# Patient Record
Sex: FEMALE | Race: WHITE | NOT HISPANIC OR LATINO
[De-identification: names, ages, dates, MRNs, and addresses within clinical notes are randomized per-mention and may not be internally consistent; named-entity substitution may affect disease eponyms.]

---

## 2017-01-06 ENCOUNTER — APPOINTMENT (OUTPATIENT)
Dept: NEPHROLOGY | Facility: CLINIC | Age: 62
End: 2017-01-06

## 2017-01-06 VITALS
HEART RATE: 90 BPM | WEIGHT: 155 LBS | BODY MASS INDEX: 24.62 KG/M2 | DIASTOLIC BLOOD PRESSURE: 70 MMHG | OXYGEN SATURATION: 99 % | TEMPERATURE: 97.7 F | HEIGHT: 66.5 IN | SYSTOLIC BLOOD PRESSURE: 150 MMHG | RESPIRATION RATE: 16 BRPM

## 2017-01-10 ENCOUNTER — RESULT REVIEW (OUTPATIENT)
Age: 62
End: 2017-01-10

## 2017-01-10 LAB
ANION GAP SERPL CALC-SCNC: 15 MMOL/L
BASOPHILS # BLD AUTO: 0.03 K/UL
BASOPHILS NFR BLD AUTO: 0.6 %
BUN SERPL-MCNC: 62 MG/DL
CALCIUM SERPL-MCNC: 8.9 MG/DL
CHLORIDE SERPL-SCNC: 107 MMOL/L
CO2 SERPL-SCNC: 21 MMOL/L
CREAT SERPL-MCNC: 3.23 MG/DL
EOSINOPHIL # BLD AUTO: 0.16 K/UL
EOSINOPHIL NFR BLD AUTO: 3 %
GLUCOSE SERPL-MCNC: 103 MG/DL
HCT VFR BLD CALC: 32.3 %
HGB BLD-MCNC: 10.7 G/DL
IMM GRANULOCYTES NFR BLD AUTO: 0.2 %
LYMPHOCYTES # BLD AUTO: 1.01 K/UL
LYMPHOCYTES NFR BLD AUTO: 18.7 %
MAN DIFF?: NORMAL
MCHC RBC-ENTMCNC: 30.7 PG
MCHC RBC-ENTMCNC: 33.1 GM/DL
MCV RBC AUTO: 92.6 FL
MONOCYTES # BLD AUTO: 0.5 K/UL
MONOCYTES NFR BLD AUTO: 9.3 %
NEUTROPHILS # BLD AUTO: 3.68 K/UL
NEUTROPHILS NFR BLD AUTO: 68.2 %
PLATELET # BLD AUTO: 177 K/UL
POTASSIUM SERPL-SCNC: 4.4 MMOL/L
RBC # BLD: 3.49 M/UL
RBC # FLD: 13.2 %
SODIUM SERPL-SCNC: 143 MMOL/L
WBC # FLD AUTO: 5.39 K/UL

## 2017-02-03 ENCOUNTER — APPOINTMENT (OUTPATIENT)
Dept: NEPHROLOGY | Facility: CLINIC | Age: 62
End: 2017-02-03

## 2017-03-31 ENCOUNTER — APPOINTMENT (OUTPATIENT)
Dept: NEPHROLOGY | Facility: CLINIC | Age: 62
End: 2017-03-31

## 2017-03-31 VITALS
WEIGHT: 153 LBS | OXYGEN SATURATION: 98 % | TEMPERATURE: 97.9 F | BODY MASS INDEX: 24.3 KG/M2 | RESPIRATION RATE: 14 BRPM | HEART RATE: 97 BPM | SYSTOLIC BLOOD PRESSURE: 172 MMHG | DIASTOLIC BLOOD PRESSURE: 98 MMHG | HEIGHT: 66.5 IN

## 2017-03-31 VITALS — DIASTOLIC BLOOD PRESSURE: 90 MMHG | SYSTOLIC BLOOD PRESSURE: 154 MMHG

## 2017-04-03 LAB
ALBUMIN SERPL ELPH-MCNC: 4.4 G/DL
ANION GAP SERPL CALC-SCNC: 18 MMOL/L
BASOPHILS # BLD AUTO: 0.03 K/UL
BASOPHILS NFR BLD AUTO: 0.4 %
BUN SERPL-MCNC: 55 MG/DL
CALCIUM SERPL-MCNC: 9.5 MG/DL
CALCIUM SERPL-MCNC: 9.5 MG/DL
CHLORIDE SERPL-SCNC: 104 MMOL/L
CO2 SERPL-SCNC: 22 MMOL/L
CREAT SERPL-MCNC: 3.22 MG/DL
EOSINOPHIL # BLD AUTO: 0.26 K/UL
EOSINOPHIL NFR BLD AUTO: 3.8 %
FERRITIN SERPL-MCNC: 240.4 NG/ML
GLUCOSE SERPL-MCNC: 85 MG/DL
HCT VFR BLD CALC: 33.4 %
HGB BLD-MCNC: 11 G/DL
IMM GRANULOCYTES NFR BLD AUTO: 0.1 %
IRON SATN MFR SERPL: 17 %
IRON SERPL-MCNC: 46 UG/DL
LYMPHOCYTES # BLD AUTO: 1.38 K/UL
LYMPHOCYTES NFR BLD AUTO: 20.3 %
MAN DIFF?: NORMAL
MCHC RBC-ENTMCNC: 30.4 PG
MCHC RBC-ENTMCNC: 32.9 GM/DL
MCV RBC AUTO: 92.3 FL
MONOCYTES # BLD AUTO: 0.49 K/UL
MONOCYTES NFR BLD AUTO: 7.2 %
NEUTROPHILS # BLD AUTO: 4.62 K/UL
NEUTROPHILS NFR BLD AUTO: 68.2 %
PARATHYROID HORMONE INTACT: 269 PG/ML
PHOSPHATE SERPL-MCNC: 3.4 MG/DL
PLATELET # BLD AUTO: 180 K/UL
POTASSIUM SERPL-SCNC: 5 MMOL/L
RBC # BLD: 3.62 M/UL
RBC # FLD: 13.4 %
SODIUM SERPL-SCNC: 144 MMOL/L
TIBC SERPL-MCNC: 271 UG/DL
UIBC SERPL-MCNC: 225 UG/DL
WBC # FLD AUTO: 6.79 K/UL

## 2017-06-23 ENCOUNTER — APPOINTMENT (OUTPATIENT)
Dept: NEPHROLOGY | Facility: CLINIC | Age: 62
End: 2017-06-23

## 2017-06-23 VITALS
TEMPERATURE: 97.1 F | SYSTOLIC BLOOD PRESSURE: 130 MMHG | OXYGEN SATURATION: 99 % | HEART RATE: 99 BPM | WEIGHT: 154 LBS | RESPIRATION RATE: 16 BRPM | DIASTOLIC BLOOD PRESSURE: 89 MMHG | BODY MASS INDEX: 24.46 KG/M2 | HEIGHT: 66.5 IN

## 2017-06-26 ENCOUNTER — RESULT REVIEW (OUTPATIENT)
Age: 62
End: 2017-06-26

## 2017-06-26 LAB
ALBUMIN SERPL ELPH-MCNC: 3.9 G/DL
ANION GAP SERPL CALC-SCNC: 13 MMOL/L
BASOPHILS # BLD AUTO: 0.02 K/UL
BASOPHILS NFR BLD AUTO: 0.3 %
BUN SERPL-MCNC: 73 MG/DL
CALCIUM SERPL-MCNC: 9.2 MG/DL
CHLORIDE SERPL-SCNC: 105 MMOL/L
CO2 SERPL-SCNC: 24 MMOL/L
CREAT SERPL-MCNC: 3.45 MG/DL
EOSINOPHIL # BLD AUTO: 0.25 K/UL
EOSINOPHIL NFR BLD AUTO: 4 %
GLUCOSE SERPL-MCNC: 103 MG/DL
HCT VFR BLD CALC: 30.6 %
HGB BLD-MCNC: 10 G/DL
IMM GRANULOCYTES NFR BLD AUTO: 0.2 %
LYMPHOCYTES # BLD AUTO: 1.57 K/UL
LYMPHOCYTES NFR BLD AUTO: 24.8 %
MAN DIFF?: NORMAL
MCHC RBC-ENTMCNC: 30 PG
MCHC RBC-ENTMCNC: 32.7 GM/DL
MCV RBC AUTO: 91.9 FL
MONOCYTES # BLD AUTO: 0.44 K/UL
MONOCYTES NFR BLD AUTO: 7 %
NEUTROPHILS # BLD AUTO: 4.03 K/UL
NEUTROPHILS NFR BLD AUTO: 63.7 %
PHOSPHATE SERPL-MCNC: 3.3 MG/DL
PLATELET # BLD AUTO: 184 K/UL
POTASSIUM SERPL-SCNC: 4.6 MMOL/L
RBC # BLD: 3.33 M/UL
RBC # FLD: 13.3 %
SODIUM SERPL-SCNC: 142 MMOL/L
WBC # FLD AUTO: 6.32 K/UL

## 2017-09-01 ENCOUNTER — APPOINTMENT (OUTPATIENT)
Dept: NEPHROLOGY | Facility: CLINIC | Age: 62
End: 2017-09-01
Payer: COMMERCIAL

## 2017-09-01 VITALS
SYSTOLIC BLOOD PRESSURE: 142 MMHG | HEART RATE: 97 BPM | HEIGHT: 66 IN | DIASTOLIC BLOOD PRESSURE: 80 MMHG | WEIGHT: 150 LBS | BODY MASS INDEX: 24.11 KG/M2 | OXYGEN SATURATION: 97 % | TEMPERATURE: 97.5 F | RESPIRATION RATE: 14 BRPM

## 2017-09-01 PROCEDURE — 99214 OFFICE O/P EST MOD 30 MIN: CPT

## 2017-09-05 ENCOUNTER — RESULT REVIEW (OUTPATIENT)
Age: 62
End: 2017-09-05

## 2017-09-06 LAB
ALBUMIN SERPL ELPH-MCNC: 4.2 G/DL
ANION GAP SERPL CALC-SCNC: 19 MMOL/L
BASOPHILS # BLD AUTO: 0.04 K/UL
BASOPHILS NFR BLD AUTO: 0.7 %
BUN SERPL-MCNC: 76 MG/DL
CALCIUM SERPL-MCNC: 9.8 MG/DL
CHLORIDE SERPL-SCNC: 108 MMOL/L
CO2 SERPL-SCNC: 17 MMOL/L
CREAT SERPL-MCNC: 3.24 MG/DL
EOSINOPHIL # BLD AUTO: 0.2 K/UL
EOSINOPHIL NFR BLD AUTO: 3.3 %
GLUCOSE SERPL-MCNC: 37 MG/DL
HCT VFR BLD CALC: 32.5 %
HGB BLD-MCNC: 10.7 G/DL
IMM GRANULOCYTES NFR BLD AUTO: 0.2 %
LYMPHOCYTES # BLD AUTO: 1.57 K/UL
LYMPHOCYTES NFR BLD AUTO: 25.8 %
MAN DIFF?: NORMAL
MCHC RBC-ENTMCNC: 30.6 PG
MCHC RBC-ENTMCNC: 32.9 GM/DL
MCV RBC AUTO: 92.9 FL
MONOCYTES # BLD AUTO: 0.38 K/UL
MONOCYTES NFR BLD AUTO: 6.3 %
NEUTROPHILS # BLD AUTO: 3.88 K/UL
NEUTROPHILS NFR BLD AUTO: 63.7 %
PHOSPHATE SERPL-MCNC: 4.1 MG/DL
PLATELET # BLD AUTO: 172 K/UL
POTASSIUM SERPL-SCNC: 4.8 MMOL/L
RBC # BLD: 3.5 M/UL
RBC # FLD: 13.6 %
SODIUM SERPL-SCNC: 144 MMOL/L
WBC # FLD AUTO: 6.08 K/UL

## 2017-09-07 ENCOUNTER — TRANSCRIPTION ENCOUNTER (OUTPATIENT)
Age: 62
End: 2017-09-07

## 2017-10-27 ENCOUNTER — APPOINTMENT (OUTPATIENT)
Dept: NEPHROLOGY | Facility: CLINIC | Age: 62
End: 2017-10-27
Payer: COMMERCIAL

## 2017-10-27 VITALS
RESPIRATION RATE: 14 BRPM | OXYGEN SATURATION: 98 % | HEIGHT: 66 IN | SYSTOLIC BLOOD PRESSURE: 146 MMHG | HEART RATE: 90 BPM | WEIGHT: 149 LBS | TEMPERATURE: 97.9 F | BODY MASS INDEX: 23.95 KG/M2 | DIASTOLIC BLOOD PRESSURE: 80 MMHG

## 2017-10-27 PROCEDURE — 99214 OFFICE O/P EST MOD 30 MIN: CPT

## 2017-10-30 ENCOUNTER — RESULT REVIEW (OUTPATIENT)
Age: 62
End: 2017-10-30

## 2017-10-30 ENCOUNTER — RX RENEWAL (OUTPATIENT)
Age: 62
End: 2017-10-30

## 2017-11-02 LAB
ALBUMIN SERPL ELPH-MCNC: 4.6 G/DL
ANION GAP SERPL CALC-SCNC: 21 MMOL/L
BUN SERPL-MCNC: 65 MG/DL
CALCIUM SERPL-MCNC: 10 MG/DL
CHLORIDE SERPL-SCNC: 101 MMOL/L
CO2 SERPL-SCNC: 22 MMOL/L
CREAT SERPL-MCNC: 3.65 MG/DL
GLUCOSE SERPL-MCNC: 41 MG/DL
PHOSPHATE SERPL-MCNC: 3 MG/DL
POTASSIUM SERPL-SCNC: 4.4 MMOL/L
SODIUM SERPL-SCNC: 144 MMOL/L

## 2017-11-06 ENCOUNTER — APPOINTMENT (OUTPATIENT)
Dept: VASCULAR SURGERY | Facility: CLINIC | Age: 62
End: 2017-11-06
Payer: COMMERCIAL

## 2017-11-06 VITALS
HEIGHT: 66 IN | WEIGHT: 149 LBS | DIASTOLIC BLOOD PRESSURE: 75 MMHG | SYSTOLIC BLOOD PRESSURE: 161 MMHG | BODY MASS INDEX: 23.95 KG/M2 | HEART RATE: 73 BPM | TEMPERATURE: 98.3 F

## 2017-11-06 PROCEDURE — G0365: CPT

## 2017-11-06 PROCEDURE — 99202 OFFICE O/P NEW SF 15 MIN: CPT

## 2017-11-07 ENCOUNTER — TRANSCRIPTION ENCOUNTER (OUTPATIENT)
Age: 62
End: 2017-11-07

## 2017-11-08 ENCOUNTER — OUTPATIENT (OUTPATIENT)
Dept: OUTPATIENT SERVICES | Facility: HOSPITAL | Age: 62
LOS: 1 days | End: 2017-11-08
Payer: COMMERCIAL

## 2017-11-08 VITALS
TEMPERATURE: 98 F | DIASTOLIC BLOOD PRESSURE: 86 MMHG | HEART RATE: 84 BPM | SYSTOLIC BLOOD PRESSURE: 167 MMHG | HEIGHT: 66 IN | OXYGEN SATURATION: 100 % | RESPIRATION RATE: 20 BRPM | WEIGHT: 149.03 LBS

## 2017-11-08 DIAGNOSIS — Z90.89 ACQUIRED ABSENCE OF OTHER ORGANS: Chronic | ICD-10-CM

## 2017-11-08 DIAGNOSIS — N18.6 END STAGE RENAL DISEASE: ICD-10-CM

## 2017-11-08 DIAGNOSIS — Z01.818 ENCOUNTER FOR OTHER PREPROCEDURAL EXAMINATION: ICD-10-CM

## 2017-11-08 LAB
ALBUMIN SERPL ELPH-MCNC: 4.3 G/DL — SIGNIFICANT CHANGE UP (ref 3.3–5)
ALP SERPL-CCNC: 101 U/L — SIGNIFICANT CHANGE UP (ref 40–120)
ALT FLD-CCNC: 8 U/L — LOW (ref 10–45)
ANION GAP SERPL CALC-SCNC: 19 MMOL/L — HIGH (ref 5–17)
AST SERPL-CCNC: 16 U/L — SIGNIFICANT CHANGE UP (ref 10–40)
BILIRUB SERPL-MCNC: 0.5 MG/DL — SIGNIFICANT CHANGE UP (ref 0.2–1.2)
BUN SERPL-MCNC: 54 MG/DL — HIGH (ref 7–23)
CALCIUM SERPL-MCNC: 9.9 MG/DL — SIGNIFICANT CHANGE UP (ref 8.4–10.5)
CHLORIDE SERPL-SCNC: 104 MMOL/L — SIGNIFICANT CHANGE UP (ref 96–108)
CO2 SERPL-SCNC: 21 MMOL/L — LOW (ref 22–31)
CREAT SERPL-MCNC: 3.38 MG/DL — HIGH (ref 0.5–1.3)
GLUCOSE SERPL-MCNC: 107 MG/DL — HIGH (ref 70–99)
HCT VFR BLD CALC: 32.5 % — LOW (ref 34.5–45)
HGB BLD-MCNC: 10.9 G/DL — LOW (ref 11.5–15.5)
MCHC RBC-ENTMCNC: 31.1 PG — SIGNIFICANT CHANGE UP (ref 27–34)
MCHC RBC-ENTMCNC: 33.5 GM/DL — SIGNIFICANT CHANGE UP (ref 32–36)
MCV RBC AUTO: 92.6 FL — SIGNIFICANT CHANGE UP (ref 80–100)
PLATELET # BLD AUTO: 171 K/UL — SIGNIFICANT CHANGE UP (ref 150–400)
POTASSIUM SERPL-MCNC: 4.6 MMOL/L — SIGNIFICANT CHANGE UP (ref 3.5–5.3)
POTASSIUM SERPL-SCNC: 4.6 MMOL/L — SIGNIFICANT CHANGE UP (ref 3.5–5.3)
PROT SERPL-MCNC: 7.8 G/DL — SIGNIFICANT CHANGE UP (ref 6–8.3)
RBC # BLD: 3.51 M/UL — LOW (ref 3.8–5.2)
RBC # FLD: 13.1 % — SIGNIFICANT CHANGE UP (ref 10.3–14.5)
SODIUM SERPL-SCNC: 144 MMOL/L — SIGNIFICANT CHANGE UP (ref 135–145)
WBC # BLD: 7.08 K/UL — SIGNIFICANT CHANGE UP (ref 3.8–10.5)
WBC # FLD AUTO: 7.08 K/UL — SIGNIFICANT CHANGE UP (ref 3.8–10.5)

## 2017-11-08 PROCEDURE — 80053 COMPREHEN METABOLIC PANEL: CPT

## 2017-11-08 PROCEDURE — 85027 COMPLETE CBC AUTOMATED: CPT

## 2017-11-08 PROCEDURE — G0463: CPT

## 2017-11-08 RX ORDER — CEFAZOLIN SODIUM 1 G
2000 VIAL (EA) INJECTION ONCE
Qty: 0 | Refills: 0 | Status: DISCONTINUED | OUTPATIENT
Start: 2017-11-10 | End: 2017-11-25

## 2017-11-08 NOTE — H&P PST ADULT - HISTORY OF PRESENT ILLNESS
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

## 2017-11-08 NOTE — H&P PST ADULT - ATTENDING COMMENTS
Pt known  CKD  Impending HD  Needs perm access  Plan L UE AVF  COnd, options, procedure, risks/benegits/implications dw'ed pt and . Pt known  CKD  Impending HD  Needs perm access  Plan L UE AVF  COnd, options, procedure, risks/benegits/implications dw'ed pt and .  11/10/17; No changes  Will proceed w l AVF

## 2017-11-08 NOTE — H&P PST ADULT - NSANTHOSAYNRD_GEN_A_CORE
No. RENETTA screening performed.  STOP BANG Legend: 0-2 = LOW Risk; 3-4 = INTERMEDIATE Risk; 5-8 = HIGH Risk

## 2017-11-08 NOTE — H&P PST ADULT - PMH
Alport's syndrome  carrier  CKD (chronic kidney disease) stage 4, GFR 15-29 ml/min    Hard of hearing    HTN (hypertension) Alport's syndrome  carrier  Anemia    CKD (chronic kidney disease) stage 4, GFR 15-29 ml/min    Hard of hearing    HTN (hypertension)

## 2017-11-10 ENCOUNTER — OUTPATIENT (OUTPATIENT)
Dept: OUTPATIENT SERVICES | Facility: HOSPITAL | Age: 62
LOS: 1 days | End: 2017-11-10
Payer: COMMERCIAL

## 2017-11-10 VITALS
TEMPERATURE: 99 F | RESPIRATION RATE: 18 BRPM | SYSTOLIC BLOOD PRESSURE: 118 MMHG | WEIGHT: 149.03 LBS | DIASTOLIC BLOOD PRESSURE: 70 MMHG | HEIGHT: 66 IN | OXYGEN SATURATION: 100 % | HEART RATE: 74 BPM

## 2017-11-10 VITALS
TEMPERATURE: 99 F | OXYGEN SATURATION: 100 % | DIASTOLIC BLOOD PRESSURE: 68 MMHG | SYSTOLIC BLOOD PRESSURE: 112 MMHG | RESPIRATION RATE: 18 BRPM | HEART RATE: 55 BPM

## 2017-11-10 DIAGNOSIS — N18.6 END STAGE RENAL DISEASE: ICD-10-CM

## 2017-11-10 DIAGNOSIS — Z90.89 ACQUIRED ABSENCE OF OTHER ORGANS: Chronic | ICD-10-CM

## 2017-11-10 LAB
POTASSIUM SERPL-MCNC: 4.6 MMOL/L — SIGNIFICANT CHANGE UP (ref 3.5–5.3)
POTASSIUM SERPL-SCNC: 4.6 MMOL/L — SIGNIFICANT CHANGE UP (ref 3.5–5.3)

## 2017-11-10 PROCEDURE — C1889: CPT

## 2017-11-10 PROCEDURE — 36821 AV FUSION DIRECT ANY SITE: CPT

## 2017-11-10 PROCEDURE — 36819 AV FUSE UPPR ARM BASILIC: CPT | Mod: LT

## 2017-11-10 PROCEDURE — 36820 AV FUSION/FOREARM VEIN: CPT | Mod: GC

## 2017-11-10 PROCEDURE — 84132 ASSAY OF SERUM POTASSIUM: CPT

## 2017-11-10 RX ORDER — ACETAMINOPHEN WITH CODEINE 300MG-30MG
1 TABLET ORAL
Qty: 10 | Refills: 0
Start: 2017-11-10

## 2017-11-10 RX ORDER — LIDOCAINE HCL 20 MG/ML
0.2 VIAL (ML) INJECTION ONCE
Qty: 0 | Refills: 0 | Status: DISCONTINUED | OUTPATIENT
Start: 2017-11-10 | End: 2017-11-10

## 2017-11-10 RX ORDER — SODIUM CHLORIDE 9 MG/ML
3 INJECTION INTRAMUSCULAR; INTRAVENOUS; SUBCUTANEOUS EVERY 8 HOURS
Qty: 0 | Refills: 0 | Status: DISCONTINUED | OUTPATIENT
Start: 2017-11-10 | End: 2017-11-10

## 2017-11-10 NOTE — ASU DISCHARGE PLAN (ADULT/PEDIATRIC). - MEDICATION SUMMARY - MEDICATIONS TO TAKE
I will START or STAY ON the medications listed below when I get home from the hospital:    Tylenol with Codeine #3 oral tablet  -- 1 tab(s) by mouth every 4 hours MDD:6  -- Caution federal law prohibits the transfer of this drug to any person other  than the person for whom it was prescribed.  May cause drowsiness.  Alcohol may intensify this effect.  Use care when operating dangerous machinery.  This product contains acetaminophen.  Do not use  with any other product containing acetaminophen to prevent possible liver damage.  Using more of this medication than prescribed may cause serious breathing problems.    -- Indication: For post op pain    sodium bicarbonate 650 mg oral tablet  -- 2 tab(s) by mouth 2 times a day  -- Indication: For GERD    carvedilol 3.125 mg oral tablet  -- 1 tab(s) by mouth 2 times a day  -- Indication: For tachycardia    amLODIPine 10 mg oral tablet  -- 1 tab(s) by mouth once a day  -- Indication: For HTN    furosemide 20 mg oral tablet  -- 1 tab(s) by mouth once a day  -- Indication: For CKD    Iron 100 Plus oral tablet  -- 1 tab(s) by mouth once a day  -- Indication: For anemia

## 2017-11-10 NOTE — ASU DISCHARGE PLAN (ADULT/PEDIATRIC). - NOTIFY
Bleeding that does not stop/Numbness, tingling/Pain not relieved by Medications/Swelling that continues

## 2017-11-25 ENCOUNTER — TRANSCRIPTION ENCOUNTER (OUTPATIENT)
Age: 62
End: 2017-11-25

## 2017-12-04 ENCOUNTER — TRANSCRIPTION ENCOUNTER (OUTPATIENT)
Age: 62
End: 2017-12-04

## 2017-12-07 ENCOUNTER — APPOINTMENT (OUTPATIENT)
Dept: VASCULAR SURGERY | Facility: CLINIC | Age: 62
End: 2017-12-07
Payer: COMMERCIAL

## 2017-12-07 VITALS
DIASTOLIC BLOOD PRESSURE: 79 MMHG | HEART RATE: 87 BPM | SYSTOLIC BLOOD PRESSURE: 161 MMHG | WEIGHT: 146 LBS | BODY MASS INDEX: 23.46 KG/M2 | TEMPERATURE: 97.7 F | HEIGHT: 66 IN

## 2017-12-07 PROCEDURE — 93990 DOPPLER FLOW TESTING: CPT

## 2017-12-07 PROCEDURE — 99024 POSTOP FOLLOW-UP VISIT: CPT

## 2018-01-05 ENCOUNTER — APPOINTMENT (OUTPATIENT)
Dept: NEPHROLOGY | Facility: CLINIC | Age: 63
End: 2018-01-05
Payer: COMMERCIAL

## 2018-01-05 VITALS
SYSTOLIC BLOOD PRESSURE: 130 MMHG | WEIGHT: 150 LBS | RESPIRATION RATE: 18 BRPM | HEART RATE: 89 BPM | OXYGEN SATURATION: 99 % | DIASTOLIC BLOOD PRESSURE: 70 MMHG | BODY MASS INDEX: 24.11 KG/M2 | TEMPERATURE: 98.6 F | HEIGHT: 66 IN

## 2018-01-05 PROCEDURE — 99214 OFFICE O/P EST MOD 30 MIN: CPT | Mod: GC

## 2018-01-05 RX ORDER — AMOXICILLIN 250 MG/1
250 CAPSULE ORAL
Qty: 21 | Refills: 0 | Status: DISCONTINUED | COMMUNITY
Start: 2017-08-18

## 2018-01-05 RX ORDER — CARVEDILOL 3.12 MG/1
3.12 TABLET, FILM COATED ORAL
Qty: 60 | Refills: 0 | Status: DISCONTINUED | COMMUNITY
Start: 2016-11-14

## 2018-01-05 RX ORDER — ACETAMINOPHEN AND CODEINE 300; 30 MG/1; MG/1
300-30 TABLET ORAL
Qty: 10 | Refills: 0 | Status: DISCONTINUED | COMMUNITY
Start: 2017-11-10

## 2018-01-08 ENCOUNTER — RESULT REVIEW (OUTPATIENT)
Age: 63
End: 2018-01-08

## 2018-01-08 LAB
ALBUMIN SERPL ELPH-MCNC: 4.1 G/DL
ANION GAP SERPL CALC-SCNC: 18 MMOL/L
BASOPHILS # BLD AUTO: 0.02 K/UL
BASOPHILS NFR BLD AUTO: 0.3 %
BUN SERPL-MCNC: 67 MG/DL
CALCIUM SERPL-MCNC: 10.2 MG/DL
CALCIUM SERPL-MCNC: 9.9 MG/DL
CHLORIDE SERPL-SCNC: 104 MMOL/L
CO2 SERPL-SCNC: 21 MMOL/L
CREAT SERPL-MCNC: 3.86 MG/DL
EOSINOPHIL # BLD AUTO: 0.18 K/UL
EOSINOPHIL NFR BLD AUTO: 2.7 %
GLUCOSE SERPL-MCNC: 94 MG/DL
HCT VFR BLD CALC: 32.1 %
HGB BLD-MCNC: 10.6 G/DL
IMM GRANULOCYTES NFR BLD AUTO: 0.2 %
LYMPHOCYTES # BLD AUTO: 1.28 K/UL
LYMPHOCYTES NFR BLD AUTO: 19.4 %
MAN DIFF?: NORMAL
MCHC RBC-ENTMCNC: 31.7 PG
MCHC RBC-ENTMCNC: 33 GM/DL
MCV RBC AUTO: 96.1 FL
MONOCYTES # BLD AUTO: 0.48 K/UL
MONOCYTES NFR BLD AUTO: 7.3 %
NEUTROPHILS # BLD AUTO: 4.62 K/UL
NEUTROPHILS NFR BLD AUTO: 70.1 %
PARATHYROID HORMONE INTACT: 337 PG/ML
PHOSPHATE SERPL-MCNC: 4.3 MG/DL
PLATELET # BLD AUTO: 179 K/UL
POTASSIUM SERPL-SCNC: 5.4 MMOL/L
RBC # BLD: 3.34 M/UL
RBC # FLD: 13.1 %
SODIUM SERPL-SCNC: 143 MMOL/L
WBC # FLD AUTO: 6.59 K/UL

## 2018-01-12 ENCOUNTER — APPOINTMENT (OUTPATIENT)
Dept: NEPHROLOGY | Facility: CLINIC | Age: 63
End: 2018-01-12
Payer: COMMERCIAL

## 2018-01-12 PROCEDURE — 36415 COLL VENOUS BLD VENIPUNCTURE: CPT

## 2018-01-17 LAB
ALBUMIN SERPL ELPH-MCNC: 4.5 G/DL
ANION GAP SERPL CALC-SCNC: 19 MMOL/L
BUN SERPL-MCNC: 67 MG/DL
CALCIUM SERPL-MCNC: 9.8 MG/DL
CHLORIDE SERPL-SCNC: 103 MMOL/L
CO2 SERPL-SCNC: 23 MMOL/L
CREAT SERPL-MCNC: 3.98 MG/DL
GLUCOSE SERPL-MCNC: 97 MG/DL
PHOSPHATE SERPL-MCNC: 4.5 MG/DL
POTASSIUM SERPL-SCNC: 4.8 MMOL/L
SODIUM SERPL-SCNC: 145 MMOL/L

## 2018-02-01 ENCOUNTER — APPOINTMENT (OUTPATIENT)
Dept: VASCULAR SURGERY | Facility: CLINIC | Age: 63
End: 2018-02-01
Payer: COMMERCIAL

## 2018-02-01 VITALS
HEART RATE: 80 BPM | SYSTOLIC BLOOD PRESSURE: 141 MMHG | WEIGHT: 150 LBS | HEIGHT: 66 IN | TEMPERATURE: 98.1 F | BODY MASS INDEX: 24.11 KG/M2 | DIASTOLIC BLOOD PRESSURE: 74 MMHG

## 2018-02-01 PROCEDURE — 99024 POSTOP FOLLOW-UP VISIT: CPT

## 2018-02-01 PROCEDURE — 93971 EXTREMITY STUDY: CPT

## 2018-03-16 ENCOUNTER — APPOINTMENT (OUTPATIENT)
Dept: NEPHROLOGY | Facility: CLINIC | Age: 63
End: 2018-03-16
Payer: COMMERCIAL

## 2018-03-16 VITALS
HEIGHT: 66 IN | WEIGHT: 152 LBS | BODY MASS INDEX: 24.43 KG/M2 | HEART RATE: 93 BPM | DIASTOLIC BLOOD PRESSURE: 80 MMHG | SYSTOLIC BLOOD PRESSURE: 140 MMHG | TEMPERATURE: 97.5 F | RESPIRATION RATE: 16 BRPM

## 2018-03-16 PROCEDURE — 99214 OFFICE O/P EST MOD 30 MIN: CPT

## 2018-03-23 ENCOUNTER — RESULT REVIEW (OUTPATIENT)
Age: 63
End: 2018-03-23

## 2018-03-23 LAB
ALBUMIN SERPL ELPH-MCNC: 4.1 G/DL
ANION GAP SERPL CALC-SCNC: 17 MMOL/L
BUN SERPL-MCNC: 72 MG/DL
CALCIUM SERPL-MCNC: 9.4 MG/DL
CHLORIDE SERPL-SCNC: 100 MMOL/L
CO2 SERPL-SCNC: 24 MMOL/L
CREAT SERPL-MCNC: 3.53 MG/DL
GLUCOSE SERPL-MCNC: 117 MG/DL
PHOSPHATE SERPL-MCNC: 3.2 MG/DL
POTASSIUM SERPL-SCNC: 4.5 MMOL/L
SODIUM SERPL-SCNC: 141 MMOL/L

## 2018-05-10 ENCOUNTER — APPOINTMENT (OUTPATIENT)
Dept: VASCULAR SURGERY | Facility: CLINIC | Age: 63
End: 2018-05-10
Payer: COMMERCIAL

## 2018-05-10 VITALS
HEIGHT: 66 IN | SYSTOLIC BLOOD PRESSURE: 146 MMHG | HEART RATE: 79 BPM | WEIGHT: 152 LBS | DIASTOLIC BLOOD PRESSURE: 76 MMHG | BODY MASS INDEX: 24.43 KG/M2

## 2018-05-10 PROCEDURE — 99212 OFFICE O/P EST SF 10 MIN: CPT | Mod: 25

## 2018-05-10 PROCEDURE — 93990 DOPPLER FLOW TESTING: CPT

## 2018-05-11 ENCOUNTER — APPOINTMENT (OUTPATIENT)
Dept: NEPHROLOGY | Facility: CLINIC | Age: 63
End: 2018-05-11
Payer: COMMERCIAL

## 2018-05-11 VITALS
HEIGHT: 66 IN | SYSTOLIC BLOOD PRESSURE: 120 MMHG | HEART RATE: 78 BPM | WEIGHT: 150 LBS | TEMPERATURE: 98 F | OXYGEN SATURATION: 98 % | BODY MASS INDEX: 24.11 KG/M2 | DIASTOLIC BLOOD PRESSURE: 80 MMHG | RESPIRATION RATE: 16 BRPM

## 2018-05-11 PROCEDURE — 99214 OFFICE O/P EST MOD 30 MIN: CPT | Mod: GC

## 2018-05-14 LAB
ALBUMIN SERPL ELPH-MCNC: 4.1 G/DL
ANION GAP SERPL CALC-SCNC: 17 MMOL/L
BASOPHILS # BLD AUTO: 0.03 K/UL
BASOPHILS NFR BLD AUTO: 0.5 %
BUN SERPL-MCNC: 64 MG/DL
CALCIUM SERPL-MCNC: 9.5 MG/DL
CHLORIDE SERPL-SCNC: 100 MMOL/L
CO2 SERPL-SCNC: 24 MMOL/L
CREAT SERPL-MCNC: 3.87 MG/DL
EOSINOPHIL # BLD AUTO: 0.18 K/UL
EOSINOPHIL NFR BLD AUTO: 3.1 %
GLUCOSE SERPL-MCNC: 97 MG/DL
HCT VFR BLD CALC: 32.8 %
HGB BLD-MCNC: 10.8 G/DL
IMM GRANULOCYTES NFR BLD AUTO: 0.2 %
LYMPHOCYTES # BLD AUTO: 1.46 K/UL
LYMPHOCYTES NFR BLD AUTO: 24.7 %
MAN DIFF?: NORMAL
MCHC RBC-ENTMCNC: 31.5 PG
MCHC RBC-ENTMCNC: 32.9 GM/DL
MCV RBC AUTO: 95.6 FL
MONOCYTES # BLD AUTO: 0.5 K/UL
MONOCYTES NFR BLD AUTO: 8.5 %
NEUTROPHILS # BLD AUTO: 3.72 K/UL
NEUTROPHILS NFR BLD AUTO: 63 %
PHOSPHATE SERPL-MCNC: 4 MG/DL
PLATELET # BLD AUTO: 168 K/UL
POTASSIUM SERPL-SCNC: 4.8 MMOL/L
RBC # BLD: 3.43 M/UL
RBC # FLD: 13.2 %
SODIUM SERPL-SCNC: 141 MMOL/L
WBC # FLD AUTO: 5.9 K/UL

## 2018-07-11 ENCOUNTER — RX RENEWAL (OUTPATIENT)
Age: 63
End: 2018-07-11

## 2018-07-20 ENCOUNTER — APPOINTMENT (OUTPATIENT)
Dept: NEPHROLOGY | Facility: CLINIC | Age: 63
End: 2018-07-20
Payer: COMMERCIAL

## 2018-07-20 VITALS
OXYGEN SATURATION: 99 % | BODY MASS INDEX: 24.75 KG/M2 | HEIGHT: 66 IN | DIASTOLIC BLOOD PRESSURE: 70 MMHG | RESPIRATION RATE: 18 BRPM | HEART RATE: 71 BPM | SYSTOLIC BLOOD PRESSURE: 130 MMHG | WEIGHT: 154 LBS

## 2018-07-20 PROCEDURE — 99214 OFFICE O/P EST MOD 30 MIN: CPT | Mod: GC

## 2018-07-24 ENCOUNTER — RESULT REVIEW (OUTPATIENT)
Age: 63
End: 2018-07-24

## 2018-07-24 LAB
ALBUMIN SERPL ELPH-MCNC: 4.5 G/DL
ANION GAP SERPL CALC-SCNC: 17 MMOL/L
BUN SERPL-MCNC: 78 MG/DL
CALCIUM SERPL-MCNC: 9.6 MG/DL
CHLORIDE SERPL-SCNC: 105 MMOL/L
CO2 SERPL-SCNC: 22 MMOL/L
CREAT SERPL-MCNC: 4.51 MG/DL
GLUCOSE SERPL-MCNC: 110 MG/DL
PHOSPHATE SERPL-MCNC: 4.2 MG/DL
POTASSIUM SERPL-SCNC: 4.8 MMOL/L
SODIUM SERPL-SCNC: 144 MMOL/L

## 2018-08-03 PROBLEM — I10 ESSENTIAL (PRIMARY) HYPERTENSION: Chronic | Status: ACTIVE | Noted: 2017-11-08

## 2018-08-03 PROBLEM — D64.9 ANEMIA, UNSPECIFIED: Chronic | Status: ACTIVE | Noted: 2017-11-08

## 2018-08-09 ENCOUNTER — APPOINTMENT (OUTPATIENT)
Dept: VASCULAR SURGERY | Facility: CLINIC | Age: 63
End: 2018-08-09
Payer: COMMERCIAL

## 2018-08-09 VITALS
BODY MASS INDEX: 24.75 KG/M2 | TEMPERATURE: 98.4 F | DIASTOLIC BLOOD PRESSURE: 71 MMHG | SYSTOLIC BLOOD PRESSURE: 164 MMHG | WEIGHT: 154 LBS | HEIGHT: 66 IN

## 2018-08-09 PROCEDURE — 93990 DOPPLER FLOW TESTING: CPT

## 2018-08-09 PROCEDURE — 99212 OFFICE O/P EST SF 10 MIN: CPT

## 2018-09-28 ENCOUNTER — APPOINTMENT (OUTPATIENT)
Dept: NEPHROLOGY | Facility: CLINIC | Age: 63
End: 2018-09-28
Payer: COMMERCIAL

## 2018-09-28 VITALS
HEART RATE: 91 BPM | RESPIRATION RATE: 18 BRPM | HEIGHT: 66 IN | WEIGHT: 154 LBS | TEMPERATURE: 98.6 F | OXYGEN SATURATION: 100 % | SYSTOLIC BLOOD PRESSURE: 157 MMHG | DIASTOLIC BLOOD PRESSURE: 80 MMHG | BODY MASS INDEX: 24.75 KG/M2

## 2018-09-28 VITALS — DIASTOLIC BLOOD PRESSURE: 80 MMHG | SYSTOLIC BLOOD PRESSURE: 140 MMHG

## 2018-09-28 DIAGNOSIS — N18.4 CHRONIC KIDNEY DISEASE, STAGE 4 (SEVERE): ICD-10-CM

## 2018-09-28 PROCEDURE — 99214 OFFICE O/P EST MOD 30 MIN: CPT | Mod: GC

## 2018-10-04 ENCOUNTER — RESULT REVIEW (OUTPATIENT)
Age: 63
End: 2018-10-04

## 2018-10-04 LAB
ALBUMIN SERPL ELPH-MCNC: 4.5 G/DL
ANION GAP SERPL CALC-SCNC: 16 MMOL/L
BUN SERPL-MCNC: 65 MG/DL
CALCIUM SERPL-MCNC: 9.9 MG/DL
CHLORIDE SERPL-SCNC: 104 MMOL/L
CO2 SERPL-SCNC: 23 MMOL/L
CREAT SERPL-MCNC: 3.6 MG/DL
GLUCOSE SERPL-MCNC: 94 MG/DL
PHOSPHATE SERPL-MCNC: 3.8 MG/DL
POTASSIUM SERPL-SCNC: 5 MMOL/L
SODIUM SERPL-SCNC: 143 MMOL/L

## 2018-11-15 ENCOUNTER — APPOINTMENT (OUTPATIENT)
Dept: NEPHROLOGY | Facility: CLINIC | Age: 63
End: 2018-11-15
Payer: COMMERCIAL

## 2018-11-15 VITALS
DIASTOLIC BLOOD PRESSURE: 76 MMHG | WEIGHT: 155 LBS | HEART RATE: 81 BPM | OXYGEN SATURATION: 100 % | HEIGHT: 66 IN | SYSTOLIC BLOOD PRESSURE: 136 MMHG | RESPIRATION RATE: 18 BRPM | TEMPERATURE: 98.4 F | BODY MASS INDEX: 24.91 KG/M2

## 2018-11-15 PROCEDURE — 99214 OFFICE O/P EST MOD 30 MIN: CPT

## 2018-11-16 ENCOUNTER — RESULT REVIEW (OUTPATIENT)
Age: 63
End: 2018-11-16

## 2018-11-16 LAB
ALBUMIN SERPL ELPH-MCNC: 4.5 G/DL
ANION GAP SERPL CALC-SCNC: 16 MMOL/L
BASOPHILS # BLD AUTO: 0.03 K/UL
BASOPHILS NFR BLD AUTO: 0.5 %
BUN SERPL-MCNC: 66 MG/DL
CALCIUM SERPL-MCNC: 9.6 MG/DL
CALCIUM SERPL-MCNC: 9.9 MG/DL
CHLORIDE SERPL-SCNC: 105 MMOL/L
CO2 SERPL-SCNC: 23 MMOL/L
CREAT SERPL-MCNC: 3.91 MG/DL
EOSINOPHIL # BLD AUTO: 0.14 K/UL
EOSINOPHIL NFR BLD AUTO: 2.2 %
GLUCOSE SERPL-MCNC: 127 MG/DL
HCT VFR BLD CALC: 33.5 %
HGB BLD-MCNC: 11.3 G/DL
IMM GRANULOCYTES NFR BLD AUTO: 0.3 %
LYMPHOCYTES # BLD AUTO: 0.99 K/UL
LYMPHOCYTES NFR BLD AUTO: 15.6 %
MAN DIFF?: NORMAL
MCHC RBC-ENTMCNC: 31.8 PG
MCHC RBC-ENTMCNC: 33.7 GM/DL
MCV RBC AUTO: 94.4 FL
MONOCYTES # BLD AUTO: 0.43 K/UL
MONOCYTES NFR BLD AUTO: 6.8 %
NEUTROPHILS # BLD AUTO: 4.72 K/UL
NEUTROPHILS NFR BLD AUTO: 74.6 %
PARATHYROID HORMONE INTACT: 235 PG/ML
PHOSPHATE SERPL-MCNC: 4.3 MG/DL
PLATELET # BLD AUTO: 165 K/UL
POTASSIUM SERPL-SCNC: 4.9 MMOL/L
RBC # BLD: 3.55 M/UL
RBC # FLD: 13.2 %
SODIUM SERPL-SCNC: 144 MMOL/L
WBC # FLD AUTO: 6.33 K/UL

## 2018-11-18 ENCOUNTER — RX RENEWAL (OUTPATIENT)
Age: 63
End: 2018-11-18

## 2018-12-06 ENCOUNTER — APPOINTMENT (OUTPATIENT)
Dept: VASCULAR SURGERY | Facility: CLINIC | Age: 63
End: 2018-12-06
Payer: COMMERCIAL

## 2018-12-06 VITALS
DIASTOLIC BLOOD PRESSURE: 72 MMHG | SYSTOLIC BLOOD PRESSURE: 149 MMHG | HEIGHT: 66 IN | BODY MASS INDEX: 24.91 KG/M2 | TEMPERATURE: 98.2 F | HEART RATE: 63 BPM | WEIGHT: 155 LBS

## 2018-12-06 PROCEDURE — 93990 DOPPLER FLOW TESTING: CPT

## 2018-12-06 PROCEDURE — 99213 OFFICE O/P EST LOW 20 MIN: CPT

## 2019-01-04 ENCOUNTER — APPOINTMENT (OUTPATIENT)
Dept: NEPHROLOGY | Facility: CLINIC | Age: 64
End: 2019-01-04
Payer: COMMERCIAL

## 2019-01-04 VITALS
SYSTOLIC BLOOD PRESSURE: 156 MMHG | WEIGHT: 154 LBS | RESPIRATION RATE: 16 BRPM | TEMPERATURE: 97.3 F | HEIGHT: 66 IN | BODY MASS INDEX: 24.75 KG/M2 | HEART RATE: 82 BPM | OXYGEN SATURATION: 100 % | DIASTOLIC BLOOD PRESSURE: 82 MMHG

## 2019-01-04 VITALS — DIASTOLIC BLOOD PRESSURE: 80 MMHG | SYSTOLIC BLOOD PRESSURE: 130 MMHG

## 2019-01-04 PROCEDURE — 99214 OFFICE O/P EST MOD 30 MIN: CPT

## 2019-01-04 NOTE — HISTORY OF PRESENT ILLNESS
[FreeTextEntry1] : 63-year-old female with h/o HTN, CKD-5 (presumed Alport's gene carrier) presents for follow-up visit. Pt. was last seen in office on 11/15/18. Patient had left AVF creation surgery on 11/10/17 by Dr. Doan and was last seen in his office for follow-up visit on 12/6/18. Pt. currently feels well. She denies CP, SOB, urinary complaints, or LE swelling. Pt. admits compliance to her medications.

## 2019-01-04 NOTE — REVIEW OF SYSTEMS
[As Noted in HPI] : as noted in HPI [Negative] : Endocrine [Fever] : no fever [Feeling Poorly] : not feeling poorly [Eye Pain] : no eye pain [Earache] : no earache [Lower Ext Edema] : no lower extremity edema [Abdominal Pain] : no abdominal pain [Dysuria] : no dysuria [Limb Swelling] : no limb swelling [Dizziness] : no dizziness [Easy Bleeding] : no tendency for easy bleeding

## 2019-01-04 NOTE — PHYSICAL EXAM
[General Appearance - Alert] : alert [General Appearance - In No Acute Distress] : in no acute distress [General Appearance - Well Nourished] : well nourished [Sclera] : the sclera and conjunctiva were normal [PERRL With Normal Accommodation] : pupils were equal in size, round, and reactive to light [Outer Ear] : the ears and nose were normal in appearance [Neck Appearance] : the appearance of the neck was normal [Auscultation Breath Sounds / Voice Sounds] : lungs were clear to auscultation bilaterally [Heart Rate And Rhythm] : heart rate was normal and rhythm regular [Heart Sounds] : normal S1 and S2 [Murmurs] : no murmurs [Edema] : there was no peripheral edema [Bowel Sounds] : normal bowel sounds [Abdomen Soft] : soft [Abnormal Walk] : normal gait [___ (cm) Fistula] : [unfilled] (cm) fistula [Bruit] : a bruit was present [Thrill] : a thrill was present [] : no rash [No Focal Deficits] : no focal deficits [Oriented To Time, Place, And Person] : oriented to person, place, and time [Impaired Insight] : insight and judgment were intact [Affect] : the affect was normal

## 2019-01-04 NOTE — ASSESSMENT
[FreeTextEntry1] : 1. CKD Stage 5: Last Scr elevated at 3.91 on labs done on 11/15/18. Pt. with advanced CKD. Patient underwent left AVF creation surgery on 11/10/17. LUE AVF can be used for HD if needed as per Dr. Doan's note dated 8/9/18. Check renal panel today. Last intact PTH level was 235 on 11/15/18. Avoid NSAIDs, RCA, and nephrotoxins.\par \par 2. HTN: Repeat BP in acceptable range during office visit today. Continue with current antihypertensives. Low salt diet advised. Monitor BP.\par \par 3. Hyperkalemia: Last serum potassium WNL (4.9) on labs done on 11/15/18. Advised to continue with low potassium diet. Check serum potassium today.\par \par 4. Metabolic acidosis: Last serum CO2 WNL at 23 on 11/15/18. Continue with oral sodium bicarbonate. Check serum CO2 level today.\par \par 5. Anemia: in setting of advanced CKD. Last hemoglobin in acceptable range (11.3) on 11/15/18. Check CBC today. \par \par 6. LE edema: in setting of advanced CKD and amlodipine use. Pt. with no LE edema on examination today. Low salt diet advised. Continue with lasix. Monitor body weights   \par \par F/U pending review of lab results.

## 2019-01-08 ENCOUNTER — RESULT REVIEW (OUTPATIENT)
Age: 64
End: 2019-01-08

## 2019-01-08 LAB
ALBUMIN SERPL ELPH-MCNC: 4.6 G/DL
ANION GAP SERPL CALC-SCNC: 18 MMOL/L
BUN SERPL-MCNC: 71 MG/DL
CALCIUM SERPL-MCNC: 9.6 MG/DL
CHLORIDE SERPL-SCNC: 103 MMOL/L
CO2 SERPL-SCNC: 21 MMOL/L
CREAT SERPL-MCNC: 3.66 MG/DL
GLUCOSE SERPL-MCNC: 95 MG/DL
PHOSPHATE SERPL-MCNC: 4.2 MG/DL
POTASSIUM SERPL-SCNC: 4.9 MMOL/L
SODIUM SERPL-SCNC: 142 MMOL/L

## 2019-03-07 ENCOUNTER — TRANSCRIPTION ENCOUNTER (OUTPATIENT)
Age: 64
End: 2019-03-07

## 2019-03-15 ENCOUNTER — APPOINTMENT (OUTPATIENT)
Dept: NEPHROLOGY | Facility: CLINIC | Age: 64
End: 2019-03-15
Payer: COMMERCIAL

## 2019-03-15 VITALS
BODY MASS INDEX: 25.23 KG/M2 | DIASTOLIC BLOOD PRESSURE: 75 MMHG | HEIGHT: 66 IN | OXYGEN SATURATION: 100 % | RESPIRATION RATE: 16 BRPM | TEMPERATURE: 98 F | HEART RATE: 90 BPM | SYSTOLIC BLOOD PRESSURE: 135 MMHG | WEIGHT: 157 LBS

## 2019-03-15 PROCEDURE — 99214 OFFICE O/P EST MOD 30 MIN: CPT

## 2019-03-15 NOTE — HISTORY OF PRESENT ILLNESS
[FreeTextEntry1] : 63-year-old female with h/o HTN, CKD-5 (presumed Alport's gene carrier) presents for follow-up visit. Pt. was last seen in office on 1/4/19. Patient had left AVF creation surgery on 11/10/17 by Dr. Doan and was last seen in his office for follow-up visit on 12/6/18. \par \par Pt. currently feels well. She denies CP, SOB, urinary complaints, or LE swelling. Pt. admits compliance to her medications.

## 2019-03-15 NOTE — ASSESSMENT
[FreeTextEntry1] : 1. CKD Stage 5: Last Scr elevated/stable at 3.66 on labs done on 1/4/19. Pt. with advanced CKD. Patient underwent left AVF creation surgery on 11/10/17. LUE AVF can be used for HD if needed as per Dr. Doan's note dated 8/9/18. Check renal panel today. Last intact PTH level was 235 on 11/15/18. Avoid NSAIDs, RCA, and nephrotoxins.\par \par 2. HTN: BP in acceptable range during office visit today. Continue with current antihypertensives. Low salt diet advised. Monitor BP.\par \par 3. Hyperkalemia: Last serum potassium WNL (4.9) on labs done on 1/4/19. Advised to continue with low potassium diet. Check serum potassium today.\par \par 4. Metabolic acidosis: Last serum CO2 low at 21 on 1/4/19. Continue with oral sodium bicarbonate. Check serum CO2 level today.\par \par 5. Anemia: in setting of advanced CKD. Last hemoglobin in acceptable range (11.3) on 11/15/18. Check CBC today. \par \par 6. LE edema: in setting of advanced CKD and amlodipine use. Pt. with mild bilateral LE edema on examination today. Low salt diet advised. Continue with lasix. Monitor body weights   \par \par F/U pending review of lab results.

## 2019-03-15 NOTE — REVIEW OF SYSTEMS
[Lower Ext Edema] : lower extremity edema [As Noted in HPI] : as noted in HPI [Limb Swelling] : limb swelling [Negative] : Endocrine [Fever] : no fever [Feeling Poorly] : not feeling poorly [Eye Pain] : no eye pain [Earache] : no earache [Abdominal Pain] : no abdominal pain [Dysuria] : no dysuria [Dizziness] : no dizziness [Easy Bleeding] : no tendency for easy bleeding [FreeTextEntry9] : intermittent bilateral LE swelling (usually at end of the day)

## 2019-03-15 NOTE — PHYSICAL EXAM
[General Appearance - Alert] : alert [General Appearance - In No Acute Distress] : in no acute distress [General Appearance - Well Nourished] : well nourished [Sclera] : the sclera and conjunctiva were normal [PERRL With Normal Accommodation] : pupils were equal in size, round, and reactive to light [Outer Ear] : the ears and nose were normal in appearance [Neck Appearance] : the appearance of the neck was normal [Auscultation Breath Sounds / Voice Sounds] : lungs were clear to auscultation bilaterally [Heart Rate And Rhythm] : heart rate was normal and rhythm regular [Heart Sounds] : normal S1 and S2 [Murmurs] : no murmurs [Bowel Sounds] : normal bowel sounds [Abdomen Soft] : soft [Abnormal Walk] : normal gait [___ (cm) Fistula] : [unfilled] (cm) fistula [Bruit] : a bruit was present [Thrill] : a thrill was present [] : no rash [No Focal Deficits] : no focal deficits [Oriented To Time, Place, And Person] : oriented to person, place, and time [Impaired Insight] : insight and judgment were intact [Affect] : the affect was normal [FreeTextEntry1] : Bilateral LE: mild pitting edema present

## 2019-04-12 LAB
ALBUMIN SERPL ELPH-MCNC: 4.5 G/DL
ANION GAP SERPL CALC-SCNC: 16 MMOL/L
BUN SERPL-MCNC: 70 MG/DL
CALCIUM SERPL-MCNC: 9.5 MG/DL
CHLORIDE SERPL-SCNC: 105 MMOL/L
CO2 SERPL-SCNC: 22 MMOL/L
CREAT SERPL-MCNC: 3.73 MG/DL
GLUCOSE SERPL-MCNC: 93 MG/DL
PHOSPHATE SERPL-MCNC: 4.6 MG/DL
POTASSIUM SERPL-SCNC: 4.8 MMOL/L
SODIUM SERPL-SCNC: 143 MMOL/L

## 2019-05-16 ENCOUNTER — APPOINTMENT (OUTPATIENT)
Dept: NEPHROLOGY | Facility: CLINIC | Age: 64
End: 2019-05-16
Payer: COMMERCIAL

## 2019-05-16 VITALS
TEMPERATURE: 97.5 F | SYSTOLIC BLOOD PRESSURE: 145 MMHG | DIASTOLIC BLOOD PRESSURE: 80 MMHG | BODY MASS INDEX: 25.66 KG/M2 | OXYGEN SATURATION: 96 % | RESPIRATION RATE: 16 BRPM | WEIGHT: 159 LBS | HEART RATE: 86 BPM

## 2019-05-16 PROCEDURE — 99214 OFFICE O/P EST MOD 30 MIN: CPT

## 2019-05-16 NOTE — PHYSICAL EXAM
[General Appearance - In No Acute Distress] : in no acute distress [General Appearance - Alert] : alert [General Appearance - Well Nourished] : well nourished [PERRL With Normal Accommodation] : pupils were equal in size, round, and reactive to light [Sclera] : the sclera and conjunctiva were normal [Neck Appearance] : the appearance of the neck was normal [Outer Ear] : the ears and nose were normal in appearance [Jugular Venous Distention Increased] : there was no jugular-venous distention [Heart Sounds] : normal S1 and S2 [Auscultation Breath Sounds / Voice Sounds] : lungs were clear to auscultation bilaterally [Heart Rate And Rhythm] : heart rate was normal and rhythm regular [Bowel Sounds] : normal bowel sounds [Murmurs] : no murmurs [Edema] : there was no peripheral edema [Abnormal Walk] : normal gait [Abdomen Soft] : soft [___ (cm) Fistula] : [unfilled] (cm) fistula [Bruit] : a bruit was present [] : no rash [Thrill] : a thrill was present [No Focal Deficits] : no focal deficits [Oriented To Time, Place, And Person] : oriented to person, place, and time [Impaired Insight] : insight and judgment were intact [Affect] : the affect was normal

## 2019-05-16 NOTE — ASSESSMENT
[FreeTextEntry1] : 1. CKD Stage 5: Last Scr elevated/stable at 3.73 on labs done on 3/15/19. Pt. with advanced CKD. Patient underwent left AVF creation surgery on 11/10/17. LUE AVF can be used for HD if needed as per Dr. Doan's note dated 8/9/18. Check renal panel today. Last intact PTH level was 235 on 11/15/18. Avoid NSAIDs, RCA, and nephrotoxins.\par \par 2. HTN: BP in acceptable range during office visit today. Continue with current antihypertensives. Low salt diet advised. Monitor BP.\par \par 3. Hyperkalemia: Last serum potassium WNL (4.8) on labs done on 3/15/19. Advised to continue with low potassium diet. Check serum potassium today.\par \par 4. Metabolic acidosis: Last serum CO2 low at 22 on 3/15/19. Continue with oral sodium bicarbonate. Check serum CO2 level today.\par \par 5. Anemia: in setting of advanced CKD. Last hemoglobin in acceptable range (11.3) on 11/15/18. Check CBC today. \par \par 6. LE edema: in setting of advanced CKD and amlodipine use. No LE edema on examination today. Low salt diet advised. Continue with lasix. Monitor body weights   \par \par F/U pending review of lab results.

## 2019-05-16 NOTE — HISTORY OF PRESENT ILLNESS
[FreeTextEntry1] : 63-year-old female with advanced CKD-5 (presumed Alport's gene carrier) and HTN presents for follow-up visit. Pt. was last seen in office on 3/15/19. Patient had left AVF creation surgery on 11/10/17 by Dr. Doan and was last seen in his office for follow-up visit on 12/6/18. \par \par Pt. currently feels well. She denies CP, SOB, urinary complaints, or LE swelling. Pt. admits compliance to her medications.

## 2019-05-16 NOTE — REVIEW OF SYSTEMS
[As Noted in HPI] : as noted in HPI [Negative] : Psychiatric [Fever] : no fever [Feeling Poorly] : not feeling poorly [Eye Pain] : no eye pain [Earache] : no earache [Lower Ext Edema] : no lower extremity edema [Abdominal Pain] : no abdominal pain [Dysuria] : no dysuria [Limb Swelling] : no limb swelling [Dizziness] : no dizziness [Easy Bleeding] : no tendency for easy bleeding

## 2019-05-17 ENCOUNTER — RESULT REVIEW (OUTPATIENT)
Age: 64
End: 2019-05-17

## 2019-05-17 LAB
ALBUMIN SERPL ELPH-MCNC: 4.6 G/DL
ANION GAP SERPL CALC-SCNC: 15 MMOL/L
BASOPHILS # BLD AUTO: 0.05 K/UL
BASOPHILS NFR BLD AUTO: 0.8 %
BUN SERPL-MCNC: 72 MG/DL
CALCIUM SERPL-MCNC: 10 MG/DL
CALCIUM SERPL-MCNC: 9.8 MG/DL
CHLORIDE SERPL-SCNC: 106 MMOL/L
CO2 SERPL-SCNC: 23 MMOL/L
CREAT SERPL-MCNC: 3.77 MG/DL
EOSINOPHIL # BLD AUTO: 0.18 K/UL
EOSINOPHIL NFR BLD AUTO: 2.8 %
GLUCOSE SERPL-MCNC: 118 MG/DL
HCT VFR BLD CALC: 36 %
HGB BLD-MCNC: 11.6 G/DL
IMM GRANULOCYTES NFR BLD AUTO: 0.2 %
LYMPHOCYTES # BLD AUTO: 1 K/UL
LYMPHOCYTES NFR BLD AUTO: 15.4 %
MAN DIFF?: NORMAL
MCHC RBC-ENTMCNC: 31.3 PG
MCHC RBC-ENTMCNC: 32.2 GM/DL
MCV RBC AUTO: 97 FL
MONOCYTES # BLD AUTO: 0.38 K/UL
MONOCYTES NFR BLD AUTO: 5.9 %
NEUTROPHILS # BLD AUTO: 4.87 K/UL
NEUTROPHILS NFR BLD AUTO: 74.9 %
PARATHYROID HORMONE INTACT: 211 PG/ML
PHOSPHATE SERPL-MCNC: 4.1 MG/DL
PLATELET # BLD AUTO: 182 K/UL
POTASSIUM SERPL-SCNC: 5.1 MMOL/L
RBC # BLD: 3.71 M/UL
RBC # FLD: 12.5 %
SODIUM SERPL-SCNC: 144 MMOL/L
WBC # FLD AUTO: 6.49 K/UL

## 2019-06-07 ENCOUNTER — APPOINTMENT (OUTPATIENT)
Dept: VASCULAR SURGERY | Facility: CLINIC | Age: 64
End: 2019-06-07
Payer: COMMERCIAL

## 2019-06-07 VITALS
BODY MASS INDEX: 25.55 KG/M2 | TEMPERATURE: 98.5 F | HEIGHT: 66 IN | WEIGHT: 159 LBS | HEART RATE: 81 BPM | DIASTOLIC BLOOD PRESSURE: 73 MMHG | SYSTOLIC BLOOD PRESSURE: 148 MMHG

## 2019-06-07 PROCEDURE — 99212 OFFICE O/P EST SF 10 MIN: CPT

## 2019-06-07 PROCEDURE — 93990 DOPPLER FLOW TESTING: CPT

## 2019-07-19 ENCOUNTER — APPOINTMENT (OUTPATIENT)
Dept: NEPHROLOGY | Facility: CLINIC | Age: 64
End: 2019-07-19
Payer: COMMERCIAL

## 2019-07-19 VITALS
DIASTOLIC BLOOD PRESSURE: 77 MMHG | TEMPERATURE: 97.5 F | HEART RATE: 93 BPM | BODY MASS INDEX: 25.71 KG/M2 | HEIGHT: 66 IN | RESPIRATION RATE: 16 BRPM | SYSTOLIC BLOOD PRESSURE: 144 MMHG | WEIGHT: 160 LBS

## 2019-07-19 PROCEDURE — 99214 OFFICE O/P EST MOD 30 MIN: CPT

## 2019-07-19 NOTE — ASSESSMENT
[FreeTextEntry1] : 1. CKD Stage 5: Last Scr elevated/stable at 3.77 on labs done on 5/16/19. Pt. with advanced CKD. Patient underwent left AVF creation surgery on 11/10/17. LUE AVF can be used for HD if needed as per Dr. Doan's note dated 6/7/19. Check renal panel today. Last intact PTH level stable at 211 on 5/16/19. Avoid NSAIDs, RCA, and nephrotoxins.\par \par 2. HTN: BP in acceptable range during office visit today. Continue with current antihypertensives. Low salt diet advised. Monitor BP.\par \par 3. Hyperkalemia: Last serum potassium WNL (5.1) on labs done on 5/16/29. Advised to continue with low potassium diet. Check serum potassium today.\par \par 4. Metabolic acidosis: Last serum CO2 WNL at 23 on 5/16/19. Continue with oral sodium bicarbonate. Check serum CO2 level today.\par \par 5. Anemia: in setting of advanced CKD. Last hemoglobin in acceptable range (11.6) on 5/16/19. Check CBC today. \par \par 6. LE edema: in setting of advanced CKD and amlodipine use. No LE edema on examination today. Low salt diet advised. Continue with lasix. Monitor body weights   \par \par F/U pending review of lab results.

## 2019-07-19 NOTE — HISTORY OF PRESENT ILLNESS
[FreeTextEntry1] : 63-year-old female with advanced CKD-5 (presumed Alport's gene carrier) and HTN presents for follow-up visit. Pt. was last seen in office on 5/16/19. Patient had left AVF creation surgery on 11/10/17 by Dr. Doan and was last seen in his office for follow-up visit on 6/7/19. \par \par Pt. currently feels well. She denies CP, SOB, urinary complaints, or LE swelling. Pt. admits compliance to her medications.

## 2019-07-19 NOTE — PHYSICAL EXAM
[General Appearance - Alert] : alert [General Appearance - In No Acute Distress] : in no acute distress [General Appearance - Well Nourished] : well nourished [Sclera] : the sclera and conjunctiva were normal [PERRL With Normal Accommodation] : pupils were equal in size, round, and reactive to light [Outer Ear] : the ears and nose were normal in appearance [Neck Appearance] : the appearance of the neck was normal [Jugular Venous Distention Increased] : there was no jugular-venous distention [Auscultation Breath Sounds / Voice Sounds] : lungs were clear to auscultation bilaterally [Heart Rate And Rhythm] : heart rate was normal and rhythm regular [Heart Sounds] : normal S1 and S2 [Murmurs] : no murmurs [Edema] : there was no peripheral edema [Bowel Sounds] : normal bowel sounds [Abdomen Soft] : soft [Abnormal Walk] : normal gait [___ (cm) Fistula] : [unfilled] (cm) fistula [Bruit] : a bruit was present [Thrill] : a thrill was present [] : no rash [No Focal Deficits] : no focal deficits [Oriented To Time, Place, And Person] : oriented to person, place, and time [Impaired Insight] : insight and judgment were intact [Affect] : the affect was normal

## 2019-07-23 LAB
ALBUMIN SERPL ELPH-MCNC: 4.3 G/DL
ANION GAP SERPL CALC-SCNC: 15 MMOL/L
BUN SERPL-MCNC: 72 MG/DL
CALCIUM SERPL-MCNC: 9.4 MG/DL
CHLORIDE SERPL-SCNC: 106 MMOL/L
CO2 SERPL-SCNC: 23 MMOL/L
CREAT SERPL-MCNC: 4.16 MG/DL
GLUCOSE SERPL-MCNC: 109 MG/DL
PHOSPHATE SERPL-MCNC: 4 MG/DL
POTASSIUM SERPL-SCNC: 4.5 MMOL/L
SODIUM SERPL-SCNC: 144 MMOL/L

## 2019-10-11 ENCOUNTER — APPOINTMENT (OUTPATIENT)
Dept: NEPHROLOGY | Facility: CLINIC | Age: 64
End: 2019-10-11
Payer: COMMERCIAL

## 2019-10-11 VITALS
RESPIRATION RATE: 16 BRPM | DIASTOLIC BLOOD PRESSURE: 78 MMHG | BODY MASS INDEX: 25.55 KG/M2 | HEIGHT: 66 IN | WEIGHT: 159 LBS | SYSTOLIC BLOOD PRESSURE: 138 MMHG | HEART RATE: 94 BPM | TEMPERATURE: 97.8 F

## 2019-10-11 PROCEDURE — 99214 OFFICE O/P EST MOD 30 MIN: CPT

## 2019-10-11 NOTE — ASSESSMENT
[FreeTextEntry1] : 1. CKD Stage 5: Last Scr increased to 4.16 on labs done on 7/19/19. Pt. with advanced CKD. Patient underwent left AVF creation surgery on 11/10/17. LUE AVF can be used for HD if needed as per Dr. Doan's note dated 6/7/19. Check renal panel today. Last intact PTH level stable at 211 on 5/16/19. Avoid NSAIDs, RCA, and nephrotoxins.\par \par 2. HTN: BP in acceptable range during office visit today. Continue with current antihypertensives. Low salt diet advised. Monitor BP.\par \par 3. Hyperkalemia: Last serum potassium WNL (4.5) on labs done on 7/9/19. Advised to continue with low potassium diet. Check serum potassium today.\par \par 4. Metabolic acidosis: Last serum CO2 WNL at 23 on 7/19/19. Continue with oral sodium bicarbonate. Check serum CO2 level today.\par \par 5. Anemia: in setting of advanced CKD. Last hemoglobin in acceptable range (11.6) on 5/16/19. Check CBC today. \par \par 6. LE edema: in setting of advanced CKD and amlodipine use. Pt. with mild bilateral LE edema on examination today. Low salt diet advised. Continue with lasix. Monitor body weights   \par \par F/U pending review of lab results.

## 2019-10-11 NOTE — PHYSICAL EXAM
[General Appearance - Alert] : alert [General Appearance - In No Acute Distress] : in no acute distress [General Appearance - Well Nourished] : well nourished [Sclera] : the sclera and conjunctiva were normal [PERRL With Normal Accommodation] : pupils were equal in size, round, and reactive to light [Outer Ear] : the ears and nose were normal in appearance [Neck Appearance] : the appearance of the neck was normal [Jugular Venous Distention Increased] : there was no jugular-venous distention [Respiration, Rhythm And Depth] : normal respiratory rhythm and effort [Auscultation Breath Sounds / Voice Sounds] : lungs were clear to auscultation bilaterally [Heart Rate And Rhythm] : heart rate was normal and rhythm regular [Heart Sounds] : normal S1 and S2 [Murmurs] : no murmurs [Bowel Sounds] : normal bowel sounds [Abdomen Soft] : soft [No CVA Tenderness] : no ~M costovertebral angle tenderness [Abnormal Walk] : normal gait [___ (cm) Fistula] : [unfilled] (cm) fistula [Bruit] : a bruit was present [Thrill] : a thrill was present [] : no rash [Oriented To Time, Place, And Person] : oriented to person, place, and time [Impaired Insight] : insight and judgment were intact [Affect] : the affect was normal [FreeTextEntry1] : bilateral LE: mild pitting edema

## 2019-10-11 NOTE — HISTORY OF PRESENT ILLNESS
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 Pt. was last seen in office on 7/19/19. Patient had left AVF creation surgery on 11/10/17 by Dr. Doan and was last seen in his office for follow-up visit on 6/7/19. \par \par Pt. currently feels well. She denies CP, SOB, HA or urinary complaints. Pt. admits compliance to her medications.

## 2019-10-11 NOTE — REVIEW OF SYSTEMS
[As Noted in HPI] : as noted in HPI [Lower Ext Edema] : lower extremity edema [Limb Swelling] : limb swelling [Negative] : Endocrine [Fever] : no fever [Feeling Poorly] : not feeling poorly [Eye Pain] : no eye pain [Earache] : no earache [Shortness Of Breath] : no shortness of breath [Abdominal Pain] : no abdominal pain [Dysuria] : no dysuria [Dizziness] : no dizziness [Easy Bleeding] : no tendency for easy bleeding [FreeTextEntry5] : mild bilateral LE edema

## 2019-10-13 LAB
ALBUMIN SERPL ELPH-MCNC: 4.8 G/DL
ANION GAP SERPL CALC-SCNC: 18 MMOL/L
BASOPHILS # BLD AUTO: 0.04 K/UL
BASOPHILS NFR BLD AUTO: 0.6 %
BUN SERPL-MCNC: 71 MG/DL
CALCIUM SERPL-MCNC: 9.5 MG/DL
CHLORIDE SERPL-SCNC: 106 MMOL/L
CO2 SERPL-SCNC: 18 MMOL/L
CREAT SERPL-MCNC: 3.86 MG/DL
EOSINOPHIL # BLD AUTO: 0.24 K/UL
EOSINOPHIL NFR BLD AUTO: 3.4 %
GLUCOSE SERPL-MCNC: 96 MG/DL
HCT VFR BLD CALC: 35.5 %
HGB BLD-MCNC: 11.3 G/DL
IMM GRANULOCYTES NFR BLD AUTO: 0.3 %
LYMPHOCYTES # BLD AUTO: 1.35 K/UL
LYMPHOCYTES NFR BLD AUTO: 19.1 %
MAN DIFF?: NORMAL
MCHC RBC-ENTMCNC: 31.2 PG
MCHC RBC-ENTMCNC: 31.8 GM/DL
MCV RBC AUTO: 98.1 FL
MONOCYTES # BLD AUTO: 0.51 K/UL
MONOCYTES NFR BLD AUTO: 7.2 %
NEUTROPHILS # BLD AUTO: 4.92 K/UL
NEUTROPHILS NFR BLD AUTO: 69.4 %
PHOSPHATE SERPL-MCNC: 4.2 MG/DL
PLATELET # BLD AUTO: 194 K/UL
POTASSIUM SERPL-SCNC: 4.8 MMOL/L
RBC # BLD: 3.62 M/UL
RBC # FLD: 12.4 %
SODIUM SERPL-SCNC: 142 MMOL/L
WBC # FLD AUTO: 7.08 K/UL

## 2019-10-14 ENCOUNTER — RESULT REVIEW (OUTPATIENT)
Age: 64
End: 2019-10-14

## 2019-12-13 ENCOUNTER — APPOINTMENT (OUTPATIENT)
Dept: VASCULAR SURGERY | Facility: CLINIC | Age: 64
End: 2019-12-13
Payer: COMMERCIAL

## 2019-12-13 PROCEDURE — 99212 OFFICE O/P EST SF 10 MIN: CPT

## 2019-12-13 PROCEDURE — 93990 DOPPLER FLOW TESTING: CPT

## 2019-12-20 ENCOUNTER — APPOINTMENT (OUTPATIENT)
Dept: NEPHROLOGY | Facility: CLINIC | Age: 64
End: 2019-12-20
Payer: COMMERCIAL

## 2019-12-20 VITALS
RESPIRATION RATE: 17 BRPM | WEIGHT: 155.25 LBS | HEART RATE: 73 BPM | OXYGEN SATURATION: 92 % | BODY MASS INDEX: 25.06 KG/M2 | TEMPERATURE: 97.8 F | SYSTOLIC BLOOD PRESSURE: 176 MMHG | DIASTOLIC BLOOD PRESSURE: 78 MMHG

## 2019-12-20 VITALS — DIASTOLIC BLOOD PRESSURE: 68 MMHG | SYSTOLIC BLOOD PRESSURE: 142 MMHG

## 2019-12-20 PROCEDURE — 99214 OFFICE O/P EST MOD 30 MIN: CPT

## 2019-12-20 NOTE — HISTORY OF PRESENT ILLNESS
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 Pt. was last seen in office on 10/11/19. Patient had left AVF creation surgery on 11/10/17 by Dr. Doan and was last seen in his office for follow-up visit on 12/13/19. \par \par Pt. currently feels well. She denies CP, SOB, HA or urinary complaints. Pt. admits compliance to her medications. Pt. had labs done recently in PMD's office on 12/6/19.

## 2019-12-20 NOTE — REVIEW OF SYSTEMS
[As Noted in HPI] : as noted in HPI [Lower Ext Edema] : lower extremity edema [Limb Swelling] : limb swelling [Negative] : Endocrine [Fever] : no fever [Feeling Poorly] : not feeling poorly [Eye Pain] : no eye pain [Earache] : no earache [Abdominal Pain] : no abdominal pain [Shortness Of Breath] : no shortness of breath [Dysuria] : no dysuria [Dizziness] : no dizziness [Easy Bleeding] : no tendency for easy bleeding [FreeTextEntry5] : mild bilateral LE edema

## 2019-12-20 NOTE — ASSESSMENT
[FreeTextEntry1] : 1. CKD Stage 5: Last Scr elevated but stable at 3.71 on labs done on 12/6/19. Pt. with advanced CKD. Patient underwent left AVF creation surgery on 11/10/17. LUE AVF can be used for HD if needed as per Dr. Doan's note dated 6/7/19. Monitor renal panel. Last intact PTH level stable at 211 on 5/16/19. Avoid NSAIDs, RCA, and nephrotoxins.\par \par 2. HTN: Repeat BP reading improved to acceptable range during office visit today. Continue with current antihypertensives. Low salt diet advised. Monitor BP.\par \par 3. Hyperkalemia: Last serum potassium WNL (4.5) on labs done on 12/6/19. Advised to continue with low potassium diet. Monitor serum potassium.\par \par 4. Metabolic acidosis: Last serum CO2 low at 18 on 12/6/19. Increase oral sodium bicarbonate to TID. Monitor serum CO2. \par \par 5. Anemia: in setting of advanced CKD. Last hemoglobin in acceptable range (10.1) on 12/6/19. Monitor hemoglobin. \par \par 6. LE edema: in setting of advanced CKD and amlodipine use. No LE edema on examination today. Low salt diet advised. Continue with lasix. Monitor body weights   \par \par Follow-up in 2-3 months.

## 2019-12-20 NOTE — PHYSICAL EXAM
[General Appearance - Alert] : alert [General Appearance - Well Nourished] : well nourished [Sclera] : the sclera and conjunctiva were normal [General Appearance - In No Acute Distress] : in no acute distress [Outer Ear] : the ears and nose were normal in appearance [PERRL With Normal Accommodation] : pupils were equal in size, round, and reactive to light [Neck Appearance] : the appearance of the neck was normal [Respiration, Rhythm And Depth] : normal respiratory rhythm and effort [Jugular Venous Distention Increased] : there was no jugular-venous distention [Auscultation Breath Sounds / Voice Sounds] : lungs were clear to auscultation bilaterally [Heart Sounds] : normal S1 and S2 [Heart Rate And Rhythm] : heart rate was normal and rhythm regular [Murmurs] : no murmurs [Edema] : there was no peripheral edema [Abdomen Soft] : soft [Bowel Sounds] : normal bowel sounds [No CVA Tenderness] : no ~M costovertebral angle tenderness [___ (cm) Fistula] : [unfilled] (cm) fistula [Abnormal Walk] : normal gait [Bruit] : a bruit was present [Thrill] : a thrill was present [] : no rash [Affect] : the affect was normal [Impaired Insight] : insight and judgment were intact [Oriented To Time, Place, And Person] : oriented to person, place, and time

## 2020-03-13 ENCOUNTER — APPOINTMENT (OUTPATIENT)
Dept: NEPHROLOGY | Facility: CLINIC | Age: 65
End: 2020-03-13
Payer: COMMERCIAL

## 2020-03-13 VITALS
HEIGHT: 66 IN | DIASTOLIC BLOOD PRESSURE: 68 MMHG | HEART RATE: 110 BPM | WEIGHT: 158 LBS | BODY MASS INDEX: 25.39 KG/M2 | SYSTOLIC BLOOD PRESSURE: 154 MMHG | TEMPERATURE: 97.7 F | OXYGEN SATURATION: 96 % | RESPIRATION RATE: 15 BRPM

## 2020-03-13 VITALS — DIASTOLIC BLOOD PRESSURE: 64 MMHG | SYSTOLIC BLOOD PRESSURE: 134 MMHG

## 2020-03-13 PROCEDURE — 99214 OFFICE O/P EST MOD 30 MIN: CPT

## 2020-03-13 NOTE — HISTORY OF PRESENT ILLNESS
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 Pt. was last seen in office on 12/20/19. Patient had left AVF creation surgery on 11/10/17 by Dr. Doan and was last seen in his office for follow-up visit on 12/13/19. \par \par Pt. currently feels well. She denies CP, SOB, HA or urinary complaints. Pt. admits compliance to her medications.

## 2020-03-13 NOTE — ASSESSMENT
[FreeTextEntry1] : 1. CKD Stage 5: Last Scr elevated but stable at 3.71 on labs done on 12/6/19. Pt. with advanced CKD. Patient underwent left AVF creation surgery on 11/10/17. LUE AVF can be used for HD if needed as per Dr. Doan's note dated 6/7/19. Last intact PTH level stable at 211 on 5/16/19. Check renal panel and intact PTH level today. Avoid NSAIDs, RCA, and nephrotoxins.\par \par 2. HTN: Repeat BP reading improved to acceptable range during office visit today. Continue with current BP meds. Low salt diet advised. Monitor BP.\par \par 3. Hyperkalemia: Last serum potassium WNL (4.5) on labs done on 12/6/19. Advised to continue with low potassium diet. Check serum potassium today.\par \par 4. Metabolic acidosis: Last serum CO2 low at 18 on 12/6/19. Increase oral sodium bicarbonate to TID. Check serum CO2 today. \par \par 5. Anemia: in setting of advanced CKD. Last hemoglobin in acceptable range (10.1) on 12/6/19. Monitor hemoglobin. \par \par 6. LE edema: in setting of advanced CKD and amlodipine use. Pt. with trace bilateral LE edema on examination today. Low salt diet advised. Continue with oral lasix. Monitor body weights   \par \par Follow-up pending review of labs results.

## 2020-03-13 NOTE — PHYSICAL EXAM
[General Appearance - Alert] : alert [General Appearance - In No Acute Distress] : in no acute distress [General Appearance - Well Nourished] : well nourished [Sclera] : the sclera and conjunctiva were normal [PERRL With Normal Accommodation] : pupils were equal in size, round, and reactive to light [Outer Ear] : the ears and nose were normal in appearance [Neck Appearance] : the appearance of the neck was normal [Jugular Venous Distention Increased] : there was no jugular-venous distention [Respiration, Rhythm And Depth] : normal respiratory rhythm and effort [Auscultation Breath Sounds / Voice Sounds] : lungs were clear to auscultation bilaterally [Heart Rate And Rhythm] : heart rate was normal and rhythm regular [Heart Sounds] : normal S1 and S2 [Murmurs] : no murmurs [Bowel Sounds] : normal bowel sounds [Abdomen Soft] : soft [No CVA Tenderness] : no ~M costovertebral angle tenderness [Abnormal Walk] : normal gait [___ (cm) Fistula] : [unfilled] (cm) fistula [Bruit] : a bruit was present [Thrill] : a thrill was present [] : no rash [Oriented To Time, Place, And Person] : oriented to person, place, and time [Impaired Insight] : insight and judgment were intact [Affect] : the affect was normal [FreeTextEntry1] : Bilateral LE: trace edema present

## 2020-03-16 LAB
ALBUMIN SERPL ELPH-MCNC: 4.5 G/DL
ANION GAP SERPL CALC-SCNC: 17 MMOL/L
BUN SERPL-MCNC: 69 MG/DL
CALCIUM SERPL-MCNC: 9.3 MG/DL
CALCIUM SERPL-MCNC: 9.4 MG/DL
CHLORIDE SERPL-SCNC: 105 MMOL/L
CO2 SERPL-SCNC: 22 MMOL/L
CREAT SERPL-MCNC: 4.01 MG/DL
GLUCOSE SERPL-MCNC: 78 MG/DL
PARATHYROID HORMONE INTACT: 569 PG/ML
PHOSPHATE SERPL-MCNC: 5 MG/DL
POTASSIUM SERPL-SCNC: 5.1 MMOL/L
SODIUM SERPL-SCNC: 144 MMOL/L

## 2020-03-31 ENCOUNTER — RX RENEWAL (OUTPATIENT)
Age: 65
End: 2020-03-31

## 2020-05-06 ENCOUNTER — RX RENEWAL (OUTPATIENT)
Age: 65
End: 2020-05-06

## 2020-05-28 ENCOUNTER — APPOINTMENT (OUTPATIENT)
Dept: VASCULAR SURGERY | Facility: CLINIC | Age: 65
End: 2020-05-28
Payer: COMMERCIAL

## 2020-05-28 PROCEDURE — 93990 DOPPLER FLOW TESTING: CPT

## 2020-06-05 ENCOUNTER — APPOINTMENT (OUTPATIENT)
Dept: VASCULAR SURGERY | Facility: CLINIC | Age: 65
End: 2020-06-05

## 2020-06-05 ENCOUNTER — APPOINTMENT (OUTPATIENT)
Dept: VASCULAR SURGERY | Facility: CLINIC | Age: 65
End: 2020-06-05
Payer: COMMERCIAL

## 2020-06-05 PROCEDURE — 99201 OFFICE OUTPATIENT NEW 10 MINUTES: CPT | Mod: 95

## 2020-06-19 ENCOUNTER — APPOINTMENT (OUTPATIENT)
Dept: NEPHROLOGY | Facility: CLINIC | Age: 65
End: 2020-06-19
Payer: COMMERCIAL

## 2020-06-19 VITALS
RESPIRATION RATE: 18 BRPM | HEIGHT: 66 IN | TEMPERATURE: 99.4 F | HEART RATE: 79 BPM | OXYGEN SATURATION: 99 % | WEIGHT: 155 LBS | SYSTOLIC BLOOD PRESSURE: 152 MMHG | BODY MASS INDEX: 24.91 KG/M2 | DIASTOLIC BLOOD PRESSURE: 73 MMHG

## 2020-06-19 VITALS — DIASTOLIC BLOOD PRESSURE: 67 MMHG | SYSTOLIC BLOOD PRESSURE: 140 MMHG

## 2020-06-19 PROCEDURE — 99214 OFFICE O/P EST MOD 30 MIN: CPT

## 2020-06-19 RX ORDER — FUROSEMIDE 20 MG/1
20 TABLET ORAL
Qty: 90 | Refills: 3 | Status: DISCONTINUED | COMMUNITY
Start: 2017-09-01 | End: 2020-06-19

## 2020-06-19 NOTE — HISTORY OF PRESENT ILLNESS
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 Pt. was last seen in office on 3/13/20. Patient had left AVF creation surgery on 11/10/17 by Dr. Doan and was last seen in his office for follow-up visit on 12/13/19. \par \par Pt. currently feels well but gives history of intermittent bilateral LE/feet swelling. She denies CP, SOB, HA or urinary complaints. Pt. admits compliance to her medications.

## 2020-06-19 NOTE — PHYSICAL EXAM
[General Appearance - Alert] : alert [General Appearance - In No Acute Distress] : in no acute distress [General Appearance - Well Nourished] : well nourished [Outer Ear] : the ears and nose were normal in appearance [PERRL With Normal Accommodation] : pupils were equal in size, round, and reactive to light [Sclera] : the sclera and conjunctiva were normal [Respiration, Rhythm And Depth] : normal respiratory rhythm and effort [Neck Appearance] : the appearance of the neck was normal [Jugular Venous Distention Increased] : there was no jugular-venous distention [Heart Rate And Rhythm] : heart rate was normal and rhythm regular [Auscultation Breath Sounds / Voice Sounds] : lungs were clear to auscultation bilaterally [Heart Sounds] : normal S1 and S2 [Bowel Sounds] : normal bowel sounds [Murmurs] : no murmurs [No CVA Tenderness] : no ~M costovertebral angle tenderness [Abdomen Soft] : soft [Abnormal Walk] : normal gait [___ (cm) Fistula] : [unfilled] (cm) fistula [] : no rash [Bruit] : a bruit was present [Thrill] : a thrill was present [Affect] : the affect was normal [Impaired Insight] : insight and judgment were intact [Oriented To Time, Place, And Person] : oriented to person, place, and time [FreeTextEntry1] : Bilateral LE: mild edema present

## 2020-06-19 NOTE — REVIEW OF SYSTEMS
[Lower Ext Edema] : lower extremity edema [As Noted in HPI] : as noted in HPI [Limb Swelling] : limb swelling [Negative] : Endocrine [Eye Pain] : no eye pain [Fever] : no fever [Feeling Poorly] : not feeling poorly [Shortness Of Breath] : no shortness of breath [Earache] : no earache [Dysuria] : no dysuria [Dizziness] : no dizziness [Abdominal Pain] : no abdominal pain [Easy Bleeding] : no tendency for easy bleeding [FreeTextEntry5] : mild bilateral LE edema

## 2020-06-19 NOTE — ASSESSMENT
[FreeTextEntry1] : 1. CKD Stage 5: Last Scr increased to 4.01 in March 2020. Pt. with advanced/progressive CKD. Patient underwent left AVF creation surgery on 11/10/17. LUE AVF can be used for HD if needed as per Dr. Doan's note dated 6/7/19. Check renal panel today. Avoid NSAIDs, RCA, and nephrotoxins.\par \par 2. HTN: Repeat BP reading improved during office visit today. Add torsemide 10 mg once daily and discontinue furosemide. Low salt diet advised. Monitor BP.\par \par 3. Hyperkalemia: Last serum potassium WNL (5.1) on labs done in March 2020. Advised to continue with low potassium diet. Check serum potassium today.\par \par 4. Metabolic acidosis: Last serum CO2 WNL at 22. Pt. on oral sodium bicarbonate therapy. Check serum CO2 today. \par \par 5. Anemia: in setting of advanced CKD. Last hemoglobin in acceptable range (10.1) on 12/6/19. Monitor hemoglobin. \par \par 6. LE edema: in setting of advanced CKD and amlodipine use. Pt. with mild bilateral LE edema on examination today. Add torsemide 10 mg once daily and discontinue furosemide. Low salt diet advised. Monitor body weights   \par \par Follow-up pending review of labs results.

## 2020-06-21 ENCOUNTER — RX RENEWAL (OUTPATIENT)
Age: 65
End: 2020-06-21

## 2020-06-21 LAB
ALBUMIN SERPL ELPH-MCNC: 4.8 G/DL
ANION GAP SERPL CALC-SCNC: 19 MMOL/L
BASOPHILS # BLD AUTO: 0.04 K/UL
BASOPHILS NFR BLD AUTO: 0.7 %
BUN SERPL-MCNC: 75 MG/DL
CALCIUM SERPL-MCNC: 9.2 MG/DL
CHLORIDE SERPL-SCNC: 107 MMOL/L
CO2 SERPL-SCNC: 20 MMOL/L
CREAT SERPL-MCNC: 4.4 MG/DL
EOSINOPHIL # BLD AUTO: 0.18 K/UL
EOSINOPHIL NFR BLD AUTO: 3.2 %
GLUCOSE SERPL-MCNC: 113 MG/DL
HCT VFR BLD CALC: 32.9 %
HGB BLD-MCNC: 10.6 G/DL
IMM GRANULOCYTES NFR BLD AUTO: 0.2 %
LYMPHOCYTES # BLD AUTO: 1.02 K/UL
LYMPHOCYTES NFR BLD AUTO: 18.1 %
MAN DIFF?: NORMAL
MCHC RBC-ENTMCNC: 31.3 PG
MCHC RBC-ENTMCNC: 32.2 GM/DL
MCV RBC AUTO: 97.1 FL
MONOCYTES # BLD AUTO: 0.4 K/UL
MONOCYTES NFR BLD AUTO: 7.1 %
NEUTROPHILS # BLD AUTO: 4 K/UL
NEUTROPHILS NFR BLD AUTO: 70.7 %
PHOSPHATE SERPL-MCNC: 4.3 MG/DL
PLATELET # BLD AUTO: 161 K/UL
POTASSIUM SERPL-SCNC: 5.2 MMOL/L
RBC # BLD: 3.39 M/UL
RBC # FLD: 12.5 %
SODIUM SERPL-SCNC: 146 MMOL/L
WBC # FLD AUTO: 5.65 K/UL

## 2020-08-31 ENCOUNTER — RX RENEWAL (OUTPATIENT)
Age: 65
End: 2020-08-31

## 2020-09-11 ENCOUNTER — APPOINTMENT (OUTPATIENT)
Dept: NEPHROLOGY | Facility: CLINIC | Age: 65
End: 2020-09-11
Payer: COMMERCIAL

## 2020-09-11 VITALS
TEMPERATURE: 99.3 F | SYSTOLIC BLOOD PRESSURE: 160 MMHG | DIASTOLIC BLOOD PRESSURE: 77 MMHG | RESPIRATION RATE: 16 BRPM | HEIGHT: 66 IN | HEART RATE: 81 BPM | WEIGHT: 153 LBS | BODY MASS INDEX: 24.59 KG/M2

## 2020-09-11 VITALS — SYSTOLIC BLOOD PRESSURE: 134 MMHG | DIASTOLIC BLOOD PRESSURE: 76 MMHG

## 2020-09-11 PROCEDURE — 99214 OFFICE O/P EST MOD 30 MIN: CPT

## 2020-09-11 NOTE — HISTORY OF PRESENT ILLNESS
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 Pt. was last seen in office on 6/19/20. Pt. had left AVF creation surgery on 11/10/17 by Dr. Doan. \par \par Pt. currently feels well but gives history of intermittent bilateral LE/feet swelling. She denies CP, SOB, HA or urinary complaints. Pt. admits compliance to her medications.

## 2020-09-11 NOTE — REVIEW OF SYSTEMS
[As Noted in HPI] : as noted in HPI [Lower Ext Edema] : lower extremity edema [Limb Swelling] : limb swelling [Negative] : Endocrine [Fever] : no fever [Feeling Poorly] : not feeling poorly [Earache] : no earache [Shortness Of Breath] : no shortness of breath [Eye Pain] : no eye pain [Abdominal Pain] : no abdominal pain [Dysuria] : no dysuria [Easy Bleeding] : no tendency for easy bleeding [Dizziness] : no dizziness [FreeTextEntry5] : mild bilateral LE edema

## 2020-09-11 NOTE — PHYSICAL EXAM
[General Appearance - In No Acute Distress] : in no acute distress [General Appearance - Alert] : alert [Sclera] : the sclera and conjunctiva were normal [General Appearance - Well Nourished] : well nourished [PERRL With Normal Accommodation] : pupils were equal in size, round, and reactive to light [Outer Ear] : the ears and nose were normal in appearance [Neck Appearance] : the appearance of the neck was normal [Jugular Venous Distention Increased] : there was no jugular-venous distention [Auscultation Breath Sounds / Voice Sounds] : lungs were clear to auscultation bilaterally [Heart Rate And Rhythm] : heart rate was normal and rhythm regular [Respiration, Rhythm And Depth] : normal respiratory rhythm and effort [Murmurs] : no murmurs [Heart Sounds] : normal S1 and S2 [Abdomen Soft] : soft [Bowel Sounds] : normal bowel sounds [___ (cm) Fistula] : [unfilled] (cm) fistula [Abnormal Walk] : normal gait [No CVA Tenderness] : no ~M costovertebral angle tenderness [Bruit] : a bruit was present [Thrill] : a thrill was present [] : no rash [Oriented To Time, Place, And Person] : oriented to person, place, and time [Impaired Insight] : insight and judgment were intact [Affect] : the affect was normal [FreeTextEntry1] : Bilateral LE: mild edema present

## 2020-09-11 NOTE — ASSESSMENT
[FreeTextEntry1] : 1. CKD Stage 5: Last Scr increased to 4.4 in June 2020. Pt. with advanced/progressive CKD. Patient underwent left AVF creation surgery on 11/10/17. LUE AVF can be used for HD if needed as per Dr. Doan's note in June 2020. Check renal panel today. Avoid NSAIDs, RCA, and nephrotoxins.\par \par 2. HTN: BP in acceptable range during office visit today. Low salt diet advised. Monitor BP.\par \par 3. Hyperkalemia: Last serum potassium WNL (5.2) on labs done in June 2020. Advised to continue with low potassium diet. Check serum potassium today.\par \par 4. Metabolic acidosis: Last serum CO2 low at 20. Pt. on oral sodium bicarbonate therapy. Check serum CO2 today. \par \par 5. Anemia: in setting of advanced CKD. Last hemoglobin in acceptable range (10.6). Check hemoglobin today. \par \par 6. LE edema: in setting of advanced CKD and amlodipine use. Pt. with mild bilateral LE edema on examination today. Pt. on torsemide 10 mg once daily. Low salt diet advised. Monitor body weights   \par \par Follow-up pending review of labs results.

## 2020-09-12 ENCOUNTER — EMERGENCY (EMERGENCY)
Facility: HOSPITAL | Age: 65
LOS: 1 days | Discharge: HOME | End: 2020-09-12
Attending: EMERGENCY MEDICINE
Payer: COMMERCIAL

## 2020-09-12 VITALS
TEMPERATURE: 98 F | HEIGHT: 66 IN | SYSTOLIC BLOOD PRESSURE: 135 MMHG | OXYGEN SATURATION: 100 % | RESPIRATION RATE: 16 BRPM | WEIGHT: 154.1 LBS | DIASTOLIC BLOOD PRESSURE: 47 MMHG | HEART RATE: 74 BPM

## 2020-09-12 VITALS
DIASTOLIC BLOOD PRESSURE: 63 MMHG | TEMPERATURE: 98 F | RESPIRATION RATE: 18 BRPM | OXYGEN SATURATION: 100 % | HEART RATE: 75 BPM | SYSTOLIC BLOOD PRESSURE: 120 MMHG

## 2020-09-12 DIAGNOSIS — I77.0 ARTERIOVENOUS FISTULA, ACQUIRED: Chronic | ICD-10-CM

## 2020-09-12 DIAGNOSIS — Z90.89 ACQUIRED ABSENCE OF OTHER ORGANS: Chronic | ICD-10-CM

## 2020-09-12 LAB
ALBUMIN SERPL ELPH-MCNC: 4.3 G/DL — SIGNIFICANT CHANGE UP (ref 3.3–5)
ALP SERPL-CCNC: 102 U/L — SIGNIFICANT CHANGE UP (ref 40–120)
ALT FLD-CCNC: 10 U/L — SIGNIFICANT CHANGE UP (ref 10–45)
ANION GAP SERPL CALC-SCNC: 14 MMOL/L — SIGNIFICANT CHANGE UP (ref 5–17)
AST SERPL-CCNC: 12 U/L — SIGNIFICANT CHANGE UP (ref 10–40)
BASE EXCESS BLDV CALC-SCNC: -1.7 MMOL/L — SIGNIFICANT CHANGE UP (ref -2–2)
BASOPHILS # BLD AUTO: 0.04 K/UL — SIGNIFICANT CHANGE UP (ref 0–0.2)
BASOPHILS NFR BLD AUTO: 0.7 % — SIGNIFICANT CHANGE UP (ref 0–2)
BILIRUB SERPL-MCNC: 0.4 MG/DL — SIGNIFICANT CHANGE UP (ref 0.2–1.2)
BUN SERPL-MCNC: 84 MG/DL — HIGH (ref 7–23)
CA-I SERPL-SCNC: 1.28 MMOL/L — SIGNIFICANT CHANGE UP (ref 1.12–1.3)
CALCIUM SERPL-MCNC: 10 MG/DL — SIGNIFICANT CHANGE UP (ref 8.4–10.5)
CHLORIDE BLDV-SCNC: 110 MMOL/L — HIGH (ref 96–108)
CHLORIDE SERPL-SCNC: 106 MMOL/L — SIGNIFICANT CHANGE UP (ref 96–108)
CO2 BLDV-SCNC: 25 MMOL/L — SIGNIFICANT CHANGE UP (ref 22–30)
CO2 SERPL-SCNC: 22 MMOL/L — SIGNIFICANT CHANGE UP (ref 22–31)
CREAT SERPL-MCNC: 4.59 MG/DL — HIGH (ref 0.5–1.3)
EOSINOPHIL # BLD AUTO: 0.18 K/UL — SIGNIFICANT CHANGE UP (ref 0–0.5)
EOSINOPHIL NFR BLD AUTO: 3.1 % — SIGNIFICANT CHANGE UP (ref 0–6)
GAS PNL BLDV: 145 MMOL/L — SIGNIFICANT CHANGE UP (ref 135–145)
GAS PNL BLDV: SIGNIFICANT CHANGE UP
GAS PNL BLDV: SIGNIFICANT CHANGE UP
GLUCOSE BLDV-MCNC: 122 MG/DL — HIGH (ref 70–99)
GLUCOSE SERPL-MCNC: 132 MG/DL — HIGH (ref 70–99)
HCO3 BLDV-SCNC: 24 MMOL/L — SIGNIFICANT CHANGE UP (ref 21–29)
HCT VFR BLD CALC: 30.8 % — LOW (ref 34.5–45)
HCT VFR BLDA CALC: 32 % — LOW (ref 39–50)
HGB BLD CALC-MCNC: 10.4 G/DL — LOW (ref 11.5–15.5)
HGB BLD-MCNC: 10 G/DL — LOW (ref 11.5–15.5)
IMM GRANULOCYTES NFR BLD AUTO: 0.5 % — SIGNIFICANT CHANGE UP (ref 0–1.5)
LACTATE BLDV-MCNC: 1.3 MMOL/L — SIGNIFICANT CHANGE UP (ref 0.7–2)
LYMPHOCYTES # BLD AUTO: 0.83 K/UL — LOW (ref 1–3.3)
LYMPHOCYTES # BLD AUTO: 14.4 % — SIGNIFICANT CHANGE UP (ref 13–44)
MCHC RBC-ENTMCNC: 31.3 PG — SIGNIFICANT CHANGE UP (ref 27–34)
MCHC RBC-ENTMCNC: 32.5 GM/DL — SIGNIFICANT CHANGE UP (ref 32–36)
MCV RBC AUTO: 96.3 FL — SIGNIFICANT CHANGE UP (ref 80–100)
MONOCYTES # BLD AUTO: 0.37 K/UL — SIGNIFICANT CHANGE UP (ref 0–0.9)
MONOCYTES NFR BLD AUTO: 6.4 % — SIGNIFICANT CHANGE UP (ref 2–14)
NEUTROPHILS # BLD AUTO: 4.32 K/UL — SIGNIFICANT CHANGE UP (ref 1.8–7.4)
NEUTROPHILS NFR BLD AUTO: 74.9 % — SIGNIFICANT CHANGE UP (ref 43–77)
NRBC # BLD: 0 /100 WBCS — SIGNIFICANT CHANGE UP (ref 0–0)
PCO2 BLDV: 46 MMHG — SIGNIFICANT CHANGE UP (ref 35–50)
PH BLDV: 7.33 — LOW (ref 7.35–7.45)
PLATELET # BLD AUTO: 155 K/UL — SIGNIFICANT CHANGE UP (ref 150–400)
PO2 BLDV: 29 MMHG — SIGNIFICANT CHANGE UP (ref 25–45)
POTASSIUM BLDV-SCNC: 5.1 MMOL/L — SIGNIFICANT CHANGE UP (ref 3.5–5.3)
POTASSIUM SERPL-MCNC: 5.1 MMOL/L — SIGNIFICANT CHANGE UP (ref 3.5–5.3)
POTASSIUM SERPL-SCNC: 5.1 MMOL/L — SIGNIFICANT CHANGE UP (ref 3.5–5.3)
PROT SERPL-MCNC: 7.1 G/DL — SIGNIFICANT CHANGE UP (ref 6–8.3)
RBC # BLD: 3.2 M/UL — LOW (ref 3.8–5.2)
RBC # FLD: 12.1 % — SIGNIFICANT CHANGE UP (ref 10.3–14.5)
SAO2 % BLDV: 53 % — LOW (ref 67–88)
SODIUM SERPL-SCNC: 142 MMOL/L — SIGNIFICANT CHANGE UP (ref 135–145)
WBC # BLD: 5.77 K/UL — SIGNIFICANT CHANGE UP (ref 3.8–10.5)
WBC # FLD AUTO: 5.77 K/UL — SIGNIFICANT CHANGE UP (ref 3.8–10.5)

## 2020-09-12 PROCEDURE — 93010 ELECTROCARDIOGRAM REPORT: CPT | Mod: 59

## 2020-09-12 PROCEDURE — 85025 COMPLETE CBC W/AUTO DIFF WBC: CPT

## 2020-09-12 PROCEDURE — 99285 EMERGENCY DEPT VISIT HI MDM: CPT

## 2020-09-12 PROCEDURE — 82803 BLOOD GASES ANY COMBINATION: CPT

## 2020-09-12 PROCEDURE — 85018 HEMOGLOBIN: CPT

## 2020-09-12 PROCEDURE — 82947 ASSAY GLUCOSE BLOOD QUANT: CPT

## 2020-09-12 PROCEDURE — 85014 HEMATOCRIT: CPT

## 2020-09-12 PROCEDURE — 82435 ASSAY OF BLOOD CHLORIDE: CPT

## 2020-09-12 PROCEDURE — 84295 ASSAY OF SERUM SODIUM: CPT

## 2020-09-12 PROCEDURE — 84132 ASSAY OF SERUM POTASSIUM: CPT

## 2020-09-12 PROCEDURE — 99284 EMERGENCY DEPT VISIT MOD MDM: CPT

## 2020-09-12 PROCEDURE — 93005 ELECTROCARDIOGRAM TRACING: CPT

## 2020-09-12 PROCEDURE — 83605 ASSAY OF LACTIC ACID: CPT

## 2020-09-12 PROCEDURE — 80053 COMPREHEN METABOLIC PANEL: CPT

## 2020-09-12 PROCEDURE — 82330 ASSAY OF CALCIUM: CPT

## 2020-09-12 RX ORDER — SODIUM BICARBONATE 1 MEQ/ML
2 SYRINGE (ML) INTRAVENOUS
Qty: 0 | Refills: 0 | DISCHARGE

## 2020-09-12 RX ORDER — CARVEDILOL PHOSPHATE 80 MG/1
1 CAPSULE, EXTENDED RELEASE ORAL
Qty: 0 | Refills: 0 | DISCHARGE

## 2020-09-12 NOTE — ED ADULT NURSE NOTE - OBJECTIVE STATEMENT
64 yo f pmhx of alport syndrome, anemia, CKD stage 4, HTN, left arm fistula, (pink band) presents to the ed with c/o abnormal potassium value. PT reports yesterday she went to her nephrologist got blood work done and was told to come to the ED because of an elevated potassium level. Pt states she gets her usual blood work and was found to have an elevated potassium level. PT is A&Ox4, lung sound clear, pt placed on cardiac monitoring, abd soft and non distended, ambulatory, reports able to make urine. pt denies chest pain, numbness and tingling of the extremities, back pain, headache, nausea and vomiting.

## 2020-09-12 NOTE — ED PROVIDER NOTE - ATTENDING CONTRIBUTION TO CARE
64 y/o f with pmhx Alport's syndrome, CKD, HTN, presents for abnml labs. was called at around 3 am and  heard the message at around 10 am that her potassium was high from yesterday's routine labs done as outpt. patient denies any complaints. sees Dr. Horn at [facility] for Renal. no chest pain no shortness of breath.   Gen.  no acute distress  HEENT:  perrl eomi  Lungs:  b/l bs  CVS: S1S2   Abd;  soft non tender  Ext: + left upper ext fistula  Neuro: aaox3 no focal deficits  MSK: strength 5/5 upper and lower ext b/l.

## 2020-09-12 NOTE — ED PROVIDER NOTE - OBJECTIVE STATEMENT
66 yo F w/ hx of Alport's syndrome, CKD5 (s/p LUE fistula Nov 2017, not started on dialysis), presenting with outpatient lab result of elevated potassium 6.3. Pt reports she had her 3-month routine visit with Dr. Horn yesterday and today was informed that the blood work from yesterday showed K 6.3 and was told to come to ED. Pt reports feeling well without complaints. Denies f/c/n/v, CP, palpitations, SOB, abdominal pain, diarrhea, constipation, dysuria, hematuria, hematochezia.

## 2020-09-12 NOTE — ED PROVIDER NOTE - PROVIDER TOKENS
PROVIDER:[TOKEN:[[id number]],FOLLOWUP:[1-3 Days],ESTABLISHEDPATIENT:[T]],PROVIDER:[TOKEN:[[id number]],FOLLOWUP:[4-6 Days],ESTABLISHEDPATIENT:[T]]

## 2020-09-12 NOTE — ED PROVIDER NOTE - NS ED ROS FT
REVIEW OF SYSTEMS:    CONSTITUTIONAL: No weakness, fevers or chills  EYES/ENT: No visual changes  NECK: No pain or stiffness  RESPIRATORY: No cough or shortness of breath  CARDIOVASCULAR: No chest pain or palpitations  GASTROINTESTINAL: No abdominal or epigastric pain. No nausea, vomiting, or hematemesis; No diarrhea or constipation. No melena or hematochezia.  GENITOURINARY: No dysuria or hematuria  NEUROLOGICAL: No numbness or weakness  SKIN: No rashes or lesions REVIEW OF SYSTEMS:    CONSTITUTIONAL: No weakness, fevers or chills  EYES/ENT: No visual changes. +baseline hearing difficulties  NECK: No pain or stiffness  RESPIRATORY: No cough or shortness of breath  CARDIOVASCULAR: No chest pain or palpitations  GASTROINTESTINAL: No abdominal or epigastric pain. No nausea, vomiting, or hematemesis; No diarrhea or constipation. No melena or hematochezia.  GENITOURINARY: No dysuria or hematuria  NEUROLOGICAL: No numbness or weakness  SKIN: No rashes or lesions

## 2020-09-12 NOTE — ED PROVIDER NOTE - PROGRESS NOTE DETAILS
Resident note (Jaskaran Mallory, PGY-2): VBG and CMP potassium 5.1. Plan to dispo home with outpatient PCP/Nephrology follow-up. Left message with Dr. Jet Horn to call back in order to inform him of lab result.

## 2020-09-12 NOTE — ED PROVIDER NOTE - CLINICAL SUMMARY MEDICAL DECISION MAKING FREE TEXT BOX
64 yo F w/ hx of Alport's syndrome, CKD5 (s/p LUE fistula Nov 2017, not started on dialysis), presenting with outpatient lab result of elevated potassium 6.3 after visit with Nephrologist yesterday. VSS, exam unremarkable. EKG obtained, normal sinus. Will recheck labs to r/o hyperkalemia. 66 yo F w/ hx of Alport's syndrome, CKD5 (s/p LUE fistula Nov 2017, not started on dialysis), presenting with outpatient lab result of elevated potassium 6.3 after visit with Nephrologist yesterday. VSS, exam unremarkable. EKG obtained, normal sinus. Will recheck labs to r/o hyperkalemia.  ATTG: : abnml labs concern for elevated K+, check ekg, labs, re eval for dispo.

## 2020-09-12 NOTE — ED ADULT TRIAGE NOTE - NS ED TRIAGE AVPU SCALE
Hyperbilirubinemia requiring phototherapy Alert-The patient is alert, awake and responds to voice. The patient is oriented to time, place, and person. The triage nurse is able to obtain subjective information.

## 2020-09-12 NOTE — ED PROVIDER NOTE - PHYSICAL EXAMINATION
GENERAL: NAD, well-developed, comfortably laying in bed  HEENT: Sclera clear  CHEST/LUNG: Clear to auscultation bilaterally; No wheezes or rales  HEART: Regular rate and rhythm; No murmurs, rubs, or gallops  ABDOMEN: Soft, Nontender, Nondistended; Bowel sounds present  EXTREMITIES:  2+ Peripheral Pulses, No clubbing, cyanosis, or edema  PSYCH: AAOx3  NEUROLOGY: non-focal  SKIN: No rashes or lesions GENERAL: NAD, well-developed, comfortably laying in bed  HEENT: Sclera clear  CHEST/LUNG: Clear to auscultation bilaterally; No wheezes or rales  HEART: Regular rate and rhythm; No murmurs, rubs, or gallops  ABDOMEN: Soft, Nontender, Nondistended; Bowel sounds present  EXTREMITIES:  LUE fistula (forearm); 2+ Peripheral Pulses, No clubbing, cyanosis, or edema  PSYCH: AAOx3  NEUROLOGY: non-focal  SKIN: No rashes or lesions

## 2020-09-12 NOTE — ED PROVIDER NOTE - NSFOLLOWUPINSTRUCTIONS_ED_ALL_ED_FT
YOU WERE SEEN IN THE ED FOR: elevated potassium level    Your lab work was checked and your potassium level was a 5.1 in the ED, which is in the normal range.     PLEASE FOLLOW UP WITH YOUR PRIVATE PHYSICIAN (PCP Dr. Jos Zaragoza or Nephrologist Dr. Jet Horn) WITHIN THE NEXT 72 HOURS. BRING COPIES OF YOUR RESULTS.  -If you do not have a PMD, please call [phone] to find one convenient for you or call our clinic at [phone].    RETURN TO THE EMERGENCY DEPARTMENT IF YOU EXPERIENCE ANY NEW/CONCERNING/WORSENING SYMPTOMS SUCH AS BUT NOT LIMITED TO:  weakness, confusion, CP, palpitations, "funny" heart beats, inability to urinate

## 2020-09-12 NOTE — ED PROVIDER NOTE - PMH
Alport's syndrome  carrier  Anemia    CKD (chronic kidney disease) stage 4, GFR 15-29 ml/min    Hard of hearing    HTN (hypertension)

## 2020-09-12 NOTE — ED ADULT TRIAGE NOTE - CHIEF COMPLAINT QUOTE
Patient sent by nephrologist for potassium 6.3 performed outpatient. Denies CP, SOB, palpitations.  PMHx ESRD, fistula L arm.

## 2020-09-12 NOTE — ED PROVIDER NOTE - CARE PROVIDER_API CALL
Jet Horn  NEPHROLOGY  [address], [address] [place] [address]  Phone: [phone]  Fax: [phone]  Established Patient  Follow Up Time: 1-3 Days    Jos Zaragoza  INTERNAL MEDICINE  [address], [address]  [place] [address]  Phone: [phone]  Fax: [phone]  Established Patient  Follow Up Time: 4-6 Days

## 2020-09-12 NOTE — ED PROVIDER NOTE - PATIENT PORTAL LINK FT
You can access the FollowMyHealth Patient Portal offered by [facility] by registering at the following website: [email or web address]. By joining [facility]’s FollowMyHealth portal, you will also be able to view your health information using other applications (apps) compatible with our system.

## 2020-09-14 LAB
ALBUMIN SERPL ELPH-MCNC: 4.8 G/DL
ANION GAP SERPL CALC-SCNC: 19 MMOL/L
BASOPHILS # BLD AUTO: 0.05 K/UL
BASOPHILS NFR BLD AUTO: 0.7 %
BUN SERPL-MCNC: 77 MG/DL
CALCIUM SERPL-MCNC: 10.1 MG/DL
CHLORIDE SERPL-SCNC: 105 MMOL/L
CO2 SERPL-SCNC: 21 MMOL/L
CREAT SERPL-MCNC: 4.82 MG/DL
EOSINOPHIL # BLD AUTO: 0.24 K/UL
EOSINOPHIL NFR BLD AUTO: 3.4 %
GLUCOSE SERPL-MCNC: 80 MG/DL
HCT VFR BLD CALC: 32.3 %
HGB BLD-MCNC: 10.3 G/DL
IMM GRANULOCYTES NFR BLD AUTO: 0.4 %
LYMPHOCYTES # BLD AUTO: 1.53 K/UL
LYMPHOCYTES NFR BLD AUTO: 21.5 %
MAN DIFF?: NORMAL
MCHC RBC-ENTMCNC: 31.5 PG
MCHC RBC-ENTMCNC: 31.9 GM/DL
MCV RBC AUTO: 98.8 FL
MONOCYTES # BLD AUTO: 0.62 K/UL
MONOCYTES NFR BLD AUTO: 8.7 %
NEUTROPHILS # BLD AUTO: 4.64 K/UL
NEUTROPHILS NFR BLD AUTO: 65.3 %
PHOSPHATE SERPL-MCNC: 4.2 MG/DL
PLATELET # BLD AUTO: 174 K/UL
POTASSIUM SERPL-SCNC: 6.3 MMOL/L
RBC # BLD: 3.27 M/UL
RBC # FLD: 12.2 %
SODIUM SERPL-SCNC: 145 MMOL/L
WBC # FLD AUTO: 7.11 K/UL

## 2020-10-29 ENCOUNTER — APPOINTMENT (OUTPATIENT)
Dept: NEPHROLOGY | Facility: CLINIC | Age: 65
End: 2020-10-29
Payer: COMMERCIAL

## 2020-10-29 ENCOUNTER — APPOINTMENT (OUTPATIENT)
Dept: TRANSPLANT | Facility: CLINIC | Age: 65
End: 2020-10-29
Payer: COMMERCIAL

## 2020-10-29 ENCOUNTER — LABORATORY RESULT (OUTPATIENT)
Age: 65
End: 2020-10-29

## 2020-10-29 ENCOUNTER — NON-APPOINTMENT (OUTPATIENT)
Age: 65
End: 2020-10-29

## 2020-10-29 VITALS
TEMPERATURE: 97.2 F | DIASTOLIC BLOOD PRESSURE: 65 MMHG | SYSTOLIC BLOOD PRESSURE: 134 MMHG | HEIGHT: 66 IN | BODY MASS INDEX: 24.91 KG/M2 | RESPIRATION RATE: 76 BRPM | WEIGHT: 155 LBS | HEART RATE: 78 BPM | OXYGEN SATURATION: 100 %

## 2020-10-29 PROCEDURE — 99204 OFFICE O/P NEW MOD 45 MIN: CPT

## 2020-10-29 PROCEDURE — 99072 ADDL SUPL MATRL&STAF TM PHE: CPT

## 2020-10-29 PROCEDURE — 99205 OFFICE O/P NEW HI 60 MIN: CPT

## 2020-10-29 NOTE — PHYSICAL EXAM

## 2020-10-29 NOTE — HISTORY OF PRESENT ILLNESS
[Hypertension] : Hypertension [Other: ___] : [unfilled] [Previous Kidney Transplant] : no previous kidney transplant [Cardiac History] : no cardiac history [Blood Transfusion] : no prior blood transfusion [Claudication/Angina] : no claudication/angina [Hx of DVT/Thrombosis/Miscarriage] : history  of dvt/thrombosis/miscarriage [Diabetes] : no diabetes [TextBox_20] : 01/05 [TextBox_30] : unlimited, stairs no problem [de-identified] : 65 F for kidney transplant candidacy evaluation\par Diagnosed with CKD since 15-20y\par advanced CKD-5 due to presumed Alport's gene carrier\par no biopsy of kidney\par urine output normal, nocturia x1, no hematuria\par \par PMHx\par HT\par hearing loss (as part of Alport's sydrome) since 2018\par denies cardiac, respiratory, neurological, depression, psychiatric issues\par no DVT/PE\par denies covid symptoms or infeciton\par denies infection\par \par Miscarriage x1\par No blood transfusions\par \par Surgical Hx\par left AVF creation (2017) - Dr Doan\par tonsillectomy\par \par Mammogram 08/20 - wnl\par pap smear - due for one\par colonoscopy - due for one\par \par Meds:\par sodium bicarb\par amlodipine\par caredilol\par torsemide\par \par FHx\par son - kidney failure and kidney transplant (Dec 2019), Alport's, Crohn's\par \par SHx\par lives with  and son (36), other son (30) lives apart\par works [employer] as \par non smoker\par no alcohol\par no drugs\par \par Living donor - none identified

## 2020-10-29 NOTE — ASSESSMENT
[Excellent candidate] : an excellent candidate. We should proceed with our protocol for evaluation for kidney transplantation. [FreeTextEntry1] : great functional status, still working full time and good exercise tolerance

## 2020-10-29 NOTE — PLAN
[FreeTextEntry1] : 1. CKD:  Ms. NGUYEN GARCIA  Is a good candidate for kidney transplantation and would benefit from transplantation . we can send genetic testing for etiology of CKD \par 2.Cardiac risk: will get further evaluation; echo, stress test; Reviewed cardiovascular risk reduction strategies\par 3. Cancer screening:  [place], Pap and colonoscopy \par 4. ID: Serology for acute and chronic viral infections. Screening for latent TB\par 5. Imaging: Renal/abdominal /chest /Iliac imaging\par 6.Hypertension- well controlled \par \par I have personally discussed the risks and benefits of transplantation and patient attended transplant education class where the following was disclosed:\par  \par Reviewed factors affecting survival and morbidity while on dialysis, the transplant wait list and reviewed aimee-operative and long-term risk factors affecting outcome in kidney transplantation. \par  \par One year SRTR outcomes for national and [facility] were discussed in regards to patient survival and graft survival after transplantation. \par  \par Details of transplant surgery, including complications were discussed.\par Immunosuppression and complications including infection including life threatening sepsis and opportunistic infections, malignancy and new onset diabetes were discussed. \par  \par Benefits of live donor transplantation as well as variability in wait times across regions and multiple listing were discussed. \par KDPI >85% and PHS high risk criteria donors were discussed. \par HCV kidney transplantation was discussed.\par  \par Will proceed with completing/ updating work up and listing for transplant/ live donor transplant once work up is reviewed and found to be acceptable by multidisciplinary listing committee.\par

## 2020-10-29 NOTE — REASON FOR VISIT
[Initial] : an initial visit for [End-Stage Renal Disease] : end-stage renal disease [Kidney Transplant Evaluation] : kidney transplant evaluation [FreeTextEntry2] : Jet Horn

## 2020-10-29 NOTE — HISTORY OF PRESENT ILLNESS
[TextBox_42] : NGUYEN GARCIA is a 65 year old female who presents for kidney transplant evaluation. \par Cause of CKD : has known CKD ~ 15 years. Has a AVF in 2017, not on dialysis yet. Son has Alports and she has had genetic testing that revealed that she was a carrier. \par Nephrologist: Dr Jet Horn \par  \par Other PMH :\par Cardiac - HTN X 10 years . No MI , CHF, CAD\par Pulmonary-  no h/o COPD, Asthma\par Infections-h/o  TB, HIV, Hepatitis , covid \par Heme- h/o malignancy or bleeding disorders.No DVT/PE\par Endo- no h/o diabetes, no Thyroid disease\par Neuro- no no h/o CVA\par Psych- suicidal ideation, depression or anxiety. \par Sensitization events- no  previous blood transfusions or previous transplant\par Ob/gyn- 2 children ( 36, 30 ) Normal vaginal delivery \par No infections or hospitalizations in the last 6 months \par Surgical h/o : AVF , tonsillectomy \par Functional status: can walk 10 blocks and climb stairs with no difficulty \par Social history: works at [employer]. works in financial aid . works full time. lives with  and son . non smoker , no alcohol or illicit drug use.\par Family history:  Son was diagnosed with kidney disease due to Alports syndrome and underwent a kidney transplant in 2019 at [facility]. \par .\par \par \par

## 2020-10-29 NOTE — PLAN
[We should proceed with our protocol for kidney transplantation evaluation.] : We should proceed with our protocol for kidney transplantation evaluation. [TextBox_6] : cardiac evaluation\par cxr\par us vessels\par pap smear\par colonoscopy\par blood tests including serology and covid testing

## 2020-11-02 LAB
ABO + RH PNL BLD: NORMAL
ABO + RH PNL BLD: NORMAL
ALBUMIN SERPL ELPH-MCNC: 4.5 G/DL
ALP BLD-CCNC: 118 U/L
ALT SERPL-CCNC: 8 U/L
ANION GAP SERPL CALC-SCNC: 18 MMOL/L
APPEARANCE: CLEAR
AST SERPL-CCNC: 11 U/L
BACTERIA: NEGATIVE
BASOPHILS # BLD AUTO: 0.05 K/UL
BASOPHILS NFR BLD AUTO: 0.8 %
BILIRUB SERPL-MCNC: 0.4 MG/DL
BILIRUBIN URINE: NEGATIVE
BLOOD URINE: NORMAL
BUN SERPL-MCNC: 94 MG/DL
CALCIUM SERPL-MCNC: 9.2 MG/DL
CHLORIDE SERPL-SCNC: 104 MMOL/L
CHOLEST SERPL-MCNC: 186 MG/DL
CMV IGG SERPL QL: <0.2 U/ML
CMV IGG SERPL-IMP: NEGATIVE
CO2 SERPL-SCNC: 22 MMOL/L
COLOR: COLORLESS
CREAT SERPL-MCNC: 4.65 MG/DL
CREAT SPEC-SCNC: 66 MG/DL
CREAT/PROT UR: 0.8 RATIO
EBV DNA SERPL NAA+PROBE-ACNC: NOT DETECTED IU/ML
EBV EA AB SER IA-ACNC: <5 U/ML
EBV EA AB TITR SER IF: NEGATIVE
EBV EA IGG SER QL IA: 13.6 U/ML
EBV EA IGG SER-ACNC: NEGATIVE
EBV EA IGM SER IA-ACNC: NEGATIVE
EBV PATRN SPEC IB-IMP: NORMAL
EBV VCA IGG SER IA-ACNC: 182 U/ML
EBV VCA IGM SER QL IA: <10 U/ML
EOSINOPHIL # BLD AUTO: 0.25 K/UL
EOSINOPHIL NFR BLD AUTO: 3.8 %
EPSTEIN-BARR VIRUS CAPSID ANTIGEN IGG: POSITIVE
ESTIMATED AVERAGE GLUCOSE: 97 MG/DL
GLUCOSE QUALITATIVE U: NEGATIVE
GLUCOSE SERPL-MCNC: 98 MG/DL
HAV IGM SER QL: NONREACTIVE
HBA1C MFR BLD HPLC: 5 %
HBV CORE IGG+IGM SER QL: NONREACTIVE
HBV SURFACE AB SER QL: NONREACTIVE
HBV SURFACE AB SERPL IA-ACNC: <3 MIU/ML
HBV SURFACE AG SER QL: NONREACTIVE
HCT VFR BLD CALC: 33.3 %
HCV AB SER QL: NONREACTIVE
HCV S/CO RATIO: 0.07 S/CO
HDLC SERPL-MCNC: 37 MG/DL
HEPATITIS A IGG ANTIBODY: NONREACTIVE
HGB BLD-MCNC: 10.8 G/DL
HIV1+2 AB SPEC QL IA.RAPID: NONREACTIVE
HSV 1+2 IGG SER IA-IMP: NEGATIVE
HSV 1+2 IGG SER IA-IMP: POSITIVE
HSV1 IGG SER QL: 56.4 INDEX
HSV2 IGG SER QL: 0.1 INDEX
HYALINE CASTS: 0 /LPF
IMM GRANULOCYTES NFR BLD AUTO: 0.3 %
KETONES URINE: NEGATIVE
LDLC SERPL CALC-MCNC: 125 MG/DL
LEUKOCYTE ESTERASE URINE: ABNORMAL
LYMPHOCYTES # BLD AUTO: 1.17 K/UL
LYMPHOCYTES NFR BLD AUTO: 17.6 %
M TB IFN-G BLD-IMP: NEGATIVE
MAGNESIUM SERPL-MCNC: 2.2 MG/DL
MAN DIFF?: NORMAL
MCHC RBC-ENTMCNC: 31.2 PG
MCHC RBC-ENTMCNC: 32.4 GM/DL
MCV RBC AUTO: 96.2 FL
MICROSCOPIC-UA: NORMAL
MONOCYTES # BLD AUTO: 0.5 K/UL
MONOCYTES NFR BLD AUTO: 7.5 %
NEUTROPHILS # BLD AUTO: 4.67 K/UL
NEUTROPHILS NFR BLD AUTO: 70 %
NITRITE URINE: NEGATIVE
NONHDLC SERPL-MCNC: 150 MG/DL
PH URINE: 6.5
PHOSPHATE SERPL-MCNC: 5 MG/DL
PLATELET # BLD AUTO: 177 K/UL
POTASSIUM SERPL-SCNC: 4.8 MMOL/L
PROT SERPL-MCNC: 7.2 G/DL
PROT UR-MCNC: 55 MG/DL
PROTEIN URINE: ABNORMAL
QUANTIFERON TB PLUS MITOGEN MINUS NIL: 6.97 IU/ML
QUANTIFERON TB PLUS NIL: 0.01 IU/ML
QUANTIFERON TB PLUS TB1 MINUS NIL: 0 IU/ML
QUANTIFERON TB PLUS TB2 MINUS NIL: 0 IU/ML
RBC # BLD: 3.46 M/UL
RBC # FLD: 12.3 %
RED BLOOD CELLS URINE: 2 /HPF
RUBV IGG FLD-ACNC: 23 INDEX
RUBV IGG SER-IMP: POSITIVE
SARS-COV-2 IGG SERPL IA-ACNC: 0.12 INDEX
SARS-COV-2 IGG SERPL QL IA: NEGATIVE
SODIUM SERPL-SCNC: 144 MMOL/L
SPECIFIC GRAVITY URINE: 1.01
SQUAMOUS EPITHELIAL CELLS: 2 /HPF
T GONDII AB SER-IMP: NEGATIVE
T GONDII IGG SER QL: <3 IU/ML
T PALLIDUM AB SER QL IA: NEGATIVE
TRIGL SERPL-MCNC: 125 MG/DL
UROBILINOGEN URINE: NORMAL
VZV AB TITR SER: POSITIVE
VZV IGG SER IF-ACNC: 365.6 INDEX
WBC # FLD AUTO: 6.66 K/UL
WHITE BLOOD CELLS URINE: 27 /HPF

## 2020-11-03 DIAGNOSIS — Z12.11 ENCOUNTER FOR SCREENING FOR MALIGNANT NEOPLASM OF COLON: ICD-10-CM

## 2020-11-08 ENCOUNTER — RX RENEWAL (OUTPATIENT)
Age: 65
End: 2020-11-08

## 2020-11-16 ENCOUNTER — APPOINTMENT (OUTPATIENT)
Dept: CARDIOLOGY | Facility: CLINIC | Age: 65
End: 2020-11-16
Payer: COMMERCIAL

## 2020-11-16 ENCOUNTER — NON-APPOINTMENT (OUTPATIENT)
Age: 65
End: 2020-11-16

## 2020-11-16 VITALS
HEART RATE: 80 BPM | SYSTOLIC BLOOD PRESSURE: 150 MMHG | OXYGEN SATURATION: 100 % | WEIGHT: 151 LBS | TEMPERATURE: 97 F | BODY MASS INDEX: 24.37 KG/M2 | DIASTOLIC BLOOD PRESSURE: 58 MMHG

## 2020-11-16 VITALS — DIASTOLIC BLOOD PRESSURE: 58 MMHG | SYSTOLIC BLOOD PRESSURE: 138 MMHG

## 2020-11-16 PROCEDURE — 99205 OFFICE O/P NEW HI 60 MIN: CPT

## 2020-11-16 PROCEDURE — 93000 ELECTROCARDIOGRAM COMPLETE: CPT | Mod: NC

## 2020-11-16 PROCEDURE — 99072 ADDL SUPL MATRL&STAF TM PHE: CPT

## 2020-11-16 PROCEDURE — 93306 TTE W/DOPPLER COMPLETE: CPT

## 2020-11-16 NOTE — CARDIOLOGY SUMMARY
[___] : [unfilled] [___] : [unfilled] [LVEF ___%] : LVEF [unfilled]% [None] : no pulmonary hypertension [Normal] : normal LA size

## 2020-11-29 NOTE — PHYSICAL EXAM
[General Appearance - Well Developed] : well developed [Normal Appearance] : normal appearance [Well Groomed] : well groomed [General Appearance - Well Nourished] : well nourished [No Deformities] : no deformities [General Appearance - In No Acute Distress] : no acute distress [Conjunctiva] : the conjunctiva were normal in both eyes [PERRL] : pupils were equal in size, round, and reactive to light [EOM Intact] : extraocular movements were intact [Normal Oral Mucosa] : normal oral mucosa [No Oral Pallor] : no oral pallor [No Oral Cyanosis] : no oral cyanosis [Normal Oropharynx] : normal oropharynx [Normal Jugular Venous A Waves Present] : normal jugular venous A waves present [Normal Jugular Venous V Waves Present] : normal jugular venous V waves present [No Jugular Venous Coombs A Waves] : no jugular venous coombs A waves [5th Left ICS - MCL] : palpated at the 5th LICS in the midclavicular line [Normal] : normal [No Precordial Heave] : no precordial heave was noted [Normal Rate] : normal [Rhythm Regular] : regular [Normal S1] : normal S1 [Normal S2] : normal S2 [No Gallop] : no gallop heard [No Murmur] : no murmurs heard [2+] : right 2+ [] : no respiratory distress [Respiration, Rhythm And Depth] : normal respiratory rhythm and effort [Exaggerated Use Of Accessory Muscles For Inspiration] : no accessory muscle use [Auscultation Breath Sounds / Voice Sounds] : lungs were clear to auscultation bilaterally [Bowel Sounds] : normal bowel sounds [Abdomen Soft] : soft [Abdomen Tenderness] : non-tender [Abnormal Walk] : normal gait [Gait - Sufficient For Exercise Testing] : the gait was sufficient for exercise testing [Nail Clubbing] : no clubbing of the fingernails [Cyanosis, Localized] : no localized cyanosis [Skin Color & Pigmentation] : normal skin color and pigmentation [No Venous Stasis] : no venous stasis [No Xanthoma] : no  xanthoma was observed [Oriented To Time, Place, And Person] : oriented to person, place, and time [Impaired Insight] : insight and judgment were intact [Affect] : the affect was normal [Mood] : the mood was normal [No Anxiety] : not feeling anxious [___ +] : bilateral [unfilled]U+ pretibial pitting edema [Yellow Sclera (Icteric)] : no scleral icterus was seen [Right Carotid Bruit] : no bruit heard over the right carotid [Left Carotid Bruit] : no bruit heard over the left carotid [Rt] : no varicose veins of the right leg [Lt] : no varicose veins of the left leg

## 2020-11-29 NOTE — HISTORY OF PRESENT ILLNESS
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 \par Patient is a preemptive candidate for transplant.\par She has a fistula placed in her left radial in anticipation of eventual HD, but it was placed several years ago and she has not needed it.\par \par PMD: Jos Zaragoza MD [phone]\par Nephrologist: Jet Horn MD [phone]

## 2020-11-29 NOTE — DISCUSSION/SUMMARY
[FreeTextEntry1] : 65 year-old woman with history as above who presents for cardiac evaluation prior to possible renal transplant.\par Echocardiogram to evaluate for structural heart disease.\par Nuclear stress to evaluate for ischemic heart disease.

## 2020-12-03 ENCOUNTER — APPOINTMENT (OUTPATIENT)
Dept: CARDIOLOGY | Facility: CLINIC | Age: 65
End: 2020-12-03
Payer: COMMERCIAL

## 2020-12-03 PROCEDURE — 93015 CV STRESS TEST SUPVJ I&R: CPT

## 2020-12-03 PROCEDURE — 78452 HT MUSCLE IMAGE SPECT MULT: CPT

## 2020-12-03 PROCEDURE — 99072 ADDL SUPL MATRL&STAF TM PHE: CPT

## 2020-12-03 PROCEDURE — A9500: CPT

## 2020-12-04 ENCOUNTER — APPOINTMENT (OUTPATIENT)
Dept: VASCULAR SURGERY | Facility: CLINIC | Age: 65
End: 2020-12-04
Payer: COMMERCIAL

## 2020-12-04 VITALS
BODY MASS INDEX: 24.91 KG/M2 | DIASTOLIC BLOOD PRESSURE: 67 MMHG | HEIGHT: 66 IN | TEMPERATURE: 97.7 F | HEART RATE: 82 BPM | SYSTOLIC BLOOD PRESSURE: 121 MMHG | WEIGHT: 155 LBS

## 2020-12-04 PROCEDURE — 99212 OFFICE O/P EST SF 10 MIN: CPT

## 2020-12-04 PROCEDURE — 93990 DOPPLER FLOW TESTING: CPT

## 2020-12-04 PROCEDURE — 99072 ADDL SUPL MATRL&STAF TM PHE: CPT

## 2020-12-10 ENCOUNTER — APPOINTMENT (OUTPATIENT)
Dept: NEPHROLOGY | Facility: CLINIC | Age: 65
End: 2020-12-10
Payer: COMMERCIAL

## 2020-12-10 VITALS
SYSTOLIC BLOOD PRESSURE: 119 MMHG | TEMPERATURE: 98.6 F | WEIGHT: 150 LBS | BODY MASS INDEX: 24.11 KG/M2 | HEIGHT: 66 IN | DIASTOLIC BLOOD PRESSURE: 63 MMHG | HEART RATE: 83 BPM | RESPIRATION RATE: 16 BRPM

## 2020-12-10 PROCEDURE — 99214 OFFICE O/P EST MOD 30 MIN: CPT

## 2020-12-10 PROCEDURE — 99072 ADDL SUPL MATRL&STAF TM PHE: CPT

## 2020-12-10 NOTE — REVIEW OF SYSTEMS
[Limb Swelling] : limb swelling [Negative] : Endocrine [Fever] : no fever [Feeling Poorly] : not feeling poorly [Eye Pain] : no eye pain [Earache] : no earache [Lower Ext Edema] : no lower extremity edema [Shortness Of Breath] : no shortness of breath [Abdominal Pain] : no abdominal pain [Dysuria] : no dysuria [Dizziness] : no dizziness [Easy Bleeding] : no tendency for easy bleeding

## 2020-12-10 NOTE — PHYSICAL EXAM
[General Appearance - Alert] : alert [General Appearance - In No Acute Distress] : in no acute distress [General Appearance - Well Nourished] : well nourished [Sclera] : the sclera and conjunctiva were normal [PERRL With Normal Accommodation] : pupils were equal in size, round, and reactive to light [Outer Ear] : the ears and nose were normal in appearance [Neck Appearance] : the appearance of the neck was normal [Jugular Venous Distention Increased] : there was no jugular-venous distention [Respiration, Rhythm And Depth] : normal respiratory rhythm and effort [Auscultation Breath Sounds / Voice Sounds] : lungs were clear to auscultation bilaterally [Heart Rate And Rhythm] : heart rate was normal and rhythm regular [Heart Sounds] : normal S1 and S2 [Murmurs] : no murmurs [Edema] : there was no peripheral edema [Bowel Sounds] : normal bowel sounds [Abdomen Soft] : soft [No CVA Tenderness] : no ~M costovertebral angle tenderness [Abnormal Walk] : normal gait [___ (cm) Fistula] : [unfilled] (cm) fistula [Bruit] : a bruit was present [Thrill] : a thrill was present [] : no rash [Oriented To Time, Place, And Person] : oriented to person, place, and time [Impaired Insight] : insight and judgment were intact [Affect] : the affect was normal

## 2020-12-10 NOTE — ASSESSMENT
[FreeTextEntry1] : 1. CKD Stage 5: Pt. with advanced/progressive CKD. Last Scr elevated/stable at 4.65 on 10/29/20. Patient underwent left AVF creation surgery on 11/10/17. LUE AVF can be used for HD if needed as per Dr. Doan's note in June 2020. Check renal panel today. Avoid NSAIDs, RCA, and nephrotoxins.\par \par 2. HTN: BP in acceptable range during office visit today. Low salt diet advised. Monitor BP.\par \par 3. Hyperkalemia: Last serum potassium WNL (4.8) on labs done on 10/29/20. Advised to continue with low potassium diet. Check serum potassium today.\par \par 4. Metabolic acidosis: Last serum CO2 WNL at 22. Pt. on oral sodium bicarbonate therapy. Check serum CO2 today. \par \par 5. Anemia: in setting of advanced CKD. Last hemoglobin in acceptable range (10.8). Monitor hemoglobin today. \par \par 6. LE edema: Pt. with no bilateral LE edema on examination today. Pt. on torsemide 10 mg once daily. Low salt diet advised. Monitor body weights   \par \par Follow-up pending review of labs results.

## 2020-12-10 NOTE — HISTORY OF PRESENT ILLNESS
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 Pt. was last seen in office on 9/11/20. Pt. had left AVF creation surgery on 11/10/17 by Dr. Doan. \par \par Pt. currently feels well and denies CP, SOB, HA or urinary complaints. Pt. admits compliance to her medications. Pt. underwent pre-kidney transplant evaluation at [facility] on 10/29/20.

## 2020-12-10 NOTE — QUALITY MEASURES
[Yes] : patient is an appropriate candidate for kidney transplantation evaluation [Patient has started or completed the] : patient has started or completed the process of evaluation for renal transplant

## 2020-12-18 LAB
ALBUMIN SERPL ELPH-MCNC: 4 G/DL
ANION GAP SERPL CALC-SCNC: 13 MMOL/L
BUN SERPL-MCNC: 74 MG/DL
CALCIUM SERPL-MCNC: 9 MG/DL
CHLORIDE SERPL-SCNC: 108 MMOL/L
CO2 SERPL-SCNC: 24 MMOL/L
CREAT SERPL-MCNC: 4.57 MG/DL
GLUCOSE SERPL-MCNC: 125 MG/DL
PHOSPHATE SERPL-MCNC: 4.2 MG/DL
POTASSIUM SERPL-SCNC: 4.6 MMOL/L
SODIUM SERPL-SCNC: 145 MMOL/L

## 2020-12-28 ENCOUNTER — APPOINTMENT (OUTPATIENT)
Dept: ULTRASOUND IMAGING | Facility: CLINIC | Age: 65
End: 2020-12-28
Payer: COMMERCIAL

## 2020-12-28 ENCOUNTER — APPOINTMENT (OUTPATIENT)
Dept: RADIOLOGY | Facility: CLINIC | Age: 65
End: 2020-12-28
Payer: COMMERCIAL

## 2020-12-28 ENCOUNTER — OUTPATIENT (OUTPATIENT)
Dept: OUTPATIENT SERVICES | Facility: HOSPITAL | Age: 65
LOS: 1 days | End: 2020-12-28
Payer: COMMERCIAL

## 2020-12-28 DIAGNOSIS — Z90.89 ACQUIRED ABSENCE OF OTHER ORGANS: Chronic | ICD-10-CM

## 2020-12-28 DIAGNOSIS — I77.0 ARTERIOVENOUS FISTULA, ACQUIRED: Chronic | ICD-10-CM

## 2020-12-28 DIAGNOSIS — Z00.8 ENCOUNTER FOR OTHER GENERAL EXAMINATION: ICD-10-CM

## 2020-12-28 PROCEDURE — 71046 X-RAY EXAM CHEST 2 VIEWS: CPT

## 2020-12-28 PROCEDURE — 76700 US EXAM ABDOM COMPLETE: CPT | Mod: 26,59

## 2020-12-28 PROCEDURE — 93976 VASCULAR STUDY: CPT | Mod: 26

## 2020-12-28 PROCEDURE — 71046 X-RAY EXAM CHEST 2 VIEWS: CPT | Mod: 26

## 2020-12-28 PROCEDURE — 76700 US EXAM ABDOM COMPLETE: CPT

## 2020-12-28 PROCEDURE — 93975 VASCULAR STUDY: CPT

## 2020-12-29 ENCOUNTER — APPOINTMENT (OUTPATIENT)
Dept: OBGYN | Facility: CLINIC | Age: 65
End: 2020-12-29
Payer: COMMERCIAL

## 2020-12-29 VITALS
SYSTOLIC BLOOD PRESSURE: 122 MMHG | HEIGHT: 66 IN | TEMPERATURE: 97.3 F | DIASTOLIC BLOOD PRESSURE: 70 MMHG | WEIGHT: 147 LBS | BODY MASS INDEX: 23.63 KG/M2

## 2020-12-29 PROCEDURE — 99387 INIT PM E/M NEW PAT 65+ YRS: CPT

## 2020-12-29 PROCEDURE — 99072 ADDL SUPL MATRL&STAF TM PHE: CPT

## 2020-12-29 NOTE — HISTORY OF PRESENT ILLNESS
[FreeTextEntry1] : pre transplant clearance  [Patient reported mammogram was normal] : Patient reported mammogram was normal [Patient reported PAP Smear was normal] : Patient reported PAP Smear was normal [Mammogramdate] : 2020 [PapSmeardate] : 2015 [TextBox_43] : scheduled

## 2021-01-04 LAB — CYTOLOGY CVX/VAG DOC THIN PREP: NORMAL

## 2021-01-07 ENCOUNTER — OUTPATIENT (OUTPATIENT)
Dept: OUTPATIENT SERVICES | Facility: HOSPITAL | Age: 66
LOS: 1 days | End: 2021-01-07
Payer: COMMERCIAL

## 2021-01-07 VITALS
HEIGHT: 65 IN | RESPIRATION RATE: 16 BRPM | TEMPERATURE: 98 F | SYSTOLIC BLOOD PRESSURE: 105 MMHG | HEART RATE: 83 BPM | DIASTOLIC BLOOD PRESSURE: 68 MMHG | WEIGHT: 145.95 LBS | OXYGEN SATURATION: 98 %

## 2021-01-07 DIAGNOSIS — I77.0 ARTERIOVENOUS FISTULA, ACQUIRED: Chronic | ICD-10-CM

## 2021-01-07 DIAGNOSIS — Z12.11 ENCOUNTER FOR SCREENING FOR MALIGNANT NEOPLASM OF COLON: ICD-10-CM

## 2021-01-07 DIAGNOSIS — Z01.818 ENCOUNTER FOR OTHER PREPROCEDURAL EXAMINATION: ICD-10-CM

## 2021-01-07 DIAGNOSIS — N18.6 END STAGE RENAL DISEASE: ICD-10-CM

## 2021-01-07 DIAGNOSIS — Z90.89 ACQUIRED ABSENCE OF OTHER ORGANS: Chronic | ICD-10-CM

## 2021-01-07 LAB
ANION GAP SERPL CALC-SCNC: 16 MMOL/L — SIGNIFICANT CHANGE UP (ref 5–17)
BUN SERPL-MCNC: 93 MG/DL — HIGH (ref 7–23)
CALCIUM SERPL-MCNC: 8.2 MG/DL — LOW (ref 8.4–10.5)
CHLORIDE SERPL-SCNC: 105 MMOL/L — SIGNIFICANT CHANGE UP (ref 96–108)
CO2 SERPL-SCNC: 21 MMOL/L — LOW (ref 22–31)
CREAT SERPL-MCNC: 4.89 MG/DL — HIGH (ref 0.5–1.3)
GLUCOSE SERPL-MCNC: 123 MG/DL — HIGH (ref 70–99)
HCT VFR BLD CALC: 27.3 % — LOW (ref 34.5–45)
HGB BLD-MCNC: 8.7 G/DL — LOW (ref 11.5–15.5)
MCHC RBC-ENTMCNC: 29.9 PG — SIGNIFICANT CHANGE UP (ref 27–34)
MCHC RBC-ENTMCNC: 31.9 GM/DL — LOW (ref 32–36)
MCV RBC AUTO: 93.8 FL — SIGNIFICANT CHANGE UP (ref 80–100)
NRBC # BLD: 0 /100 WBCS — SIGNIFICANT CHANGE UP (ref 0–0)
PLATELET # BLD AUTO: 115 K/UL — LOW (ref 150–400)
POTASSIUM SERPL-MCNC: 4.4 MMOL/L — SIGNIFICANT CHANGE UP (ref 3.5–5.3)
POTASSIUM SERPL-SCNC: 4.4 MMOL/L — SIGNIFICANT CHANGE UP (ref 3.5–5.3)
RBC # BLD: 2.91 M/UL — LOW (ref 3.8–5.2)
RBC # FLD: 13.2 % — SIGNIFICANT CHANGE UP (ref 10.3–14.5)
SODIUM SERPL-SCNC: 142 MMOL/L — SIGNIFICANT CHANGE UP (ref 135–145)
WBC # BLD: 2.84 K/UL — LOW (ref 3.8–10.5)
WBC # FLD AUTO: 2.84 K/UL — LOW (ref 3.8–10.5)

## 2021-01-07 PROCEDURE — G0463: CPT

## 2021-01-07 PROCEDURE — 85027 COMPLETE CBC AUTOMATED: CPT

## 2021-01-07 PROCEDURE — 80048 BASIC METABOLIC PNL TOTAL CA: CPT

## 2021-01-07 NOTE — H&P PST ADULT - NSICDXPROBLEM_GEN_ALL_CORE_FT
PROBLEM DIAGNOSES  Problem: ESRD with anemia  Assessment and Plan: colonoscopy under anesthesia  PST labs send  preprocedure instructions discussed   K the day of sx  continue home meds  recent EKG/echo/stress report, cardiac note on file       PROBLEM DIAGNOSES  Problem: Screening for colon cancer  Assessment and Plan: colonoscopy under anesthesia  PST labs send  preprocedure instructions discussed   K the day of sx  continue home meds  recent EKG/echo/stress report, cardiac note on file

## 2021-01-07 NOTE — H&P PST ADULT - HISTORY OF PRESENT ILLNESS
66 yo female  alport syndrome, HTN, ESRD s/p left AV fistula not on HD ( on pretransplant) planned for screnning colonoscopy under anesthesia on 1/13/2021.    *** Covid test 1/10/2021 64 yo female with PMH of  alport syndrome, HTN, [place] with bilateral hearing aids, ESRD s/p left AV fistula 2017 not on HD ( on pretransplant list) planned for screening colonoscopy under anesthesia on 1/13/2021.    *** Covid test 1/10/2021

## 2021-01-07 NOTE — H&P PST ADULT - NSICDXPASTMEDICALHX_GEN_ALL_CORE_FT
PAST MEDICAL HISTORY:  Alport's syndrome     Anemia     ESRD with anemia     Hard of hearing     HTN (hypertension)      PAST MEDICAL HISTORY:  Alport's syndrome     Anemia     ESRD with anemia left AV fistula, not in use    Hard of hearing uses hearing aids    HTN (hypertension)

## 2021-01-07 NOTE — H&P PST ADULT - OTHER CARE PROVIDERS
Dr. Leroy Huston Cardio, Dr. Jet Horn nephro Dr. Leroy Huston Cardio [phone], Dr. Jet Horn nephro [phone]

## 2021-01-09 DIAGNOSIS — Z01.818 ENCOUNTER FOR OTHER PREPROCEDURAL EXAMINATION: ICD-10-CM

## 2021-01-10 ENCOUNTER — APPOINTMENT (OUTPATIENT)
Dept: DISASTER EMERGENCY | Facility: CLINIC | Age: 66
End: 2021-01-10

## 2021-01-12 PROBLEM — N18.6 END STAGE RENAL DISEASE: Chronic | Status: ACTIVE | Noted: 2021-01-07

## 2021-01-12 PROBLEM — Q87.81 ALPORT SYNDROME: Chronic | Status: ACTIVE | Noted: 2017-11-08

## 2021-01-12 PROBLEM — H91.90 UNSPECIFIED HEARING LOSS, UNSPECIFIED EAR: Chronic | Status: ACTIVE | Noted: 2017-11-08

## 2021-01-12 LAB — SARS-COV-2 N GENE NPH QL NAA+PROBE: DETECTED

## 2021-02-03 ENCOUNTER — OUTPATIENT (OUTPATIENT)
Dept: OUTPATIENT SERVICES | Facility: HOSPITAL | Age: 66
LOS: 1 days | End: 2021-02-03
Payer: COMMERCIAL

## 2021-02-03 VITALS
HEIGHT: 66 IN | RESPIRATION RATE: 18 BRPM | DIASTOLIC BLOOD PRESSURE: 70 MMHG | WEIGHT: 149.91 LBS | OXYGEN SATURATION: 100 % | TEMPERATURE: 98 F | HEART RATE: 81 BPM | SYSTOLIC BLOOD PRESSURE: 149 MMHG

## 2021-02-03 DIAGNOSIS — I77.0 ARTERIOVENOUS FISTULA, ACQUIRED: Chronic | ICD-10-CM

## 2021-02-03 DIAGNOSIS — Z01.818 ENCOUNTER FOR OTHER PREPROCEDURAL EXAMINATION: ICD-10-CM

## 2021-02-03 DIAGNOSIS — Z90.89 ACQUIRED ABSENCE OF OTHER ORGANS: Chronic | ICD-10-CM

## 2021-02-03 LAB
ANION GAP SERPL CALC-SCNC: 16 MMOL/L — SIGNIFICANT CHANGE UP (ref 5–17)
BUN SERPL-MCNC: 79 MG/DL — HIGH (ref 7–23)
CALCIUM SERPL-MCNC: 9.5 MG/DL — SIGNIFICANT CHANGE UP (ref 8.4–10.5)
CHLORIDE SERPL-SCNC: 107 MMOL/L — SIGNIFICANT CHANGE UP (ref 96–108)
CO2 SERPL-SCNC: 21 MMOL/L — LOW (ref 22–31)
CREAT SERPL-MCNC: 4.29 MG/DL — HIGH (ref 0.5–1.3)
GLUCOSE SERPL-MCNC: 125 MG/DL — HIGH (ref 70–99)
HCT VFR BLD CALC: 27.9 % — LOW (ref 34.5–45)
HGB BLD-MCNC: 9.1 G/DL — LOW (ref 11.5–15.5)
MCHC RBC-ENTMCNC: 30.6 PG — SIGNIFICANT CHANGE UP (ref 27–34)
MCHC RBC-ENTMCNC: 32.6 GM/DL — SIGNIFICANT CHANGE UP (ref 32–36)
MCV RBC AUTO: 93.9 FL — SIGNIFICANT CHANGE UP (ref 80–100)
NRBC # BLD: 0 /100 WBCS — SIGNIFICANT CHANGE UP (ref 0–0)
PLATELET # BLD AUTO: 170 K/UL — SIGNIFICANT CHANGE UP (ref 150–400)
POTASSIUM SERPL-MCNC: 4.7 MMOL/L — SIGNIFICANT CHANGE UP (ref 3.5–5.3)
POTASSIUM SERPL-SCNC: 4.7 MMOL/L — SIGNIFICANT CHANGE UP (ref 3.5–5.3)
RBC # BLD: 2.97 M/UL — LOW (ref 3.8–5.2)
RBC # FLD: 13.7 % — SIGNIFICANT CHANGE UP (ref 10.3–14.5)
SODIUM SERPL-SCNC: 144 MMOL/L — SIGNIFICANT CHANGE UP (ref 135–145)
WBC # BLD: 6.76 K/UL — SIGNIFICANT CHANGE UP (ref 3.8–10.5)
WBC # FLD AUTO: 6.76 K/UL — SIGNIFICANT CHANGE UP (ref 3.8–10.5)

## 2021-02-03 PROCEDURE — 80048 BASIC METABOLIC PNL TOTAL CA: CPT

## 2021-02-03 PROCEDURE — 85027 COMPLETE CBC AUTOMATED: CPT

## 2021-02-03 RX ORDER — FUROSEMIDE 40 MG
1 TABLET ORAL
Qty: 0 | Refills: 0 | DISCHARGE

## 2021-02-03 RX ORDER — BENZOYL PEROXIDE MICRONIZED 5.8 %
1 TOWELETTE (EA) TOPICAL
Qty: 0 | Refills: 0 | DISCHARGE

## 2021-02-03 NOTE — H&P PST ADULT - NEGATIVE GASTROINTESTINAL SYMPTOMS
no nausea/no vomiting/no diarrhea/no constipation/no change in bowel habits/no abdominal pain/no melena

## 2021-02-03 NOTE — H&P PST ADULT - NSICDXPASTMEDICALHX_GEN_ALL_CORE_FT
PAST MEDICAL HISTORY:  Alport's syndrome     Anemia     ESRD with anemia left AV fistula, not in use    Hard of hearing uses hearing aids    HTN (hypertension)

## 2021-02-03 NOTE — H&P PST ADULT - HISTORY OF PRESENT ILLNESS
66 yo female with PMH of alport syndrome, HTN, [place] with bilateral hearing aids, ESRD s/p left AV fistula 2017 not on HD (on pretransplant list) planned for screening colonoscopy under anesthesia on 2/10/2021.  Patient was previously scheduled for colonoscopy in January but was found to be Covid + on 1/10/2021; patient was asymptomatic and denies symptoms currently.    *** Covid test 2/7/2021 ***

## 2021-02-03 NOTE — H&P PST ADULT - NSICDXPROBLEM_GEN_ALL_CORE_FT
PROBLEM DIAGNOSES  Problem: Encounter for other preprocedural examination  Assessment and Plan: Colonoscopy on 2/10/2021 with Dr. Terence Day.  Covid test on 2/7/21 at [facility].  Pre-op questions given. Questions answered.

## 2021-02-07 ENCOUNTER — APPOINTMENT (OUTPATIENT)
Dept: DISASTER EMERGENCY | Facility: CLINIC | Age: 66
End: 2021-02-07

## 2021-02-07 LAB — SARS-COV-2 N GENE NPH QL NAA+PROBE: NOT DETECTED

## 2021-02-10 ENCOUNTER — NON-APPOINTMENT (OUTPATIENT)
Age: 66
End: 2021-02-10

## 2021-02-10 ENCOUNTER — RESULT REVIEW (OUTPATIENT)
Age: 66
End: 2021-02-10

## 2021-02-10 ENCOUNTER — APPOINTMENT (OUTPATIENT)
Dept: HEPATOLOGY | Facility: HOSPITAL | Age: 66
End: 2021-02-10

## 2021-02-10 ENCOUNTER — OUTPATIENT (OUTPATIENT)
Dept: OUTPATIENT SERVICES | Facility: HOSPITAL | Age: 66
LOS: 1 days | End: 2021-02-10
Payer: COMMERCIAL

## 2021-02-10 VITALS
SYSTOLIC BLOOD PRESSURE: 118 MMHG | WEIGHT: 149.91 LBS | DIASTOLIC BLOOD PRESSURE: 58 MMHG | OXYGEN SATURATION: 100 % | RESPIRATION RATE: 15 BRPM | HEART RATE: 76 BPM | TEMPERATURE: 97 F | HEIGHT: 65.5 IN

## 2021-02-10 VITALS
OXYGEN SATURATION: 100 % | SYSTOLIC BLOOD PRESSURE: 107 MMHG | RESPIRATION RATE: 19 BRPM | DIASTOLIC BLOOD PRESSURE: 44 MMHG | HEART RATE: 74 BPM

## 2021-02-10 DIAGNOSIS — Z01.818 ENCOUNTER FOR OTHER PREPROCEDURAL EXAMINATION: ICD-10-CM

## 2021-02-10 DIAGNOSIS — Z90.89 ACQUIRED ABSENCE OF OTHER ORGANS: Chronic | ICD-10-CM

## 2021-02-10 DIAGNOSIS — I77.0 ARTERIOVENOUS FISTULA, ACQUIRED: Chronic | ICD-10-CM

## 2021-02-10 PROCEDURE — 88305 TISSUE EXAM BY PATHOLOGIST: CPT | Mod: 26

## 2021-02-10 PROCEDURE — 88305 TISSUE EXAM BY PATHOLOGIST: CPT

## 2021-02-10 PROCEDURE — 45380 COLONOSCOPY AND BIOPSY: CPT

## 2021-02-10 PROCEDURE — 45380 COLONOSCOPY AND BIOPSY: CPT | Mod: PT

## 2021-02-10 RX ORDER — SODIUM CHLORIDE 9 MG/ML
500 INJECTION INTRAMUSCULAR; INTRAVENOUS; SUBCUTANEOUS
Refills: 0 | Status: DISCONTINUED | OUTPATIENT
Start: 2021-02-10 | End: 2021-02-24

## 2021-02-10 NOTE — ASU PATIENT PROFILE, ADULT - PMH
Alport's syndrome    Anemia    ESRD with anemia  left AV fistula, not in use  Hard of hearing  uses hearing aids  HTN (hypertension)

## 2021-02-10 NOTE — H&P PST ADULT - PT NEEDS ASSIST
Daily Note     Today's date: 2/10/2021  Patient name: Karan Quintana  : 1964  MRN: [id number]  Referring provider: Waleska Haas, *  Dx:   Encounter Diagnosis     ICD-10-CM    1  Strain of muscle, fascia and tendon of lower back, initial encounter  S39 012A    2  Low back pain without sciatica, unspecified back pain laterality, unspecified chronicity  M54 5                   Subjective: Pt notes that she did not have muscle spasms in her back this morning for the first time in awhile  She notes that she has been compliant with her HEP  Objective: See treatment diary below      Assessment: Tolerated treatment well  Pt was able to perform all exercise today with no difficulty  She had increased tightness in her neck today, had some discomfort during open book stretch  Pt needed moderate cueing for proper form and intensity  No TTT during manual work, continue to progress patient as see fit  Patient demonstrated fatigue post treatment and would benefit from continued PT  Plan: Continue per plan of care             Precautions: N/A     Daily Treatment Diary:       Initial Evaluation Date: 21  Compliance 1/26  2/3 2/10                 Visit Number 1 2  3                 Re-Jody  IE -  -              MC   Foto Captured Y -  -                        1/26  2/3  2/10                 Manual                      L/S STM   arb  arb 10'                                                              Ther-Ex                      Bike- warm up -  L1 9'  L1 9'                 LTR HEP  10x10"  10x10"                 SKC HEP  10x 5" ea  10x 5" ea                 Open books HEP  5x10" 5x 10" ea                 Cat/cow HEP  -  -                 child's pose  HEP  -  -                 Hamstring stretch HEP  3x30"  3x30"                 IT band stretch HEP    -                 Piriformis stretch HEP    3x30"                 clamshells  Green 2x10 Green 2x10           Butterfly stretch   10x10"          Hip ext    At bar 15x ea          squats   At bar 15x           Bridge    15x  -                 Neuro Re-Ed                      PPT with TA contraction  20x 5"  20x 5"                 TA with march   10x                                                  Ther-Act                                                               Modalities                       prn  10' post  10' post no

## 2021-02-10 NOTE — ASU PREOP CHECKLIST - ISOLATION PRECAUTIONS
Luz is here today for   Chief Complaint   Patient presents with   • Office Visit   • Physical   .        Medication Refills needed today?  NO,   if you receive a prescription today would you like it to be sent to [facility]? YES      Patient would like communication of their results via:      Cell Phone:   Telephone Information:   Mobile [phone]     Okay to leave a message containing results? Yes          Health Maintenance Summary     DTaP/Tdap/Td Vaccine (1 - Tdap)  Overdue since 6/25/2007    Cervical Cancer Screening HPV CO-Testing (Every 5 Years)  Overdue since 6/25/2018    Influenza Vaccine (1)  Overdue since 9/1/2020    Depression Screening (Yearly)  Next due on 9/16/2021    Hepatitis B Vaccine   Aged Out    Meningococcal Vaccine   Aged Out    HPV Vaccine   Aged Out    Pneumococcal Vaccine 0-64   Aged Out          Patient is due for the topics as listed above and wishes to proceed with them.            none

## 2021-02-10 NOTE — PRE PROCEDURE NOTE - PRE PROCEDURE EVALUATION
Attending Physician: Terence Day                        Procedure: Colonoscopy    Indication for Procedure: Screening  ________________________________________________________  PAST MEDICAL & SURGICAL HISTORY:  ESRD with anemia  left AV fistula, not in use    Anemia    Hard of hearing  uses hearing aids    HTN (hypertension)    Alport&#x27;s syndrome    A-V fistula  Nov 2017    S/P tonsillectomy      ALLERGIES:  No Known Allergies    HOME MEDICATIONS:  amLODIPine 10 mg oral tablet: 1 tab(s) orally once a day  carvedilol 6.25 mg oral tablet: 1 tab(s) orally 2 times a day  iron: once a day  sodium bicarbonate 650 mg oral tablet: 2 tab(s) orally 3 times a day  torsemide 10 mg oral tablet: 1 tab(s) orally once a day    AICD/PPM: [ ] yes   [ ] no    PERTINENT LAB DATA:                      PHYSICAL EXAMINATION:    Height (cm): 166.4  Weight (kg): 68  BMI (kg/m2): 24.6  BSA (m2): 1.76T(C): 36.3  HR: 76  BP: 118/58  RR: 15  SpO2: 100%    Constitutional: NAD  HEENT: PERRLA, EOMI,    Neck:  No JVD  Respiratory: CTAB/L  Cardiovascular: S1 and S2  Gastrointestinal: BS+, soft, NT/ND  Extremities: No peripheral edema  Neurological: A/O x 3, no focal deficits  Psychiatric: Normal mood, normal affect  Skin: No rashes    ASA Class: I [ ]  II [ ]  III [ x]  IV [ ]    COMMENTS:    The patient is a suitable candidate for the planned procedure unless box checked [ ]  No, explain:

## 2021-02-10 NOTE — ASU PREOP CHECKLIST - BLOOD AVAILABLE
Patient states the pharmacy doesn't have a prescription for her insulin.  She would like one sent over.  Thank You. Ok to leave a message.   n/a

## 2021-02-11 ENCOUNTER — NON-APPOINTMENT (OUTPATIENT)
Age: 66
End: 2021-02-11

## 2021-02-11 LAB — SURGICAL PATHOLOGY STUDY: SIGNIFICANT CHANGE UP

## 2021-02-12 ENCOUNTER — APPOINTMENT (OUTPATIENT)
Dept: NEPHROLOGY | Facility: CLINIC | Age: 66
End: 2021-02-12
Payer: COMMERCIAL

## 2021-02-12 VITALS
TEMPERATURE: 96.5 F | OXYGEN SATURATION: 99 % | BODY MASS INDEX: 24.75 KG/M2 | DIASTOLIC BLOOD PRESSURE: 72 MMHG | HEIGHT: 66 IN | HEART RATE: 88 BPM | WEIGHT: 154 LBS | SYSTOLIC BLOOD PRESSURE: 144 MMHG

## 2021-02-12 PROCEDURE — 99072 ADDL SUPL MATRL&STAF TM PHE: CPT

## 2021-02-12 PROCEDURE — 99214 OFFICE O/P EST MOD 30 MIN: CPT | Mod: GC

## 2021-02-12 RX ORDER — PEG-3350, SODIUM SULFATE, SODIUM CHLORIDE, POTASSIUM CHLORIDE, SODIUM ASCORBATE AND ASCORBIC ACID 7.5-2.691G
100 KIT ORAL
Qty: 2 | Refills: 0 | Status: DISCONTINUED | COMMUNITY
Start: 2020-11-03 | End: 2021-02-12

## 2021-02-12 NOTE — HISTORY OF PRESENT ILLNESS
[FreeTextEntry1] : 65-year-old female with advanced CKD-5 (presumed Alport's gene carrier), left AVF creation surgery (on 11/10/17 by Dr. Doan), and HTN presents for follow-up visit after ~2 months. Pt. was last seen in office on 12/10/20. \par \par Pt. currently feels well and denies CP, SOB, HA or urinary complaints. Pt. admits compliance to her medications. Pt. says she completed her pre-kidney transplant evaluation at [facility] Kidney Transplant program.

## 2021-02-12 NOTE — PHYSICAL EXAM
[General Appearance - Alert] : alert [General Appearance - In No Acute Distress] : in no acute distress [General Appearance - Well Nourished] : well nourished [Sclera] : the sclera and conjunctiva were normal [Outer Ear] : the ears and nose were normal in appearance [PERRL With Normal Accommodation] : pupils were equal in size, round, and reactive to light [Neck Appearance] : the appearance of the neck was normal [Jugular Venous Distention Increased] : there was no jugular-venous distention [Respiration, Rhythm And Depth] : normal respiratory rhythm and effort [Auscultation Breath Sounds / Voice Sounds] : lungs were clear to auscultation bilaterally [Heart Rate And Rhythm] : heart rate was normal and rhythm regular [Heart Sounds] : normal S1 and S2 [Murmurs] : no murmurs [Edema] : there was no peripheral edema [Bowel Sounds] : normal bowel sounds [Abdomen Soft] : soft [No CVA Tenderness] : no ~M costovertebral angle tenderness [Abnormal Walk] : normal gait [___ (cm) Fistula] : [unfilled] (cm) fistula [Bruit] : a bruit was present [Thrill] : a thrill was present [] : no rash [Oriented To Time, Place, And Person] : oriented to person, place, and time [Impaired Insight] : insight and judgment were intact [Affect] : the affect was normal

## 2021-02-12 NOTE — REVIEW OF SYSTEMS
[Negative] : Endocrine [Fever] : no fever [Feeling Poorly] : not feeling poorly [Eye Pain] : no eye pain [Earache] : no earache [Lower Ext Edema] : no lower extremity edema [Shortness Of Breath] : no shortness of breath [Abdominal Pain] : no abdominal pain [Dysuria] : no dysuria [Limb Swelling] : no limb swelling [Dizziness] : no dizziness [Easy Bleeding] : no tendency for easy bleeding

## 2021-02-12 NOTE — ASSESSMENT
[FreeTextEntry1] : 1. CKD Stage 5: Pt. with advanced/progressive CKD. Last Scr elevated/stable at 4.29 on 2/3/21. Patient underwent left AVF creation surgery on 11/10/17. LUE AVF can be used for HD if needed as per Dr. Doan's note in June 2020. Pt. underwent kidney transplant evaluation at [facility]. Monitor kidney function. Avoid NSAIDs, RCA, and nephrotoxins.\par \par 2. HTN: BP in acceptable range during office visit today. Low salt diet advised. Monitor BP.\par \par 3. Hyperkalemia: Last serum potassium WNL (4.7) on labs done on 2/3/21. Advised to continue with low potassium diet. Monitor serum potassium.\par \par 4. Metabolic acidosis: Last serum CO2 WNL at 21. Pt. on oral sodium bicarbonate therapy. Monitor serum CO2. \par \par 5. Anemia: in setting of advanced CKD. Last hemoglobin below target range (9.1). Monitor hemoglobin. \par \par 6. LE edema: Pt. with no bilateral LE edema on examination today. Pt. on torsemide 10 mg once daily. Low salt diet advised. Monitor body weights   \par \par Follow-up in 2 months.

## 2021-02-16 ENCOUNTER — NON-APPOINTMENT (OUTPATIENT)
Age: 66
End: 2021-02-16

## 2021-03-16 ENCOUNTER — APPOINTMENT (OUTPATIENT)
Dept: TRANSPLANT | Facility: CLINIC | Age: 66
End: 2021-03-16

## 2021-04-13 ENCOUNTER — APPOINTMENT (OUTPATIENT)
Dept: TRANSPLANT | Facility: CLINIC | Age: 66
End: 2021-04-13

## 2021-04-14 ENCOUNTER — APPOINTMENT (OUTPATIENT)
Dept: TRANSPLANT | Facility: CLINIC | Age: 66
End: 2021-04-14
Payer: COMMERCIAL

## 2021-04-14 PROCEDURE — 99001T: CUSTOM

## 2021-04-23 ENCOUNTER — APPOINTMENT (OUTPATIENT)
Dept: NEPHROLOGY | Facility: CLINIC | Age: 66
End: 2021-04-23
Payer: COMMERCIAL

## 2021-04-23 VITALS
TEMPERATURE: 97.2 F | WEIGHT: 156 LBS | SYSTOLIC BLOOD PRESSURE: 156 MMHG | RESPIRATION RATE: 16 BRPM | DIASTOLIC BLOOD PRESSURE: 76 MMHG | HEIGHT: 66 IN | HEART RATE: 89 BPM | BODY MASS INDEX: 25.07 KG/M2

## 2021-04-23 VITALS — SYSTOLIC BLOOD PRESSURE: 148 MMHG | DIASTOLIC BLOOD PRESSURE: 84 MMHG

## 2021-04-23 PROCEDURE — 99214 OFFICE O/P EST MOD 30 MIN: CPT

## 2021-04-23 PROCEDURE — 99072 ADDL SUPL MATRL&STAF TM PHE: CPT

## 2021-04-23 NOTE — ASSESSMENT
[FreeTextEntry1] : 1. CKD Stage 5: Pt. with advanced/progressive CKD. Last Scr elevated/stable at 4.29 on 2/3/21. Patient underwent left AVF creation surgery on 11/10/17. LUE AVF can be used for HD if needed as per Dr. Doan's note in June 2020. Pt. underwent kidney transplant evaluation at [facility]. Check renal panel today. Monitor kidney function. Avoid NSAIDs, RCA, and nephrotoxins.\par \par 2. HTN: Repeat BP reading improved during office visit today. Low salt diet advised. Monitor BP.\par \par 3. Hyperkalemia: Last serum potassium WNL (4.7) on labs done on 2/3/21. Advised to continue with low potassium diet. Check serum potassium today.\par \par 4. Metabolic acidosis: Last serum CO2 WNL at 21. Pt. on oral sodium bicarbonate therapy. Check serum CO2 today. \par \par 5. Anemia: in setting of advanced CKD. Last hemoglobin below target range (9.1). Monitor hemoglobin. \par \par 6. LE edema: Pt. with no bilateral LE edema on examination today. Pt. on torsemide 10 mg once daily. Low salt diet advised. Monitor body weights   \par \par Follow-up in 2 months.

## 2021-04-23 NOTE — HISTORY OF PRESENT ILLNESS
[FreeTextEntry1] : 65-year-old female with advanced CKD-5 (presumed Alport's gene carrier), left AVF creation surgery (on 11/10/17 by Dr. Doan), and HTN presents for follow-up visit after ~2.5 months. Pt. was last seen in office on 2/12/21. \par \par Pt. currently feels well and denies CP, SOB, HA or urinary complaints. Pt. admits compliance to her medications. Pt. currently listed actively at [facility] Kidney Transplant program.

## 2021-04-23 NOTE — REVIEW OF SYSTEMS
[Negative] : Endocrine [Fever] : no fever [Feeling Poorly] : not feeling poorly [Earache] : no earache [Eye Pain] : no eye pain [Lower Ext Edema] : no lower extremity edema [Shortness Of Breath] : no shortness of breath [Abdominal Pain] : no abdominal pain [Dysuria] : no dysuria [Limb Swelling] : no limb swelling [Dizziness] : no dizziness [Easy Bleeding] : no tendency for easy bleeding

## 2021-04-25 LAB
ALBUMIN SERPL ELPH-MCNC: 4.5 G/DL
ANION GAP SERPL CALC-SCNC: 16 MMOL/L
BUN SERPL-MCNC: 84 MG/DL
CALCIUM SERPL-MCNC: 9.3 MG/DL
CHLORIDE SERPL-SCNC: 108 MMOL/L
CO2 SERPL-SCNC: 21 MMOL/L
CREAT SERPL-MCNC: 4.67 MG/DL
GLUCOSE SERPL-MCNC: 87 MG/DL
PHOSPHATE SERPL-MCNC: 5 MG/DL
POTASSIUM SERPL-SCNC: 5.1 MMOL/L
SODIUM SERPL-SCNC: 145 MMOL/L

## 2021-04-27 ENCOUNTER — NON-APPOINTMENT (OUTPATIENT)
Age: 66
End: 2021-04-27

## 2021-05-13 ENCOUNTER — APPOINTMENT (OUTPATIENT)
Dept: TRANSPLANT | Facility: CLINIC | Age: 66
End: 2021-05-13

## 2021-05-14 ENCOUNTER — APPOINTMENT (OUTPATIENT)
Dept: TRANSPLANT | Facility: CLINIC | Age: 66
End: 2021-05-14
Payer: COMMERCIAL

## 2021-05-14 PROCEDURE — 99001T: CUSTOM

## 2021-06-11 ENCOUNTER — APPOINTMENT (OUTPATIENT)
Dept: VASCULAR SURGERY | Facility: CLINIC | Age: 66
End: 2021-06-11
Payer: COMMERCIAL

## 2021-06-11 LAB — ROGOSIN: NORMAL

## 2021-06-11 PROCEDURE — 99072 ADDL SUPL MATRL&STAF TM PHE: CPT

## 2021-06-11 PROCEDURE — 93990 DOPPLER FLOW TESTING: CPT

## 2021-06-15 ENCOUNTER — APPOINTMENT (OUTPATIENT)
Dept: VASCULAR SURGERY | Facility: CLINIC | Age: 66
End: 2021-06-15
Payer: COMMERCIAL

## 2021-06-15 PROCEDURE — 99442: CPT

## 2021-06-18 ENCOUNTER — APPOINTMENT (OUTPATIENT)
Dept: TRANSPLANT | Facility: CLINIC | Age: 66
End: 2021-06-18

## 2021-06-21 ENCOUNTER — APPOINTMENT (OUTPATIENT)
Dept: TRANSPLANT | Facility: CLINIC | Age: 66
End: 2021-06-21
Payer: COMMERCIAL

## 2021-06-21 PROCEDURE — 86832 HLA CLASS I HIGH DEFIN QUAL: CPT

## 2021-06-21 PROCEDURE — 99001T: CUSTOM

## 2021-06-21 PROCEDURE — 86833 HLA CLASS II HIGH DEFIN QUAL: CPT

## 2021-06-23 NOTE — PRE-ANESTHESIA EVALUATION ADULT - NSATTENDATTESTRD_GEN_ALL_CORE
The patient has been re-examined and I agree with the above assessment or I updated with my findings. Psoriasis

## 2021-06-24 ENCOUNTER — NON-APPOINTMENT (OUTPATIENT)
Age: 66
End: 2021-06-24

## 2021-06-24 LAB
ALBUMIN SERPL ELPH-MCNC: 4.4 G/DL
ANION GAP SERPL CALC-SCNC: 16 MMOL/L
BASOPHILS # BLD AUTO: 0.04 K/UL
BASOPHILS NFR BLD AUTO: 0.7 %
BUN SERPL-MCNC: 100 MG/DL
CALCIUM SERPL-MCNC: 9.3 MG/DL
CHLORIDE SERPL-SCNC: 108 MMOL/L
CO2 SERPL-SCNC: 21 MMOL/L
CREAT SERPL-MCNC: 5.09 MG/DL
EOSINOPHIL # BLD AUTO: 0.21 K/UL
EOSINOPHIL NFR BLD AUTO: 3.6 %
GLUCOSE SERPL-MCNC: 123 MG/DL
HCT VFR BLD CALC: 31 %
HGB BLD-MCNC: 9.6 G/DL
IMM GRANULOCYTES NFR BLD AUTO: 0.2 %
LYMPHOCYTES # BLD AUTO: 1.23 K/UL
LYMPHOCYTES NFR BLD AUTO: 21 %
MAN DIFF?: NORMAL
MCHC RBC-ENTMCNC: 30.2 PG
MCHC RBC-ENTMCNC: 31 GM/DL
MCV RBC AUTO: 97.5 FL
MONOCYTES # BLD AUTO: 0.53 K/UL
MONOCYTES NFR BLD AUTO: 9 %
NEUTROPHILS # BLD AUTO: 3.84 K/UL
NEUTROPHILS NFR BLD AUTO: 65.5 %
PHOSPHATE SERPL-MCNC: 5 MG/DL
PLATELET # BLD AUTO: 171 K/UL
POTASSIUM SERPL-SCNC: 5.4 MMOL/L
RBC # BLD: 3.18 M/UL
RBC # FLD: 12.7 %
SODIUM SERPL-SCNC: 145 MMOL/L
WBC # FLD AUTO: 5.86 K/UL

## 2021-06-30 LAB
ALBUMIN SERPL ELPH-MCNC: 4.2 G/DL
ANION GAP SERPL CALC-SCNC: 14 MMOL/L
BUN SERPL-MCNC: 81 MG/DL
CALCIUM SERPL-MCNC: 9.4 MG/DL
CHLORIDE SERPL-SCNC: 107 MMOL/L
CO2 SERPL-SCNC: 23 MMOL/L
CREAT SERPL-MCNC: 4.84 MG/DL
GLUCOSE SERPL-MCNC: 154 MG/DL
PHOSPHATE SERPL-MCNC: 4.4 MG/DL
POTASSIUM SERPL-SCNC: 5.6 MMOL/L
SODIUM SERPL-SCNC: 144 MMOL/L

## 2021-07-09 ENCOUNTER — APPOINTMENT (OUTPATIENT)
Dept: TRANSPLANT | Facility: CLINIC | Age: 66
End: 2021-07-09

## 2021-07-12 ENCOUNTER — APPOINTMENT (OUTPATIENT)
Dept: TRANSPLANT | Facility: CLINIC | Age: 66
End: 2021-07-12
Payer: COMMERCIAL

## 2021-07-12 PROCEDURE — 99001T: CUSTOM

## 2021-07-16 ENCOUNTER — APPOINTMENT (OUTPATIENT)
Dept: NEPHROLOGY | Facility: CLINIC | Age: 66
End: 2021-07-16
Payer: COMMERCIAL

## 2021-07-16 VITALS
SYSTOLIC BLOOD PRESSURE: 143 MMHG | HEIGHT: 66 IN | DIASTOLIC BLOOD PRESSURE: 74 MMHG | BODY MASS INDEX: 24.75 KG/M2 | TEMPERATURE: 97.2 F | HEART RATE: 89 BPM | WEIGHT: 154 LBS | RESPIRATION RATE: 16 BRPM | OXYGEN SATURATION: 98 %

## 2021-07-16 VITALS — SYSTOLIC BLOOD PRESSURE: 138 MMHG | DIASTOLIC BLOOD PRESSURE: 82 MMHG

## 2021-07-16 PROCEDURE — 99214 OFFICE O/P EST MOD 30 MIN: CPT

## 2021-07-16 PROCEDURE — 99072 ADDL SUPL MATRL&STAF TM PHE: CPT

## 2021-07-16 NOTE — HISTORY OF PRESENT ILLNESS
[FreeTextEntry1] : 65-year-old female with advanced CKD-5 (presumed Alport's gene carrier), LUE AVF creation surgery (on 11/10/17 by Dr. Doan), and HTN presents for follow-up visit after ~2.5-3 months. Pt. was last seen in office on 4/23/21. \par \par Pt. currently feels well and denies CP, SOB, HA or urinary complaints. Pt. admits compliance to her medications. Pt. currently listed actively at [facility] Kidney Transplant program.

## 2021-07-16 NOTE — PHYSICAL EXAM
[General Appearance - Alert] : alert [General Appearance - In No Acute Distress] : in no acute distress [General Appearance - Well Nourished] : well nourished [Sclera] : the sclera and conjunctiva were normal [PERRL With Normal Accommodation] : pupils were equal in size, round, and reactive to light [Outer Ear] : the ears and nose were normal in appearance [Neck Appearance] : the appearance of the neck was normal [Jugular Venous Distention Increased] : there was no jugular-venous distention [Respiration, Rhythm And Depth] : normal respiratory rhythm and effort [Auscultation Breath Sounds / Voice Sounds] : lungs were clear to auscultation bilaterally [Heart Rate And Rhythm] : heart rate was normal and rhythm regular [Heart Sounds] : normal S1 and S2 [Murmurs] : no murmurs [Bowel Sounds] : normal bowel sounds [Abdomen Soft] : soft [No CVA Tenderness] : no ~M costovertebral angle tenderness [Abnormal Walk] : normal gait [___ (cm) Fistula] : [unfilled] (cm) fistula [Bruit] : a bruit was present [Thrill] : a thrill was present [] : no rash [Oriented To Time, Place, And Person] : oriented to person, place, and time [Impaired Insight] : insight and judgment were intact [Affect] : the affect was normal [FreeTextEntry1] : Mild B/L LE pitting edema (L>R)

## 2021-07-16 NOTE — REVIEW OF SYSTEMS
[Negative] : Endocrine [Lower Ext Edema] : lower extremity edema [Limb Swelling] : limb swelling [Fever] : no fever [Eye Pain] : no eye pain [Earache] : no earache [Shortness Of Breath] : no shortness of breath [Abdominal Pain] : no abdominal pain [Dysuria] : no dysuria [Dizziness] : no dizziness [Easy Bleeding] : no tendency for easy bleeding [FreeTextEntry2] : generalized weakness intermittently

## 2021-07-16 NOTE — ASSESSMENT
[FreeTextEntry1] : 1. CKD Stage 5: Pt. with advanced/progressive CKD. Last Scr elevated/stable at 4.84 in June 2021. Patient underwent left AVF creation surgery on 11/10/17. LUE AVF can be used for HD if needed as per Dr. Doan's note in June 2020. Pt. underwent kidney transplant evaluation at [facility]. Check renal panel today. Monitor kidney function. Avoid NSAIDs, RCA, and nephrotoxins.\par \par 2. HTN: Repeat BP reading improved during office visit today. Low salt diet advised. Monitor BP.\par \par 3. Hyperkalemia: Last serum potassium elevated at 5.6 on labs done in June 2021. Low potassium diet advised. Check serum potassium today.\par \par 4. Metabolic acidosis: Last serum CO2 WNL at 23. Pt. on oral sodium bicarbonate therapy. Check serum CO2 today. \par \par 5. Anemia: in setting of advanced CKD. Last hemoglobin below target range (9.6). Monitor hemoglobin. \par \par 6. LE edema: Pt. with mild bilateral LE edema on examination today. Pt. on torsemide 10 mg once daily. Low salt diet advised. Monitor body weights   \par \par Follow-up in 2 months.

## 2021-07-19 ENCOUNTER — NON-APPOINTMENT (OUTPATIENT)
Age: 66
End: 2021-07-19

## 2021-07-21 LAB
ALBUMIN SERPL ELPH-MCNC: 4.2 G/DL
ANION GAP SERPL CALC-SCNC: 16 MMOL/L
BASOPHILS # BLD AUTO: 0.03 K/UL
BASOPHILS NFR BLD AUTO: 0.5 %
BUN SERPL-MCNC: 96 MG/DL
CALCIUM SERPL-MCNC: 8.7 MG/DL
CALCIUM SERPL-MCNC: 8.7 MG/DL
CHLORIDE SERPL-SCNC: 108 MMOL/L
CO2 SERPL-SCNC: 19 MMOL/L
CREAT SERPL-MCNC: 5.1 MG/DL
EOSINOPHIL # BLD AUTO: 0.26 K/UL
EOSINOPHIL NFR BLD AUTO: 4.3 %
GLUCOSE SERPL-MCNC: 110 MG/DL
HCT VFR BLD CALC: 29.6 %
HGB BLD-MCNC: 9.7 G/DL
IMM GRANULOCYTES NFR BLD AUTO: 0.2 %
LYMPHOCYTES # BLD AUTO: 1.09 K/UL
LYMPHOCYTES NFR BLD AUTO: 18 %
MAN DIFF?: NORMAL
MCHC RBC-ENTMCNC: 31.3 PG
MCHC RBC-ENTMCNC: 32.8 GM/DL
MCV RBC AUTO: 95.5 FL
MONOCYTES # BLD AUTO: 0.54 K/UL
MONOCYTES NFR BLD AUTO: 8.9 %
NEUTROPHILS # BLD AUTO: 4.11 K/UL
NEUTROPHILS NFR BLD AUTO: 68.1 %
PARATHYROID HORMONE INTACT: 587 PG/ML
PHOSPHATE SERPL-MCNC: 4.5 MG/DL
PLATELET # BLD AUTO: 175 K/UL
POTASSIUM SERPL-SCNC: 5.1 MMOL/L
RBC # BLD: 3.1 M/UL
RBC # FLD: 12.7 %
SODIUM SERPL-SCNC: 143 MMOL/L
WBC # FLD AUTO: 6.04 K/UL

## 2021-08-05 ENCOUNTER — APPOINTMENT (OUTPATIENT)
Dept: TRANSPLANT | Facility: CLINIC | Age: 66
End: 2021-08-05
Payer: COMMERCIAL

## 2021-08-05 PROCEDURE — 99001T: CUSTOM

## 2021-08-06 ENCOUNTER — APPOINTMENT (OUTPATIENT)
Dept: TRANSPLANT | Facility: CLINIC | Age: 66
End: 2021-08-06

## 2021-09-09 ENCOUNTER — APPOINTMENT (OUTPATIENT)
Dept: TRANSPLANT | Facility: CLINIC | Age: 66
End: 2021-09-09
Payer: COMMERCIAL

## 2021-09-09 PROCEDURE — 99001T: CUSTOM

## 2021-09-10 ENCOUNTER — APPOINTMENT (OUTPATIENT)
Dept: TRANSPLANT | Facility: CLINIC | Age: 66
End: 2021-09-10

## 2021-09-24 ENCOUNTER — APPOINTMENT (OUTPATIENT)
Dept: NEPHROLOGY | Facility: CLINIC | Age: 66
End: 2021-09-24
Payer: COMMERCIAL

## 2021-09-24 VITALS
TEMPERATURE: 97.8 F | HEIGHT: 66 IN | RESPIRATION RATE: 16 BRPM | OXYGEN SATURATION: 99 % | DIASTOLIC BLOOD PRESSURE: 73 MMHG | WEIGHT: 156.13 LBS | SYSTOLIC BLOOD PRESSURE: 183 MMHG | BODY MASS INDEX: 25.09 KG/M2 | HEART RATE: 89 BPM

## 2021-09-24 VITALS — SYSTOLIC BLOOD PRESSURE: 142 MMHG | DIASTOLIC BLOOD PRESSURE: 68 MMHG

## 2021-09-24 PROCEDURE — 99214 OFFICE O/P EST MOD 30 MIN: CPT

## 2021-09-24 NOTE — ASSESSMENT
[FreeTextEntry1] : 1. CKD Stage 5: Pt. with advanced/progressive CKD. Last Scr elevated/stable at 5.1 on 7/16/21. Pt. underwent left AVF creation surgery on 11/10/17. LUE AVF can be used for HD if needed as per Dr. Doan's note in June 2020. Pt. currently listed actively at [facility] Kidney Transplant program. Check renal panel today. Monitor kidney function. Avoid NSAIDs, RCA, and nephrotoxins.\par \par 2. HTN: Repeat BP reading improved during office visit today. Low salt diet advised. Monitor BP.\par \par 3. Hyperkalemia: Last serum potassium WNL at 5.1 on 7/16/21. Low potassium diet advised. Check serum potassium today.\par \par 4. Metabolic acidosis: Last serum CO2 low at 19. Pt. on oral sodium bicarbonate therapy. Check serum CO2 today. \par \par 5. Anemia: in setting of advanced CKD. Last hemoglobin low but stable at 9.7. Monitor hemoglobin. \par \par 6. LE edema: Pt. with mild bilateral LE edema on examination today. Pt. on torsemide 10 mg once daily. Low salt diet advised. Monitor body weights   \par \par Follow-up in 2 months.

## 2021-09-24 NOTE — HISTORY OF PRESENT ILLNESS
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 Doan), and HTN presents for follow-up visit. Pt. was last seen in office on 7/16/21. \par \par Pt. currently feels well but gives history of intermittent generalized weakness. Pt. denies fever, nausea, vomiting, CP, SOB, HA or urinary complaints. Pt. admits compliance to her medications. Pt. currently listed actively at [facility] Kidney Transplant program.

## 2021-09-24 NOTE — REVIEW OF SYSTEMS
[Lower Ext Edema] : lower extremity edema [Limb Swelling] : limb swelling [Negative] : Endocrine [Fever] : no fever [Eye Pain] : no eye pain [Earache] : no earache [Shortness Of Breath] : no shortness of breath [Abdominal Pain] : no abdominal pain [Dysuria] : no dysuria [Dizziness] : no dizziness [Easy Bleeding] : no tendency for easy bleeding [FreeTextEntry2] : intermittent generalized weakness

## 2021-09-24 NOTE — PHYSICAL EXAM
[General Appearance - Alert] : alert [General Appearance - In No Acute Distress] : in no acute distress [General Appearance - Well Nourished] : well nourished [Sclera] : the sclera and conjunctiva were normal [PERRL With Normal Accommodation] : pupils were equal in size, round, and reactive to light [Outer Ear] : the ears and nose were normal in appearance [Neck Appearance] : the appearance of the neck was normal [Jugular Venous Distention Increased] : there was no jugular-venous distention [Respiration, Rhythm And Depth] : normal respiratory rhythm and effort [Auscultation Breath Sounds / Voice Sounds] : lungs were clear to auscultation bilaterally [Heart Rate And Rhythm] : heart rate was normal and rhythm regular [Heart Sounds] : normal S1 and S2 [Murmurs] : no murmurs [Bowel Sounds] : normal bowel sounds [Abdomen Soft] : soft [No CVA Tenderness] : no ~M costovertebral angle tenderness [Abnormal Walk] : normal gait [___ (cm) Fistula] : [unfilled] (cm) fistula [Bruit] : a bruit was present [Thrill] : a thrill was present [] : no rash [Oriented To Time, Place, And Person] : oriented to person, place, and time [Impaired Insight] : insight and judgment were intact [Affect] : the affect was normal [FreeTextEntry1] : Mild B/L LE pitting edema

## 2021-09-27 ENCOUNTER — NON-APPOINTMENT (OUTPATIENT)
Age: 66
End: 2021-09-27

## 2021-09-27 LAB
ALBUMIN SERPL ELPH-MCNC: 4.4 G/DL
ANION GAP SERPL CALC-SCNC: 18 MMOL/L
BASOPHILS # BLD AUTO: 0.05 K/UL
BASOPHILS NFR BLD AUTO: 0.8 %
BUN SERPL-MCNC: 89 MG/DL
CALCIUM SERPL-MCNC: 8.7 MG/DL
CHLORIDE SERPL-SCNC: 108 MMOL/L
CO2 SERPL-SCNC: 18 MMOL/L
CREAT SERPL-MCNC: 4.79 MG/DL
EOSINOPHIL # BLD AUTO: 0.25 K/UL
EOSINOPHIL NFR BLD AUTO: 4 %
GLUCOSE SERPL-MCNC: 123 MG/DL
HCT VFR BLD CALC: 31.4 %
HGB BLD-MCNC: 10.2 G/DL
IMM GRANULOCYTES NFR BLD AUTO: 0.3 %
LYMPHOCYTES # BLD AUTO: 1.11 K/UL
LYMPHOCYTES NFR BLD AUTO: 17.8 %
MAN DIFF?: NORMAL
MCHC RBC-ENTMCNC: 31.2 PG
MCHC RBC-ENTMCNC: 32.5 GM/DL
MCV RBC AUTO: 96 FL
MONOCYTES # BLD AUTO: 0.61 K/UL
MONOCYTES NFR BLD AUTO: 9.8 %
NEUTROPHILS # BLD AUTO: 4.2 K/UL
NEUTROPHILS NFR BLD AUTO: 67.3 %
PHOSPHATE SERPL-MCNC: 5.4 MG/DL
PLATELET # BLD AUTO: 167 K/UL
POTASSIUM SERPL-SCNC: 4.8 MMOL/L
RBC # BLD: 3.27 M/UL
RBC # FLD: 12.5 %
SODIUM SERPL-SCNC: 145 MMOL/L
WBC # FLD AUTO: 6.24 K/UL

## 2021-10-06 ENCOUNTER — APPOINTMENT (OUTPATIENT)
Dept: TRANSPLANT | Facility: CLINIC | Age: 66
End: 2021-10-06

## 2021-10-07 ENCOUNTER — APPOINTMENT (OUTPATIENT)
Dept: TRANSPLANT | Facility: CLINIC | Age: 66
End: 2021-10-07
Payer: COMMERCIAL

## 2021-10-07 PROCEDURE — 99001T: CUSTOM

## 2021-10-12 ENCOUNTER — APPOINTMENT (OUTPATIENT)
Dept: TRANSPLANT | Facility: CLINIC | Age: 66
End: 2021-10-12

## 2021-11-01 ENCOUNTER — APPOINTMENT (OUTPATIENT)
Dept: NEPHROLOGY | Facility: CLINIC | Age: 66
End: 2021-11-01
Payer: COMMERCIAL

## 2021-11-01 VITALS
DIASTOLIC BLOOD PRESSURE: 75 MMHG | WEIGHT: 155 LBS | HEIGHT: 64 IN | SYSTOLIC BLOOD PRESSURE: 121 MMHG | HEART RATE: 86 BPM | BODY MASS INDEX: 26.46 KG/M2 | OXYGEN SATURATION: 100 % | TEMPERATURE: 97.3 F

## 2021-11-01 PROCEDURE — 99215 OFFICE O/P EST HI 40 MIN: CPT

## 2021-11-01 PROCEDURE — 99072 ADDL SUPL MATRL&STAF TM PHE: CPT

## 2021-11-01 NOTE — PLAN
[FreeTextEntry1] : 1. CKD:  Ms. NGUYEN GARCIA  remains  a good candidate for kidney transplantation and would benefit from transplantation .\par 2.Cardiac risk: cardiac testing reviewed form Dec 2020 . repeat cardiac testing next year. \par 3. Cancer screening:  Olinda done this year, wnl. Needs pap smear .  olonoscopy result?\par 6.Hypertension- well controlled \par \par I have personally discussed the risks and benefits of transplantation and patient attended transplant education class where the following was disclosed:\par  \par Reviewed factors affecting survival and morbidity while on dialysis, the transplant wait list and reviewed aimee-operative and long-term risk factors affecting outcome in kidney transplantation. \par  \par One year SRTR outcomes for national and [facility] were discussed in regards to patient survival and graft survival after transplantation. \par  \par Details of transplant surgery, including complications were discussed.\par Immunosuppression and complications including infection including life threatening sepsis and opportunistic infections, malignancy and new onset diabetes were discussed. \par  \par Benefits of live donor transplantation as well as variability in wait times across regions and multiple listing were discussed. \par KDPI >85% and PHS high risk criteria donors were discussed. \par HCV kidney transplantation was discussed.\par  \par Will proceed with completing/ updating work up and listing for transplant/ live donor transplant once work up is reviewed and found to be acceptable by multidisciplinary listing committee.\par

## 2021-11-01 NOTE — HISTORY OF PRESENT ILLNESS
[TextBox_[id number]] : NGUYEN GARCIA is a 66 year old female who presents for kidney transplant evaluation. \par Cause of CKD : has known CKD ~ 15 years. Has a AVF in 2017, not on dialysis yet. Son has Alports and she has had genetic testing that revealed that she was a carrier. \par Nephrologist: Dr Jet Horn \par remains preemptive \par  \par Other PMH :\par Cardiac - HTN X 10 years. No MI , CHF, CAD\par Pulmonary- no h/o COPD, Asthma\par Infections-h/o TB, HIV, Hepatitis , covid \par Heme- h/o malignancy or bleeding disorders.No DVT/PE\par Endo- no h/o diabetes, no Thyroid disease\par Neuro- no no h/o CVA\par Psych- suicidal ideation, depression or anxiety. \par Sensitization events- no previous blood transfusions or previous transplant\par Ob/gyn- 2 children ( 36, 30 ) Normal vaginal delivery \par No infections or hospitalizations in the last 1 year \par Surgical h/o : AVF , tonsillectomy \par Functional status: can walk 10 blocks and climb stairs with no difficulty \par Social history: works at [employer]. works in financial aid. works full time. lives with  and son. non smoker , no alcohol or illicit drug use.\par Family history: Son was diagnosed with kidney disease due to Alports syndrome and underwent a kidney transplant in 2019 at [facility]. \par .\par \par \par

## 2021-11-02 ENCOUNTER — APPOINTMENT (OUTPATIENT)
Dept: TRANSPLANT | Facility: CLINIC | Age: 66
End: 2021-11-02
Payer: COMMERCIAL

## 2021-11-02 ENCOUNTER — NON-APPOINTMENT (OUTPATIENT)
Age: 66
End: 2021-11-02

## 2021-11-02 LAB
ABO + RH PNL BLD: NORMAL
ALBUMIN SERPL ELPH-MCNC: 4.4 G/DL
ALP BLD-CCNC: 144 U/L
ALT SERPL-CCNC: 9 U/L
ANION GAP SERPL CALC-SCNC: 15 MMOL/L
APPEARANCE: ABNORMAL
AST SERPL-CCNC: 10 U/L
BACTERIA: ABNORMAL
BASOPHILS # BLD AUTO: 0.04 K/UL
BASOPHILS NFR BLD AUTO: 0.6 %
BILIRUB SERPL-MCNC: 0.4 MG/DL
BILIRUBIN URINE: NEGATIVE
BLOOD URINE: NORMAL
BUN SERPL-MCNC: 85 MG/DL
C PEPTIDE SERPL-MCNC: 10.1 NG/ML
CALCIUM SERPL-MCNC: 9.4 MG/DL
CHLORIDE SERPL-SCNC: 108 MMOL/L
CHOLEST SERPL-MCNC: 179 MG/DL
CMV IGG SERPL QL: <0.2 U/ML
CMV IGG SERPL-IMP: NEGATIVE
CO2 SERPL-SCNC: 21 MMOL/L
COLOR: NORMAL
COVID-19 NUCLEOCAPSID  GAM ANTIBODY INTERPRETATION: POSITIVE
COVID-19 SPIKE DOMAIN ANTIBODY INTERPRETATION: POSITIVE
CREAT SERPL-MCNC: 5.02 MG/DL
CREAT SPEC-SCNC: 69 MG/DL
CREAT/PROT UR: 1 RATIO
EBV DNA SERPL NAA+PROBE-ACNC: NOT DETECTED IU/ML
EBV EA AB SER IA-ACNC: <5 U/ML
EBV EA AB TITR SER IF: POSITIVE
EBV EA IGG SER QL IA: 245 U/ML
EBV EA IGG SER-ACNC: NEGATIVE
EBV EA IGM SER IA-ACNC: NEGATIVE
EBV PATRN SPEC IB-IMP: NORMAL
EBV VCA IGG SER IA-ACNC: 168 U/ML
EBV VCA IGM SER QL IA: <10 U/ML
EOSINOPHIL # BLD AUTO: 0.29 K/UL
EOSINOPHIL NFR BLD AUTO: 4.7 %
EPSTEIN-BARR VIRUS CAPSID ANTIGEN IGG: POSITIVE
ESTIMATED AVERAGE GLUCOSE: 103 MG/DL
GLUCOSE QUALITATIVE U: NEGATIVE
GLUCOSE SERPL-MCNC: 132 MG/DL
HAV IGM SER QL: NONREACTIVE
HBA1C MFR BLD HPLC: 5.2 %
HBV CORE IGG+IGM SER QL: NONREACTIVE
HBV SURFACE AB SER QL: NONREACTIVE
HBV SURFACE AB SERPL IA-ACNC: <3 MIU/ML
HBV SURFACE AG SER QL: NONREACTIVE
HCT VFR BLD CALC: 31.3 %
HCV AB SER QL: NONREACTIVE
HCV S/CO RATIO: 0.11 S/CO
HDLC SERPL-MCNC: 36 MG/DL
HEPATITIS A IGG ANTIBODY: NONREACTIVE
HGB BLD-MCNC: 10.1 G/DL
HIV1+2 AB SPEC QL IA.RAPID: NONREACTIVE
HSV 1+2 IGG SER IA-IMP: NEGATIVE
HSV 1+2 IGG SER IA-IMP: POSITIVE
HSV1 IGG SER QL: >62.2 INDEX
HSV2 IGG SER QL: 0.1 INDEX
HYALINE CASTS: 0 /LPF
IMM GRANULOCYTES NFR BLD AUTO: 0.3 %
KETONES URINE: NEGATIVE
LDLC SERPL CALC-MCNC: 118 MG/DL
LEUKOCYTE ESTERASE URINE: ABNORMAL
LYMPHOCYTES # BLD AUTO: 1.09 K/UL
LYMPHOCYTES NFR BLD AUTO: 17.7 %
MAGNESIUM SERPL-MCNC: 2.1 MG/DL
MAN DIFF?: NORMAL
MCHC RBC-ENTMCNC: 31.1 PG
MCHC RBC-ENTMCNC: 32.3 GM/DL
MCV RBC AUTO: 96.3 FL
MICROSCOPIC-UA: NORMAL
MONOCYTES # BLD AUTO: 0.53 K/UL
MONOCYTES NFR BLD AUTO: 8.6 %
NEUTROPHILS # BLD AUTO: 4.19 K/UL
NEUTROPHILS NFR BLD AUTO: 68.1 %
NITRITE URINE: NEGATIVE
NONHDLC SERPL-MCNC: 143 MG/DL
PH URINE: 6.5
PHOSPHATE SERPL-MCNC: 4.9 MG/DL
PLATELET # BLD AUTO: 165 K/UL
POTASSIUM SERPL-SCNC: 5.4 MMOL/L
PROT SERPL-MCNC: 7 G/DL
PROT UR-MCNC: 68 MG/DL
PROTEIN URINE: ABNORMAL
RBC # BLD: 3.25 M/UL
RBC # FLD: 12.4 %
RED BLOOD CELLS URINE: 9 /HPF
RUBV IGG FLD-ACNC: 22.8 INDEX
RUBV IGG SER-IMP: POSITIVE
SARS-COV-2 AB SERPL IA-ACNC: >250 U/ML
SARS-COV-2 AB SERPL QL IA: 27.8 INDEX
SARS-COV-2 N GENE NPH QL NAA+PROBE: NOT DETECTED
SODIUM SERPL-SCNC: 145 MMOL/L
SPECIFIC GRAVITY URINE: 1.02
SQUAMOUS EPITHELIAL CELLS: 20 /HPF
T GONDII AB SER-IMP: NEGATIVE
T GONDII IGG SER QL: <3 IU/ML
T PALLIDUM AB SER QL IA: NEGATIVE
TRIGL SERPL-MCNC: 123 MG/DL
UROBILINOGEN URINE: NORMAL
VZV AB TITR SER: POSITIVE
VZV IGG SER IF-ACNC: 293 INDEX
WBC # FLD AUTO: 6.16 K/UL
WHITE BLOOD CELLS URINE: 22 /HPF

## 2021-11-02 PROCEDURE — 86833 HLA CLASS II HIGH DEFIN QUAL: CPT

## 2021-11-02 PROCEDURE — 99001T: CUSTOM

## 2021-11-02 PROCEDURE — 86832 HLA CLASS I HIGH DEFIN QUAL: CPT

## 2021-11-03 LAB
M TB IFN-G BLD-IMP: NEGATIVE
QUANTIFERON TB PLUS MITOGEN MINUS NIL: 10 IU/ML
QUANTIFERON TB PLUS NIL: 0.01 IU/ML
QUANTIFERON TB PLUS TB1 MINUS NIL: 0 IU/ML
QUANTIFERON TB PLUS TB2 MINUS NIL: 0 IU/ML

## 2021-11-19 ENCOUNTER — APPOINTMENT (OUTPATIENT)
Dept: NEPHROLOGY | Facility: CLINIC | Age: 66
End: 2021-11-19
Payer: COMMERCIAL

## 2021-11-19 VITALS
OXYGEN SATURATION: 99 % | BODY MASS INDEX: 26.8 KG/M2 | WEIGHT: 157 LBS | SYSTOLIC BLOOD PRESSURE: 153 MMHG | RESPIRATION RATE: 16 BRPM | HEIGHT: 64 IN | DIASTOLIC BLOOD PRESSURE: 76 MMHG | TEMPERATURE: 97.1 F | HEART RATE: 86 BPM

## 2021-11-19 VITALS — SYSTOLIC BLOOD PRESSURE: 122 MMHG | DIASTOLIC BLOOD PRESSURE: 78 MMHG

## 2021-11-19 PROCEDURE — 99214 OFFICE O/P EST MOD 30 MIN: CPT

## 2021-11-19 NOTE — PHYSICAL EXAM
[General Appearance - Alert] : alert [General Appearance - In No Acute Distress] : in no acute distress [General Appearance - Well Nourished] : well nourished [Sclera] : the sclera and conjunctiva were normal [PERRL With Normal Accommodation] : pupils were equal in size, round, and reactive to light [Outer Ear] : the ears and nose were normal in appearance [Neck Appearance] : the appearance of the neck was normal [Jugular Venous Distention Increased] : there was no jugular-venous distention [Respiration, Rhythm And Depth] : normal respiratory rhythm and effort [Auscultation Breath Sounds / Voice Sounds] : lungs were clear to auscultation bilaterally [Heart Rate And Rhythm] : heart rate was normal and rhythm regular [Heart Sounds] : normal S1 and S2 [Murmurs] : no murmurs [Bowel Sounds] : normal bowel sounds [Abdomen Soft] : soft [No CVA Tenderness] : no ~M costovertebral angle tenderness [Abnormal Walk] : normal gait [___ (cm) Fistula] : [unfilled] (cm) fistula [Bruit] : a bruit was present [Thrill] : a thrill was present [] : no rash [Oriented To Time, Place, And Person] : oriented to person, place, and time [Impaired Insight] : insight and judgment were intact [Affect] : the affect was normal [FreeTextEntry1] : B/L LE: mild pitting edema present on exam today.

## 2021-11-19 NOTE — HISTORY OF PRESENT ILLNESS
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 Doan), and HTN presents for follow-up visit. Pt. was last seen in office on 9/24/21. \par \par Pt. currently feels well but gives history of generalized weakness "at the end of the day". Pt. denies fever, nausea, vomiting, CP, SOB, HA or urinary complaints. Pt. admits compliance to her medications. Pt. currently listed actively at [facility] Kidney Transplant program. Pt. was recently seen by transplant nephrologist Dr. Elenita Tobin on 11/1/21.

## 2021-11-19 NOTE — ASSESSMENT
[FreeTextEntry1] : 1. CKD Stage 5: Pt. with advanced/progressive CKD. Last Scr elevated/stable at 5.02 on 11/1/21. Pt. underwent left AVF creation surgery on 11/10/17. LUE AVF can be used for HD if needed as per Dr. Doan's note in June 2020. Pt. currently listed actively at [facility] Kidney Transplant program. Check renal panel today. Monitor kidney function. Avoid NSAIDs, RCA, and nephrotoxins.\par \par 2. HTN: Repeat BP reading improved during office visit today. Low salt diet advised. Monitor BP.\par \par 3. Hyperkalemia: Serum potassium elevated at 5.4 on 11/1/21. Low potassium diet advised. Check serum potassium today.\par \par 4. Metabolic acidosis: Serum CO2 low at 21 on 11/1/21. Pt. on oral sodium bicarbonate therapy. Check serum CO2 today. \par \par 5. Anemia: in setting of advanced CKD. Recent hemoglobin low but stable at 10.1 (11/1/21). Monitor hemoglobin. \par \par 6. LE edema: Pt. with mild bilateral LE edema on examination today. Pt. on torsemide 10 mg once daily. Low salt diet advised. Monitor body weights   \par \par Follow-up in 2 months.

## 2021-11-19 NOTE — REVIEW OF SYSTEMS
[As Noted in HPI] : as noted in HPI [Lower Ext Edema] : lower extremity edema [Limb Swelling] : limb swelling [Negative] : Endocrine [Fever] : no fever [Eye Pain] : no eye pain [Earache] : no earache [Shortness Of Breath] : no shortness of breath [Abdominal Pain] : no abdominal pain [Dysuria] : no dysuria [Dizziness] : no dizziness [Easy Bleeding] : no tendency for easy bleeding

## 2021-11-22 ENCOUNTER — NON-APPOINTMENT (OUTPATIENT)
Age: 66
End: 2021-11-22

## 2021-12-13 ENCOUNTER — APPOINTMENT (OUTPATIENT)
Dept: TRANSPLANT | Facility: CLINIC | Age: 66
End: 2021-12-13

## 2021-12-13 ENCOUNTER — APPOINTMENT (OUTPATIENT)
Dept: VASCULAR SURGERY | Facility: CLINIC | Age: 66
End: 2021-12-13
Payer: COMMERCIAL

## 2021-12-13 PROCEDURE — 93990 DOPPLER FLOW TESTING: CPT

## 2021-12-14 ENCOUNTER — APPOINTMENT (OUTPATIENT)
Dept: VASCULAR SURGERY | Facility: CLINIC | Age: 66
End: 2021-12-14
Payer: COMMERCIAL

## 2021-12-14 ENCOUNTER — APPOINTMENT (OUTPATIENT)
Dept: TRANSPLANT | Facility: CLINIC | Age: 66
End: 2021-12-14
Payer: COMMERCIAL

## 2021-12-14 PROCEDURE — 99001T: CUSTOM

## 2021-12-14 PROCEDURE — 99441: CPT

## 2021-12-28 ENCOUNTER — NON-APPOINTMENT (OUTPATIENT)
Age: 66
End: 2021-12-28

## 2022-01-25 ENCOUNTER — APPOINTMENT (OUTPATIENT)
Dept: CARDIOLOGY | Facility: CLINIC | Age: 67
End: 2022-01-25
Payer: COMMERCIAL

## 2022-01-25 ENCOUNTER — NON-APPOINTMENT (OUTPATIENT)
Age: 67
End: 2022-01-25

## 2022-01-25 ENCOUNTER — OUTPATIENT (OUTPATIENT)
Dept: OUTPATIENT SERVICES | Facility: HOSPITAL | Age: 67
LOS: 1 days | End: 2022-01-25
Payer: COMMERCIAL

## 2022-01-25 ENCOUNTER — APPOINTMENT (OUTPATIENT)
Dept: TRANSPLANT | Facility: CLINIC | Age: 67
End: 2022-01-25

## 2022-01-25 ENCOUNTER — APPOINTMENT (OUTPATIENT)
Dept: ULTRASOUND IMAGING | Facility: CLINIC | Age: 67
End: 2022-01-25
Payer: COMMERCIAL

## 2022-01-25 ENCOUNTER — APPOINTMENT (OUTPATIENT)
Dept: RADIOLOGY | Facility: CLINIC | Age: 67
End: 2022-01-25
Payer: COMMERCIAL

## 2022-01-25 VITALS
WEIGHT: 154 LBS | OXYGEN SATURATION: 99 % | DIASTOLIC BLOOD PRESSURE: 70 MMHG | HEART RATE: 85 BPM | BODY MASS INDEX: 26.29 KG/M2 | SYSTOLIC BLOOD PRESSURE: 154 MMHG | HEIGHT: 64 IN

## 2022-01-25 DIAGNOSIS — I77.0 ARTERIOVENOUS FISTULA, ACQUIRED: Chronic | ICD-10-CM

## 2022-01-25 DIAGNOSIS — Z90.89 ACQUIRED ABSENCE OF OTHER ORGANS: Chronic | ICD-10-CM

## 2022-01-25 DIAGNOSIS — Z01.818 ENCOUNTER FOR OTHER PREPROCEDURAL EXAMINATION: ICD-10-CM

## 2022-01-25 PROCEDURE — 93000 ELECTROCARDIOGRAM COMPLETE: CPT | Mod: NC

## 2022-01-25 PROCEDURE — 76700 US EXAM ABDOM COMPLETE: CPT | Mod: 26,59

## 2022-01-25 PROCEDURE — 71046 X-RAY EXAM CHEST 2 VIEWS: CPT | Mod: 26

## 2022-01-25 PROCEDURE — 93975 VASCULAR STUDY: CPT | Mod: 26

## 2022-01-25 PROCEDURE — 76700 US EXAM ABDOM COMPLETE: CPT

## 2022-01-25 PROCEDURE — 99215 OFFICE O/P EST HI 40 MIN: CPT

## 2022-01-25 PROCEDURE — 99072 ADDL SUPL MATRL&STAF TM PHE: CPT

## 2022-01-25 PROCEDURE — 93975 VASCULAR STUDY: CPT

## 2022-01-25 PROCEDURE — 71046 X-RAY EXAM CHEST 2 VIEWS: CPT

## 2022-01-26 ENCOUNTER — APPOINTMENT (OUTPATIENT)
Dept: TRANSPLANT | Facility: CLINIC | Age: 67
End: 2022-01-26
Payer: COMMERCIAL

## 2022-01-26 PROCEDURE — 99001T: CUSTOM

## 2022-01-28 ENCOUNTER — APPOINTMENT (OUTPATIENT)
Dept: NEPHROLOGY | Facility: CLINIC | Age: 67
End: 2022-01-28
Payer: COMMERCIAL

## 2022-01-28 VITALS
RESPIRATION RATE: 16 BRPM | HEART RATE: 90 BPM | DIASTOLIC BLOOD PRESSURE: 74 MMHG | TEMPERATURE: 97.5 F | BODY MASS INDEX: 26.38 KG/M2 | WEIGHT: 154.5 LBS | OXYGEN SATURATION: 99 % | SYSTOLIC BLOOD PRESSURE: 160 MMHG | HEIGHT: 64 IN

## 2022-01-28 VITALS — SYSTOLIC BLOOD PRESSURE: 132 MMHG | DIASTOLIC BLOOD PRESSURE: 78 MMHG

## 2022-01-28 PROCEDURE — 99214 OFFICE O/P EST MOD 30 MIN: CPT

## 2022-01-28 NOTE — ASSESSMENT
[FreeTextEntry1] : 1. CKD Stage 5: Pt. with advanced/progressive CKD. Last Scr elevated at 5.37 on 11/19/21. Pt. underwent left AVF creation surgery on 11/10/17. LUE AVF can be used for HD if needed as per Dr. Doan's note in June 2020. Pt. currently listed actively at [facility] Kidney Transplant program. Check renal panel and intact PTH today. Monitor kidney function. Avoid NSAIDs, RCA, and nephrotoxins.\par \par 2. HTN: Repeat BP reading improved during office visit today. Low salt diet advised. Monitor BP on current BP meds.\par \par 3. Hyperkalemia: Serum potassium WNL at 5.1 on 11/19/21. Low potassium diet advised. Check serum potassium today.\par \par 4. Metabolic acidosis: Serum CO2 low at 19 on 11/19/21. Pt. on oral sodium bicarbonate therapy. Check serum CO2 today. \par \par 5. Anemia: in setting of advanced CKD. Hemoglobin low but stable at 10.1 (11/1/21). Check CBC today.  \par \par 6. LE edema: Pt. with mild bilateral LE edema on examination today. Pt. on torsemide 10 mg once daily. Low salt diet advised. Monitor body weights   \par \par Follow-up in 2 months.

## 2022-01-28 NOTE — HISTORY OF PRESENT ILLNESS
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 Doan), and HTN presents for follow-up visit. Pt. was last seen in office on 11/19/21. \par \par Pt. currently feels well. No fever, nausea, vomiting, CP, SOB, HA or urinary complaints. Pt. admits compliance to her medications. Pt. currently listed actively at [facility] Kidney Transplant program. Pt. was seen by transplant nephrologist Dr. Elenita Tobin on 11/1/21.

## 2022-01-28 NOTE — REVIEW OF SYSTEMS
[As Noted in HPI] : as noted in HPI [Feeling Tired] : feeling tired [Lower Ext Edema] : lower extremity edema [Limb Swelling] : limb swelling [Negative] : Endocrine [Fever] : no fever [Eye Pain] : no eye pain [Earache] : no earache [Shortness Of Breath] : no shortness of breath [Abdominal Pain] : no abdominal pain [Dysuria] : no dysuria [Dizziness] : no dizziness [Easy Bleeding] : no tendency for easy bleeding [FreeTextEntry2] : history of intermittent tiredness

## 2022-01-31 ENCOUNTER — NON-APPOINTMENT (OUTPATIENT)
Age: 67
End: 2022-01-31

## 2022-02-02 NOTE — ED ADULT NURSE NOTE - MODE OF DISCHARGE
[FreeTextEntry1] : Annual Physical [de-identified] : RAVINDER KAY is a 71 yo woman with hypercholesterolemia, hypothyroidism, depression here for a physical.  She has been well overall.  Medical problem stable on current meds.\par \par Gyn utd with dr bree young.  Mammogram utd 1/22 at [facility]. Bone density due next year. Cologuard utd 2021 due 2024. Pt will consider derm.  Pt will consider tdap.\par \par The patient is  with 2 children and 3 grandsons.  She is a [employer].  She would have no difficulty walking 4 to 6 blocks or 2 flights of stairs.   Ambulatory

## 2022-02-05 NOTE — PHYSICAL EXAM
[General Appearance - Well Developed] : well developed [Normal Appearance] : normal appearance [Well Groomed] : well groomed [General Appearance - Well Nourished] : well nourished [No Deformities] : no deformities [General Appearance - In No Acute Distress] : no acute distress [Conjunctiva] : the conjunctiva were normal in both eyes [PERRL] : pupils were equal in size, round, and reactive to light [EOM Intact] : extraocular movements were intact [Normal Oral Mucosa] : normal oral mucosa [No Oral Pallor] : no oral pallor [No Oral Cyanosis] : no oral cyanosis [Normal Oropharynx] : normal oropharynx [Normal Jugular Venous A Waves Present] : normal jugular venous A waves present [Normal Jugular Venous V Waves Present] : normal jugular venous V waves present [No Jugular Venous Coombs A Waves] : no jugular venous coombs A waves [5th Left ICS - MCL] : palpated at the 5th LICS in the midclavicular line [Normal] : normal [No Precordial Heave] : no precordial heave was noted [Normal Rate] : normal [Rhythm Regular] : regular [Normal S1] : normal S1 [Normal S2] : normal S2 [No Gallop] : no gallop heard [No Murmur] : no murmurs heard [2+] : right 2+ [___ +] : bilateral [unfilled]U+ pretibial pitting edema [] : no respiratory distress [Respiration, Rhythm And Depth] : normal respiratory rhythm and effort [Exaggerated Use Of Accessory Muscles For Inspiration] : no accessory muscle use [Auscultation Breath Sounds / Voice Sounds] : lungs were clear to auscultation bilaterally [Bowel Sounds] : normal bowel sounds [Abdomen Soft] : soft [Abdomen Tenderness] : non-tender [Abnormal Walk] : normal gait [Gait - Sufficient For Exercise Testing] : the gait was sufficient for exercise testing [Nail Clubbing] : no clubbing of the fingernails [Cyanosis, Localized] : no localized cyanosis [Skin Color & Pigmentation] : normal skin color and pigmentation [No Venous Stasis] : no venous stasis [No Xanthoma] : no  xanthoma was observed [Oriented To Time, Place, And Person] : oriented to person, place, and time [Impaired Insight] : insight and judgment were intact [Affect] : the affect was normal [Mood] : the mood was normal [No Anxiety] : not feeling anxious [Yellow Sclera (Icteric)] : no scleral icterus was seen [Right Carotid Bruit] : no bruit heard over the right carotid [Left Carotid Bruit] : no bruit heard over the left carotid [Rt] : no varicose veins of the right leg [Lt] : no varicose veins of the left leg

## 2022-02-05 NOTE — DISCUSSION/SUMMARY
[FreeTextEntry1] : 66 year-old woman with history as above who presents for cardiac evaluation prior to possible renal transplant.\par \par Repeat Echocardiogram to evaluate for structural heart disease.\par Repeat nuclear stress to evaluate for ischemic heart disease. \par \par

## 2022-02-05 NOTE — CARDIOLOGY SUMMARY
[de-identified] : 11/16/2020, normal, sinus 72 bpm [de-identified] : 11/16/2020, normal LV function, no pulmonary hypertension, normal LA size LVEF 65-70%

## 2022-02-05 NOTE — REASON FOR VISIT
[Other: ____] : [unfilled] [FreeTextEntry1] : January 2022 - Patient returns today for follow-up as part of her transplant evaluation. She remains a preemptive candidate for transplant, but she now has a left radial AV fistula placed.\par Her ischemic evaluation was negative in late 2020.\par She has no cardiovascular complaints.\par She has received two doses of Moderna's Covid-19 vaccine and a Moderna booster.

## 2022-02-22 ENCOUNTER — APPOINTMENT (OUTPATIENT)
Dept: TRANSPLANT | Facility: CLINIC | Age: 67
End: 2022-02-22

## 2022-02-23 ENCOUNTER — APPOINTMENT (OUTPATIENT)
Dept: TRANSPLANT | Facility: CLINIC | Age: 67
End: 2022-02-23
Payer: COMMERCIAL

## 2022-02-23 PROCEDURE — 99001T: CUSTOM

## 2022-03-03 RX ORDER — DOXERCALCIFEROL 1 UG/1
1 CAPSULE ORAL DAILY
Qty: 60 | Refills: 2 | Status: DISCONTINUED | COMMUNITY
Start: 2022-01-31 | End: 2022-03-03

## 2022-03-04 ENCOUNTER — APPOINTMENT (OUTPATIENT)
Dept: TRANSPLANT | Facility: CLINIC | Age: 67
End: 2022-03-04

## 2022-03-11 ENCOUNTER — APPOINTMENT (OUTPATIENT)
Dept: NEPHROLOGY | Facility: CLINIC | Age: 67
End: 2022-03-11
Payer: COMMERCIAL

## 2022-03-11 VITALS
BODY MASS INDEX: 26.63 KG/M2 | DIASTOLIC BLOOD PRESSURE: 73 MMHG | HEIGHT: 64 IN | SYSTOLIC BLOOD PRESSURE: 147 MMHG | HEART RATE: 87 BPM | TEMPERATURE: 97 F | WEIGHT: 156 LBS | RESPIRATION RATE: 15 BRPM | OXYGEN SATURATION: 98 %

## 2022-03-11 VITALS — SYSTOLIC BLOOD PRESSURE: 120 MMHG | DIASTOLIC BLOOD PRESSURE: 70 MMHG

## 2022-03-11 VITALS — BODY MASS INDEX: 26.61 KG/M2 | WEIGHT: 155 LBS

## 2022-03-11 PROCEDURE — 99214 OFFICE O/P EST MOD 30 MIN: CPT | Mod: GC

## 2022-03-11 NOTE — ASSESSMENT
[FreeTextEntry1] : 1. CKD Stage 5: Pt. with advanced/progressive CKD. Last Scr elevated at 5.33 on 1/28/22. Pt. underwent left AVF creation surgery on 11/10/17. LUE AVF can be used for HD if needed as per Dr. Doan's note in June 2020. Pt. currently listed actively at [facility] Kidney Transplant program. Check renal panel today. Monitor kidney function. Avoid NSAIDs, RCA, and nephrotoxins.\par \par 2. HTN: Repeat BP reading improved during office visit today. Low salt diet advised. Monitor BP on current BP meds.\par \par 3. Hyperkalemia: Serum potassium WNL at 4.9 on 1/28/22. Low potassium diet advised. Check serum potassium today.\par \par 4. Metabolic acidosis: Serum CO2 is 23 on 1/28/22. Pt. on oral sodium bicarbonate therapy. Check serum CO2 today. \par \par 5. Anemia: in setting of advanced CKD. Hemoglobin low but stable at 10.1 on 1/28/22. Monitor CBC. \par \par 6. LE edema: Pt. with mild bilateral LE edema on examination today. Pt. on torsemide 10 mg once daily. Low salt diet advised. Monitor body weights   \par \par 7. Hyperparathyroidism: Pt. with elevated intact PTH on 701 on 1/28/22. Pt. was initiated on Hectorol 1 mcg PO daily on 1/31/22, will transition to oral Calcitriol 0.25 mg once daily secondary to cost issues. Monitor intact PTH. \par \par RTC: Depending on lab findings.

## 2022-03-11 NOTE — REVIEW OF SYSTEMS
[Lower Ext Edema] : lower extremity edema [Fever] : no fever [Chills] : no chills [Eyesight Problems] : no eyesight problems [Sore Throat] : no sore throat [Shortness Of Breath] : no shortness of breath [Vomiting] : no vomiting [Diarrhea] : no diarrhea [Dysuria] : no dysuria [Arthralgias] : no arthralgias [Dizziness] : no dizziness [Sleep Disturbances] : no sleep disturbances [Feelings Of Weakness] : no feelings of weakness [Easy Bleeding] : no tendency for easy bleeding [Easy Bruising] : no tendency for easy bruising [FreeTextEntry5] : chronic LE edema

## 2022-03-11 NOTE — END OF VISIT
[FreeTextEntry3] : I was physically present for the key portions of the evaluation and management (E/M) service provided. I agree with the above history, ROS, physical exam, assessment, and plan which I have reviewed and edited where appropriate. I have also reviewed the assessment and management of CKD, HTN, hyperkalemia, metabolic acidosis, anemia and LE edema with the patient during clinic visit today. \par \par Pt. with advanced CKD stage 5 and HTN. Repeat BP reading in acceptable range during clinic visit today. Check labs (as outlined above). Monitor BP and labs. Avoid nephrotoxins.

## 2022-03-11 NOTE — HISTORY OF PRESENT ILLNESS
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 Doan), and HTN presents for follow-up visit. Pt. was last seen in office on 1/28/22.\par \par Pt. currently feels well. No fever, nausea, vomiting, CP, SOB, HA or urinary complaints. Pt. admits compliance to her medications. Pt. currently listed actively at [facility] Kidney Transplant program. Pt. was seen by transplant nephrologist Dr. Elenita Tobin on 11/1/21.

## 2022-03-14 NOTE — H&P PST ADULT - PRO ANTICIPATED DISCH DISP
Outpatient Medications Marked as Taking for the 3/14/22 encounter (Refill) with Aidan Martínez MD   Medication Sig Dispense Refill   • nystatin (MYCOSTATIN) 485272 UNIT/GM cream APPLY EXTERNALLY TO RASH THREE TIMES DAILY FOR 2 WEEKS 60 g 5        Ok to leave detailed Message: Yes  Informed caller of refill policy- 48-72 hours: Yes  No call back needed unless nurse has questions.     [facility]: [facility]   home

## 2022-03-15 ENCOUNTER — NON-APPOINTMENT (OUTPATIENT)
Age: 67
End: 2022-03-15

## 2022-03-15 LAB
ALBUMIN SERPL ELPH-MCNC: 4.3 G/DL
ANION GAP SERPL CALC-SCNC: 19 MMOL/L
BUN SERPL-MCNC: 85 MG/DL
CALCIUM SERPL-MCNC: 8.8 MG/DL
CHLORIDE SERPL-SCNC: 104 MMOL/L
CO2 SERPL-SCNC: 19 MMOL/L
CREAT SERPL-MCNC: 5.73 MG/DL
EGFR: 8 ML/MIN/1.73M2
GLUCOSE SERPL-MCNC: 91 MG/DL
PHOSPHATE SERPL-MCNC: 5 MG/DL
POTASSIUM SERPL-SCNC: 4.9 MMOL/L
SODIUM SERPL-SCNC: 141 MMOL/L

## 2022-03-29 ENCOUNTER — APPOINTMENT (OUTPATIENT)
Dept: CARDIOLOGY | Facility: CLINIC | Age: 67
End: 2022-03-29
Payer: COMMERCIAL

## 2022-03-29 PROCEDURE — 99072 ADDL SUPL MATRL&STAF TM PHE: CPT

## 2022-03-29 PROCEDURE — 78452 HT MUSCLE IMAGE SPECT MULT: CPT

## 2022-03-29 PROCEDURE — 93306 TTE W/DOPPLER COMPLETE: CPT

## 2022-03-29 PROCEDURE — A9500: CPT

## 2022-03-29 PROCEDURE — 93015 CV STRESS TEST SUPVJ I&R: CPT

## 2022-04-05 ENCOUNTER — APPOINTMENT (OUTPATIENT)
Dept: TRANSPLANT | Facility: CLINIC | Age: 67
End: 2022-04-05

## 2022-05-17 ENCOUNTER — APPOINTMENT (OUTPATIENT)
Dept: TRANSPLANT | Facility: CLINIC | Age: 67
End: 2022-05-17

## 2022-05-20 ENCOUNTER — APPOINTMENT (OUTPATIENT)
Dept: NEPHROLOGY | Facility: CLINIC | Age: 67
End: 2022-05-20
Payer: MEDICARE

## 2022-05-20 VITALS — SYSTOLIC BLOOD PRESSURE: 138 MMHG | DIASTOLIC BLOOD PRESSURE: 82 MMHG

## 2022-05-20 VITALS
WEIGHT: 152 LBS | BODY MASS INDEX: 25.95 KG/M2 | SYSTOLIC BLOOD PRESSURE: 149 MMHG | RESPIRATION RATE: 16 BRPM | DIASTOLIC BLOOD PRESSURE: 71 MMHG | TEMPERATURE: 97.3 F | OXYGEN SATURATION: 100 % | HEIGHT: 64 IN | HEART RATE: 84 BPM

## 2022-05-20 LAB
ALBUMIN SERPL ELPH-MCNC: 4.4 G/DL
ALBUMIN SERPL ELPH-MCNC: 4.4 G/DL
ANION GAP SERPL CALC-SCNC: 16 MMOL/L
ANION GAP SERPL CALC-SCNC: 21 MMOL/L
BASOPHILS # BLD AUTO: 0.03 K/UL
BASOPHILS NFR BLD AUTO: 0.5 %
BUN SERPL-MCNC: 88 MG/DL
BUN SERPL-MCNC: 93 MG/DL
CALCIUM SERPL-MCNC: 8.7 MG/DL
CALCIUM SERPL-MCNC: 8.9 MG/DL
CALCIUM SERPL-MCNC: 9 MG/DL
CHLORIDE SERPL-SCNC: 104 MMOL/L
CHLORIDE SERPL-SCNC: 106 MMOL/L
CO2 SERPL-SCNC: 19 MMOL/L
CO2 SERPL-SCNC: 23 MMOL/L
CREAT SERPL-MCNC: 5.33 MG/DL
CREAT SERPL-MCNC: 5.37 MG/DL
EOSINOPHIL # BLD AUTO: 0.27 K/UL
EOSINOPHIL NFR BLD AUTO: 4.1 %
GLUCOSE SERPL-MCNC: 117 MG/DL
GLUCOSE SERPL-MCNC: 80 MG/DL
HCT VFR BLD CALC: 31 %
HGB BLD-MCNC: 10.1 G/DL
IMM GRANULOCYTES NFR BLD AUTO: 0.5 %
LYMPHOCYTES # BLD AUTO: 1.28 K/UL
LYMPHOCYTES NFR BLD AUTO: 19.5 %
MAN DIFF?: NORMAL
MCHC RBC-ENTMCNC: 31.8 PG
MCHC RBC-ENTMCNC: 32.6 GM/DL
MCV RBC AUTO: 97.5 FL
MONOCYTES # BLD AUTO: 0.62 K/UL
MONOCYTES NFR BLD AUTO: 9.4 %
NEUTROPHILS # BLD AUTO: 4.35 K/UL
NEUTROPHILS NFR BLD AUTO: 66 %
PARATHYROID HORMONE INTACT: 701 PG/ML
PHOSPHATE SERPL-MCNC: 4.2 MG/DL
PHOSPHATE SERPL-MCNC: 4.6 MG/DL
PLATELET # BLD AUTO: 201 K/UL
POTASSIUM SERPL-SCNC: 4.9 MMOL/L
POTASSIUM SERPL-SCNC: 5.1 MMOL/L
RBC # BLD: 3.18 M/UL
RBC # FLD: 12.3 %
SODIUM SERPL-SCNC: 143 MMOL/L
SODIUM SERPL-SCNC: 145 MMOL/L
WBC # FLD AUTO: 6.58 K/UL

## 2022-05-20 PROCEDURE — 99214 OFFICE O/P EST MOD 30 MIN: CPT

## 2022-05-20 NOTE — REVIEW OF SYSTEMS
[As Noted in HPI] : as noted in HPI [Lower Ext Edema] : lower extremity edema [Fever] : no fever [Eye Pain] : no eye pain [Sore Throat] : no sore throat [Shortness Of Breath] : no shortness of breath [Vomiting] : no vomiting [Diarrhea] : no diarrhea [Dysuria] : no dysuria [Arthralgias] : no arthralgias [Dizziness] : no dizziness [Sleep Disturbances] : no sleep disturbances [Feelings Of Weakness] : no feelings of weakness [Easy Bleeding] : no tendency for easy bleeding [Easy Bruising] : no tendency for easy bruising [FreeTextEntry5] : chronic B/L LE edema

## 2022-05-20 NOTE — PHYSICAL EXAM
[General Appearance - Alert] : alert [General Appearance - In No Acute Distress] : in no acute distress [General Appearance - Well Nourished] : well nourished [Sclera] : the sclera and conjunctiva were normal [PERRL With Normal Accommodation] : pupils were equal in size, round, and reactive to light [Outer Ear] : the ears and nose were normal in appearance [Neck Appearance] : the appearance of the neck was normal [Jugular Venous Distention Increased] : there was no jugular-venous distention [Respiration, Rhythm And Depth] : normal respiratory rhythm and effort [Auscultation Breath Sounds / Voice Sounds] : lungs were clear to auscultation bilaterally [Heart Rate And Rhythm] : heart rate was normal and rhythm regular [Heart Sounds] : normal S1 and S2 [Murmurs] : no murmurs [Bowel Sounds] : normal bowel sounds [Abdomen Soft] : soft [No CVA Tenderness] : no ~M costovertebral angle tenderness [Abnormal Walk] : normal gait [___ (cm) Fistula] : [unfilled] (cm) fistula [Bruit] : a bruit was present [Thrill] : a thrill was present [] : no rash [Oriented To Time, Place, And Person] : oriented to person, place, and time [Impaired Insight] : insight and judgment were intact [Affect] : the affect was normal [FreeTextEntry1] : B/L LE: mild pitting edema

## 2022-05-20 NOTE — ASSESSMENT
[FreeTextEntry1] : 1. CKD Stage 5: Pt. with advanced/progressive CKD. Last Scr elevated at 5.73 on 3/11/22. Pt. underwent left AVF creation surgery on 11/10/17. LUE AVF can be used for HD if needed as per Dr. Doan's note in June 2020. Pt. currently listed actively at [facility] Kidney Transplant program. Check renal panel today. Monitor kidney function. Avoid NSAIDs, RCA, and nephrotoxins.\par \par 2. HTN: Repeat BP reading improved during office visit today. Low salt diet advised. Monitor BP on current BP meds.\par \par 3. Hyperkalemia: Serum potassium WNL at 4.9 on 3/11/22. Low potassium diet advised. Check serum potassium today.\par \par 4. Metabolic acidosis: Serum CO2 was low at 19 on 3/11/22. Pt. on oral sodium bicarbonate therapy. Check serum CO2 today. \par \par 5. Anemia: in setting of advanced CKD. Hemoglobin low but stable at 10.1 on 1/28/22. Monitor CBC. \par \par 6. LE edema: Pt. with mild bilateral LE edema on examination today. Pt. on torsemide 10 mg once daily. Low salt diet advised. Monitor body weights   \par \par 7. Hyperparathyroidism: Pt. with elevated intact PTH on 701 on 1/28/22. Pt. currently on oral Calcitriol 0.25 mg once daily. Check intact PTH today. \par \par Follow-up pending review of lab results.

## 2022-05-20 NOTE — HISTORY OF PRESENT ILLNESS
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 Doan), and HTN presents for follow-up visit. Pt. was last seen in office on 3/11/22.\par \par Pt. currently feels well but gives history of intermittent tiredness/fatigue. Pt. says she recently/retired from her job. No fever, nausea, vomiting, CP, SOB, HA or urinary complaints. Pt. admits compliance to her medications. Pt. currently listed actively at [facility] Kidney Transplant program.

## 2022-05-22 ENCOUNTER — NON-APPOINTMENT (OUTPATIENT)
Age: 67
End: 2022-05-22

## 2022-05-26 ENCOUNTER — NON-APPOINTMENT (OUTPATIENT)
Age: 67
End: 2022-05-26

## 2022-05-26 LAB
ALBUMIN SERPL ELPH-MCNC: 4.5 G/DL
ANION GAP SERPL CALC-SCNC: 17 MMOL/L
BASOPHILS # BLD AUTO: 0.04 K/UL
BASOPHILS NFR BLD AUTO: 0.6 %
BUN SERPL-MCNC: 82 MG/DL
CALCIUM SERPL-MCNC: 9.3 MG/DL
CALCIUM SERPL-MCNC: 9.7 MG/DL
CHLORIDE SERPL-SCNC: 107 MMOL/L
CO2 SERPL-SCNC: 19 MMOL/L
CREAT SERPL-MCNC: 5.6 MG/DL
EGFR: 8 ML/MIN/1.73M2
EOSINOPHIL # BLD AUTO: 0.33 K/UL
EOSINOPHIL NFR BLD AUTO: 5.2 %
GLUCOSE SERPL-MCNC: 95 MG/DL
HCT VFR BLD CALC: 31.4 %
HGB BLD-MCNC: 10.2 G/DL
IMM GRANULOCYTES NFR BLD AUTO: 0.3 %
LYMPHOCYTES # BLD AUTO: 1.22 K/UL
LYMPHOCYTES NFR BLD AUTO: 19.2 %
MAN DIFF?: NORMAL
MCHC RBC-ENTMCNC: 31.2 PG
MCHC RBC-ENTMCNC: 32.5 GM/DL
MCV RBC AUTO: 96 FL
MONOCYTES # BLD AUTO: 0.58 K/UL
MONOCYTES NFR BLD AUTO: 9.1 %
NEUTROPHILS # BLD AUTO: 4.18 K/UL
NEUTROPHILS NFR BLD AUTO: 65.6 %
PARATHYROID HORMONE INTACT: 558 PG/ML
PHOSPHATE SERPL-MCNC: 4.5 MG/DL
PLATELET # BLD AUTO: 187 K/UL
POTASSIUM SERPL-SCNC: 5.8 MMOL/L
RBC # BLD: 3.27 M/UL
RBC # FLD: 12.8 %
SODIUM SERPL-SCNC: 143 MMOL/L
WBC # FLD AUTO: 6.37 K/UL

## 2022-06-07 LAB
ALBUMIN SERPL ELPH-MCNC: 3.9 G/DL
ANION GAP SERPL CALC-SCNC: 17 MMOL/L
BUN SERPL-MCNC: 94 MG/DL
CALCIUM SERPL-MCNC: 8.2 MG/DL
CHLORIDE SERPL-SCNC: 107 MMOL/L
CO2 SERPL-SCNC: 20 MMOL/L
CREAT SERPL-MCNC: 5.54 MG/DL
EGFR: 8 ML/MIN/1.73M2
GLUCOSE SERPL-MCNC: 189 MG/DL
PHOSPHATE SERPL-MCNC: 4.8 MG/DL
POTASSIUM SERPL-SCNC: 4.9 MMOL/L
SODIUM SERPL-SCNC: 144 MMOL/L

## 2022-07-19 ENCOUNTER — APPOINTMENT (OUTPATIENT)
Dept: TRANSPLANT | Facility: CLINIC | Age: 67
End: 2022-07-19

## 2022-07-29 ENCOUNTER — APPOINTMENT (OUTPATIENT)
Dept: NEPHROLOGY | Facility: CLINIC | Age: 67
End: 2022-07-29

## 2022-07-29 VITALS
WEIGHT: 152 LBS | SYSTOLIC BLOOD PRESSURE: 137 MMHG | HEIGHT: 64 IN | BODY MASS INDEX: 25.95 KG/M2 | HEART RATE: 83 BPM | RESPIRATION RATE: 16 BRPM | TEMPERATURE: 97 F | DIASTOLIC BLOOD PRESSURE: 68 MMHG

## 2022-07-29 LAB
BASOPHILS # BLD AUTO: 0.04 K/UL
BASOPHILS NFR BLD AUTO: 0.5 %
EOSINOPHIL # BLD AUTO: 0.38 K/UL
EOSINOPHIL NFR BLD AUTO: 5.2 %
HCT VFR BLD CALC: 31.2 %
HGB BLD-MCNC: 10 G/DL
IMM GRANULOCYTES NFR BLD AUTO: 0.7 %
LYMPHOCYTES # BLD AUTO: 1.21 K/UL
LYMPHOCYTES NFR BLD AUTO: 16.5 %
MAN DIFF?: NORMAL
MCHC RBC-ENTMCNC: 31.4 PG
MCHC RBC-ENTMCNC: 32.1 GM/DL
MCV RBC AUTO: 98.1 FL
MONOCYTES # BLD AUTO: 0.56 K/UL
MONOCYTES NFR BLD AUTO: 7.6 %
NEUTROPHILS # BLD AUTO: 5.1 K/UL
NEUTROPHILS NFR BLD AUTO: 69.5 %
PLATELET # BLD AUTO: 174 K/UL
RBC # BLD: 3.18 M/UL
RBC # FLD: 12.3 %
WBC # FLD AUTO: 7.34 K/UL

## 2022-07-29 PROCEDURE — 99214 OFFICE O/P EST MOD 30 MIN: CPT

## 2022-07-29 NOTE — ASSESSMENT
[FreeTextEntry1] : 1. CKD Stage 5: Pt. with advanced/progressive CKD. Last Scr elevated/stable at 5.54 on 5/25/22. Pt. underwent left AVF creation surgery on 11/10/17. LUE AVF can be used for HD if needed as per Dr. Doan's note in June 2020. Pt. currently listed actively at [facility] Kidney Transplant program. Check renal panel today. Monitor kidney function. Avoid NSAIDs, RCA, and nephrotoxins.\par \par 2. HTN: BP in acceptable range during office visit today. Low salt diet advised. Monitor BP on current BP meds.\par \par 3. Hyperkalemia: Serum potassium WNL at 4.9 on 5/25/22. Low potassium diet advised. Check serum potassium today.\par \par 4. Metabolic acidosis: Serum CO2 was low at 20 on 5/25/22. Pt. on oral sodium bicarbonate therapy. Check serum CO2 today. \par \par 5. Anemia: in setting of advanced CKD. Hemoglobin low but stable at 10.2 on 5/20/22. Monitor CBC. \par \par 6. LE edema: No LE edema on examination today. Pt. on torsemide 10 mg once daily. Low salt diet advised. Monitor body weights.   \par \par 7. Hyperparathyroidism: Intact PTH elevated at 558 on 5/20/22. Pt. currently on oral Calcitriol 0.25 mg once daily. Check intact PTH today. \par \par Follow-up pending review of lab results.

## 2022-07-29 NOTE — REVIEW OF SYSTEMS
[As Noted in HPI] : as noted in HPI [Feeling Tired] : feeling tired [Fever] : no fever [Eye Pain] : no eye pain [Sore Throat] : no sore throat [Lower Ext Edema] : no lower extremity edema [Shortness Of Breath] : no shortness of breath [Vomiting] : no vomiting [Diarrhea] : no diarrhea [Dysuria] : no dysuria [Arthralgias] : no arthralgias [Limb Swelling] : no limb swelling [Dizziness] : no dizziness [Sleep Disturbances] : no sleep disturbances [Feelings Of Weakness] : no feelings of weakness [Easy Bleeding] : no tendency for easy bleeding [Easy Bruising] : no tendency for easy bruising

## 2022-07-29 NOTE — HISTORY OF PRESENT ILLNESS
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 Doan), and HTN presents for follow-up visit. Pt. was last seen in office on 5/20/22.\par \par Pt. currently feels well but gives history of intermittent tiredness/fatigue. No fever, CP, SOB, nausea, vomiting, HA or urinary complaints. Pt. admits compliance to her medications.

## 2022-08-19 ENCOUNTER — APPOINTMENT (OUTPATIENT)
Dept: TRANSPLANT | Facility: CLINIC | Age: 67
End: 2022-08-19

## 2022-08-19 LAB
ALBUMIN SERPL ELPH-MCNC: 3.9 G/DL
ANION GAP SERPL CALC-SCNC: 16 MMOL/L
BUN SERPL-MCNC: 94 MG/DL
CALCIUM SERPL-MCNC: 9.3 MG/DL
CHLORIDE SERPL-SCNC: 105 MMOL/L
CO2 SERPL-SCNC: 21 MMOL/L
CREAT SERPL-MCNC: 5.27 MG/DL
EGFR: 8 ML/MIN/1.73M2
GLUCOSE SERPL-MCNC: 159 MG/DL
PHOSPHATE SERPL-MCNC: 4.4 MG/DL
POTASSIUM SERPL-SCNC: 5.2 MMOL/L
SODIUM SERPL-SCNC: 142 MMOL/L

## 2022-09-14 NOTE — H&P PST ADULT - DOES PATIENT HAVE ADVANCE DIRECTIVE
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 Plan: OS: Resolved - patient may discontinue Maxitrol.
No

## 2022-09-26 ENCOUNTER — APPOINTMENT (OUTPATIENT)
Dept: TRANSPLANT | Facility: CLINIC | Age: 67
End: 2022-09-26

## 2022-09-29 ENCOUNTER — RX RENEWAL (OUTPATIENT)
Age: 67
End: 2022-09-29

## 2022-09-29 ENCOUNTER — APPOINTMENT (OUTPATIENT)
Dept: NEPHROLOGY | Facility: CLINIC | Age: 67
End: 2022-09-29

## 2022-09-29 VITALS
RESPIRATION RATE: 16 BRPM | HEART RATE: 85 BPM | TEMPERATURE: 98 F | BODY MASS INDEX: 26.8 KG/M2 | WEIGHT: 157 LBS | SYSTOLIC BLOOD PRESSURE: 139 MMHG | DIASTOLIC BLOOD PRESSURE: 72 MMHG | HEIGHT: 64 IN | OXYGEN SATURATION: 99 %

## 2022-09-29 PROCEDURE — 99214 OFFICE O/P EST MOD 30 MIN: CPT

## 2022-09-29 NOTE — PHYSICAL EXAM
[General Appearance - Alert] : alert [General Appearance - In No Acute Distress] : in no acute distress [General Appearance - Well Nourished] : well nourished [Sclera] : the sclera and conjunctiva were normal [PERRL With Normal Accommodation] : pupils were equal in size, round, and reactive to light [Outer Ear] : the ears and nose were normal in appearance [Neck Appearance] : the appearance of the neck was normal [Jugular Venous Distention Increased] : there was no jugular-venous distention [Respiration, Rhythm And Depth] : normal respiratory rhythm and effort [Auscultation Breath Sounds / Voice Sounds] : lungs were clear to auscultation bilaterally [Heart Rate And Rhythm] : heart rate was normal and rhythm regular [Heart Sounds] : normal S1 and S2 [Murmurs] : no murmurs [Heart Sounds Pericardial Friction Rub] : no pericardial rub [Edema] : there was no peripheral edema [Bowel Sounds] : normal bowel sounds [Abdomen Soft] : soft [No CVA Tenderness] : no ~M costovertebral angle tenderness [Abnormal Walk] : normal gait [___ (cm) Fistula] : [unfilled] (cm) fistula [Bruit] : a bruit was present [Thrill] : a thrill was present [] : no rash [Oriented To Time, Place, And Person] : oriented to person, place, and time [Impaired Insight] : insight and judgment were intact [Affect] : the affect was normal

## 2022-09-29 NOTE — REVIEW OF SYSTEMS
[Feeling Tired] : feeling tired [As Noted in HPI] : as noted in HPI [Fever] : no fever [Eye Pain] : no eye pain [Sore Throat] : no sore throat [Lower Ext Edema] : no lower extremity edema [Shortness Of Breath] : no shortness of breath [Vomiting] : no vomiting [Diarrhea] : no diarrhea [Dysuria] : no dysuria [Arthralgias] : no arthralgias [Limb Swelling] : no limb swelling [Itching] : no itching [Dizziness] : no dizziness [Sleep Disturbances] : no sleep disturbances [Easy Bleeding] : no tendency for easy bleeding

## 2022-09-29 NOTE — ASSESSMENT
[FreeTextEntry1] : 1. CKD Stage 5: Pt. with advanced/progressive CKD. Last Scr elevated/stable at 5.27 on 7/29/22. Pt. underwent left AVF creation surgery on 11/10/17. LUE AVF can be used for HD if needed as per Dr. Doan's note in June 2020. Pt. currently listed actively at [facility] Kidney Transplant program. Check renal panel today. Monitor kidney function. Avoid NSAIDs, RCA, and nephrotoxins.\par \par 2. HTN: BP in acceptable range during office visit today. Low salt diet advised. Monitor BP on current BP meds.\par \par 3. Hyperkalemia: Serum potassium WNL at 5.2 on 7/29/22. Low potassium diet advised. Check serum potassium today.\par \par 4. Metabolic acidosis: Serum CO2 was low at 21 on 7/29/22. Pt. on oral sodium bicarbonate therapy. Check serum CO2 today. \par \par 5. Anemia: in setting of advanced CKD. Hemoglobin low but stable at 10.0 on 7/29/22. Monitor CBC. \par \par 6. LE edema: No LE edema on examination today. Pt. on torsemide 10 mg once daily. Low salt diet advised. Monitor body weights.   \par \par 7. Hyperparathyroidism: Intact PTH elevated at 558 on 5/20/22. Pt. currently on oral Calcitriol 0.25 mg once daily. Check intact PTH today. \par \par Follow-up pending review of lab results.

## 2022-09-29 NOTE — HISTORY OF PRESENT ILLNESS
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 Doan), and HTN presents for follow-up visit. Pt. was last seen in office on 7/29/22.\par \par Pt. currently feels well but gives history of intermittent tiredness/fatigue. No fever, CP, SOB, nausea, vomiting, HA or urinary complaints. Pt. admits compliance to her medications.

## 2022-09-30 ENCOUNTER — NON-APPOINTMENT (OUTPATIENT)
Age: 67
End: 2022-09-30

## 2022-09-30 LAB
ALBUMIN SERPL ELPH-MCNC: 4.4 G/DL
ANION GAP SERPL CALC-SCNC: 17 MMOL/L
BUN SERPL-MCNC: 96 MG/DL
CALCIUM SERPL-MCNC: 8.9 MG/DL
CALCIUM SERPL-MCNC: 9.4 MG/DL
CHLORIDE SERPL-SCNC: 105 MMOL/L
CO2 SERPL-SCNC: 20 MMOL/L
CREAT SERPL-MCNC: 5.38 MG/DL
EGFR: 8 ML/MIN/1.73M2
GLUCOSE SERPL-MCNC: 129 MG/DL
PARATHYROID HORMONE INTACT: 462 PG/ML
PHOSPHATE SERPL-MCNC: 5 MG/DL
POTASSIUM SERPL-SCNC: 5.2 MMOL/L
SODIUM SERPL-SCNC: 142 MMOL/L

## 2022-10-28 ENCOUNTER — APPOINTMENT (OUTPATIENT)
Dept: TRANSPLANT | Facility: CLINIC | Age: 67
End: 2022-10-28

## 2022-11-01 ENCOUNTER — LABORATORY RESULT (OUTPATIENT)
Age: 67
End: 2022-11-01

## 2022-11-01 ENCOUNTER — APPOINTMENT (OUTPATIENT)
Dept: NEPHROLOGY | Facility: CLINIC | Age: 67
End: 2022-11-01

## 2022-11-01 VITALS
SYSTOLIC BLOOD PRESSURE: 122 MMHG | TEMPERATURE: 97.6 F | WEIGHT: 155 LBS | OXYGEN SATURATION: 96 % | HEIGHT: 65 IN | DIASTOLIC BLOOD PRESSURE: 72 MMHG | BODY MASS INDEX: 25.83 KG/M2 | HEART RATE: 81 BPM | RESPIRATION RATE: 17 BRPM

## 2022-11-01 PROCEDURE — 99072 ADDL SUPL MATRL&STAF TM PHE: CPT

## 2022-11-01 PROCEDURE — 99215 OFFICE O/P EST HI 40 MIN: CPT

## 2022-11-01 NOTE — HISTORY OF PRESENT ILLNESS
[TextBox_42] : NGUYEN GARCIA is a 66 year old female who presents for kidney transplant evaluation. \par Cause of CKD : has known CKD ~ 15 years. Has a AVF in 2017, not on dialysis yet. Son has Alports and she has had genetic testing that revealed that she was a carrier. \par Nephrologist: Dr Jet Horn \par remains preemptive , but will be starting dialysis soon\par  \par Other PMH :\par Cardiac - HTN X 10 years. No MI , CHF, CAD\par Pulmonary- no h/o COPD, Asthma\par Infections-h/o TB, HIV, Hepatitis , covid \par Heme- h/o malignancy or bleeding disorders.No DVT/PE\par Endo- no h/o diabetes, no Thyroid disease\par Neuro- no no h/o CVA\par Psych- suicidal ideation, depression or anxiety. \par Sensitization events- no previous blood transfusions or previous transplant\par Ob/gyn- 2 children ( 36, 30 ) Normal vaginal delivery \par No infections or hospitalizations in the last 1 year \par Surgical h/o : AVF , tonsillectomy \par Functional status: can walk 10 blocks and climb stairs with no difficulty \par Social history: works at [employer]. works in financial aid. works full time. lives with  and son. non smoker , no alcohol or illicit drug use.\par Family history: Son was diagnosed with kidney disease due to Alports syndrome and underwent a kidney transplant in 2019 at [facility]. \par .\par Last SEen 21\par Listed \par Dialysis pre-emptive when last seen\par ABO A\par PRA 0% will repeat today. \par \par Most recent testing\par Cardiac-\par EC2020, normal, sinus 72 bpm \par Echo: , normal LV, LA, RA and RV no pulmonary hypertension, normal LA size LVEF 60-65% \par Stress 3/29/22 90% MPHR 4 METS no evidence of inducible ischemia. EF 77%\par \par Radiology \par Chest Xray 22 no acute disease. \par Abd US and doppler 22 multiple simple appearing renal cysts and diffuse increased renal echogenicity. vasculature patent. \par \par Cancer Screening\par Mammo 21 BIRADS 2 no evidence of malignancy. \par \par \par

## 2022-11-01 NOTE — PLAN
[FreeTextEntry1] : 1. Advanced  CKD:  Ms. NGUYEN GARCIA  remains  a good candidate for kidney transplantation and would benefit from transplantation .\par 2.Cardiac risk: cardiac testing done this year, reviewed and is wnl. \par 3. Cancer screening:  Olinda done this year, wnl. Needs pap smear . Colonoscopy result?\par \par \par I have personally discussed the risks and benefits of transplantation and patient attended transplant education class where the following was disclosed:\par  \par Reviewed factors affecting survival and morbidity while on dialysis, the transplant wait list and reviewed aimee-operative and long-term risk factors affecting outcome in kidney transplantation. \par  \par One year SRTR outcomes for national and [facility] were discussed in regards to patient survival and graft survival after transplantation. \par  \par Details of transplant surgery, including complications were discussed.\par Immunosuppression and complications including infection including life threatening sepsis and opportunistic infections, malignancy and new onset diabetes were discussed. \par  \par Benefits of live donor transplantation as well as variability in wait times across regions and multiple listing were discussed. \par KDPI >85% and PHS high risk criteria donors were discussed. \par HCV kidney transplantation was discussed.\par  \par Will proceed with completing/ updating work up and listing for transplant/ live donor transplant once work up is reviewed and found to be acceptable by multidisciplinary listing committee.\par

## 2022-11-02 LAB
ABO + RH PNL BLD: NORMAL
ALBUMIN SERPL ELPH-MCNC: 4.2 G/DL
ALP BLD-CCNC: 84 U/L
ALT SERPL-CCNC: 9 U/L
ANION GAP SERPL CALC-SCNC: 17 MMOL/L
APPEARANCE: ABNORMAL
AST SERPL-CCNC: 10 U/L
BASOPHILS # BLD AUTO: 0.05 K/UL
BASOPHILS NFR BLD AUTO: 0.7 %
BILIRUB SERPL-MCNC: 0.3 MG/DL
BILIRUBIN URINE: NEGATIVE
BLOOD URINE: NEGATIVE
BUN SERPL-MCNC: 91 MG/DL
C PEPTIDE SERPL-MCNC: 12.5 NG/ML
CALCIUM SERPL-MCNC: 9.1 MG/DL
CHLORIDE SERPL-SCNC: 107 MMOL/L
CHOLEST SERPL-MCNC: 176 MG/DL
CK SERPL-CCNC: 24 U/L
CMV IGG SERPL QL: <0.2 U/ML
CMV IGG SERPL-IMP: NEGATIVE
CO2 SERPL-SCNC: 18 MMOL/L
COLOR: NORMAL
COVID-19 SPIKE DOMAIN ANTIBODY INTERPRETATION: POSITIVE
CREAT SERPL-MCNC: 5.25 MG/DL
CREAT SPEC-SCNC: 130 MG/DL
CREAT/PROT UR: 0.6 RATIO
CRP SERPL-MCNC: <3 MG/L
EBV EA AB SER IA-ACNC: <5 U/ML
EBV EA AB TITR SER IF: POSITIVE
EBV EA IGG SER QL IA: 58.7 U/ML
EBV EA IGG SER-ACNC: NEGATIVE
EBV EA IGM SER IA-ACNC: NEGATIVE
EBV PATRN SPEC IB-IMP: NORMAL
EBV VCA IGG SER IA-ACNC: 165 U/ML
EBV VCA IGM SER QL IA: 22.9 U/ML
EGFR: 8 ML/MIN/1.73M2
EOSINOPHIL # BLD AUTO: 0.37 K/UL
EOSINOPHIL NFR BLD AUTO: 5.5 %
EPSTEIN-BARR VIRUS CAPSID ANTIGEN IGG: POSITIVE
ERYTHROCYTE [SEDIMENTATION RATE] IN BLOOD BY WESTERGREN METHOD: 35 MM/HR
ESTIMATED AVERAGE GLUCOSE: 103 MG/DL
GLUCOSE QUALITATIVE U: NEGATIVE
GLUCOSE SERPL-MCNC: 171 MG/DL
HAV IGM SER QL: NONREACTIVE
HBA1C MFR BLD HPLC: 5.2 %
HBV CORE IGG+IGM SER QL: NONREACTIVE
HBV SURFACE AB SER QL: NONREACTIVE
HBV SURFACE AB SERPL IA-ACNC: <3 MIU/ML
HBV SURFACE AG SER QL: NONREACTIVE
HCT VFR BLD CALC: 29.8 %
HCV AB SER QL: NONREACTIVE
HCV S/CO RATIO: 0.14 S/CO
HDLC SERPL-MCNC: 30 MG/DL
HEPATITIS A IGG ANTIBODY: NONREACTIVE
HGB BLD-MCNC: 9.9 G/DL
HIV1+2 AB SPEC QL IA.RAPID: NONREACTIVE
HSV 1+2 IGG SER IA-IMP: NEGATIVE
HSV 1+2 IGG SER IA-IMP: POSITIVE
HSV 1+2 IGG SER IA-IMP: POSITIVE
HSV1 IGG SER QL: 61.5 INDEX
HSV1 IGG SER QL: 61.5 INDEX
HSV2 IGG SER QL: 0.1 INDEX
IMM GRANULOCYTES NFR BLD AUTO: 0.4 %
KETONES URINE: NEGATIVE
LDLC SERPL CALC-MCNC: 110 MG/DL
LEUKOCYTE ESTERASE URINE: ABNORMAL
LYMPHOCYTES # BLD AUTO: 1.38 K/UL
LYMPHOCYTES NFR BLD AUTO: 20.5 %
MAGNESIUM SERPL-MCNC: 1.9 MG/DL
MAN DIFF?: NORMAL
MCHC RBC-ENTMCNC: 31.9 PG
MCHC RBC-ENTMCNC: 33.2 GM/DL
MCV RBC AUTO: 96.1 FL
MONOCYTES # BLD AUTO: 0.45 K/UL
MONOCYTES NFR BLD AUTO: 6.7 %
NEUTROPHILS # BLD AUTO: 4.44 K/UL
NEUTROPHILS NFR BLD AUTO: 66.2 %
NITRITE URINE: NEGATIVE
NONHDLC SERPL-MCNC: 147 MG/DL
PH URINE: 6
PHOSPHATE SERPL-MCNC: 4.8 MG/DL
PLATELET # BLD AUTO: 178 K/UL
POTASSIUM SERPL-SCNC: 4.8 MMOL/L
PROT SERPL-MCNC: 6.6 G/DL
PROT UR-MCNC: 81 MG/DL
PROTEIN URINE: ABNORMAL
RBC # BLD: 3.1 M/UL
RBC # FLD: 12.3 %
RUBV IGG FLD-ACNC: 18.9 INDEX
RUBV IGG SER-IMP: POSITIVE
SARS-COV-2 AB SERPL IA-ACNC: >250 U/ML
SARS-COV-2 N GENE NPH QL NAA+PROBE: NOT DETECTED
SODIUM SERPL-SCNC: 142 MMOL/L
SPECIFIC GRAVITY URINE: 1.01
T GONDII AB SER-IMP: NEGATIVE
T GONDII IGG SER QL: <3 IU/ML
T PALLIDUM AB SER QL IA: NEGATIVE
T3 SERPL-MCNC: 106 NG/DL
T4 FREE SERPL-MCNC: 1.2 NG/DL
TRIGL SERPL-MCNC: 181 MG/DL
TSH SERPL-ACNC: 2.07 UIU/ML
URATE SERPL-MCNC: 8.3 MG/DL
UROBILINOGEN URINE: NORMAL
VZV AB TITR SER: POSITIVE
VZV IGG SER IF-ACNC: 306.9 INDEX
WBC # FLD AUTO: 6.72 K/UL

## 2022-11-04 ENCOUNTER — APPOINTMENT (OUTPATIENT)
Dept: VASCULAR SURGERY | Facility: CLINIC | Age: 67
End: 2022-11-04

## 2022-11-04 VITALS
DIASTOLIC BLOOD PRESSURE: 63 MMHG | SYSTOLIC BLOOD PRESSURE: 122 MMHG | TEMPERATURE: 98.9 F | HEIGHT: 65 IN | HEART RATE: 80 BPM

## 2022-11-04 LAB
HSV1 IGM SER QL: NEGATIVE
HSV2 AB FLD-ACNC: NEGATIVE
M TB IFN-G BLD-IMP: NEGATIVE
QUANTIFERON TB PLUS MITOGEN MINUS NIL: 9.17 IU/ML
QUANTIFERON TB PLUS NIL: 0.04 IU/ML
QUANTIFERON TB PLUS TB1 MINUS NIL: 0 IU/ML
QUANTIFERON TB PLUS TB2 MINUS NIL: -0.01 IU/ML

## 2022-11-04 PROCEDURE — 93880 EXTRACRANIAL BILAT STUDY: CPT

## 2022-11-04 PROCEDURE — 99213 OFFICE O/P EST LOW 20 MIN: CPT

## 2022-11-18 ENCOUNTER — APPOINTMENT (OUTPATIENT)
Dept: NEPHROLOGY | Facility: CLINIC | Age: 67
End: 2022-11-18

## 2022-11-18 VITALS
WEIGHT: 156 LBS | SYSTOLIC BLOOD PRESSURE: 155 MMHG | TEMPERATURE: 97.7 F | HEART RATE: 85 BPM | RESPIRATION RATE: 16 BRPM | BODY MASS INDEX: 25.07 KG/M2 | HEIGHT: 66 IN | OXYGEN SATURATION: 99 % | DIASTOLIC BLOOD PRESSURE: 80 MMHG

## 2022-11-18 DIAGNOSIS — N25.81 SECONDARY HYPERPARATHYROIDISM OF RENAL ORIGIN: ICD-10-CM

## 2022-11-18 PROCEDURE — 99214 OFFICE O/P EST MOD 30 MIN: CPT

## 2022-11-18 NOTE — ASSESSMENT
[FreeTextEntry1] : 1. CKD Stage 5: Pt. with advanced/progressive CKD. Recent Scr elevated/stable at 5.25 on 11/1/22. Pt. underwent left AVF creation surgery on 11/10/17. LUE AVF can be used for HD if needed as per Dr. Doan's note in June 2020. Pt. currently listed actively at [facility] Kidney Transplant program. Monitor kidney function. Avoid NSAIDs, RCA, and nephrotoxins.\par \par 2. HTN: BP elevated during office visit today. Low salt diet advised. Monitor BP on current BP meds.\par \par 3. Hyperkalemia: Serum potassium WNL at 4.8 on 11/1/22. Low potassium diet advised. Monitor serum potassium. \par \par 4. Metabolic acidosis: Serum CO2 was low at 18 on 11/1/22. Pt. on oral sodium bicarbonate therapy. Monitor serum CO2. \par \par 5. Anemia: in setting of advanced CKD. Hemoglobin low but stable at 9.9 on 11/1/22. Monitor CBC. \par \par 6. LE edema: No LE edema on examination today. Pt. on oral Torsemide 10 mg once daily. Low salt diet advised. Monitor body weights.   \par \par 7. Hyperparathyroidism: Intact PTH elevated at 558 on 5/20/22. Pt. currently on oral Calcitriol 0.25 mg once daily. Monitor intact PTH. \par \par Follow-up in 2 months.

## 2022-11-18 NOTE — HISTORY OF PRESENT ILLNESS
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 Doan), and HTN presents for follow-up visit. Pt. was last seen in office on 7/29/22.\par \par Pt. currently feels well but gives history of intermittent tiredness/fatigue. No fever, CP, SOB, nausea, vomiting, HA or urinary complaints. Pt. admits compliance to her medications. Pt. seen by her transplant nephrologist Dr. Elenita Tobin earlier this month on 11/1/22. Pt. saw her vascular surgeon Dr. Doan on 11/4/22.

## 2022-12-07 ENCOUNTER — NON-APPOINTMENT (OUTPATIENT)
Age: 67
End: 2022-12-07

## 2022-12-16 ENCOUNTER — APPOINTMENT (OUTPATIENT)
Dept: TRANSPLANT | Facility: CLINIC | Age: 67
End: 2022-12-16

## 2022-12-27 ENCOUNTER — RX RENEWAL (OUTPATIENT)
Age: 67
End: 2022-12-27

## 2023-01-05 ENCOUNTER — APPOINTMENT (OUTPATIENT)
Dept: CARDIOLOGY | Facility: CLINIC | Age: 68
End: 2023-01-05
Payer: COMMERCIAL

## 2023-01-05 ENCOUNTER — NON-APPOINTMENT (OUTPATIENT)
Age: 68
End: 2023-01-05

## 2023-01-05 VITALS
WEIGHT: 156 LBS | HEIGHT: 66 IN | SYSTOLIC BLOOD PRESSURE: 149 MMHG | OXYGEN SATURATION: 100 % | BODY MASS INDEX: 25.07 KG/M2 | HEART RATE: 86 BPM | DIASTOLIC BLOOD PRESSURE: 76 MMHG | RESPIRATION RATE: 17 BRPM

## 2023-01-05 PROCEDURE — 99072 ADDL SUPL MATRL&STAF TM PHE: CPT

## 2023-01-05 PROCEDURE — 93000 ELECTROCARDIOGRAM COMPLETE: CPT

## 2023-01-05 PROCEDURE — 99215 OFFICE O/P EST HI 40 MIN: CPT

## 2023-01-05 NOTE — REASON FOR VISIT
[Other: ____] : [unfilled] [FreeTextEntry1] : January 2023 - Patient returns today for follow-up in her usual state of health. She remains a preemptive candidate for transplant. She has retired from work and reports feeling bored at home.\par She has no cardiovascular complaints. Carotid duplex performed by Jerome Doan MD.\par She has not had Covid-19 since her asymptomatic infection in December 2020 (detected prior to her screening colonoscopy). She has not received the bivalent booster.\par

## 2023-01-05 NOTE — HISTORY OF PRESENT ILLNESS
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 \par Patient is a preemptive candidate for transplant.\par She has a fistula placed in her left radial in anticipation of eventual HD, but it was placed several years ago and she has not needed it.\par \par On November 16, 2020, patient presented for echocardiogram. Echo showed normal LVEF (65-70%), normal LA size, normal pulmonary pressures.\par \par On December 3, 2020, she presented for nuclear stress test. She walked on the treadmill, achieved target HR (88% MPHR), but had to stop walking due to leg pain early in stage 2. Myocardial perfusion imaging was negative for ischemia. \par \par January 2022 - Patient returns today for follow-up as part of her transplant evaluation. She remains a preemptive candidate for transplant, but she now has a left radial AV fistula placed.\par Her ischemic evaluation was negative in late 2020.\par She has no cardiovascular complaints.\par She has received two doses of Moderna's Covid-19 vaccine and a Moderna booster.\par \par PMD: Jos Zaragoza MD [phone]\par Nephrologist: Jet Horn MD [phone]

## 2023-01-05 NOTE — CARDIOLOGY SUMMARY
[de-identified] : 11/16/2020, normal, sinus 72 bpm [de-identified] : 11/16/2020, normal LV function, no pulmonary hypertension, normal LA size LVEF 65-70%

## 2023-01-05 NOTE — DISCUSSION/SUMMARY
[EKG obtained to assist in diagnosis and management of assessed problem(s)] : EKG obtained to assist in diagnosis and management of assessed problem(s) [FreeTextEntry1] : 67 year-old woman with history as above who presents for cardiac evaluation prior to possible renal transplant.\par \par Repeat Echocardiogram to evaluate for structural heart disease.\par Repeat nuclear stress to evaluate for ischemic heart disease.

## 2023-01-09 ENCOUNTER — OUTPATIENT (OUTPATIENT)
Dept: OUTPATIENT SERVICES | Facility: HOSPITAL | Age: 68
LOS: 1 days | End: 2023-01-09
Payer: COMMERCIAL

## 2023-01-09 ENCOUNTER — APPOINTMENT (OUTPATIENT)
Dept: CT IMAGING | Facility: CLINIC | Age: 68
End: 2023-01-09
Payer: COMMERCIAL

## 2023-01-09 DIAGNOSIS — I77.0 ARTERIOVENOUS FISTULA, ACQUIRED: Chronic | ICD-10-CM

## 2023-01-09 DIAGNOSIS — Z90.89 ACQUIRED ABSENCE OF OTHER ORGANS: Chronic | ICD-10-CM

## 2023-01-09 DIAGNOSIS — Z00.8 ENCOUNTER FOR OTHER GENERAL EXAMINATION: ICD-10-CM

## 2023-01-09 DIAGNOSIS — Z01.818 ENCOUNTER FOR OTHER PREPROCEDURAL EXAMINATION: ICD-10-CM

## 2023-01-09 PROCEDURE — 74176 CT ABD & PELVIS W/O CONTRAST: CPT | Mod: 26

## 2023-01-09 PROCEDURE — 74176 CT ABD & PELVIS W/O CONTRAST: CPT

## 2023-01-11 ENCOUNTER — APPOINTMENT (OUTPATIENT)
Dept: CARDIOLOGY | Facility: CLINIC | Age: 68
End: 2023-01-11

## 2023-01-13 ENCOUNTER — APPOINTMENT (OUTPATIENT)
Dept: TRANSPLANT | Facility: CLINIC | Age: 68
End: 2023-01-13

## 2023-01-17 ENCOUNTER — NON-APPOINTMENT (OUTPATIENT)
Age: 68
End: 2023-01-17

## 2023-01-19 ENCOUNTER — APPOINTMENT (OUTPATIENT)
Dept: NEPHROLOGY | Facility: CLINIC | Age: 68
End: 2023-01-19
Payer: MEDICARE

## 2023-01-19 VITALS
WEIGHT: 152.13 LBS | HEART RATE: 97 BPM | BODY MASS INDEX: 24.45 KG/M2 | RESPIRATION RATE: 16 BRPM | DIASTOLIC BLOOD PRESSURE: 87 MMHG | SYSTOLIC BLOOD PRESSURE: 126 MMHG | HEIGHT: 66 IN | OXYGEN SATURATION: 99 % | TEMPERATURE: 97.5 F

## 2023-01-19 PROCEDURE — 99214 OFFICE O/P EST MOD 30 MIN: CPT

## 2023-01-19 NOTE — ASSESSMENT
[FreeTextEntry1] : 1. CKD Stage 5: Pt. with advanced/progressive CKD. Last Scr elevated/stable at 5.25 on 11/1/22. Pt. underwent LUE AVF creation surgery on 11/10/17. LUE AVF can be used for HD if needed as per Dr. Doan's note in June 2020. Pt. currently listed actively at [facility] Kidney Transplant program. Check renal panel today. Monitor kidney function. Avoid NSAIDs, RCA, and nephrotoxins.\par \par 2. HTN: BP in acceptable range during office visit today. Low salt diet advised. Monitor BP on current BP meds.\par \par 3. Hyperkalemia: Serum potassium WNL at 4.8 on 11/1/22. Low potassium diet advised. Check serum potassium today. \par \par 4. Metabolic acidosis: Serum CO2 was low at 18 on 11/1/22. Pt. on oral sodium bicarbonate therapy. Check serum CO2 today. \par \par 5. Anemia: in setting of advanced CKD. Hemoglobin low but stable at 9.9 on 11/1/22. Check CBC today. \par \par 6. LE edema: No LE edema on examination today. Pt. on oral Torsemide 10 mg once daily. Low salt diet advised. Monitor body weights.   \par \par 7. Hyperparathyroidism: Intact PTH decreased to 462 on 9/29/22. Pt. currently on oral Calcitriol 0.25 mg once daily. Check intact PTH today. \par \par Follow-up in 2 months.

## 2023-01-19 NOTE — HISTORY OF PRESENT ILLNESS
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 Doan), and HTN presents for follow-up visit. Pt. was last seen in office on 7/29/22.\par \par Pt. currently feels well but gives history of intermittent tiredness/fatigue. No fever, CP, SOB, nausea, vomiting, HA or urinary complaints. Pt. admits compliance to her medications. Pt. saw her cardiologist Dr. Huston earlier this month on 1/5/23. Pt. saw her vascular surgeon Dr. Doan on 11/4/22.

## 2023-01-20 ENCOUNTER — NON-APPOINTMENT (OUTPATIENT)
Age: 68
End: 2023-01-20

## 2023-01-20 ENCOUNTER — APPOINTMENT (OUTPATIENT)
Dept: OBGYN | Facility: CLINIC | Age: 68
End: 2023-01-20
Payer: MEDICARE

## 2023-01-20 VITALS
DIASTOLIC BLOOD PRESSURE: 81 MMHG | SYSTOLIC BLOOD PRESSURE: 151 MMHG | WEIGHT: 155 LBS | HEIGHT: 66 IN | BODY MASS INDEX: 24.91 KG/M2

## 2023-01-20 LAB
ALBUMIN SERPL ELPH-MCNC: 4.3 G/DL
ANION GAP SERPL CALC-SCNC: 17 MMOL/L
BUN SERPL-MCNC: 93 MG/DL
CALCIUM SERPL-MCNC: 9.2 MG/DL
CHLORIDE SERPL-SCNC: 107 MMOL/L
CO2 SERPL-SCNC: 17 MMOL/L
CREAT SERPL-MCNC: 5.44 MG/DL
EGFR: 8 ML/MIN/1.73M2
GLUCOSE SERPL-MCNC: 126 MG/DL
PHOSPHATE SERPL-MCNC: 5 MG/DL
POTASSIUM SERPL-SCNC: 4.8 MMOL/L
SODIUM SERPL-SCNC: 142 MMOL/L

## 2023-01-20 PROCEDURE — G0101: CPT

## 2023-01-20 NOTE — HISTORY OF PRESENT ILLNESS
[FreeTextEntry1] : Patient on renal transplant list \par  [Patient reported mammogram was normal] : Patient reported mammogram was normal [Patient reported PAP Smear was normal] : Patient reported PAP Smear was normal [Patient reported colonoscopy was normal] : Patient reported colonoscopy was normal [Mammogramdate] : 2022  [PapSmeardate] : 2020 [TextBox_43] : Up to date

## 2023-01-25 LAB — CYTOLOGY CVX/VAG DOC THIN PREP: NORMAL

## 2023-02-14 DIAGNOSIS — Z01.818 ENCOUNTER FOR OTHER PREPROCEDURAL EXAMINATION: ICD-10-CM

## 2023-02-15 ENCOUNTER — APPOINTMENT (OUTPATIENT)
Dept: TRANSPLANT | Facility: CLINIC | Age: 68
End: 2023-02-15

## 2023-03-07 ENCOUNTER — APPOINTMENT (OUTPATIENT)
Dept: VASCULAR SURGERY | Facility: CLINIC | Age: 68
End: 2023-03-07
Payer: MEDICARE

## 2023-03-07 PROCEDURE — 93880 EXTRACRANIAL BILAT STUDY: CPT

## 2023-03-14 ENCOUNTER — APPOINTMENT (OUTPATIENT)
Dept: VASCULAR SURGERY | Facility: CLINIC | Age: 68
End: 2023-03-14
Payer: MEDICARE

## 2023-03-14 PROCEDURE — 99442: CPT | Mod: 95,PD

## 2023-03-16 ENCOUNTER — APPOINTMENT (OUTPATIENT)
Dept: NEPHROLOGY | Facility: CLINIC | Age: 68
End: 2023-03-16
Payer: MEDICARE

## 2023-03-16 VITALS
HEIGHT: 66 IN | TEMPERATURE: 97 F | BODY MASS INDEX: 24.27 KG/M2 | DIASTOLIC BLOOD PRESSURE: 69 MMHG | WEIGHT: 151 LBS | OXYGEN SATURATION: 98 % | SYSTOLIC BLOOD PRESSURE: 118 MMHG | RESPIRATION RATE: 15 BRPM | HEART RATE: 99 BPM

## 2023-03-16 DIAGNOSIS — N18.5 CHRONIC KIDNEY DISEASE, STAGE 5: ICD-10-CM

## 2023-03-16 DIAGNOSIS — E87.2 ACIDOSIS: ICD-10-CM

## 2023-03-16 DIAGNOSIS — E87.5 HYPERKALEMIA: ICD-10-CM

## 2023-03-16 PROCEDURE — 99214 OFFICE O/P EST MOD 30 MIN: CPT

## 2023-03-16 NOTE — ASSESSMENT
[FreeTextEntry1] : 1. CKD Stage 5: Pt. with advanced/progressive CKD. Last Scr elevated/stable at 5.44 on 1/19/23. Pt. underwent LUE AVF creation surgery on 11/10/17. LUE AVF can be used for HD if needed as per Dr. Doan's note in June 2020. Pt. currently listed actively at [facility] Kidney Transplant program. Check renal panel today. Monitor kidney function. Avoid NSAIDs, RCA, and nephrotoxins.\par \par 2. HTN: BP in acceptable range during office visit today. Low salt diet advised. Monitor BP on current BP meds.\par \par 3. Hyperkalemia: Serum potassium WNL at 4.8 on 1/19/23. Low potassium diet advised. Check serum potassium today. \par \par 4. Metabolic acidosis: Serum CO2 was low at 17 on 1/19/23. Pt. on oral sodium bicarbonate therapy. Check serum CO2 today. \par \par 5. Anemia: in setting of advanced CKD. Hemoglobin low but stable at 9.9 on 11/1/22. Check CBC today. \par \par 6. LE edema: No LE edema on examination today. Pt. on oral Torsemide 10 mg once daily. Low salt diet advised. Monitor body weights.   \par \par 7. Hyperparathyroidism: Intact PTH decreased to 462 on 9/29/22. Pt. currently on oral Calcitriol 0.25 mg once daily. Check intact PTH today. \par \par Follow-up pending review of lab results.

## 2023-03-16 NOTE — HISTORY OF PRESENT ILLNESS
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 Doan), and HTN presents for follow-up visit. Pt. was last seen in office on 1/19/23.\par \par Pt. gives recent history of abdominal pain and diarrhea. Pt. currently feels better but gives history of intermittent tiredness/fatigue and decreased appetite. No fever, CP, SOB, nausea, vomiting, diarrhea, HA or urinary complaints during clinic visit today. Pt. admits compliance to her medications.

## 2023-03-16 NOTE — REVIEW OF SYSTEMS
[Feeling Tired] : feeling tired [As Noted in HPI] : as noted in HPI [Fever] : no fever [Eye Pain] : no eye pain [Sore Throat] : no sore throat [Lower Ext Edema] : no lower extremity edema [Shortness Of Breath] : no shortness of breath [Dysuria] : no dysuria [Arthralgias] : no arthralgias [Limb Swelling] : no limb swelling [Itching] : no itching [Dizziness] : no dizziness [Sleep Disturbances] : no sleep disturbances [Easy Bleeding] : no tendency for easy bleeding [FreeTextEntry2] : decreased appetite

## 2023-03-17 ENCOUNTER — INPATIENT (INPATIENT)
Facility: HOSPITAL | Age: 68
LOS: 6 days | Discharge: HOME | DRG: 683 | End: 2023-03-24
Attending: INTERNAL MEDICINE | Admitting: INTERNAL MEDICINE
Payer: MEDICARE

## 2023-03-17 VITALS
SYSTOLIC BLOOD PRESSURE: 146 MMHG | OXYGEN SATURATION: 100 % | WEIGHT: 149.03 LBS | RESPIRATION RATE: 20 BRPM | TEMPERATURE: 98 F | DIASTOLIC BLOOD PRESSURE: 67 MMHG | HEART RATE: 78 BPM | HEIGHT: 64 IN

## 2023-03-17 DIAGNOSIS — Z90.89 ACQUIRED ABSENCE OF OTHER ORGANS: Chronic | ICD-10-CM

## 2023-03-17 DIAGNOSIS — N17.9 ACUTE KIDNEY FAILURE, UNSPECIFIED: ICD-10-CM

## 2023-03-17 DIAGNOSIS — N18.5 CHRONIC KIDNEY DISEASE, STAGE 5: ICD-10-CM

## 2023-03-17 DIAGNOSIS — N18.6 END STAGE RENAL DISEASE: ICD-10-CM

## 2023-03-17 DIAGNOSIS — D64.9 ANEMIA, UNSPECIFIED: ICD-10-CM

## 2023-03-17 DIAGNOSIS — I10 ESSENTIAL (PRIMARY) HYPERTENSION: ICD-10-CM

## 2023-03-17 DIAGNOSIS — I77.0 ARTERIOVENOUS FISTULA, ACQUIRED: Chronic | ICD-10-CM

## 2023-03-17 DIAGNOSIS — Z29.9 ENCOUNTER FOR PROPHYLACTIC MEASURES, UNSPECIFIED: ICD-10-CM

## 2023-03-17 LAB
ALBUMIN SERPL ELPH-MCNC: 3.5 G/DL
ALBUMIN SERPL ELPH-MCNC: 3.7 G/DL — SIGNIFICANT CHANGE UP (ref 3.3–5)
ALP SERPL-CCNC: 70 U/L — SIGNIFICANT CHANGE UP (ref 40–120)
ALT FLD-CCNC: 13 U/L — SIGNIFICANT CHANGE UP (ref 10–45)
ANION GAP SERPL CALC-SCNC: 18 MMOL/L — HIGH (ref 5–17)
ANION GAP SERPL CALC-SCNC: 20 MMOL/L
APPEARANCE UR: ABNORMAL
APTT BLD: 29.4 SEC — SIGNIFICANT CHANGE UP (ref 27.5–35.5)
AST SERPL-CCNC: 10 U/L — SIGNIFICANT CHANGE UP (ref 10–40)
BACTERIA # UR AUTO: ABNORMAL
BASOPHILS # BLD AUTO: 0.04 K/UL
BASOPHILS # BLD AUTO: 0.05 K/UL — SIGNIFICANT CHANGE UP (ref 0–0.2)
BASOPHILS NFR BLD AUTO: 0.6 %
BASOPHILS NFR BLD AUTO: 0.7 % — SIGNIFICANT CHANGE UP (ref 0–2)
BILIRUB SERPL-MCNC: 0.2 MG/DL — SIGNIFICANT CHANGE UP (ref 0.2–1.2)
BILIRUB UR-MCNC: NEGATIVE — SIGNIFICANT CHANGE UP
BLD GP AB SCN SERPL QL: NEGATIVE — SIGNIFICANT CHANGE UP
BUN SERPL-MCNC: 107 MG/DL — HIGH (ref 7–23)
BUN SERPL-MCNC: 119 MG/DL
CALCIUM SERPL-MCNC: 8.6 MG/DL
CALCIUM SERPL-MCNC: 8.6 MG/DL
CALCIUM SERPL-MCNC: 9.1 MG/DL — SIGNIFICANT CHANGE UP (ref 8.4–10.5)
CHLORIDE SERPL-SCNC: 105 MMOL/L
CHLORIDE SERPL-SCNC: 106 MMOL/L — SIGNIFICANT CHANGE UP (ref 96–108)
CO2 SERPL-SCNC: 17 MMOL/L
CO2 SERPL-SCNC: 19 MMOL/L — LOW (ref 22–31)
COLOR SPEC: SIGNIFICANT CHANGE UP
CREAT SERPL-MCNC: 6.2 MG/DL — HIGH (ref 0.5–1.3)
CREAT SERPL-MCNC: 6.59 MG/DL
DIFF PNL FLD: ABNORMAL
EGFR: 6 ML/MIN/1.73M2
EGFR: 7 ML/MIN/1.73M2 — LOW
EOSINOPHIL # BLD AUTO: 0.24 K/UL
EOSINOPHIL # BLD AUTO: 0.24 K/UL — SIGNIFICANT CHANGE UP (ref 0–0.5)
EOSINOPHIL NFR BLD AUTO: 3.3 %
EOSINOPHIL NFR BLD AUTO: 3.4 % — SIGNIFICANT CHANGE UP (ref 0–6)
EPI CELLS # UR: 1 /HPF — SIGNIFICANT CHANGE UP
FLUAV AG NPH QL: SIGNIFICANT CHANGE UP
FLUBV AG NPH QL: SIGNIFICANT CHANGE UP
GLUCOSE SERPL-MCNC: 123 MG/DL — HIGH (ref 70–99)
GLUCOSE SERPL-MCNC: 170 MG/DL
GLUCOSE UR QL: NEGATIVE — SIGNIFICANT CHANGE UP
HBV SURFACE AG SER-ACNC: SIGNIFICANT CHANGE UP
HCT VFR BLD CALC: 24.8 % — LOW (ref 34.5–45)
HCT VFR BLD CALC: 25.3 %
HGB BLD-MCNC: 7.9 G/DL
HGB BLD-MCNC: 8 G/DL — LOW (ref 11.5–15.5)
HYALINE CASTS # UR AUTO: 1 /LPF — SIGNIFICANT CHANGE UP (ref 0–2)
IMM GRANULOCYTES NFR BLD AUTO: 0.6 % — SIGNIFICANT CHANGE UP (ref 0–0.9)
IMM GRANULOCYTES NFR BLD AUTO: 1.1 %
INR BLD: 1.46 RATIO — HIGH (ref 0.88–1.16)
KETONES UR-MCNC: NEGATIVE — SIGNIFICANT CHANGE UP
LEUKOCYTE ESTERASE UR-ACNC: ABNORMAL
LYMPHOCYTES # BLD AUTO: 0.78 K/UL
LYMPHOCYTES # BLD AUTO: 0.87 K/UL — LOW (ref 1–3.3)
LYMPHOCYTES # BLD AUTO: 12.5 % — LOW (ref 13–44)
LYMPHOCYTES NFR BLD AUTO: 10.9 %
MAGNESIUM SERPL-MCNC: 1.7 MG/DL — SIGNIFICANT CHANGE UP (ref 1.6–2.6)
MAN DIFF?: NORMAL
MCHC RBC-ENTMCNC: 30.2 PG
MCHC RBC-ENTMCNC: 30.4 PG — SIGNIFICANT CHANGE UP (ref 27–34)
MCHC RBC-ENTMCNC: 31.2 GM/DL
MCHC RBC-ENTMCNC: 32.3 GM/DL — SIGNIFICANT CHANGE UP (ref 32–36)
MCV RBC AUTO: 94.3 FL — SIGNIFICANT CHANGE UP (ref 80–100)
MCV RBC AUTO: 96.6 FL
MONOCYTES # BLD AUTO: 0.49 K/UL — SIGNIFICANT CHANGE UP (ref 0–0.9)
MONOCYTES # BLD AUTO: 0.56 K/UL
MONOCYTES NFR BLD AUTO: 7 % — SIGNIFICANT CHANGE UP (ref 2–14)
MONOCYTES NFR BLD AUTO: 7.8 %
NEUTROPHILS # BLD AUTO: 5.27 K/UL — SIGNIFICANT CHANGE UP (ref 1.8–7.4)
NEUTROPHILS # BLD AUTO: 5.47 K/UL
NEUTROPHILS NFR BLD AUTO: 75.8 % — SIGNIFICANT CHANGE UP (ref 43–77)
NEUTROPHILS NFR BLD AUTO: 76.3 %
NITRITE UR-MCNC: NEGATIVE — SIGNIFICANT CHANGE UP
NRBC # BLD: 0 /100 WBCS — SIGNIFICANT CHANGE UP (ref 0–0)
PARATHYROID HORMONE INTACT: 580 PG/ML
PH UR: 6 — SIGNIFICANT CHANGE UP (ref 5–8)
PHOSPHATE SERPL-MCNC: 5.1 MG/DL
PHOSPHATE SERPL-MCNC: 5.3 MG/DL — HIGH (ref 2.5–4.5)
PLATELET # BLD AUTO: 249 K/UL
PLATELET # BLD AUTO: 265 K/UL — SIGNIFICANT CHANGE UP (ref 150–400)
POTASSIUM SERPL-MCNC: 5 MMOL/L — SIGNIFICANT CHANGE UP (ref 3.5–5.3)
POTASSIUM SERPL-SCNC: 4.8 MMOL/L
POTASSIUM SERPL-SCNC: 5 MMOL/L — SIGNIFICANT CHANGE UP (ref 3.5–5.3)
PROT SERPL-MCNC: 7.4 G/DL — SIGNIFICANT CHANGE UP (ref 6–8.3)
PROT UR-MCNC: ABNORMAL
PROTHROM AB SERPL-ACNC: 17 SEC — HIGH (ref 10.5–13.4)
RBC # BLD: 2.62 M/UL
RBC # BLD: 2.63 M/UL — LOW (ref 3.8–5.2)
RBC # FLD: 12.1 %
RBC # FLD: 12.1 % — SIGNIFICANT CHANGE UP (ref 10.3–14.5)
RBC CASTS # UR COMP ASSIST: 3 /HPF — SIGNIFICANT CHANGE UP (ref 0–4)
RH IG SCN BLD-IMP: NEGATIVE — SIGNIFICANT CHANGE UP
RSV RNA NPH QL NAA+NON-PROBE: SIGNIFICANT CHANGE UP
SARS-COV-2 RNA SPEC QL NAA+PROBE: SIGNIFICANT CHANGE UP
SARS-COV-2 RNA SPEC QL NAA+PROBE: SIGNIFICANT CHANGE UP
SODIUM SERPL-SCNC: 143 MMOL/L
SODIUM SERPL-SCNC: 143 MMOL/L — SIGNIFICANT CHANGE UP (ref 135–145)
SP GR SPEC: 1.01 — SIGNIFICANT CHANGE UP (ref 1.01–1.02)
UROBILINOGEN FLD QL: NEGATIVE — SIGNIFICANT CHANGE UP
WBC # BLD: 6.96 K/UL — SIGNIFICANT CHANGE UP (ref 3.8–10.5)
WBC # FLD AUTO: 6.96 K/UL — SIGNIFICANT CHANGE UP (ref 3.8–10.5)
WBC # FLD AUTO: 7.17 K/UL
WBC UR QL: 134 /HPF — HIGH (ref 0–5)

## 2023-03-17 PROCEDURE — 99285 EMERGENCY DEPT VISIT HI MDM: CPT

## 2023-03-17 PROCEDURE — 99222 1ST HOSP IP/OBS MODERATE 55: CPT

## 2023-03-17 PROCEDURE — 99222 1ST HOSP IP/OBS MODERATE 55: CPT | Mod: GC

## 2023-03-17 RX ORDER — LANOLIN ALCOHOL/MO/W.PET/CERES
3 CREAM (GRAM) TOPICAL AT BEDTIME
Refills: 0 | Status: DISCONTINUED | OUTPATIENT
Start: 2023-03-17 | End: 2023-03-24

## 2023-03-17 RX ORDER — ACETAMINOPHEN 500 MG
650 TABLET ORAL EVERY 6 HOURS
Refills: 0 | Status: DISCONTINUED | OUTPATIENT
Start: 2023-03-17 | End: 2023-03-24

## 2023-03-17 RX ORDER — CALCITRIOL 0.5 UG/1
0.25 CAPSULE ORAL DAILY
Refills: 0 | Status: DISCONTINUED | OUTPATIENT
Start: 2023-03-17 | End: 2023-03-24

## 2023-03-17 NOTE — CONSULT NOTE ADULT - PROBLEM SELECTOR RECOMMENDATION 2
Pt. with anemia in setting of her advanced renal disease, notable for acute drop to 8.0. Please check iron studies. If iron replete then will consider starting on DANI with HD. Monitor Hb.     If any questions, please feel free to contact me     Geovani Keenan  Nephrology Fellow  [facility] Pager: [phone]

## 2023-03-17 NOTE — H&P ADULT - HISTORY OF PRESENT ILLNESS
KRISSY Gutierrez informed son ( Ronnie Hernandez), HCP 67F with history of Alport Syndrome, fistula placed  Brenda, NP 67F with history of Alport Syndrome, CKD V with LUE fistula already created in  11/2017 sent in by her nephrologist for concerns of worsening uremic symptoms and sent in for initiation of dialysis. She describes that she has been very tired in the last few days, also has had diarrhea for several weeks and feels a loss of appetite. She also describes feeling "cold all the time".  ROS is otherwise negative.

## 2023-03-17 NOTE — CONSULT NOTE ADULT - PROBLEM SELECTOR RECOMMENDATION 9
Pt. with advanced kidney disease in setting of Alport Syndrome sent in to the hospital by her outpatient nephrologist Dr. Jet Horn for worsening kidney function. On review of [facility] pt noted to have a SCr of 5.44 on 1/19/2023 and on outpatient labs worsened to 6.59. Pt with LUE AVF created on 11/10/2017; reports she was evaluated by vascular surgery about 2 weeks ago and told it was okay for use. Pt currently with uremic symptoms including fatigue, decreased appetite, and weight loss as well. Plan will be to initiate long term HD via her AVF today. HD consent obtained and placed in the chart. Monitor labs and urine output. Avoid nephrotoxins. Dose medications as per eGFR.

## 2023-03-17 NOTE — H&P ADULT - PROBLEM SELECTOR PLAN 1
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

## 2023-03-17 NOTE — CONSULT NOTE ADULT - ATTENDING COMMENTS
I have seen this patient with the fellow and agree with their assessment and plan. I was physically present for significant portions of the evaluation and management (E/M) service provided.  I agree with the above history, physical, and plan which I have reviewed and edited where appropriate.    CKDV now progressing to ESRD  Needs to start dialysis today as uremic symptoms  Start 2 hours today using AVF and then 2.5 hours tomorrow  Full session then on Monday  Plan for outpatient placement- please ask  to assist family. [facility] vs [facility] unit may be where we can consider  d/w with  at bedside    Tom Richard MD  Pager   Office     Contact me directly via Microsoft Teams     (After 5 pm or on weekends please page the on-call fellow/attending, can check [email or web address] for schedule. Login is alida domínguez, schedule under [facility] medicine, psych, derm)    For weekend coverage, call Dr Bernadette Pierre( fellow) or Dr Carolyn Lira( attending) I have seen this patient with the fellow and agree with their assessment and plan. I was physically present for significant portions of the evaluation and management (E/M) service provided.  I agree with the above history, physical, and plan which I have reviewed and edited where appropriate.    CKDV now progressing to ESRD  Needs to start dialysis today as uremic symptoms  Start 2 hours today using AVF and then 2.5 hours tomorrow  Full session then on Monday  Plan for outpatient placement- please ask  to assist family. [facility] vs [facility] unit may be where we can consider  d/w with  at bedside    For her anemia- would benefit likely from one unit of PRBCS with HD today   Then we can start DANI as well    Tom Richard MD  Pager   Office     Contact me directly via Microsoft Teams     (After 5 pm or on weekends please page the on-call fellow/attending, can check [email or web address] for schedule. Login is alida domínguez, schedule under [facility] medicine, psych, derm)    For weekend coverage, call Dr Bernadette Pierre( fellow) or Dr Carolyn Lira( attending)

## 2023-03-17 NOTE — H&P ADULT - NSICDXFAMILYHX_GEN_ALL_CORE_FT
FAMILY HISTORY:  Child  Still living? Unknown  FH: kidney disease, Age at diagnosis: Age Unknown

## 2023-03-17 NOTE — ED PROVIDER NOTE - PROGRESS NOTE DETAILS
Patient with reassuring exam at this time. Discussed care with nephrology who recommend admission for dialysis  Saad Landeros DO  PGY6 Pediatric Emergency Fellow

## 2023-03-17 NOTE — H&P ADULT - PROBLEM SELECTOR PLAN 3
DVT: IMPROVE 1, no pharmacological ppx indicated  Diet: renal  Dispo: home, needs dialysis established prior to discharge

## 2023-03-17 NOTE — H&P ADULT - NSHPLABSRESULTS_GEN_ALL_CORE
8.0    6.96  )-----------( 265      ( 17 Mar 2023 12:39 )             24.8         143  |  106  |  107<H>  ----------------------------<  123<H>  5.0   |  19<L>  |  6.20<H>    Ca    9.1      17 Mar 2023 12:39  Phos  5.3       Mg     1.7         TPro  7.4  /  Alb  3.7  /  TBili  0.2  /  DBili  x   /  AST  10  /  ALT  13  /  AlkPhos  70      PT/INR - ( 17 Mar 2023 12:39 )   PT: 17.0 sec;   INR: 1.46 ratio         PTT - ( 17 Mar 2023 12:39 )  PTT:29.4 sec        Urinalysis Basic - ( 17 Mar 2023 14:53 )    Color: Light Yellow / Appearance: Slightly Turbid / S.012 / pH: x  Gluc: x / Ketone: Negative  / Bili: Negative / Urobili: Negative   Blood: x / Protein: 100 mg/dl / Nitrite: Negative   Leuk Esterase: Large / RBC: 3 /hpf /  /HPF   Sq Epi: x / Non Sq Epi: 1 /hpf / Bacteria: Few          CAPILLARY BLOOD GLUCOSE        EKG personally interpreted NSR, no ischemic changes

## 2023-03-17 NOTE — ED PROVIDER NOTE - CARE PLAN
Principal Discharge DX:	Acute on chronic renal failure  Secondary Diagnosis:	ESRD needing dialysis   1

## 2023-03-17 NOTE — H&P ADULT - NSHPREVIEWOFSYSTEMS_GEN_ALL_CORE
REVIEW OF SYSTEMS:  CONSTITUTIONAL: No fever, chills  EYES: No eye pain, visual disturbances, or discharge  ENMT:  No difficulty hearing. No sinus or throat pain  NECK: No pain or stiffness  RESPIRATORY: No cough, wheezing. No shortness of breath  CARDIOVASCULAR: No chest pain, palpitations, dizziness, or leg swelling  GASTROINTESTINAL: +diarrhea. No abdominal or epigastric pain. No nausea, vomiting, or hematemesis;   GENITOURINARY: No dysuria, frequency  NEUROLOGICAL: No headaches, loss of strength  SKIN: No itching, burning, rashes, or lesions   LYMPH NODES: No enlarged glands  ENDOCRINE: No heat or cold intolerance; No hair loss  MUSCULOSKELETAL: No joint pain or swelling; No muscle, back, or extremity pain  HEME/LYMPH: No easy bruising, or bleeding gums  ALLERGY AND IMMUNOLOGIC: No hives or eczema

## 2023-03-17 NOTE — ED PROVIDER NOTE - ATTENDING CONTRIBUTION TO CARE
I performed a history and physical exam of the patient and discussed their management with the fellow. I reviewed the fellow's note and agree with the documented findings and plan of care.  Sarita Leslie MD

## 2023-03-17 NOTE — PATIENT PROFILE ADULT - FLU SEASON?
SUBJECTIVE:  Thomas is a 37 year old male who is seen establish care. He needs refills of his medications. He has no complaints    ROS:  In addition to that noted above, review of systems is remarkable for:  As above    PAST MEDICAL HISTORY INCLUDES:  Patient Active Problem List   Diagnosis   • Esophageal reflux   • Other atopic dermatitis and related conditions   • Irritable bowel syndrome   • Extrinsic asthma, unspecified   • Impotence of organic origin   • Strain of shoulder, right       MEDICATIONS reviewed and updated in the electronic health record.    ALLERGIES reviewed and updated in the electronic health record.    OBJECTIVE:  Visit Vitals   • /70 (BP Location: [facility], Patient Position: Sitting, Cuff Size: Large Adult)   • Pulse 84   • Resp 20   • Ht 6' 3\" (1.905 m)   • Wt 132.5 kg  Comment: 292 lb   • SpO2 99%   • BMI 36.5 kg/m2     General: Appears healthy, alert, in no acute distress.  Skin: Warm and dry, no rash noted  HEENT: Tympanic membranes are gray and intact bilaterally, nasal mucosa normal, oral pharynx benign  Lungs: Clear to auscultation throughout, with a normal respiratory effort.  Heart: Regular rate and rhythm, no murmurs or peripheral edema.  Abdomen: Soft, nontender, positive bowel sounds.  Back: No CVA tenderness.  Extremities: No peripheral edema.     ASSESSMENT/ PLAN:  1. Other atopic dermatitis    2. Extrinsic asthma, mild persistent, uncomplicated    3. Other male erectile dysfunction    4. Gastroesophageal reflux disease without esophagitis        Orders Placed This Encounter   • DISCONTD: albuterol (PROAIR HFA) 108 (90 Base) MCG/ACT inhaler   • clobetasol (TEMOVATE) 0.05 % ointment   • DISCONTD: albuterol (PROAIR HFA) 108 (90 Base) MCG/ACT inhaler   • sildenafil (VIAGRA) 100 MG tablet   • DISCONTD: albuterol (PROAIR HFA) 108 (90 Base) MCG/ACT inhaler   • DISCONTD: beclomethasone diprop (QVAR) 80 MCG/ACT inhaler   • DISCONTD: esomeprazole (NEXIUM) 40 MG capsule   • albuterol  (PROAIR HFA) 108 (90 Base) MCG/ACT inhaler   • beclomethasone diprop (QVAR) 80 MCG/ACT inhaler   • esomeprazole (NEXIUM) 40 MG capsule       All questions have been answered and any specific instructions have been outlined on the AVS provided to patient.    Return if symptoms worsen or fail to improve.    Braeden Woodard MD       Yes...

## 2023-03-17 NOTE — ED PROVIDER NOTE - CLINICAL SUMMARY MEDICAL DECISION MAKING FREE TEXT BOX
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  Patient reports she went to her nephrologist 2 days ago where they did routine blood work and was called to come in today due to worsening creatinine and necessity to start dialysis.  Patient reports weakness in the past week as well and reports that she had a low hemoglobin level yesterday additionally.  She denies any headache, nausea, vomiting, chest pain, abdominal pain.  She reports she had a left AV fistula placed in her arm 2 to 3 years ago however has never needed to use it.  She denies any history of dialysis in the past.  Patient makes her own urine. patient is her for acute on chronic renal failure will require admission for dialysis. initial EKG, no concern for hyperkalemia, PLan: labs, EKG, admit for dialysis.

## 2023-03-17 NOTE — ED ADULT NURSE NOTE - OBJECTIVE STATEMENT
Pt referred to ER by PCP due to Low HGB? and high creatinine levels. Pt has hx of ESRD but her creatinine was stable for last year but last labs came more abnormal then usually. Pt is AXOX4, ambulatory. Pt also reports extreme generalized weakness which she was reporting to her PCP but was never addressed. Pt has AV fistula to her L forearm - thrill and bruit present.

## 2023-03-17 NOTE — ED ADULT TRIAGE NOTE - CHIEF COMPLAINT QUOTE
Sent by MD due to abnormal lab work and to start dialysis; Reports elevated creatinine and low hemoglobin. [facility] ESRD.

## 2023-03-17 NOTE — ED PROVIDER NOTE - OBJECTIVE STATEMENT
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  Patient reports she went to her nephrologist 2 days ago where they did routine blood work and was called to come in today due to worsening creatinine and necessity to start dialysis.  Patient reports weakness in the past week as well and reports that she had a low hemoglobin level yesterday additionally.  She denies any headache, nausea, vomiting, chest pain, abdominal pain.  She reports she had a left AV fistula placed in her arm 2 to 3 years ago however has never needed to use it.  She denies any history of dialysis in the past.  Patient makes her own urine.

## 2023-03-17 NOTE — ED ADULT NURSE REASSESSMENT NOTE - NS ED NURSE REASSESS COMMENT FT1
Pt returned from dialysis, able to complete full session without complications. Pt is A&OX4, speaking coherently, following commands, sensory/motor function intact, VSS, NAD noted. Pt denies chest pain, SOB, abdominal pain, n/v/d, dizziness/lightheadedness at this time. Plan of care discussed with pt and verbalizes understanding. Safety and comfort measures maintained.

## 2023-03-17 NOTE — ED ADULT NURSE NOTE - CHIEF COMPLAINT QUOTE
Sent by MD due to abnormal lab work and to start dialysis; Reports elevated creatinine and low hemoglobin. [facility] ESRD.

## 2023-03-17 NOTE — H&P ADULT - NSHPPHYSICALEXAM_GEN_ALL_CORE
Vital Signs Last 24 Hrs  T(C): 36.7 (17 Mar 2023 15:45), Max: 36.8 (17 Mar 2023 11:22)  T(F): 98 (17 Mar 2023 15:45), Max: 98.2 (17 Mar 2023 11:22)  HR: 78 (17 Mar 2023 15:45) (78 - 86)  BP: 111/64 (17 Mar 2023 15:45) (111/64 - 146/67)  RR: 16 (17 Mar 2023 15:45) (16 - 20)  SpO2: 100% (17 Mar 2023 15:45) (100% - 100%)    Parameters below as of 17 Mar 2023 15:45  Patient On (Oxygen Delivery Method): room air    GENERAL: NAD, comfortable appearing  HEAD:  Atraumatic, Normocephalic  NECK: Supple, No JVD  CHEST/LUNG: Clear to auscultation bilaterally; No wheeze  HEART: Regular rate and rhythm; No murmurs, rubs, or gallops  ABDOMEN: Soft, Nontender, Nondistended; Bowel sounds present  EXTREMITIES:  2+ Peripheral Pulses, No clubbing, cyanosis, or edema  PSYCH: AAOx3, appropriate affect  NEUROLOGY: non-focal, wright  SKIN: No rashes or lesions

## 2023-03-18 DIAGNOSIS — N25.0 RENAL OSTEODYSTROPHY: ICD-10-CM

## 2023-03-18 DIAGNOSIS — D64.9 ANEMIA, UNSPECIFIED: ICD-10-CM

## 2023-03-18 DIAGNOSIS — N18.6 END STAGE RENAL DISEASE: ICD-10-CM

## 2023-03-18 LAB
ANION GAP SERPL CALC-SCNC: 15 MMOL/L — SIGNIFICANT CHANGE UP (ref 5–17)
BUN SERPL-MCNC: 72 MG/DL — HIGH (ref 7–23)
CALCIUM SERPL-MCNC: 8.7 MG/DL — SIGNIFICANT CHANGE UP (ref 8.4–10.5)
CHLORIDE SERPL-SCNC: 101 MMOL/L — SIGNIFICANT CHANGE UP (ref 96–108)
CO2 SERPL-SCNC: 25 MMOL/L — SIGNIFICANT CHANGE UP (ref 22–31)
CREAT SERPL-MCNC: 4.68 MG/DL — HIGH (ref 0.5–1.3)
EGFR: 10 ML/MIN/1.73M2 — LOW
GLUCOSE SERPL-MCNC: 112 MG/DL — HIGH (ref 70–99)
HBV CORE AB SER-ACNC: SIGNIFICANT CHANGE UP
HBV CORE IGM SER-ACNC: SIGNIFICANT CHANGE UP
HBV SURFACE AB SER-ACNC: <3 MIU/ML — LOW
HCT VFR BLD CALC: 24.1 % — LOW (ref 34.5–45)
HCV AB S/CO SERPL IA: 0.13 S/CO — SIGNIFICANT CHANGE UP (ref 0–0.99)
HCV AB SERPL-IMP: SIGNIFICANT CHANGE UP
HGB BLD-MCNC: 7.6 G/DL — LOW (ref 11.5–15.5)
IRON SATN MFR SERPL: 21 % — SIGNIFICANT CHANGE UP (ref 14–50)
IRON SATN MFR SERPL: 39 UG/DL — SIGNIFICANT CHANGE UP (ref 30–160)
MAGNESIUM SERPL-MCNC: 1.7 MG/DL — SIGNIFICANT CHANGE UP (ref 1.6–2.6)
MCHC RBC-ENTMCNC: 29.8 PG — SIGNIFICANT CHANGE UP (ref 27–34)
MCHC RBC-ENTMCNC: 31.5 GM/DL — LOW (ref 32–36)
MCV RBC AUTO: 94.5 FL — SIGNIFICANT CHANGE UP (ref 80–100)
NRBC # BLD: 0 /100 WBCS — SIGNIFICANT CHANGE UP (ref 0–0)
PHOSPHATE SERPL-MCNC: 5 MG/DL — HIGH (ref 2.5–4.5)
PLATELET # BLD AUTO: 226 K/UL — SIGNIFICANT CHANGE UP (ref 150–400)
POTASSIUM SERPL-MCNC: 4.5 MMOL/L — SIGNIFICANT CHANGE UP (ref 3.5–5.3)
POTASSIUM SERPL-SCNC: 4.5 MMOL/L — SIGNIFICANT CHANGE UP (ref 3.5–5.3)
RBC # BLD: 2.55 M/UL — LOW (ref 3.8–5.2)
RBC # FLD: 11.9 % — SIGNIFICANT CHANGE UP (ref 10.3–14.5)
SODIUM SERPL-SCNC: 141 MMOL/L — SIGNIFICANT CHANGE UP (ref 135–145)
TIBC SERPL-MCNC: 186 UG/DL — LOW (ref 220–430)
UIBC SERPL-MCNC: 147 UG/DL — SIGNIFICANT CHANGE UP (ref 110–370)
WBC # BLD: 5.83 K/UL — SIGNIFICANT CHANGE UP (ref 3.8–10.5)
WBC # FLD AUTO: 5.83 K/UL — SIGNIFICANT CHANGE UP (ref 3.8–10.5)

## 2023-03-18 PROCEDURE — 99233 SBSQ HOSP IP/OBS HIGH 50: CPT | Mod: GC

## 2023-03-18 RX ORDER — ERYTHROPOIETIN 10000 [IU]/ML
4000 INJECTION, SOLUTION INTRAVENOUS; SUBCUTANEOUS ONCE
Refills: 0 | Status: COMPLETED | OUTPATIENT
Start: 2023-03-18 | End: 2023-03-18

## 2023-03-18 RX ADMIN — CALCITRIOL 0.25 MICROGRAM(S): 0.5 CAPSULE ORAL at 12:29

## 2023-03-18 RX ADMIN — Medication 650 MILLIGRAM(S): at 21:20

## 2023-03-18 RX ADMIN — Medication 650 MILLIGRAM(S): at 01:14

## 2023-03-18 RX ADMIN — Medication 650 MILLIGRAM(S): at 20:22

## 2023-03-18 RX ADMIN — Medication 650 MILLIGRAM(S): at 00:44

## 2023-03-18 RX ADMIN — ERYTHROPOIETIN 4000 UNIT(S): 10000 INJECTION, SOLUTION INTRAVENOUS; SUBCUTANEOUS at 18:33

## 2023-03-18 NOTE — CONSULT NOTE ADULT - TIME BILLING
- Review of records, telemetry, vital signs and daily labs.   - General and cardiovascular physical examination.  - Generation of cardiovascular treatment plan.  - Coordination of care.      Patient was seen and examined by me on 3/18/23,interim events noted,labs and radiology studies reviewed.  Colby Escobedo MD,FACC.  [address] [place], [place] [id number].  [phone]

## 2023-03-19 LAB
ANION GAP SERPL CALC-SCNC: 16 MMOL/L — SIGNIFICANT CHANGE UP (ref 5–17)
BUN SERPL-MCNC: 38 MG/DL — HIGH (ref 7–23)
CALCIUM SERPL-MCNC: 8.8 MG/DL — SIGNIFICANT CHANGE UP (ref 8.4–10.5)
CHLORIDE SERPL-SCNC: 97 MMOL/L — SIGNIFICANT CHANGE UP (ref 96–108)
CO2 SERPL-SCNC: 24 MMOL/L — SIGNIFICANT CHANGE UP (ref 22–31)
CREAT SERPL-MCNC: 3.66 MG/DL — HIGH (ref 0.5–1.3)
EGFR: 13 ML/MIN/1.73M2 — LOW
GLUCOSE SERPL-MCNC: 118 MG/DL — HIGH (ref 70–99)
HCT VFR BLD CALC: 23.8 % — LOW (ref 34.5–45)
HGB BLD-MCNC: 7.6 G/DL — LOW (ref 11.5–15.5)
MAGNESIUM SERPL-MCNC: 1.8 MG/DL — SIGNIFICANT CHANGE UP (ref 1.6–2.6)
MCHC RBC-ENTMCNC: 30 PG — SIGNIFICANT CHANGE UP (ref 27–34)
MCHC RBC-ENTMCNC: 31.9 GM/DL — LOW (ref 32–36)
MCV RBC AUTO: 94.1 FL — SIGNIFICANT CHANGE UP (ref 80–100)
NRBC # BLD: 0 /100 WBCS — SIGNIFICANT CHANGE UP (ref 0–0)
PHOSPHATE SERPL-MCNC: 4.2 MG/DL — SIGNIFICANT CHANGE UP (ref 2.5–4.5)
PLATELET # BLD AUTO: 225 K/UL — SIGNIFICANT CHANGE UP (ref 150–400)
POTASSIUM SERPL-MCNC: 4 MMOL/L — SIGNIFICANT CHANGE UP (ref 3.5–5.3)
POTASSIUM SERPL-SCNC: 4 MMOL/L — SIGNIFICANT CHANGE UP (ref 3.5–5.3)
RBC # BLD: 2.53 M/UL — LOW (ref 3.8–5.2)
RBC # FLD: 12 % — SIGNIFICANT CHANGE UP (ref 10.3–14.5)
SODIUM SERPL-SCNC: 137 MMOL/L — SIGNIFICANT CHANGE UP (ref 135–145)
WBC # BLD: 6.39 K/UL — SIGNIFICANT CHANGE UP (ref 3.8–10.5)
WBC # FLD AUTO: 6.39 K/UL — SIGNIFICANT CHANGE UP (ref 3.8–10.5)

## 2023-03-19 PROCEDURE — 70450 CT HEAD/BRAIN W/O DYE: CPT | Mod: 26

## 2023-03-19 RX ORDER — LIDOCAINE AND PRILOCAINE CREAM 25; 25 MG/G; MG/G
1 CREAM TOPICAL DAILY
Refills: 0 | Status: DISCONTINUED | OUTPATIENT
Start: 2023-03-19 | End: 2023-03-24

## 2023-03-19 RX ADMIN — CALCITRIOL 0.25 MICROGRAM(S): 0.5 CAPSULE ORAL at 12:14

## 2023-03-20 ENCOUNTER — APPOINTMENT (OUTPATIENT)
Dept: CARDIOLOGY | Facility: CLINIC | Age: 68
End: 2023-03-20

## 2023-03-20 LAB
ANION GAP SERPL CALC-SCNC: 17 MMOL/L — SIGNIFICANT CHANGE UP (ref 5–17)
BUN SERPL-MCNC: 56 MG/DL — HIGH (ref 7–23)
CALCIUM SERPL-MCNC: 8.9 MG/DL — SIGNIFICANT CHANGE UP (ref 8.4–10.5)
CHLORIDE SERPL-SCNC: 99 MMOL/L — SIGNIFICANT CHANGE UP (ref 96–108)
CO2 SERPL-SCNC: 23 MMOL/L — SIGNIFICANT CHANGE UP (ref 22–31)
CREAT SERPL-MCNC: 5.42 MG/DL — HIGH (ref 0.5–1.3)
EGFR: 8 ML/MIN/1.73M2 — LOW
GLUCOSE SERPL-MCNC: 101 MG/DL — HIGH (ref 70–99)
HCT VFR BLD CALC: 24 % — LOW (ref 34.5–45)
HGB BLD-MCNC: 7.7 G/DL — LOW (ref 11.5–15.5)
MAGNESIUM SERPL-MCNC: 1.9 MG/DL — SIGNIFICANT CHANGE UP (ref 1.6–2.6)
MCHC RBC-ENTMCNC: 31 PG — SIGNIFICANT CHANGE UP (ref 27–34)
MCHC RBC-ENTMCNC: 32.1 GM/DL — SIGNIFICANT CHANGE UP (ref 32–36)
MCV RBC AUTO: 96.8 FL — SIGNIFICANT CHANGE UP (ref 80–100)
NRBC # BLD: 0 /100 WBCS — SIGNIFICANT CHANGE UP (ref 0–0)
PHOSPHATE SERPL-MCNC: 5.3 MG/DL — HIGH (ref 2.5–4.5)
PLATELET # BLD AUTO: 205 K/UL — SIGNIFICANT CHANGE UP (ref 150–400)
POTASSIUM SERPL-MCNC: 3.7 MMOL/L — SIGNIFICANT CHANGE UP (ref 3.5–5.3)
POTASSIUM SERPL-SCNC: 3.7 MMOL/L — SIGNIFICANT CHANGE UP (ref 3.5–5.3)
RBC # BLD: 2.48 M/UL — LOW (ref 3.8–5.2)
RBC # FLD: 12.1 % — SIGNIFICANT CHANGE UP (ref 10.3–14.5)
SODIUM SERPL-SCNC: 139 MMOL/L — SIGNIFICANT CHANGE UP (ref 135–145)
WBC # BLD: 6.06 K/UL — SIGNIFICANT CHANGE UP (ref 3.8–10.5)
WBC # FLD AUTO: 6.06 K/UL — SIGNIFICANT CHANGE UP (ref 3.8–10.5)

## 2023-03-20 PROCEDURE — 99232 SBSQ HOSP IP/OBS MODERATE 35: CPT | Mod: GC

## 2023-03-20 PROCEDURE — 71045 X-RAY EXAM CHEST 1 VIEW: CPT | Mod: 26

## 2023-03-20 RX ORDER — ERYTHROPOIETIN 10000 [IU]/ML
4000 INJECTION, SOLUTION INTRAVENOUS; SUBCUTANEOUS ONCE
Refills: 0 | Status: COMPLETED | OUTPATIENT
Start: 2023-03-20 | End: 2023-03-20

## 2023-03-20 RX ADMIN — LIDOCAINE AND PRILOCAINE CREAM 1 APPLICATION(S): 25; 25 CREAM TOPICAL at 16:47

## 2023-03-20 RX ADMIN — ERYTHROPOIETIN 4000 UNIT(S): 10000 INJECTION, SOLUTION INTRAVENOUS; SUBCUTANEOUS at 22:09

## 2023-03-20 RX ADMIN — CALCITRIOL 0.25 MICROGRAM(S): 0.5 CAPSULE ORAL at 12:05

## 2023-03-21 ENCOUNTER — TRANSCRIPTION ENCOUNTER (OUTPATIENT)
Age: 68
End: 2023-03-21

## 2023-03-21 LAB
ANION GAP SERPL CALC-SCNC: 17 MMOL/L — SIGNIFICANT CHANGE UP (ref 5–17)
BUN SERPL-MCNC: 42 MG/DL — HIGH (ref 7–23)
CALCIUM SERPL-MCNC: 9.2 MG/DL — SIGNIFICANT CHANGE UP (ref 8.4–10.5)
CHLORIDE SERPL-SCNC: 95 MMOL/L — LOW (ref 96–108)
CO2 SERPL-SCNC: 27 MMOL/L — SIGNIFICANT CHANGE UP (ref 22–31)
CREAT SERPL-MCNC: 4.35 MG/DL — HIGH (ref 0.5–1.3)
EGFR: 11 ML/MIN/1.73M2 — LOW
GLUCOSE SERPL-MCNC: 122 MG/DL — HIGH (ref 70–99)
HCT VFR BLD CALC: 26.2 % — LOW (ref 34.5–45)
HGB BLD-MCNC: 8.3 G/DL — LOW (ref 11.5–15.5)
MAGNESIUM SERPL-MCNC: 1.8 MG/DL — SIGNIFICANT CHANGE UP (ref 1.6–2.6)
MCHC RBC-ENTMCNC: 30 PG — SIGNIFICANT CHANGE UP (ref 27–34)
MCHC RBC-ENTMCNC: 31.7 GM/DL — LOW (ref 32–36)
MCV RBC AUTO: 94.6 FL — SIGNIFICANT CHANGE UP (ref 80–100)
NRBC # BLD: 0 /100 WBCS — SIGNIFICANT CHANGE UP (ref 0–0)
PHOSPHATE SERPL-MCNC: 4.2 MG/DL — SIGNIFICANT CHANGE UP (ref 2.5–4.5)
PLATELET # BLD AUTO: 240 K/UL — SIGNIFICANT CHANGE UP (ref 150–400)
POTASSIUM SERPL-MCNC: 3.6 MMOL/L — SIGNIFICANT CHANGE UP (ref 3.5–5.3)
POTASSIUM SERPL-SCNC: 3.6 MMOL/L — SIGNIFICANT CHANGE UP (ref 3.5–5.3)
RBC # BLD: 2.77 M/UL — LOW (ref 3.8–5.2)
RBC # FLD: 12.3 % — SIGNIFICANT CHANGE UP (ref 10.3–14.5)
SODIUM SERPL-SCNC: 139 MMOL/L — SIGNIFICANT CHANGE UP (ref 135–145)
WBC # BLD: 7.85 K/UL — SIGNIFICANT CHANGE UP (ref 3.8–10.5)
WBC # FLD AUTO: 7.85 K/UL — SIGNIFICANT CHANGE UP (ref 3.8–10.5)

## 2023-03-21 PROCEDURE — 99232 SBSQ HOSP IP/OBS MODERATE 35: CPT | Mod: GC

## 2023-03-21 RX ORDER — ERYTHROPOIETIN 10000 [IU]/ML
4000 INJECTION, SOLUTION INTRAVENOUS; SUBCUTANEOUS
Refills: 0 | Status: DISCONTINUED | OUTPATIENT
Start: 2023-03-21 | End: 2023-03-24

## 2023-03-21 RX ADMIN — Medication 650 MILLIGRAM(S): at 00:21

## 2023-03-21 RX ADMIN — Medication 650 MILLIGRAM(S): at 00:51

## 2023-03-21 RX ADMIN — CALCITRIOL 0.25 MICROGRAM(S): 0.5 CAPSULE ORAL at 11:01

## 2023-03-21 NOTE — DISCHARGE NOTE NURSING/CASE MANAGEMENT/SOCIAL WORK - NSDCPEFALRISK_GEN_ALL_CORE
For information on Fall & Injury Prevention, visit: [email or web address] OR  [email or web address] OR  https://www.cdc.gov/steadi/patient.html

## 2023-03-21 NOTE — DISCHARGE NOTE NURSING/CASE MANAGEMENT/SOCIAL WORK - PATIENT PORTAL LINK FT
You can access the FollowMyHealth Patient Portal offered by [facility] by registering at the following website: [email or web address]. By joining [employer]’s FollowMyHealth portal, you will also be able to view your health information using other applications (apps) compatible with our system.

## 2023-03-21 NOTE — DISCHARGE NOTE NURSING/CASE MANAGEMENT/SOCIAL WORK - NSDCCRTYPESERV_GEN_ALL_CORE_FT
Arrive @ 09:15 on Friday, 3/24/2023 for your first treatment so the admission paperwork can be completed.  Your Regular Treatment Days are Icivom-Iucqmlwxj-Juoohi @ 6 PM.  Remember to bring 2 current forms of ID (1 government issued photo ID). Current insurance card. Comfortable clothes & blanket. Bring something for you to do while you are there. Bring all medications, supplements, & vitamin bottles. Arrive @ 09:15 on Monday, 3/27/2023 for your first treatment so the admission paperwork can be completed.  Your Regular Treatment Days are Jrpcee-Uprykddwz-Oiighv @ 6 PM.  Remember to bring 2 current forms of ID (1 government issued photo ID). Current insurance card. Comfortable clothes & blanket. Bring something for you to do while you are there. Bring all medications, supplements, & vitamin bottles.

## 2023-03-22 LAB
ANION GAP SERPL CALC-SCNC: 18 MMOL/L — HIGH (ref 5–17)
BUN SERPL-MCNC: 62 MG/DL — HIGH (ref 7–23)
CALCIUM SERPL-MCNC: 9.2 MG/DL — SIGNIFICANT CHANGE UP (ref 8.4–10.5)
CHLORIDE SERPL-SCNC: 95 MMOL/L — LOW (ref 96–108)
CO2 SERPL-SCNC: 25 MMOL/L — SIGNIFICANT CHANGE UP (ref 22–31)
CREAT SERPL-MCNC: 6.01 MG/DL — HIGH (ref 0.5–1.3)
EGFR: 7 ML/MIN/1.73M2 — LOW
GAMMA INTERFERON BACKGROUND BLD IA-ACNC: 0.01 IU/ML — SIGNIFICANT CHANGE UP
GLUCOSE SERPL-MCNC: 107 MG/DL — HIGH (ref 70–99)
HCT VFR BLD CALC: 25.2 % — LOW (ref 34.5–45)
HGB BLD-MCNC: 8 G/DL — LOW (ref 11.5–15.5)
IRON SATN MFR SERPL: 60 UG/DL — SIGNIFICANT CHANGE UP (ref 30–160)
M TB IFN-G BLD-IMP: NEGATIVE — SIGNIFICANT CHANGE UP
M TB IFN-G CD4+ BCKGRND COR BLD-ACNC: 0 IU/ML — SIGNIFICANT CHANGE UP
M TB IFN-G CD4+CD8+ BCKGRND COR BLD-ACNC: 0 IU/ML — SIGNIFICANT CHANGE UP
MAGNESIUM SERPL-MCNC: 1.8 MG/DL — SIGNIFICANT CHANGE UP (ref 1.6–2.6)
MCHC RBC-ENTMCNC: 30 PG — SIGNIFICANT CHANGE UP (ref 27–34)
MCHC RBC-ENTMCNC: 31.7 GM/DL — LOW (ref 32–36)
MCV RBC AUTO: 94.4 FL — SIGNIFICANT CHANGE UP (ref 80–100)
NRBC # BLD: 0 /100 WBCS — SIGNIFICANT CHANGE UP (ref 0–0)
PHOSPHATE SERPL-MCNC: 5.4 MG/DL — HIGH (ref 2.5–4.5)
PLATELET # BLD AUTO: 228 K/UL — SIGNIFICANT CHANGE UP (ref 150–400)
POTASSIUM SERPL-MCNC: 3.6 MMOL/L — SIGNIFICANT CHANGE UP (ref 3.5–5.3)
POTASSIUM SERPL-SCNC: 3.6 MMOL/L — SIGNIFICANT CHANGE UP (ref 3.5–5.3)
QUANT TB PLUS MITOGEN MINUS NIL: 9.58 IU/ML — SIGNIFICANT CHANGE UP
RBC # BLD: 2.67 M/UL — LOW (ref 3.8–5.2)
RBC # FLD: 12.7 % — SIGNIFICANT CHANGE UP (ref 10.3–14.5)
SODIUM SERPL-SCNC: 138 MMOL/L — SIGNIFICANT CHANGE UP (ref 135–145)
WBC # BLD: 8.82 K/UL — SIGNIFICANT CHANGE UP (ref 3.8–10.5)
WBC # FLD AUTO: 8.82 K/UL — SIGNIFICANT CHANGE UP (ref 3.8–10.5)

## 2023-03-22 PROCEDURE — 99232 SBSQ HOSP IP/OBS MODERATE 35: CPT | Mod: GC

## 2023-03-22 PROCEDURE — 93990 DOPPLER FLOW TESTING: CPT | Mod: 26

## 2023-03-22 RX ADMIN — LIDOCAINE AND PRILOCAINE CREAM 1 APPLICATION(S): 25; 25 CREAM TOPICAL at 08:09

## 2023-03-22 RX ADMIN — Medication 650 MILLIGRAM(S): at 10:24

## 2023-03-22 RX ADMIN — ERYTHROPOIETIN 4000 UNIT(S): 10000 INJECTION, SOLUTION INTRAVENOUS; SUBCUTANEOUS at 09:43

## 2023-03-22 RX ADMIN — CALCITRIOL 0.25 MICROGRAM(S): 0.5 CAPSULE ORAL at 11:02

## 2023-03-22 NOTE — CONSULT NOTE ADULT - ASSESSMENT
67F with Alport Syndrome, sent in for initiation of dialysis.      Problem/Plan - 1:  ·  Problem: ESRD with anemia.   ·  Plan: CKD V progressed to ESRD requiring dialysis  - Plan to start dialysis today due to uremic symptoms (diarrhea, lethargy)  - Nephro recommends 1u prbc with HD, cannot give during first dialysis session per nursing  - F/u iron studies.     Problem/Plan - 2:  ·  Problem: HTN (hypertension).   ·  Plan: Non adherent to BP meds at home  - monitor BP and resume her home meds as needed.     Problem/Plan - 3:  ·  Problem: Prophylactic measure.   ·  Plan: DVT: IMPROVE 1, no pharmacological ppx indicated  Diet: renal  Dispo: home, needs dialysis established prior to discharge.      
67F with history of Alport Syndrome, CKD V with LUE radiocephalic AVF created in 11/2017, sent in by her nephrologist for concerns of worsening uremic symptoms and for initiation of dialysis. Patient had the R radiocephalic AVF done by Dr Doan in 2017 but received HD through the AVF first time last Friday 03/17/23, malfunctioning in the AM HD session on Monday 2/22.    Plan  - Please obtain LUE AV fistula Duplex to evaluate AVF  - Rest of care per primary team  - Vascular Surgery will follow based on AVF Duplex results  - Plan to be discussed with Vascular Fellow on call     Vascular Surgery  [phone] 
Pt. with Advanced kidney disease

## 2023-03-22 NOTE — CONSULT NOTE ADULT - SUBJECTIVE AND OBJECTIVE BOX
67F with history of Alport Syndrome, CKD V with LUE radiocephalic AVF created in 11/2017, sent in by her nephrologist for concerns of worsening uremic symptoms and for initiation of dialysis. Patient had the R radiocephalic AVF done by Dr Doan in 2017 but received HD through the AVF first time last Friday 03/17/23.     Per Medicine team AVF was malfunctioning today and Vascular Surgery was consulted to evaluate AVF.    PAST MEDICAL & SURGICAL HISTORY:  Alport&#x27;s syndrome      HTN (hypertension)      Hard of hearing uses hearing aids      Anemia      ESRD with anemia left AV fistula, not in use      S/P tonsillectomy      A-V fistula Nov 2017          Vital Signs Last 24 Hrs  T(C): 36.3 (22 Mar 2023 09:40), Max: 37.1 (21 Mar 2023 13:29)  T(F): 97.3 (22 Mar 2023 09:40), Max: 98.8 (21 Mar 2023 13:29)  HR: 87 (22 Mar 2023 09:40) (86 - 99)  BP: 128/61 (22 Mar 2023 09:40) (103/64 - 142/64)  BP(mean): --  RR: 18 (22 Mar 2023 09:40) (18 - 18)  SpO2: 98% (22 Mar 2023 09:40) (96% - 99%)    Parameters below as of 22 Mar 2023 09:40  Patient On (Oxygen Delivery Method): room air    Physical exam  General: NAD  Cardiac: Regular rate  Respiratory: Nonlabored breathing  Abdominal Exam: soft, nondistended, nontender  Vascular: L radiocephalic AVF with palpable thrill, L radial and ulnar pulses palpable  Neurological: Alert and oriented   Psych: AOX3     LABS:                           8.0    8.82  )-----------( 228      ( 22 Mar 2023 07:00 )             25.2     03-22    138  |  95<L>  |  62<H>  ----------------------------<  107<H>  3.6   |  25  |  6.01<H>    Ca    9.2      22 Mar 2023 07:00  Phos  5.4     03-22  Mg     1.8     03-22          MEDICATIONS  (STANDING):  calcitriol   Capsule 0.25 MICROGram(s) Oral daily  epoetin dexter-epbx (RETACRIT) Injectable 4000 Unit(s) IV Push <User Schedule>    MEDICATIONS  (PRN):  acetaminophen     Tablet .. 650 milliGRAM(s) Oral every 6 hours PRN Temp greater or equal to 38C (100.4F), Mild Pain (1 - 3)  lidocaine/prilocaine Cream 1 Application(s) Topical daily PRN pain  melatonin 3 milliGRAM(s) Oral at bedtime PRN Insomnia    Home Medications:  amLODIPine 10 mg oral tablet: 1 tab(s) orally once a day (17 Mar 2023 16:22)  calcitriol 0.25 mcg oral capsule: 1 cap(s) orally once a day (17 Mar 2023 16:22)  carvedilol 6.25 mg oral tablet: 1 tab(s) orally 2 times a day (17 Mar 2023 16:22)  sodium bicarbonate 650 mg oral tablet: 2 tab(s) orally 3 times a day (17 Mar 2023 16:22)  torsemide 10 mg oral tablet: 1 tab(s) orally once a day (17 Mar 2023 16:22)      
PATIENT SEEN AND EXAMINED ON :- 3/18/23  DATE OF SERVICE:   3/18/23          Interim events noted,Labs ,Radiological studies and Cardiology tests reviewed .    MR#[id number]  PATIENT NAME:-[facility] COURSE: HPI:  67F with history of Alport Syndrome, CKD V with LUE fistula already created in  11/2017 sent in by her nephrologist for concerns of worsening uremic symptoms and sent in for initiation of dialysis. She describes that she has been very tired in the last few days, also has had diarrhea for several weeks and feels a loss of appetite. She also describes feeling "cold all the time".  ROS is otherwise negative.   (17 Mar 2023 16:22)      INTERIM EVENTS:Patient seen at bedside ,interim events noted.      PMH -reviewed admission note, no change since admission  HEART FAILURE: Acute[ ]Chronic[ ] Systolic[ ] Diastolic[ ] Combined Systolic and Diastolic[ ]  CAD[ ] CABG[ ] PCI[ ]  DEVICES[ ] PPM[ ] ICD[ ] ILR[ ]  ATRIAL FIBRILLATION[ ] Paroxysmal[ ] Permanent[ ] CHADS2-[  ]  SOPHIA[ ] CKD1[ ] CKD2[ ] CKD3[ ] CKD4[ ] ESRD[ ]  COPD[ ] HTN[ ]   DM[ ] Type1[ ] Type 2[ ]   CVA[ ] Paresis[ ]    AMBULATION: Assisted[ ] Cane/walker[ ] Independent[ ]    MEDICATIONS  (STANDING):  calcitriol   Capsule 0.25 MICROGram(s) Oral daily    MEDICATIONS  (PRN):  acetaminophen     Tablet .. 650 milliGRAM(s) Oral every 6 hours PRN Temp greater or equal to 38C (100.4F), Mild Pain (1 - 3)  melatonin 3 milliGRAM(s) Oral at bedtime PRN Insomnia            REVIEW OF SYSTEMS:  Constitutional: [ ] fever, [ ]weight loss,  [ ]fatigue [ ]weight gain  Eyes: [ ] visual changes  Respiratory: [ ]shortness of breath;  [ ] cough, [ ]wheezing, [ ]chills, [ ]hemoptysis  Cardiovascular: [ ] chest pain, [ ]palpitations, [ ]dizziness,  [ ]leg swelling[ ]orthopnea[ ]PND  Gastrointestinal: [ ] abdominal pain, [ ]nausea, [ ]vomiting,  [ ]diarrhea [ ]Constipation [ ]Melena  Genitourinary: [ ] dysuria, [ ] hematuria [ ]Harper  Neurologic: [ ] headaches [ ] tremors[ ]weakness [ ]Paralysis Right[ ] Left[ ]  Skin: [ ] itching, [ ]burning, [ ] rashes  Endocrine: [ ] heat or cold intolerance  Musculoskeletal: [ ] joint pain or swelling; [ ] muscle, back, or extremity pain  Psychiatric: [ ] depression, [ ]anxiety, [ ]mood swings, or [ ]difficulty sleeping  Hematologic: [ ] easy bruising, [ ] bleeding gums    [ ] All remaining systems negative except as per above.   [ ]Unable to obtain.  [x] No change in ROS since admission      Vital Signs Last 24 Hrs  T(C): 36 (18 Mar 2023 18:05), Max: 37.1 (18 Mar 2023 04:51)  T(F): 96.8 (18 Mar 2023 18:05), Max: 98.8 (18 Mar 2023 04:51)  HR: 98 (18 Mar 2023 18:05) (82 - 98)  BP: 134/68 (18 Mar 2023 18:05) (107/64 - 134/68)  BP(mean): --  RR: 18 (18 Mar 2023 18:05) (18 - 18)  SpO2: 97% (18 Mar 2023 18:05) (97% - 100%)    Parameters below as of 18 Mar 2023 18:05  Patient On (Oxygen Delivery Method): room air      I&O's Summary    18 Mar 2023 07:01  -  18 Mar 2023 20:54  --------------------------------------------------------  IN: 720 mL / OUT: 0 mL / NET: 720 mL        PHYSICAL EXAM:  General: No acute distress BMI-  HEENT: EOMI, PERRL  Neck: Supple, [ ] JVD  Lungs: Equal air entry bilaterally; [ ] rales [ ] wheezing [ ] rhonchi  Heart: Regular rate and rhythm; [x ] murmur   2/6 [ x] systolic [ ] diastolic [ ] radiation[ ] rubs [ ]  gallops  Abdomen: Nontender, bowel sounds present  Extremities: No clubbing, cyanosis, [ ] edema [ ]Pulses  equal and intact  Nervous system:  Alert & Oriented X3, no focal deficits  Psychiatric: Normal affect  Skin: No rashes or lesions    LABS:  03-18    141  |  101  |  72<H>  ----------------------------<  112<H>  4.5   |  25  |  4.68<H>    Ca    8.7      18 Mar 2023 07:36  Phos  5.0     03-18  Mg     1.7     03-18    TPro  7.4  /  Alb  3.7  /  TBili  0.2  /  DBili  x   /  AST  10  /  ALT  13  /  AlkPhos  70  03-17    Creatinine Trend: 4.68<--, 6.20<--                        7.6    5.83  )-----------( 226      ( 18 Mar 2023 07:36 )             24.1     PT/INR - ( 17 Mar 2023 12:39 )   PT: 17.0 sec;   INR: 1.46 ratio         PTT - ( 17 Mar 2023 12:39 )  PTT:29.4 sec          
[facility] DIVISION OF KIDNEY DISEASES AND HYPERTENSION -- [phone]  -- INITIAL CONSULT NOTE  --------------------------------------------------------------------------------  HPI:  Patient is a 67 year old female with PMH of CKD stage 5, LUE AVF created on 11/10/2017 and HTN who presented to the hospital after her outpatient nephrologist Dr. Horn sent her in for worsening kidney function and anemia. On review of [facility] pt noted to have a SCr of 5.44 on 1/19/2023 and on outpatient labs worsened to 6.59 along with acute drop in her hemoglobin to 7.9. She was called this morning by her primary nephrologist to present to the ED for possibly being initiated on dialysis. The patient was seen and examined this morning in the ED. She states she has been feeling fatigue over the past few weeks which has significantly worsened. As per the  the patient has not been very mobile. She also admitted to decreased appetite and reported a 5 pound weight loss over the past few weeks. She states she has no interest in eating and the food has not been tasting the same. She denied any nausea, vomiting, or abdominal pain.       PAST HISTORY  --------------------------------------------------------------------------------  PAST MEDICAL & SURGICAL HISTORY:  Alport&#x27;s syndrome      HTN (hypertension)      Hard of hearing  uses hearing aids      Anemia      ESRD with anemia  left AV fistula, not in use      S/P tonsillectomy      A-V fistula  Nov 2017        FAMILY HISTORY:    PAST SOCIAL HISTORY:    ALLERGIES & MEDICATIONS  --------------------------------------------------------------------------------  Allergies    No Known Allergies    Intolerances      Standing Inpatient Medications    PRN Inpatient Medications      REVIEW OF SYSTEMS  All other systems were reviewed and are negative, except as noted.    VITALS/PHYSICAL EXAM  --------------------------------------------------------------------------------  T(C): 36.8 (03-17-23 @ 11:22), Max: 36.8 (03-17-23 @ 11:22)  HR: 86 (03-17-23 @ 11:49) (78 - 86)  BP: 126/68 (03-17-23 @ 11:49) (126/68 - 146/67)  RR: 18 (03-17-23 @ 11:49) (18 - 20)  SpO2: 100% (03-17-23 @ 11:49) (100% - 100%)  Wt(kg): --  Height (cm): 162.6 (03-17-23 @ 11:22)  Weight (kg): 67.6 (03-17-23 @ 11:22)  BMI (kg/m2): 25.6 (03-17-23 @ 11:22)  BSA (m2): 1.73 (03-17-23 @ 11:22)      Physical Exam:  	Gen: NAD  	HEENT: MMM  	Pulm: CTA B/L  	CV: S1S2  	Abd: Soft, +BS   	Ext: No LE edema B/L  	Neuro: Awake  	Skin: Warm and dry  	Vascular access: LUE AVF with palpable thrill and bruit    LABS/STUDIES  --------------------------------------------------------------------------------              8.0    6.96  >-----------<  265      [03-17-23 @ 12:39]              24.8     143  |  106  |  107  ----------------------------<  123      [03-17-23 @ 12:39]  5.0   |  19  |  6.20        Ca     9.1     [03-17-23 @ 12:39]      Mg     1.7     [03-17-23 @ 12:39]      Phos  5.3     [03-17-23 @ 12:39]    TPro  7.4  /  Alb  3.7  /  TBili  0.2  /  DBili  x   /  AST  10  /  ALT  13  /  AlkPhos  70  [03-17-23 @ 12:39]    PT/INR: PT 17.0 , INR 1.46       [03-17-23 @ 12:39]  PTT: 29.4       [03-17-23 @ 12:39]      Creatinine Trend:  SCr 6.20 [03-17 @ 12:39]

## 2023-03-23 ENCOUNTER — TRANSCRIPTION ENCOUNTER (OUTPATIENT)
Age: 68
End: 2023-03-23

## 2023-03-23 LAB
ANION GAP SERPL CALC-SCNC: 19 MMOL/L — HIGH (ref 5–17)
BUN SERPL-MCNC: 72 MG/DL — HIGH (ref 7–23)
CALCIUM SERPL-MCNC: 9 MG/DL — SIGNIFICANT CHANGE UP (ref 8.4–10.5)
CHLORIDE SERPL-SCNC: 98 MMOL/L — SIGNIFICANT CHANGE UP (ref 96–108)
CO2 SERPL-SCNC: 24 MMOL/L — SIGNIFICANT CHANGE UP (ref 22–31)
CREAT SERPL-MCNC: 6.35 MG/DL — HIGH (ref 0.5–1.3)
EGFR: 7 ML/MIN/1.73M2 — LOW
GLUCOSE SERPL-MCNC: 94 MG/DL — SIGNIFICANT CHANGE UP (ref 70–99)
HCT VFR BLD CALC: 24.7 % — LOW (ref 34.5–45)
HGB BLD-MCNC: 7.7 G/DL — LOW (ref 11.5–15.5)
MAGNESIUM SERPL-MCNC: 1.9 MG/DL — SIGNIFICANT CHANGE UP (ref 1.6–2.6)
MCHC RBC-ENTMCNC: 30 PG — SIGNIFICANT CHANGE UP (ref 27–34)
MCHC RBC-ENTMCNC: 31.2 GM/DL — LOW (ref 32–36)
MCV RBC AUTO: 96.1 FL — SIGNIFICANT CHANGE UP (ref 80–100)
NRBC # BLD: 0 /100 WBCS — SIGNIFICANT CHANGE UP (ref 0–0)
PHOSPHATE SERPL-MCNC: 5.4 MG/DL — HIGH (ref 2.5–4.5)
PLATELET # BLD AUTO: 223 K/UL — SIGNIFICANT CHANGE UP (ref 150–400)
POTASSIUM SERPL-MCNC: 3.6 MMOL/L — SIGNIFICANT CHANGE UP (ref 3.5–5.3)
POTASSIUM SERPL-SCNC: 3.6 MMOL/L — SIGNIFICANT CHANGE UP (ref 3.5–5.3)
RBC # BLD: 2.57 M/UL — LOW (ref 3.8–5.2)
RBC # FLD: 13 % — SIGNIFICANT CHANGE UP (ref 10.3–14.5)
SODIUM SERPL-SCNC: 141 MMOL/L — SIGNIFICANT CHANGE UP (ref 135–145)
WBC # BLD: 8.09 K/UL — SIGNIFICANT CHANGE UP (ref 3.8–10.5)
WBC # FLD AUTO: 8.09 K/UL — SIGNIFICANT CHANGE UP (ref 3.8–10.5)

## 2023-03-23 PROCEDURE — 99232 SBSQ HOSP IP/OBS MODERATE 35: CPT | Mod: GC

## 2023-03-23 PROCEDURE — 90935 HEMODIALYSIS ONE EVALUATION: CPT | Mod: GC

## 2023-03-23 RX ORDER — IRON SUCROSE 20 MG/ML
200 INJECTION, SOLUTION INTRAVENOUS EVERY 24 HOURS
Refills: 0 | Status: DISCONTINUED | OUTPATIENT
Start: 2023-03-23 | End: 2023-03-24

## 2023-03-23 RX ADMIN — CALCITRIOL 0.25 MICROGRAM(S): 0.5 CAPSULE ORAL at 12:25

## 2023-03-23 RX ADMIN — IRON SUCROSE 110 MILLIGRAM(S): 20 INJECTION, SOLUTION INTRAVENOUS at 16:06

## 2023-03-23 RX ADMIN — Medication 650 MILLIGRAM(S): at 23:11

## 2023-03-23 NOTE — PROGRESS NOTE ADULT - TIME BILLING
- Review of records, telemetry, vital signs and daily labs.   - General and cardiovascular physical examination.  - Generation of cardiovascular treatment plan.  - Coordination of care.      Patient was seen and examined by me on 3/19/23,interim events noted,labs and radiology studies reviewed.  Colby Escobedo MD,FACC.  [address] [place], [place] [id number].  [phone]
- Review of records, telemetry, vital signs and daily labs.   - General and cardiovascular physical examination.  - Generation of cardiovascular treatment plan.  - Coordination of care.      Patient was seen and examined by me on 3/20/23,interim events noted,labs and radiology studies reviewed.  Colby Escobedo MD,FACC.  [address] [place], [place] [id number].  [phone]
- Review of records, telemetry, vital signs and daily labs.   - General and cardiovascular physical examination.  - Generation of cardiovascular treatment plan.  - Coordination of care.      Patient was seen and examined by me on 3/21/23,interim events noted,labs and radiology studies reviewed.  Colby Escobedo MD,FACC.  [address] [place], [place] [id number].  [phone]
- Review of records, telemetry, vital signs and daily labs.   - General and cardiovascular physical examination.  - Generation of cardiovascular treatment plan.  - Coordination of care.      Patient was seen and examined by me on 3/22/23,interim events noted,labs and radiology studies reviewed.  Colby Escobedo MD,FACC.  [address] [place], [place] [id number].  [phone]
- Review of records, telemetry, vital signs and daily labs.   - General and cardiovascular physical examination.  - Generation of cardiovascular treatment plan.  - Coordination of care.      Patient was seen and examined by me on 3/23/23,interim events noted,labs and radiology studies reviewed.  Colby Escobedo MD,FACC.  [address] [place], [place] [id number].  [phone]

## 2023-03-23 NOTE — DISCHARGE NOTE PROVIDER - NSDCFUADDINST_GEN_ALL_CORE_FT
To start HD at [facility] on Monday 3/27 To start HD at [facility] on Monday 3/27 with Dr. Horn

## 2023-03-23 NOTE — DISCHARGE NOTE PROVIDER - NSDCFUADDAPPT_GEN_ALL_CORE_FT
APPTS ARE READY TO BE MADE: [x] YES    Best Family or Patient Contact (if needed):    1: Follow up with PCP Dr. Jos Zaragoza [phone] in 1-2 weeks  2: Follow up with your nephrologist Dr. Jet Horn within 1-2 weeks       APPTS ARE READY TO BE MADE: [x] YES    Best Family or Patient Contact (if needed):    1: Follow up with PCP Dr. Jos Zaragoza [phone] in 1-2 weeks  2: Follow up with your nephrologist Dr. Jet Horn within 1-2 weeks      Patent was seen by Dr. Horn on 3/27.

## 2023-03-23 NOTE — DISCHARGE NOTE PROVIDER - NSDCCPCAREPLAN_GEN_ALL_CORE_FT
35
PRINCIPAL DISCHARGE DIAGNOSIS  Diagnosis: ESRD needing dialysis  Assessment and Plan of Treatment: Avoid taking (NSAIDs) - (ex: Ibuprofen, Advil, Celebrex, Naprosyn)  Avoid taking any nephrotoxic agents (can harm kidneys) - Intravenous contrast for diagnostic testing, combination cold medications.  Have all medications adjusted for your renal function by your Health Care Provider.  Blood pressure control is important.  Take all medication as prescribed.  Follow up with your nephrologist within 1-2 weeks   Continue HD sessions at [facility] per schedule         SECONDARY DISCHARGE DIAGNOSES  Diagnosis: Anemia  Assessment and Plan of Treatment: Have your blood work (CBC, hgb) periodically checked per your PCP  continue taking your medication as prescribed for anemia    Diagnosis: HTN (hypertension)  Assessment and Plan of Treatment: Take medications for your blood pressure as recommended.  Eat a heart healthy diet that is low in saturated fats and salt, and includes whole grains, fruits, vegetables and lean protein   Exercise regularly (consult with your physician or cardiologist first); maintain a heart healthy weight.   Seek medical help for dizziness, Lightheadedness, Blurry vision, Headache, Chest pain, Shortness of breath  Follow up with your medical doctor to establish long term blood pressure treatment goals.

## 2023-03-23 NOTE — DISCHARGE NOTE PROVIDER - HOSPITAL COURSE
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  Patient reports she went to her nephrologist 2 days ago where they did routine blood work and was called to come in today due to worsening creatinine and necessity to start dialysis.      ESRD with anemia.    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  Patient reports she went to her nephrologist 2 days ago where they did routine blood work and was called to come in today due to worsening creatinine and necessity to start dialysis.      ESRD with anemia.    -CKD V with progression to ESRD requiring dialysis   -Has LUE AVF placed in 2017 and underwent hemodialysis session on 3/17/23    - Cre seen at 6.35    - Malfunctioning L AVF- VA duplex showing normal functioning fistula, assessed by vascular, AVF used successfully for HD session on 2/24   - continue HD as instructed at [facility] outpatient      Hypertension    -History of Non adherent to BP meds at home    - monitored off home bp meds inpatient  - complained of headach- CT Head negative for  acute intracranial hemorrhage, mass effect, or acute territorial infarct.       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  Patient reports she went to her nephrologist 2 days ago where they did routine blood work and was called to come in today due to worsening creatinine and necessity to start dialysis.      ESRD with anemia.    -CKD V with progression to ESRD requiring dialysis   -Has LUE AVF placed in 2017 and underwent hemodialysis session on 3/17/23    - Cre seen at 6.35    - Malfunctioning L AVF- VA duplex showing normal functioning fistula, assessed by vascular, AVF used successfully for HD session on 2/24   - continue HD as instructed at [facility] outpatient      Hypertension    -History of Non adherent to BP meds at home    - monitored off home bp meds inpatient, blood pressure medications held   - complained of headach- CT Head negative for  acute intracranial hemorrhage, mass effect, or acute territorial infarct.       Anemia   - Hgb seen at 8     - Iron low- received dose of IV venofer 200mg  inpatient    - 3/24 hemoglobin seen at 7.7- now status post 1 unit packed red blood cells transfusion       Pt has been medically cleared for discharge home w/ outpatient dialysis per Dr. Nieves

## 2023-03-23 NOTE — DISCHARGE NOTE PROVIDER - CARE PROVIDERS DIRECT ADDRESSES
,DirectAddress_Unknown,[email or web address]

## 2023-03-23 NOTE — DISCHARGE NOTE PROVIDER - CARE PROVIDER_API CALL
Jos Zaragoza  INTERNAL MEDICINE  [address] [place] [address], [address]  [place] [address]  Phone: [phone]  Fax: [phone]  Follow Up Time:     Jet Horn)  Nephrology  [address] [place] [address], [address] [place] [address]  Phone: [phone]  Fax: [phone]  Follow Up Time:

## 2023-03-23 NOTE — DISCHARGE NOTE PROVIDER - NSDCMRMEDTOKEN_GEN_ALL_CORE_FT
amLODIPine 10 mg oral tablet: 1 tab(s) orally once a day  calcitriol 0.25 mcg oral capsule: 1 cap(s) orally once a day  carvedilol 6.25 mg oral tablet: 1 tab(s) orally 2 times a day  sodium bicarbonate 650 mg oral tablet: 2 tab(s) orally 3 times a day  torsemide 10 mg oral tablet: 1 tab(s) orally once a day   calcitriol 0.25 mcg oral capsule: 1 cap(s) orally once a day

## 2023-03-24 VITALS
DIASTOLIC BLOOD PRESSURE: 71 MMHG | HEART RATE: 107 BPM | TEMPERATURE: 99 F | SYSTOLIC BLOOD PRESSURE: 114 MMHG | RESPIRATION RATE: 18 BRPM | OXYGEN SATURATION: 98 %

## 2023-03-24 LAB
ANION GAP SERPL CALC-SCNC: 17 MMOL/L — SIGNIFICANT CHANGE UP (ref 5–17)
BLD GP AB SCN SERPL QL: NEGATIVE — SIGNIFICANT CHANGE UP
BUN SERPL-MCNC: 36 MG/DL — HIGH (ref 7–23)
CALCIUM SERPL-MCNC: 9.1 MG/DL — SIGNIFICANT CHANGE UP (ref 8.4–10.5)
CHLORIDE SERPL-SCNC: 96 MMOL/L — SIGNIFICANT CHANGE UP (ref 96–108)
CO2 SERPL-SCNC: 25 MMOL/L — SIGNIFICANT CHANGE UP (ref 22–31)
CREAT SERPL-MCNC: 4.49 MG/DL — HIGH (ref 0.5–1.3)
EGFR: 10 ML/MIN/1.73M2 — LOW
GLUCOSE SERPL-MCNC: 162 MG/DL — HIGH (ref 70–99)
HCT VFR BLD CALC: 24.4 % — LOW (ref 34.5–45)
HGB BLD-MCNC: 7.7 G/DL — LOW (ref 11.5–15.5)
MCHC RBC-ENTMCNC: 30.1 PG — SIGNIFICANT CHANGE UP (ref 27–34)
MCHC RBC-ENTMCNC: 31.6 GM/DL — LOW (ref 32–36)
MCV RBC AUTO: 95.3 FL — SIGNIFICANT CHANGE UP (ref 80–100)
NRBC # BLD: 0 /100 WBCS — SIGNIFICANT CHANGE UP (ref 0–0)
PLATELET # BLD AUTO: 234 K/UL — SIGNIFICANT CHANGE UP (ref 150–400)
POTASSIUM SERPL-MCNC: 3.5 MMOL/L — SIGNIFICANT CHANGE UP (ref 3.5–5.3)
POTASSIUM SERPL-SCNC: 3.5 MMOL/L — SIGNIFICANT CHANGE UP (ref 3.5–5.3)
RBC # BLD: 2.56 M/UL — LOW (ref 3.8–5.2)
RBC # FLD: 13.3 % — SIGNIFICANT CHANGE UP (ref 10.3–14.5)
RH IG SCN BLD-IMP: NEGATIVE — SIGNIFICANT CHANGE UP
SODIUM SERPL-SCNC: 138 MMOL/L — SIGNIFICANT CHANGE UP (ref 135–145)
WBC # BLD: 9.04 K/UL — SIGNIFICANT CHANGE UP (ref 3.8–10.5)
WBC # FLD AUTO: 9.04 K/UL — SIGNIFICANT CHANGE UP (ref 3.8–10.5)

## 2023-03-24 PROCEDURE — 85730 THROMBOPLASTIN TIME PARTIAL: CPT

## 2023-03-24 PROCEDURE — 86705 HEP B CORE ANTIBODY IGM: CPT

## 2023-03-24 PROCEDURE — 86901 BLOOD TYPING SEROLOGIC RH(D): CPT

## 2023-03-24 PROCEDURE — 86704 HEP B CORE ANTIBODY TOTAL: CPT

## 2023-03-24 PROCEDURE — U0003: CPT

## 2023-03-24 PROCEDURE — 86803 HEPATITIS C AB TEST: CPT

## 2023-03-24 PROCEDURE — 87340 HEPATITIS B SURFACE AG IA: CPT

## 2023-03-24 PROCEDURE — 85610 PROTHROMBIN TIME: CPT

## 2023-03-24 PROCEDURE — 85025 COMPLETE CBC W/AUTO DIFF WBC: CPT

## 2023-03-24 PROCEDURE — 86900 BLOOD TYPING SEROLOGIC ABO: CPT

## 2023-03-24 PROCEDURE — 86480 TB TEST CELL IMMUN MEASURE: CPT

## 2023-03-24 PROCEDURE — 86923 COMPATIBILITY TEST ELECTRIC: CPT

## 2023-03-24 PROCEDURE — 83540 ASSAY OF IRON: CPT

## 2023-03-24 PROCEDURE — 84100 ASSAY OF PHOSPHORUS: CPT

## 2023-03-24 PROCEDURE — 83735 ASSAY OF MAGNESIUM: CPT

## 2023-03-24 PROCEDURE — 71045 X-RAY EXAM CHEST 1 VIEW: CPT

## 2023-03-24 PROCEDURE — 83550 IRON BINDING TEST: CPT

## 2023-03-24 PROCEDURE — 86706 HEP B SURFACE ANTIBODY: CPT

## 2023-03-24 PROCEDURE — 36415 COLL VENOUS BLD VENIPUNCTURE: CPT

## 2023-03-24 PROCEDURE — 70450 CT HEAD/BRAIN W/O DYE: CPT

## 2023-03-24 PROCEDURE — 36430 TRANSFUSION BLD/BLD COMPNT: CPT

## 2023-03-24 PROCEDURE — 85027 COMPLETE CBC AUTOMATED: CPT

## 2023-03-24 PROCEDURE — 80048 BASIC METABOLIC PNL TOTAL CA: CPT

## 2023-03-24 PROCEDURE — 80053 COMPREHEN METABOLIC PANEL: CPT

## 2023-03-24 PROCEDURE — 86850 RBC ANTIBODY SCREEN: CPT

## 2023-03-24 PROCEDURE — U0005: CPT

## 2023-03-24 PROCEDURE — 99232 SBSQ HOSP IP/OBS MODERATE 35: CPT | Mod: GC

## 2023-03-24 PROCEDURE — 87637 SARSCOV2&INF A&B&RSV AMP PRB: CPT

## 2023-03-24 PROCEDURE — 99285 EMERGENCY DEPT VISIT HI MDM: CPT

## 2023-03-24 PROCEDURE — 93990 DOPPLER FLOW TESTING: CPT

## 2023-03-24 PROCEDURE — P9016: CPT

## 2023-03-24 PROCEDURE — 81001 URINALYSIS AUTO W/SCOPE: CPT

## 2023-03-24 PROCEDURE — 99261: CPT

## 2023-03-24 RX ORDER — CARVEDILOL PHOSPHATE 80 MG/1
1 CAPSULE, EXTENDED RELEASE ORAL
Qty: 0 | Refills: 0 | DISCHARGE

## 2023-03-24 RX ORDER — SODIUM BICARBONATE 1 MEQ/ML
2 SYRINGE (ML) INTRAVENOUS
Qty: 0 | Refills: 0 | DISCHARGE

## 2023-03-24 RX ORDER — AMLODIPINE BESYLATE 2.5 MG/1
1 TABLET ORAL
Qty: 0 | Refills: 0 | DISCHARGE

## 2023-03-24 RX ORDER — ERYTHROPOIETIN 10000 [IU]/ML
4000 INJECTION, SOLUTION INTRAVENOUS; SUBCUTANEOUS
Qty: 12 | Refills: 0
Start: 2023-03-24 | End: 2023-04-22

## 2023-03-24 RX ADMIN — CALCITRIOL 0.25 MICROGRAM(S): 0.5 CAPSULE ORAL at 12:59

## 2023-03-24 RX ADMIN — Medication 650 MILLIGRAM(S): at 00:11

## 2023-03-24 RX ADMIN — IRON SUCROSE 110 MILLIGRAM(S): 20 INJECTION, SOLUTION INTRAVENOUS at 10:45

## 2023-03-24 RX ADMIN — ERYTHROPOIETIN 4000 UNIT(S): 10000 INJECTION, SOLUTION INTRAVENOUS; SUBCUTANEOUS at 10:44

## 2023-03-24 NOTE — PROGRESS NOTE ADULT - PROBLEM SELECTOR PLAN 1
CKD V progressed to ESRD requiring dialysis  -Pt with advanced CKD stage 5 admitted for initiation of long term HD. Pt has had LUE AVF placed in 2017. . Pt underwent 1st HD session on 3/17/23 and tolerated well. Pt clinically stable. Labs reviewed. Plan for 2nd HD session today.   - head ct for intermittent Headache
CKD V progressed to ESRD requiring dialysis  -Pt with advanced CKD stage 5 admitted for initiation of long term HD. Pt has had LUE AVF placed in 2017. . Pt underwent 1st HD session on 3/17/23 and tolerated well. Pt clinically stable. Labs reviewed. Plan for 2nd HD session today. Monitor labs and BP.
CKD V progressed to ESRD requiring dialysis  -Pt with advanced CKD stage 5 admitted for initiation of long term HD. Pt has had LUE AVF placed in 2017. . Pt underwent 1st HD session on 3/17/23 and tolerated well. Pt clinically stable. Labs reviewed. Plan for 2nd HD session today.   Headache - head ct   No acute intracranial hemorrhage, mass effect, or CT evidence of acute   territorial infarct.    d/c planning after dialysis     -
CKD V progressed to ESRD requiring dialysis  -Pt with advanced CKD stage 5 admitted for initiation of long term HD. Pt has had LUE AVF placed in 2017. . Pt underwent 1st HD session on 3/17/23 and tolerated well. Pt clinically stable. Labs reviewed. Plan for 2nd HD session today.   Headache - head ct   No acute intracranial hemorrhage, mass effect, or CT evidence of acute   territorial infarct.    d/c planning after dialysis in am    -
Pt with advanced CKD stage 5 admitted for initiation of long term HD. Pt has had LUE AVF placed in 2017. Pt underwent 1st HD session on 3/17/23 and tolerated well. Last HD session done on 3/20. Pt clinically stable. Labs reviewed. Could not tolerate HD yesterday as infiltration. US done showed good functioning fistula but low flow. D/w with Dr Catalan- will attempt HD again today at a different insertion site as less swelling. If tolerates well, ok to dc with MWF dialysis at [facility] unit. If not, will need tunnelled HD catheter by IR tomorrow  and HD once with the catheter and then dc over weekend or next week
Pt with advanced CKD stage 5 admitted for initiation of long term HD. Pt has had LUE AVF placed in 2017. Pt underwent 1st HD session on 3/17/23 and tolerated well. Last HD session done on 3/23. Pt clinically stable. Labs reviewed. Plan for HD session today. Monitor labs and BP.
CKD V progressed to ESRD requiring dialysis  -Pt with advanced CKD stage 5 admitted for initiation of long term HD. Pt has had LUE AVF placed in 2017. . Pt underwent 1st HD session on 3/17/23 and tolerated well. Pt clinically stable. Labs reviewed. Plan for 2nd HD session today.   Headache - head ct   No acute intracranial hemorrhage, mass effect, or CT evidence of acute   territorial infarct.    - vascular eval for mal fx AVF
CKD V progressed to ESRD requiring dialysis  -Pt with advanced CKD stage 5 admitted for initiation of long term HD. Pt has had LUE AVF placed in 2017. . Pt underwent 1st HD session on 3/17/23 and tolerated well. Pt clinically stable. Labs reviewed. Plan for 2nd HD session today.   Headache - head ct   No acute intracranial hemorrhage, mass effect, or CT evidence of acute   territorial infarct.    - vascular eval for mal fx AVF
CKD V progressed to ESRD requiring dialysis  -Pt with advanced CKD stage 5 admitted for initiation of long term HD. Pt has had LUE AVF placed in 2017. . Pt underwent 1st HD session on 3/17/23 and tolerated well. Pt clinically stable. Labs reviewed. Plan for 2nd HD session today.   Headache - head ct   No acute intracranial hemorrhage, mass effect, or CT evidence of acute   territorial infarct.    d/c planning after dialysis     -
Pt with advanced CKD stage 5 admitted for initiation of long term HD. Pt has had LUE AVF placed in 2017. . Pt underwent 1st HD session on 3/17/23 and tolerated well. 2nd session done on 3/18. Pt clinically stable. Labs reviewed. Plan for 3rd HD session today. Monitor labs and BP.
CKD V progressed to ESRD requiring dialysis  -Pt with advanced CKD stage 5 admitted for initiation of long term HD. Pt has had LUE AVF placed in 2017. . Pt underwent 1st HD session on 3/17/23 and tolerated well. Pt clinically stable. Labs reviewed. Plan for 2nd HD session today.   Headache - head ct   No acute intracranial hemorrhage, mass effect, or CT evidence of acute   territorial infarct.      < end of copied text >      -
CKD V progressed to ESRD requiring dialysis  -Pt with advanced CKD stage 5 admitted for initiation of long term HD. Pt has had LUE AVF placed in 2017. . Pt underwent 1st HD session on 3/17/23 and tolerated well. Pt clinically stable. Labs reviewed. Plan for 2nd HD session today.   Headache - head ct   No acute intracranial hemorrhage, mass effect, or CT evidence of acute   territorial infarct.      < end of copied text >      -
Pt with advanced CKD stage 5 admitted for initiation of long term HD. Pt has had LUE AVF placed in 2017. . Pt underwent 1st HD session on 3/17/23 and tolerated well. Pt clinically stable. Labs reviewed. Plan for 2nd HD session today. Monitor labs and BP.
CKD V progressed to ESRD requiring dialysis  -Pt with advanced CKD stage 5 admitted for initiation of long term HD. Pt has had LUE AVF placed in 2017. . Pt underwent 1st HD session on 3/17/23 and tolerated well. Pt clinically stable. Labs reviewed. Plan for 2nd HD session today.   Headache - head ct   No acute intracranial hemorrhage, mass effect, or CT evidence of acute   territorial infarct.      < end of copied text >      -
Pt with advanced CKD stage 5 admitted for initiation of long term HD. Pt has had LUE AVF placed in 2017. . Pt underwent 1st HD session on 3/17/23 and tolerated well. Now has had 3 sessions and did well  Plan for next HD session 3.5 hours on 3/22 and then dc.  Pt has a spot in [facility] HD unit under Dr Jet Horn, she has been accepted to start Friday evening third shift
Pt with advanced CKD stage 5 admitted for initiation of long term HD. Pt has had LUE AVF placed in 2017. Pt underwent 1st HD session on 3/17/23 and tolerated well. Last HD session done on 3/20. Pt clinically stable. Labs reviewed. Plan for HD session today. Monitor labs and BP.

## 2023-03-24 NOTE — PROGRESS NOTE ADULT - ATTENDING COMMENTS
I have seen this patient with the fellow and agree with their assessment and plan. I was physically present for significant portions of the evaluation and management (E/M) service provided.  I agree with the above history, physical, and plan which I have reviewed and edited where appropriate.    During HD today, pt had access infiltration event, aborted HD  Vascular Dr Catalan called, eval for dopplers vs fistulogram  May need intervention +/- tunnelled HD catheter  Hold dc for now please  [facility]    Tom Richard MD  Pager   Office     Contact me directly via [employer] Teams     (After 5 pm or on weekends please page the on-call fellow/attending, can check [email or web address] for schedule. Login is alida domínguez, schedule under [facility] medicine, psych, derm)
I have seen this patient with the fellow and agree with their assessment and plan. I was physically present for significant portions of the evaluation and management (E/M) service provided.  I agree with the above history, physical, and plan which I have reviewed and edited where appropriate.    Seen on HD, tolerating well  AVF working well, no major issues now  Plan for dc to start HD at [facility] Monday under Dr Horn    Anemia- if can't get PRBC, give another dose of IV iron before dc and we can complete series as outpatient  Got 1 dose of 200mg Venofer yesterday and DANI as well    dc planning ok from us    Tom Richard MD  Pager   Office     Contact me directly via [employer] Teams     (After 5 pm or on weekends please page the on-call fellow/attending, can check [email or web address] for schedule. Login is alida domínguez, schedule under [facility] medicine, psych, derm)
CKD stage V now progressing to ESRD  Admitted with s/o uremia  AVF functioning well.     Underwent HD yesterday. Tolerated well.   Second session today ( 2.5 hrs)   Full session then on Monday  Will start DANI with HD . Can also get 1 unit of PRBC today   Plan for outpatient placement- please ask  to assist family. [facility] unit may be where we can consider  d/w with  at bedside    Rest per Dr.Pariswala Carolyn Lira MD  O: [phone]  Contact me on teams
I have seen this patient with the fellow and agree with their assessment and plan. I was physically present for significant portions of the evaluation and management (E/M) service provided.  I agree with the above history, physical, and plan which I have reviewed and edited where appropriate.    Pt tolerated 2 sessions of HD well for Friday and Sat  Next HD today- 3 hours 1kg UF  Next one then on Wednesday at 3.5 hours  AVF worked well  Pt and  eval several HD units- including [facility]    Tom Richard MD  Pager   Office     Contact me directly via [employer] Teams     (After 5 pm or on weekends please page the on-call fellow/attending, can check [email or web address] for schedule. Login is alida domínguez, schedule under [facility] medicine, psych, derm)

## 2023-03-24 NOTE — PROGRESS NOTE ADULT - PROVIDER SPECIALTY LIST ADULT
Cardiology
Internal Medicine
Cardiology
Internal Medicine
Nephrology
Nephrology
Vascular Surgery
Nephrology
Cardiology
Internal Medicine
Nephrology
Cardiology
Cardiology
Internal Medicine
Internal Medicine
Nephrology
Internal Medicine
Internal Medicine
Nephrology
normal (ped)...

## 2023-03-24 NOTE — PROGRESS NOTE ADULT - REASON FOR ADMISSION
new dialysis requirement
unk

## 2023-03-24 NOTE — PROGRESS NOTE ADULT - PROBLEM SELECTOR PROBLEM 2
HTN (hypertension)
Anemia
HTN (hypertension)
HTN (hypertension)
Anemia
HTN (hypertension)
Anemia
Anemia
HTN (hypertension)

## 2023-03-24 NOTE — PROGRESS NOTE ADULT - SUBJECTIVE AND OBJECTIVE BOX
SUBJECTIVE / OVERNIGHT EVENTS:pt seen and examined, pt found to have mal fx avf   23    MEDICATIONS  (STANDING):  calcitriol   Capsule 0.25 MICROGram(s) Oral daily  epoetin dexter-epbx (RETACRIT) Injectable 4000 Unit(s) IV Push <User Schedule>    MEDICATIONS  (PRN):  acetaminophen     Tablet .. 650 milliGRAM(s) Oral every 6 hours PRN Temp greater or equal to 38C (100.4F), Mild Pain (1 - 3)  lidocaine/prilocaine Cream 1 Application(s) Topical daily PRN pain  melatonin 3 milliGRAM(s) Oral at bedtime PRN Insomnia    Vital Signs Last 24 Hrs  T(C): 36.8 (23 @ 19:28), Max: 36.9 (23 @ 04:55)  T(F): 98.3 (23 @ 19:28), Max: 98.4 (23 @ 04:55)  HR: 88 (23 @ 19:28) (87 - 98)  BP: 107/65 (23 @ 19:28) (107/65 - 142/64)  BP(mean): --  RR: 18 (23 @ 19:28) (18 - 18)  SpO2: 97% (23 @ 19:28) (97% - 99%)            Constitutional: No fever, fatigue  Skin: No rash.  Eyes: No recent vision problems or eye pain.  ENT: No congestion, ear pain, or sore throat.  Cardiovascular: No chest pain or palpation.  Respiratory: No cough, shortness of breath, congestion, or wheezing.  Gastrointestinal: No abdominal pain, nausea, vomiting, or diarrhea.  Genitourinary: No dysuria.  Musculoskeletal: No joint swelling.  Neurologic: No headache.    PHYSICAL EXAM:  GENERAL: NAD  EYES: EOMI, PERRLA  NECK: Supple, No JVD  CHEST/LUNG: dec breath sounds at bases  HEART:  S1 , S2 +  ABDOMEN: soft , bs+  EXTREMITIES: no edema   NEUROLOGY:alert awake      LABS:      138  |  95<L>  |  62<H>  ----------------------------<  107<H>  3.6   |  25  |  6.01<H>    Ca    9.2      22 Mar 2023 07:00  Phos  5.4     03-22  Mg     1.8     03-22      Creatinine Trend: 6.01 <--, 4.35 <--, 5.42 <--, 3.66 <--, 4.68 <--, 6.20 <--                        8.0    8.82  )-----------( 228      ( 22 Mar 2023 07:00 )             25.2     Urine Studies:  Urinalysis Basic - ( 17 Mar 2023 14:53 )    Color: Light Yellow / Appearance: Slightly Turbid / S.012 / pH:   Gluc:  / Ketone: Negative  / Bili: Negative / Urobili: Negative   Blood:  / Protein: 100 mg/dl / Nitrite: Negative   Leuk Esterase: Large / RBC: 3 /hpf /  /HPF   Sq Epi:  / Non Sq Epi: 1 /hpf / Bacteria: Few                                                  RADIOLOGY & ADDITIONAL TESTS:    Imaging Personally Reviewed:YES    Consultant(s) Notes Reviewed:  YES    Care Discussed with Consultants/Other Providers:YES  
Date of service : 03-23-23     INTERVAL HPI/OVERNIGHT EVENTS:  Vital Signs Last 24 Hrs  T(C): 36.6 (23 Mar 2023 20:02), Max: 37.5 (23 Mar 2023 05:30)  T(F): 97.9 (23 Mar 2023 20:02), Max: 99.5 (23 Mar 2023 05:30)  HR: 100 (23 Mar 2023 20:02) (89 - 100)  BP: 103/67 (23 Mar 2023 20:02) (103/67 - 122/77)  BP(mean): --  RR: 18 (23 Mar 2023 20:02) (18 - 18)  SpO2: 99% (23 Mar 2023 20:02) (98% - 100%)    Parameters below as of 23 Mar 2023 20:02  Patient On (Oxygen Delivery Method): room air      I&O's Summary    22 Mar 2023 07:01  -  23 Mar 2023 07:00  --------------------------------------------------------  IN: 1240 mL / OUT: 50 mL / NET: 1190 mL    23 Mar 2023 07:01  -  23 Mar 2023 22:19  --------------------------------------------------------  IN: 540 mL / OUT: 1000 mL / NET: -460 mL      MEDICATIONS  (STANDING):  calcitriol   Capsule 0.25 MICROGram(s) Oral daily  epoetin dexter-epbx (RETACRIT) Injectable 4000 Unit(s) IV Push <User Schedule>  iron sucrose IVPB 200 milliGRAM(s) IV Intermittent every 24 hours    MEDICATIONS  (PRN):  acetaminophen     Tablet .. 650 milliGRAM(s) Oral every 6 hours PRN Temp greater or equal to 38C (100.4F), Mild Pain (1 - 3)  lidocaine/prilocaine Cream 1 Application(s) Topical daily PRN pain  melatonin 3 milliGRAM(s) Oral at bedtime PRN Insomnia    LABS:                        7.7    8.09  )-----------( 223      ( 23 Mar 2023 06:55 )             24.7     03-23    141  |  98  |  72<H>  ----------------------------<  94  3.6   |  24  |  6.35<H>    Ca    9.0      23 Mar 2023 06:57  Phos  5.4     03-23  Mg     1.9     03-23          CAPILLARY BLOOD GLUCOSE              REVIEW OF SYSTEMS:  CONSTITUTIONAL: No fever, weight loss, or fatigue  EYES: No eye pain, visual disturbances, or discharge  ENMT:  No difficulty hearing, tinnitus, vertigo; No sinus or throat pain  NECK: No pain or stiffness  BREASTS: No pain, masses, or nipple discharge  RESPIRATORY: No cough, wheezing, chills or hemoptysis; No shortness of breath  CARDIOVASCULAR: No chest pain, palpitations, dizziness, or leg swelling  GASTROINTESTINAL: No abdominal or epigastric pain. No nausea, vomiting, or hematemesis; No diarrhea or constipation. No melena or hematochezia.  GENITOURINARY: No dysuria, frequency, hematuria, or incontinence  NEUROLOGICAL: No headaches, memory loss, loss of strength, numbness, or tremors  SKIN: No itching, burning, rashes, or lesions   LYMPH NODES: No enlarged glands  ENDOCRINE: No heat or cold intolerance; No hair loss  MUSCULOSKELETAL: No joint pain or swelling; No muscle, back, or extremity pain  PSYCHIATRIC: No depression, anxiety, mood swings, or difficulty sleeping  HEME/LYMPH: No easy bruising, or bleeding gums  ALLERY AND IMMUNOLOGIC: No hives or eczema    RADIOLOGY & ADDITIONAL TESTS:    Imaging Personally Reviewed:  [ ] YES  [ ] NO    Consultant(s) Notes Reviewed:  [x ] YES  [ ] NO    PHYSICAL EXAM:  GENERAL: NAD, well-groomed, well-developed  HEAD:  Atraumatic, Normocephalic  EYES: EOMI, PERRLA, conjunctiva and sclera clear  ENMT: No tonsillar erythema, exudates, or enlargement; Moist mucous membranes, Good dentition, No lesions  NECK: Supple, No JVD, Normal thyroid  NERVOUS SYSTEM:  Alert & Oriented X3, Good concentration; Motor Strength 5/5 B/L upper and lower extremities; DTRs 2+ intact and symmetric  CHEST/LUNG: Clear to percussion bilaterally; No rales, rhonchi, wheezing, or rubs  HEART: Regular rate and rhythm; No murmurs, rubs, or gallops  ABDOMEN: Soft, Nontender, Nondistended; Bowel sounds present  EXTREMITIES:  2+ Peripheral Pulses, No clubbing, cyanosis, or edema  LYMPH: No lymphadenopathy noted  SKIN: No rashes or lesions    Care Discussed with Consultants/Other Providers [ x] YES  [ ] NO    MEDICATIONS  (STANDING):  calcitriol   Capsule 0.25 MICROGram(s) Oral daily  epoetin dexter-epbx (RETACRIT) Injectable 4000 Unit(s) IV Push <User Schedule>  iron sucrose IVPB 200 milliGRAM(s) IV Intermittent every 24 hours    MEDICATIONS  (PRN):  acetaminophen     Tablet .. 650 milliGRAM(s) Oral every 6 hours PRN Temp greater or equal to 38C (100.4F), Mild Pain (1 - 3)  lidocaine/prilocaine Cream 1 Application(s) Topical daily PRN pain  melatonin 3 milliGRAM(s) Oral at bedtime PRN Insomnia      Vital Signs Last 24 Hrs  T(C): 36.6 (23 Mar 2023 20:02), Max: 37.5 (23 Mar 2023 05:30)  T(F): 97.9 (23 Mar 2023 20:02), Max: 99.5 (23 Mar 2023 05:30)  HR: 100 (23 Mar 2023 20:02) (89 - 100)  BP: 103/67 (23 Mar 2023 20:02) (103/67 - 122/77)  BP(mean): --  RR: 18 (23 Mar 2023 20:02) (18 - 18)  SpO2: 99% (23 Mar 2023 20:02) (98% - 100%)    Parameters below as of 23 Mar 2023 20:02  Patient On (Oxygen Delivery Method): room air    
[facility] DIVISION OF KIDNEY DISEASES AND HYPERTENSION -- FOLLOW UP NOTE  --------------------------------------------------------------------------------  Chief Complaint:    24 hour events/subjective:  tolerated 3 sessions of HD well      PAST HISTORY  --------------------------------------------------------------------------------  No significant changes to PMH, PSH, FHx, SHx, unless otherwise noted    ALLERGIES & MEDICATIONS  --------------------------------------------------------------------------------  Allergies    No Known Allergies    Intolerances      Standing Inpatient Medications  calcitriol   Capsule 0.25 MICROGram(s) Oral daily    PRN Inpatient Medications  acetaminophen     Tablet .. 650 milliGRAM(s) Oral every 6 hours PRN  lidocaine/prilocaine Cream 1 Application(s) Topical daily PRN  melatonin 3 milliGRAM(s) Oral at bedtime PRN      REVIEW OF SYSTEMS  --------------------------------------------------------------------------------  Gen: No weight changes, fatigue, fevers/chills, weakness  Skin: No rashes  Head/Eyes/Ears/Mouth: No headache; Normal hearing; Normal vision w/o blurriness; No sinus pain/discomfort, sore throat  Respiratory: No dyspnea, cough, wheezing, hemoptysis  CV: No chest pain, PND, orthopnea  GI: No abdominal pain, diarrhea, constipation, nausea, vomiting, melena, hematochezia  : No increased frequency, dysuria, hematuria, nocturia  MSK: No joint pain/swelling; no back pain; no edema  Neuro: No dizziness/lightheadedness, weakness, seizures, numbness, tingling  Heme: No easy bruising or bleeding  Endo: No heat/cold intolerance  Psych: No significant nervousness, anxiety, stress, depression    All other systems were reviewed and are negative, except as noted.    VITALS/PHYSICAL EXAM  --------------------------------------------------------------------------------  T(C): 36.9 (03-21-23 @ 05:02), Max: 36.9 (03-20-23 @ 12:58)  HR: 88 (03-21-23 @ 05:02) (88 - 107)  BP: 103/63 (03-21-23 @ 05:02) (103/63 - 159/77)  RR: 18 (03-21-23 @ 05:02) (16 - 18)  SpO2: 97% (03-21-23 @ 05:02) (96% - 99%)  Wt(kg): --        03-20-23 @ 07:01  -  03-21-23 @ 07:00  --------------------------------------------------------  IN: 1320 mL / OUT: 0 mL / NET: 1320 mL      vital signs as above  in no apparent distress  Lungs: no rhonchi, no wheeze, no crackles  CVS: S1 S2 no M/R/G  AVF +_ thrill and bruit  No LE edema    LABS/STUDIES  --------------------------------------------------------------------------------              8.3    7.85  >-----------<  240      [03-21-23 @ 07:14]              26.2     139  |  95  |  42  ----------------------------<  122      [03-21-23 @ 07:17]  3.6   |  27  |  4.35        Ca     9.2     [03-21-23 @ 07:17]      Mg     1.8     [03-21-23 @ 07:17]      Phos  4.2     [03-21-23 @ 07:17]            Creatinine Trend:  SCr 4.35 [03-21 @ 07:17]  SCr 5.42 [03-20 @ 07:08]  SCr 3.66 [03-19 @ 07:14]  SCr 4.68 [03-18 @ 07:36]  SCr 6.20 [03-17 @ 12:39]    Urinalysis - [03-17-23 @ 14:53]      Color Light Yellow / Appearance Slightly Turbid / SG 1.012 / pH 6.0      Gluc Negative / Ketone Negative  / Bili Negative / Urobili Negative       Blood Moderate / Protein 100 mg/dl / Leuk Est Large / Nitrite Negative      RBC 3 /  / Hyaline 1 / Gran  / Sq Epi  / Non Sq Epi 1 / Bacteria Few      Iron 39, TIBC 186, %sat 21      [03-17-23 @ 12:39]    HBsAb <3.0      [03-17-23 @ 14:53]  HBsAg Nonreact      [03-17-23 @ 14:53]  HBcAb Nonreact      [03-17-23 @ 14:53]  HCV 0.13, Nonreact      [03-17-23 @ 14:53]    
PATIENT SEEN AND EXAMINED ON :- 3/20/23  DATE OF SERVICE:   3/20/23          Interim events noted,Labs ,Radiological studies and Cardiology tests reviewed .    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 She describes that she has been very tired in the last few days, also has had diarrhea for several weeks and feels a loss of appetite. She also describes feeling "cold all the time".  ROS is otherwise negative.   (17 Mar 2023 16:22)      INTERIM EVENTS:Patient seen at bedside ,interim events noted.      PMH -reviewed admission note, no change since admission  HEART FAILURE: Acute[ ]Chronic[ ] Systolic[ ] Diastolic[ ] Combined Systolic and Diastolic[ ]  CAD[ ] CABG[ ] PCI[ ]  DEVICES[ ] PPM[ ] ICD[ ] ILR[ ]  ATRIAL FIBRILLATION[ ] Paroxysmal[ ] Permanent[ ] CHADS2-[  ]  SOPHIA[ ] CKD1[ ] CKD2[ ] CKD3[ ] CKD4[ ] ESRD[ ]  COPD[ ] HTN[ ]   DM[ ] Type1[ ] Type 2[ ]   CVA[ ] Paresis[ ]    AMBULATION: Assisted[ ] Cane/walker[ ] Independent[ ]    MEDICATIONS  (STANDING):  calcitriol   Capsule 0.25 MICROGram(s) Oral daily  epoetin dexter-epbx (RETACRIT) Injectable 4000 Unit(s) SubCutaneous once    MEDICATIONS  (PRN):  acetaminophen     Tablet .. 650 milliGRAM(s) Oral every 6 hours PRN Temp greater or equal to 38C (100.4F), Mild Pain (1 - 3)  lidocaine/prilocaine Cream 1 Application(s) Topical daily PRN pain  melatonin 3 milliGRAM(s) Oral at bedtime PRN Insomnia            REVIEW OF SYSTEMS:  Constitutional: [ ] fever, [ ]weight loss,  [ ]fatigue [ ]weight gain  Eyes: [ ] visual changes  Respiratory: [ ]shortness of breath;  [ ] cough, [ ]wheezing, [ ]chills, [ ]hemoptysis  Cardiovascular: [ ] chest pain, [ ]palpitations, [ ]dizziness,  [ ]leg swelling[ ]orthopnea[ ]PND  Gastrointestinal: [ ] abdominal pain, [ ]nausea, [ ]vomiting,  [ ]diarrhea [ ]Constipation [ ]Melena  Genitourinary: [ ] dysuria, [ ] hematuria [ ]Harper  Neurologic: [ ] headaches [ ] tremors[ ]weakness [ ]Paralysis Right[ ] Left[ ]  Skin: [ ] itching, [ ]burning, [ ] rashes  Endocrine: [ ] heat or cold intolerance  Musculoskeletal: [ ] joint pain or swelling; [ ] muscle, back, or extremity pain  Psychiatric: [ ] depression, [ ]anxiety, [ ]mood swings, or [ ]difficulty sleeping  Hematologic: [ ] easy bruising, [ ] bleeding gums    [ ] All remaining systems negative except as per above.   [ ]Unable to obtain.  [x] No change in ROS since admission      Vital Signs Last 24 Hrs  T(C): 36.3 (20 Mar 2023 21:30), Max: 36.9 (20 Mar 2023 12:58)  T(F): 97.3 (20 Mar 2023 21:30), Max: 98.4 (20 Mar 2023 12:58)  HR: 95 (20 Mar 2023 21:30) (90 - 107)  BP: 124/71 (20 Mar 2023 21:30) (110/65 - 159/77)  BP(mean): --  RR: 18 (20 Mar 2023 21:30) (16 - 18)  SpO2: 96% (20 Mar 2023 21:30) (96% - 99%)    Parameters below as of 20 Mar 2023 21:30  Patient On (Oxygen Delivery Method): room air      I&O's Summary    19 Mar 2023 07:01  -  20 Mar 2023 07:00  --------------------------------------------------------  IN: 240 mL / OUT: 0 mL / NET: 240 mL    20 Mar 2023 07:01  -  20 Mar 2023 21:52  --------------------------------------------------------  IN: 1320 mL / OUT: 0 mL / NET: 1320 mL        PHYSICAL EXAM:  General: No acute distress BMI-  HEENT: EOMI, PERRL  Neck: Supple, [ ] JVD  Lungs: Equal air entry bilaterally; [ ] rales [ ] wheezing [ ] rhonchi  Heart: Regular rate and rhythm; [x ] murmur   2/6 [ x] systolic [ ] diastolic [ ] radiation[ ] rubs [ ]  gallops  Abdomen: Nontender, bowel sounds present  Extremities: No clubbing, cyanosis, [ ] edema [ ]Pulses  equal and intact  Nervous system:  Alert & Oriented X3, no focal deficits  Psychiatric: Normal affect  Skin: No rashes or lesions    LABS:  03-20    139  |  99  |  56<H>  ----------------------------<  101<H>  3.7   |  23  |  5.42<H>    Ca    8.9      20 Mar 2023 07:08  Phos  5.3     03-20  Mg     1.9     03-20      Creatinine Trend: 5.42<--, 3.66<--, 4.68<--, 6.20<--                        7.7    6.06  )-----------( 205      ( 20 Mar 2023 07:10 )             24.0               
Seen ex'ed in HD unit.  L AVF accessed/being used wo probs.  Pt augie well.  L UE: smalll area of ecchymosis/induration from recent infilt stable.  Otherwise ok.    Rec:   Cont using AVF  FU in off for surveillance  Compresses for infilt area.  
pt was evaluated by hospitalist earlier during the day   
[facility] DIVISION OF KIDNEY DISEASES AND HYPERTENSION -- FOLLOW UP NOTE  --------------------------------------------------------------------------------  Chief Complaint: Advanced CKD initiated on long term HD     24 hour events/subjective: Pt. was seen and examined today. Pt underwent 1st HD session on 3/17/23 & 2nd session on 3/18. Tolerated HD well. Denies fever, chills, nausea, vomiting, CP and SOB.    PAST HISTORY  --------------------------------------------------------------------------------  No significant changes to PMH, PSH, FHx, SHx, unless otherwise noted    ALLERGIES & MEDICATIONS  --------------------------------------------------------------------------------  Allergies    No Known Allergies    Intolerances    Standing Inpatient Medications  calcitriol   Capsule 0.25 MICROGram(s) Oral daily    PRN Inpatient Medications  acetaminophen     Tablet .. 650 milliGRAM(s) Oral every 6 hours PRN  lidocaine/prilocaine Cream 1 Application(s) Topical daily PRN  melatonin 3 milliGRAM(s) Oral at bedtime PRN    REVIEW OF SYSTEMS  --------------------------------------------------------------------------------  Gen: No fevers   Respiratory: No dyspnea  CV: No chest pain  GI: No abdominal pain  : No dysuria  MSK: No  edema  Neuro: no dizziness   All other systems were reviewed and are negative, except as noted.    VITALS/PHYSICAL EXAM  --------------------------------------------------------------------------------  T(C): 36.8 (03-20-23 @ 04:53), Max: 37.4 (03-19-23 @ 12:33)  HR: 90 (03-20-23 @ 04:53) (83 - 90)  BP: 110/65 (03-20-23 @ 04:53) (108/68 - 116/67)  RR: 18 (03-20-23 @ 04:53) (18 - 18)  SpO2: 97% (03-20-23 @ 04:53) (95% - 98%)  Wt(kg): --    03-19-23 @ 07:01  -  03-20-23 @ 07:00  --------------------------------------------------------  IN: 240 mL / OUT: 0 mL / NET: 240 mL    Physical Exam:  	Gen: elderly female, resting, NAD  	HEENT: Anicteric  	Pulm: CTA B/L  	CV: S1S2+  	Abd: Soft, +BS   	Ext: No LE edema B/L  	Neuro: Awake  	Skin: Warm and dry  	Dialysis access: LUE AVF, thrill felt and bruit heard    LABS/STUDIES  --------------------------------------------------------------------------------              7.7    6.06  >-----------<  205      [03-20-23 @ 07:10]              24.0     139  |  99  |  56  ----------------------------<  101      [03-20-23 @ 07:08]  3.7   |  23  |  5.42        Ca     8.9     [03-20-23 @ 07:08]      Mg     1.9     [03-20-23 @ 07:08]      Phos  5.3     [03-20-23 @ 07:08]    Creatinine Trend:  SCr 5.42 [03-20 @ 07:08]  SCr 3.66 [03-19 @ 07:14]  SCr 4.68 [03-18 @ 07:36]  SCr 6.20 [03-17 @ 12:39]    Urinalysis - [03-17-23 @ 14:53]      Color Light Yellow / Appearance Slightly Turbid / SG 1.012 / pH 6.0      Gluc Negative / Ketone Negative  / Bili Negative / Urobili Negative       Blood Moderate / Protein 100 mg/dl / Leuk Est Large / Nitrite Negative      RBC 3 /  / Hyaline 1 / Gran  / Sq Epi  / Non Sq Epi 1 / Bacteria Few    Iron 39, TIBC 186, %sat 21      [03-17-23 @ 12:39]    HBsAb <3.0      [03-17-23 @ 14:53]  HBsAg Nonreact      [03-17-23 @ 14:53]  HBcAb Nonreact      [03-17-23 @ 14:53]  HCV 0.13, Nonreact      [03-17-23 @ 14:53]
PATIENT SEEN AND EXAMINED ON :- 3/19/23  DATE OF SERVICE:  3/19/23           Interim events noted,Labs ,Radiological studies and Cardiology tests reviewed .    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 She describes that she has been very tired in the last few days, also has had diarrhea for several weeks and feels a loss of appetite. She also describes feeling "cold all the time".  ROS is otherwise negative.   (17 Mar 2023 16:22)      INTERIM EVENTS:Patient seen at bedside ,interim events noted.      PMH -reviewed admission note, no change since admission  HEART FAILURE: Acute[ ]Chronic[ ] Systolic[ ] Diastolic[ ] Combined Systolic and Diastolic[ ]  CAD[ ] CABG[ ] PCI[ ]  DEVICES[ ] PPM[ ] ICD[ ] ILR[ ]  ATRIAL FIBRILLATION[ ] Paroxysmal[ ] Permanent[ ] CHADS2-[  ]  SOPHIA[ ] CKD1[ ] CKD2[ ] CKD3[ ] CKD4[ ] ESRD[ ]  COPD[ ] HTN[ ]   DM[ ] Type1[ ] Type 2[ ]   CVA[ ] Paresis[ ]    AMBULATION: Assisted[ ] Cane/walker[ ] Independent[ ]    MEDICATIONS  (STANDING):  calcitriol   Capsule 0.25 MICROGram(s) Oral daily    MEDICATIONS  (PRN):  acetaminophen     Tablet .. 650 milliGRAM(s) Oral every 6 hours PRN Temp greater or equal to 38C (100.4F), Mild Pain (1 - 3)  lidocaine/prilocaine Cream 1 Application(s) Topical daily PRN pain  melatonin 3 milliGRAM(s) Oral at bedtime PRN Insomnia            REVIEW OF SYSTEMS:  Constitutional: [ ] fever, [ ]weight loss,  [ ]fatigue [ ]weight gain  Eyes: [ ] visual changes  Respiratory: [ ]shortness of breath;  [ ] cough, [ ]wheezing, [ ]chills, [ ]hemoptysis  Cardiovascular: [ ] chest pain, [ ]palpitations, [ ]dizziness,  [ ]leg swelling[ ]orthopnea[ ]PND  Gastrointestinal: [ ] abdominal pain, [ ]nausea, [ ]vomiting,  [ ]diarrhea [ ]Constipation [ ]Melena  Genitourinary: [ ] dysuria, [ ] hematuria [ ]Harper  Neurologic: [ ] headaches [ ] tremors[ ]weakness [ ]Paralysis Right[ ] Left[ ]  Skin: [ ] itching, [ ]burning, [ ] rashes  Endocrine: [ ] heat or cold intolerance  Musculoskeletal: [ ] joint pain or swelling; [ ] muscle, back, or extremity pain  Psychiatric: [ ] depression, [ ]anxiety, [ ]mood swings, or [ ]difficulty sleeping  Hematologic: [ ] easy bruising, [ ] bleeding gums    [ ] All remaining systems negative except as per above.   [ ]Unable to obtain.  [x] No change in ROS since admission      Vital Signs Last 24 Hrs  T(C): 37 (19 Mar 2023 19:38), Max: 37.4 (19 Mar 2023 12:33)  T(F): 98.6 (19 Mar 2023 19:38), Max: 99.3 (19 Mar 2023 12:33)  HR: 88 (19 Mar 2023 19:38) (79 - 88)  BP: 116/67 (19 Mar 2023 19:38) (108/68 - 116/67)  BP(mean): --  RR: 18 (19 Mar 2023 19:38) (18 - 18)  SpO2: 98% (19 Mar 2023 19:38) (95% - 98%)    Parameters below as of 19 Mar 2023 19:38  Patient On (Oxygen Delivery Method): room air      I&O's Summary    18 Mar 2023 07:01  -  19 Mar 2023 07:00  --------------------------------------------------------  IN: 720 mL / OUT: 0 mL / NET: 720 mL    19 Mar 2023 07:01  -  19 Mar 2023 21:59  --------------------------------------------------------  IN: 240 mL / OUT: 0 mL / NET: 240 mL        PHYSICAL EXAM:  General: No acute distress BMI-  HEENT: EOMI, PERRL  Neck: Supple, [ ] JVD  Lungs: Equal air entry bilaterally; [ ] rales [ ] wheezing [ ] rhonchi  Heart: Regular rate and rhythm; [x ] murmur   2/6 [ x] systolic [ ] diastolic [ ] radiation[ ] rubs [ ]  gallops  Abdomen: Nontender, bowel sounds present  Extremities: No clubbing, cyanosis, [ ] edema [ ]Pulses  equal and intact  Nervous system:  Alert & Oriented X3, no focal deficits  Psychiatric: Normal affect  Skin: No rashes or lesions    LABS:  03-19    137  |  97  |  38<H>  ----------------------------<  118<H>  4.0   |  24  |  3.66<H>    Ca    8.8      19 Mar 2023 07:14  Phos  4.2     03-19  Mg     1.8     03-19      Creatinine Trend: 3.66<--, 4.68<--, 6.20<--                        7.6    6.39  )-----------( 225      ( 19 Mar 2023 07:16 )             23.8               
[facility] DIVISION OF KIDNEY DISEASES AND HYPERTENSION   FOLLOW UP NOTE    --------------------------------------------------------------------------------  Chief Complaint: Advanced CKD initiated on long term HD     24 hour events/subjective: Pt. was seen and examined today. Pt underwent 1st HD session on 3/17/23 and tolerated well. Denies fever, chills, nausea, vomiting, CP and SOB.      PAST HISTORY  --------------------------------------------------------------------------------  No significant changes to PMH, PSH, FHx, SHx, unless otherwise noted    ALLERGIES & MEDICATIONS  --------------------------------------------------------------------------------  Allergies    No Known Allergies    Intolerances      Standing Inpatient Medications  calcitriol   Capsule 0.25 MICROGram(s) Oral daily    PRN Inpatient Medications  acetaminophen     Tablet .. 650 milliGRAM(s) Oral every 6 hours PRN  melatonin 3 milliGRAM(s) Oral at bedtime PRN      REVIEW OF SYSTEMS  --------------------------------------------------------------------------------  Gen: No fevers/chills  Head/Eyes/Ears: No HA   Respiratory: No dyspnea, cough  CV: No chest pain  GI: No abdominal pain, diarrhea  : No dysuria, hematuria, see HPI  MSK: No  edema  Skin: No rashes  Heme: anemia    VITALS/PHYSICAL EXAM  --------------------------------------------------------------------------------  T(C): 37.1 (03-18-23 @ 04:51), Max: 37.1 (03-18-23 @ 04:51)  HR: 82 (03-18-23 @ 04:51) (67 - 96)  BP: 113/74 (03-18-23 @ 04:51) (111/64 - 146/67)  RR: 18 (03-18-23 @ 04:51) (16 - 20)  SpO2: 100% (03-18-23 @ 04:51) (98% - 100%)  Wt(kg): --  Height (cm): 162.6 (03-17-23 @ 11:22)  Weight (kg): 67.6 (03-17-23 @ 11:22)  BMI (kg/m2): 25.6 (03-17-23 @ 11:22)  BSA (m2): 1.73 (03-17-23 @ 11:22)      Physical Exam:  	Gen: elderly female, resting, NAD  	HEENT: Anicteric  	Pulm: CTA B/L  	CV: S1S2+  	Abd: Soft, +BS   	Ext: No LE edema B/L  	Neuro: Awake  	Skin: Warm and dry  	Dialysis access: LUE AVF, thrill felt and bruit heard    LABS/STUDIES  --------------------------------------------------------------------------------              7.6    5.83  >-----------<  226      [03-18-23 @ 07:36]              24.1     141  |  101  |  72  ----------------------------<  112      [03-18-23 @ 07:36]  4.5   |  25  |  4.68        Ca     8.7     [03-18-23 @ 07:36]      Mg     1.7     [03-18-23 @ 07:36]      Phos  5.0     [03-18-23 @ 07:36]    Creatinine Trend:  SCr 4.68 [03-18 @ 07:36]  SCr 6.20 [03-17 @ 12:39]    Urinalysis - [03-17-23 @ 14:53]      Color Light Yellow / Appearance Slightly Turbid / SG 1.012 / pH 6.0      Gluc Negative / Ketone Negative  / Bili Negative / Urobili Negative       Blood Moderate / Protein 100 mg/dl / Leuk Est Large / Nitrite Negative      RBC 3 /  / Hyaline 1 / Gran  / Sq Epi  / Non Sq Epi 1 / Bacteria Few    HBsAb <3.0      [03-17-23 @ 14:53]  HBsAg Nonreact      [03-17-23 @ 14:53]  HBcAb Nonreact      [03-17-23 @ 14:53]  HCV 0.13, Nonreact      [03-17-23 @ 14:53]
    SUBJECTIVE / OVERNIGHT EVENTS:pt seen and examined,   23    MEDICATIONS  (STANDING):  calcitriol   Capsule 0.25 MICROGram(s) Oral daily  epoetin dexter-epbx (RETACRIT) Injectable 4000 Unit(s) IV Push <User Schedule>    MEDICATIONS  (PRN):  acetaminophen     Tablet .. 650 milliGRAM(s) Oral every 6 hours PRN Temp greater or equal to 38C (100.4F), Mild Pain (1 - 3)  lidocaine/prilocaine Cream 1 Application(s) Topical daily PRN pain  melatonin 3 milliGRAM(s) Oral at bedtime PRN Insomnia    Vital Signs Last 24 Hrs  T(C): 36.8 (23 @ 19:18), Max: 37.1 (23 @ 13:29)  T(F): 98.2 (23 @ 19:18), Max: 98.8 (23 @ 13:29)  HR: 99 (23 @ 19:18) (86 - 99)  BP: 103/64 (23 @ 19:18) (103/63 - 107/69)  BP(mean): --  RR: 18 (23 @ 19:18) (18 - 18)  SpO2: 96% (23 @ 19:18) (96% - 98%)          Constitutional: No fever, fatigue  Skin: No rash.  Eyes: No recent vision problems or eye pain.  ENT: No congestion, ear pain, or sore throat.  Cardiovascular: No chest pain or palpation.  Respiratory: No cough, shortness of breath, congestion, or wheezing.  Gastrointestinal: No abdominal pain, nausea, vomiting, or diarrhea.  Genitourinary: No dysuria.  Musculoskeletal: No joint swelling.  Neurologic: No headache.    PHYSICAL EXAM:  GENERAL: NAD  EYES: EOMI, PERRLA  NECK: Supple, No JVD  CHEST/LUNG: dec breath sounds at bases  HEART:  S1 , S2 +  ABDOMEN: soft , bs+  EXTREMITIES: no edema   NEUROLOGY:alert awake      LABS:      139  |  95<L>  |  42<H>  ----------------------------<  122<H>  3.6   |  27  |  4.35<H>    Ca    9.2      21 Mar 2023 07:17  Phos  4.2     03-21  Mg     1.8     03-21      Creatinine Trend: 4.35 <--, 5.42 <--, 3.66 <--, 4.68 <--, 6.20 <--                        8.3    7.85  )-----------( 240      ( 21 Mar 2023 07:14 )             26.2     Urine Studies:  Urinalysis Basic - ( 17 Mar 2023 14:53 )    Color: Light Yellow / Appearance: Slightly Turbid / S.012 / pH:   Gluc:  / Ketone: Negative  / Bili: Negative / Urobili: Negative   Blood:  / Protein: 100 mg/dl / Nitrite: Negative   Leuk Esterase: Large / RBC: 3 /hpf /  /HPF   Sq Epi:  / Non Sq Epi: 1 /hpf / Bacteria: Few                                          RADIOLOGY & ADDITIONAL TESTS:    Imaging Personally Reviewed:YES    Consultant(s) Notes Reviewed:  YES    Care Discussed with Consultants/Other Providers:YES  
[facility] DIVISION OF KIDNEY DISEASES AND HYPERTENSION -- FOLLOW UP NOTE  --------------------------------------------------------------------------------  Chief Complaint: Advanced CKD initiated on long term HD     24 hour events/subjective: Pt. was seen and examined today. Pt underwent 1st HD session on 3/17/23 & 2nd session on 3/18. Last HD done on 3/20. Tolerated HD well. Denies fever, chills, nausea, vomiting, CP and SOB.    PAST HISTORY  --------------------------------------------------------------------------------  No significant changes to PMH, PSH, FHx, SHx, unless otherwise noted    ALLERGIES & MEDICATIONS  --------------------------------------------------------------------------------  Allergies    No Known Allergies    Intolerances    Standing Inpatient Medications  calcitriol   Capsule 0.25 MICROGram(s) Oral daily  epoetin dexter-epbx (RETACRIT) Injectable 4000 Unit(s) IV Push <User Schedule>    PRN Inpatient Medications  acetaminophen     Tablet .. 650 milliGRAM(s) Oral every 6 hours PRN  lidocaine/prilocaine Cream 1 Application(s) Topical daily PRN  melatonin 3 milliGRAM(s) Oral at bedtime PRN    REVIEW OF SYSTEMS  --------------------------------------------------------------------------------  Gen: No fevers   Respiratory: No dyspnea  CV: No chest pain  GI: No abdominal pain  : No dysuria  MSK: No  edema  Neuro: no dizziness   All other systems were reviewed and are negative, except as noted.    VITALS/PHYSICAL EXAM  --------------------------------------------------------------------------------  T(C): 36.9 (03-22-23 @ 04:55), Max: 37.1 (03-21-23 @ 13:29)  HR: 90 (03-22-23 @ 04:55) (86 - 99)  BP: 113/67 (03-22-23 @ 04:55) (103/64 - 113/67)  RR: 18 (03-22-23 @ 04:55) (18 - 18)  SpO2: 98% (03-22-23 @ 04:55) (96% - 98%)  Wt(kg): --    03-21-23 @ 07:01  -  03-22-23 @ 07:00  --------------------------------------------------------  IN: 1080 mL / OUT: 0 mL / NET: 1080 mL    Physical Exam:  	Gen: elderly female, resting, NAD  	HEENT: Anicteric  	Pulm: CTA B/L  	CV: S1S2+  	Abd: Soft, +BS   	Ext: No LE edema B/L  	Neuro: Awake  	Skin: Warm and dry  	Dialysis access: MISTY AVF, thrill felt and bruit heard    LABS/STUDIES  --------------------------------------------------------------------------------              8.0    8.82  >-----------<  228      [03-22-23 @ 07:00]              25.2     138  |  95  |  62  ----------------------------<  107      [03-22-23 @ 07:00]  3.6   |  25  |  6.01        Ca     9.2     [03-22-23 @ 07:00]      Mg     1.8     [03-22-23 @ 07:00]      Phos  5.4     [03-22-23 @ 07:00]    Creatinine Trend:  SCr 6.01 [03-22 @ 07:00]  SCr 4.35 [03-21 @ 07:17]  SCr 5.42 [03-20 @ 07:08]  SCr 3.66 [03-19 @ 07:14]  SCr 4.68 [03-18 @ 07:36]    Urinalysis - [03-17-23 @ 14:53]      Color Light Yellow / Appearance Slightly Turbid / SG 1.012 / pH 6.0      Gluc Negative / Ketone Negative  / Bili Negative / Urobili Negative       Blood Moderate / Protein 100 mg/dl / Leuk Est Large / Nitrite Negative      RBC 3 /  / Hyaline 1 / Gran  / Sq Epi  / Non Sq Epi 1 / Bacteria Few    Iron 39, TIBC 186, %sat 21      [03-17-23 @ 12:39]    HBsAb <3.0      [03-17-23 @ 14:53]  HBsAg Nonreact      [03-17-23 @ 14:53]  HBcAb Nonreact      [03-17-23 @ 14:53]  HCV 0.13, Nonreact      [03-17-23 @ 14:53]
[facility] DIVISION OF KIDNEY DISEASES AND HYPERTENSION -- FOLLOW UP NOTE  --------------------------------------------------------------------------------  Chief Complaint:    24 hour events/subjective:  US and vascular eval noted      PAST HISTORY  --------------------------------------------------------------------------------  No significant changes to PMH, PSH, FHx, SHx, unless otherwise noted    ALLERGIES & MEDICATIONS  --------------------------------------------------------------------------------  Allergies    No Known Allergies    Intolerances      Standing Inpatient Medications  calcitriol   Capsule 0.25 MICROGram(s) Oral daily  epoetin dexter-epbx (RETACRIT) Injectable 4000 Unit(s) IV Push <User Schedule>    PRN Inpatient Medications  acetaminophen     Tablet .. 650 milliGRAM(s) Oral every 6 hours PRN  lidocaine/prilocaine Cream 1 Application(s) Topical daily PRN  melatonin 3 milliGRAM(s) Oral at bedtime PRN      REVIEW OF SYSTEMS  --------------------------------------------------------------------------------  Gen: No weight changes, fatigue, fevers/chills, weakness  Skin: No rashes  Head/Eyes/Ears/Mouth: No headache; Normal hearing; Normal vision w/o blurriness; No sinus pain/discomfort, sore throat  Respiratory: No dyspnea, cough, wheezing, hemoptysis  CV: No chest pain, PND, orthopnea  GI: No abdominal pain, diarrhea, constipation, nausea, vomiting, melena, hematochezia  : No increased frequency, dysuria, hematuria, nocturia  MSK: No joint pain/swelling; no back pain; no edema  Neuro: No dizziness/lightheadedness, weakness, seizures, numbness, tingling  Heme: No easy bruising or bleeding  Endo: No heat/cold intolerance  Psych: No significant nervousness, anxiety, stress, depression    All other systems were reviewed and are negative, except as noted.    VITALS/PHYSICAL EXAM  --------------------------------------------------------------------------------  T(C): 37.5 (03-23-23 @ 05:30), Max: 37.5 (03-23-23 @ 05:30)  HR: 98 (03-23-23 @ 05:30) (88 - 98)  BP: 114/66 (03-23-23 @ 05:30) (107/65 - 114/66)  RR: 18 (03-23-23 @ 05:30) (18 - 18)  SpO2: 99% (03-23-23 @ 05:30) (97% - 99%)  Wt(kg): --        03-22-23 @ 07:01  -  03-23-23 @ 07:00  --------------------------------------------------------  IN: 1240 mL / OUT: 50 mL / NET: 1190 mL      Physical Exam:  	Gen: NAD, well-appearing  	HEENT: PERRL, supple neck, clear oropharynx  	Pulm: CTA B/L  	CV: RRR, S1S2; no rub  	Back: No spinal or CVA tenderness; no sacral edema  	Skin: Warm, without rashes  	Vascular access: + thrill + bruit AVF left arm    LABS/STUDIES  --------------------------------------------------------------------------------              7.7    8.09  >-----------<  223      [03-23-23 @ 06:55]              24.7     141  |  98  |  72  ----------------------------<  94      [03-23-23 @ 06:57]  3.6   |  24  |  6.35        Ca     9.0     [03-23-23 @ 06:57]      Mg     1.9     [03-23-23 @ 06:57]      Phos  5.4     [03-23-23 @ 06:57]            Creatinine Trend:  SCr 6.35 [03-23 @ 06:57]  SCr 6.01 [03-22 @ 07:00]  SCr 4.35 [03-21 @ 07:17]  SCr 5.42 [03-20 @ 07:08]  SCr 3.66 [03-19 @ 07:14]    Urinalysis - [03-17-23 @ 14:53]      Color Light Yellow / Appearance Slightly Turbid / SG 1.012 / pH 6.0      Gluc Negative / Ketone Negative  / Bili Negative / Urobili Negative       Blood Moderate / Protein 100 mg/dl / Leuk Est Large / Nitrite Negative      RBC 3 /  / Hyaline 1 / Gran  / Sq Epi  / Non Sq Epi 1 / Bacteria Few      Iron 60, TIBC --, %sat --      [03-22-23 @ 07:04]    HBsAb <3.0      [03-17-23 @ 14:53]  HBsAg Nonreact      [03-17-23 @ 14:53]  HBcAb Nonreact      [03-17-23 @ 14:53]  HCV 0.13, Nonreact      [03-17-23 @ 14:53]    
    SUBJECTIVE / OVERNIGHT EVENTS:pt seen and examined  23    MEDICATIONS  (STANDING):  calcitriol   Capsule 0.25 MICROGram(s) Oral daily    MEDICATIONS  (PRN):  acetaminophen     Tablet .. 650 milliGRAM(s) Oral every 6 hours PRN Temp greater or equal to 38C (100.4F), Mild Pain (1 - 3)  melatonin 3 milliGRAM(s) Oral at bedtime PRN Insomnia    T(C): 36.6 (23 @ 21:33), Max: 37.1 (23 @ 04:51)  HR: 87 (23 @ 21:33) (82 - 98)  BP: 111/66 (23 @ 21:33) (107/64 - 134/68)  RR: 18 (23 @ 21:33) (18 - )  SpO2: 98% (23 @ 21:33) (97% - 100%)    CAPILLARY BLOOD GLUCOSE        I&O's Summary    18 Mar 2023 07:01  -  19 Mar 2023 00:03  --------------------------------------------------------  IN: 720 mL / OUT: 0 mL / NET: 720 mL        Constitutional: No fever, fatigue  Skin: No rash.  Eyes: No recent vision problems or eye pain.  ENT: No congestion, ear pain, or sore throat.  Cardiovascular: No chest pain or palpation.  Respiratory: No cough, shortness of breath, congestion, or wheezing.  Gastrointestinal: No abdominal pain, nausea, vomiting, or diarrhea.  Genitourinary: No dysuria.  Musculoskeletal: No joint swelling.  Neurologic: No headache.    PHYSICAL EXAM:  GENERAL: NAD  EYES: EOMI, PERRLA  NECK: Supple, No JVD  CHEST/LUNG: dec breath sounds at bases  HEART:  S1 , S2 +  ABDOMEN: soft , bs+  EXTREMITIES: no edema   NEUROLOGY:alert awake      LABS:                        7.6    5.83  )-----------( 226      ( 18 Mar 2023 07:36 )             24.1         141  |  101  |  72<H>  ----------------------------<  112<H>  4.5   |  25  |  4.68<H>    Ca    8.7      18 Mar 2023 07:36  Phos  5.0     03-18  Mg     1.7     -18    TPro  7.4  /  Alb  3.7  /  TBili  0.2  /  DBili  x   /  AST  10  /  ALT  13  /  AlkPhos  70  03-17    PT/INR - ( 17 Mar 2023 12:39 )   PT: 17.0 sec;   INR: 1.46 ratio         PTT - ( 17 Mar 2023 12:39 )  PTT:29.4 sec      Urinalysis Basic - ( 17 Mar 2023 14:53 )    Color: Light Yellow / Appearance: Slightly Turbid / S.012 / pH: x  Gluc: x / Ketone: Negative  / Bili: Negative / Urobili: Negative   Blood: x / Protein: 100 mg/dl / Nitrite: Negative   Leuk Esterase: Large / RBC: 3 /hpf /  /HPF   Sq Epi: x / Non Sq Epi: 1 /hpf / Bacteria: Few        RADIOLOGY & ADDITIONAL TESTS:    Imaging Personally Reviewed:    Consultant(s) Notes Reviewed:      Care Discussed with Consultants/Other Providers:  
    SUBJECTIVE / OVERNIGHT EVENTS:pt seen and examined,   23    MEDICATIONS  (STANDING):  calcitriol   Capsule 0.25 MICROGram(s) Oral daily  epoetin dexter-epbx (RETACRIT) Injectable 4000 Unit(s) SubCutaneous once    MEDICATIONS  (PRN):  acetaminophen     Tablet .. 650 milliGRAM(s) Oral every 6 hours PRN Temp greater or equal to 38C (100.4F), Mild Pain (1 - 3)  lidocaine/prilocaine Cream 1 Application(s) Topical daily PRN pain  melatonin 3 milliGRAM(s) Oral at bedtime PRN Insomnia    Vital Signs Last 24 Hrs  T(C): 36.8 (23 @ 04:53), Max: 37 (23 @ 19:38)  T(F): 98.2 (23 @ 04:53), Max: 98.6 (23 @ 19:38)  HR: 90 (23 @ 04:53) (88 - 90)  BP: 110/65 (23 @ 04:53) (110/65 - 116/67)  BP(mean): --  RR: 18 (23 @ 04:53) (18 - 18)  SpO2: 97% (23 @ 04:53) (97% - 98%)        Constitutional: No fever, fatigue  Skin: No rash.  Eyes: No recent vision problems or eye pain.  ENT: No congestion, ear pain, or sore throat.  Cardiovascular: No chest pain or palpation.  Respiratory: No cough, shortness of breath, congestion, or wheezing.  Gastrointestinal: No abdominal pain, nausea, vomiting, or diarrhea.  Genitourinary: No dysuria.  Musculoskeletal: No joint swelling.  Neurologic: No headache.    PHYSICAL EXAM:  GENERAL: NAD  EYES: EOMI, PERRLA  NECK: Supple, No JVD  CHEST/LUNG: dec breath sounds at bases  HEART:  S1 , S2 +  ABDOMEN: soft , bs+  EXTREMITIES: no edema   NEUROLOGY:alert awake      LABS:      139  |  99  |  56<H>  ----------------------------<  101<H>  3.7   |  23  |  5.42<H>    Ca    8.9      20 Mar 2023 07:08  Phos  5.3     03-20  Mg     1.9     03-20      Creatinine Trend: 5.42 <--, 3.66 <--, 4.68 <--, 6.20 <--                        7.7    6.06  )-----------( 205      ( 20 Mar 2023 07:10 )             24.0     Urine Studies:  Urinalysis Basic - ( 17 Mar 2023 14:53 )    Color: Light Yellow / Appearance: Slightly Turbid / S.012 / pH:   Gluc:  / Ketone: Negative  / Bili: Negative / Urobili: Negative   Blood:  / Protein: 100 mg/dl / Nitrite: Negative   Leuk Esterase: Large / RBC: 3 /hpf /  /HPF   Sq Epi:  / Non Sq Epi: 1 /hpf / Bacteria: Few                          RADIOLOGY & ADDITIONAL TESTS:    Imaging Personally Reviewed:YES    Consultant(s) Notes Reviewed:  YES    Care Discussed with Consultants/Other Providers:YES  
    SUBJECTIVE / OVERNIGHT EVENTS:pt seen and examined, C/O intermittent headache  23    MEDICATIONS  (STANDING):  calcitriol   Capsule 0.25 MICROGram(s) Oral daily    MEDICATIONS  (PRN):  acetaminophen     Tablet .. 650 milliGRAM(s) Oral every 6 hours PRN Temp greater or equal to 38C (100.4F), Mild Pain (1 - 3)  lidocaine/prilocaine Cream 1 Application(s) Topical daily PRN pain  melatonin 3 milliGRAM(s) Oral at bedtime PRN Insomnia    Vital Signs Last 24 Hrs  T(C): 37 (23 @ 19:38), Max: 37.4 (23 @ 12:33)  T(F): 98.6 (23 @ 19:38), Max: 99.3 (23 @ 12:33)  HR: 88 (23 @ 19:38) (79 - 88)  BP: 116/67 (23 @ 19:38) (108/68 - 116/67)  BP(mean): --  RR: 18 (23 @ 19:38) (18 - 18)  SpO2: 98% (23 @ 19:38) (95% - 98%)      Constitutional: No fever, fatigue  Skin: No rash.  Eyes: No recent vision problems or eye pain.  ENT: No congestion, ear pain, or sore throat.  Cardiovascular: No chest pain or palpation.  Respiratory: No cough, shortness of breath, congestion, or wheezing.  Gastrointestinal: No abdominal pain, nausea, vomiting, or diarrhea.  Genitourinary: No dysuria.  Musculoskeletal: No joint swelling.  Neurologic: No headache.    PHYSICAL EXAM:  GENERAL: NAD  EYES: EOMI, PERRLA  NECK: Supple, No JVD  CHEST/LUNG: dec breath sounds at bases  HEART:  S1 , S2 +  ABDOMEN: soft , bs+  EXTREMITIES: no edema   NEUROLOGY:alert awake      LABS:      137  |  97  |  38<H>  ----------------------------<  118<H>  4.0   |  24  |  3.66<H>    Ca    8.8      19 Mar 2023 07:14  Phos  4.2       Mg     1.8           Creatinine Trend: 3.66 <--, 4.68 <--, 6.20 <--                        7.6    6.39  )-----------( 225      ( 19 Mar 2023 07:16 )             23.8     Urine Studies:  Urinalysis Basic - ( 17 Mar 2023 14:53 )    Color: Light Yellow / Appearance: Slightly Turbid / S.012 / pH:   Gluc:  / Ketone: Negative  / Bili: Negative / Urobili: Negative   Blood:  / Protein: 100 mg/dl / Nitrite: Negative   Leuk Esterase: Large / RBC: 3 /hpf /  /HPF   Sq Epi:  / Non Sq Epi: 1 /hpf / Bacteria: Few                          RADIOLOGY & ADDITIONAL TESTS:    Imaging Personally Reviewed:YES    Consultant(s) Notes Reviewed:  YES    Care Discussed with Consultants/Other Providers:YES  
PATIENT SEEN AND EXAMINED ON :- 3/21/23  DATE OF SERVICE:   3/21/23          Interim events noted,Labs ,Radiological studies and Cardiology tests reviewed .    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 She describes that she has been very tired in the last few days, also has had diarrhea for several weeks and feels a loss of appetite. She also describes feeling "cold all the time".  ROS is otherwise negative.   (17 Mar 2023 16:22)      INTERIM EVENTS:Patient seen at bedside ,interim events noted.      PMH -reviewed admission note, no change since admission  HEART FAILURE: Acute[ ]Chronic[ ] Systolic[ ] Diastolic[ ] Combined Systolic and Diastolic[ ]  CAD[ ] CABG[ ] PCI[ ]  DEVICES[ ] PPM[ ] ICD[ ] ILR[ ]  ATRIAL FIBRILLATION[ ] Paroxysmal[ ] Permanent[ ] CHADS2-[  ]  SOPHIA[ ] CKD1[ ] CKD2[ ] CKD3[ ] CKD4[ ] ESRD[ ]  COPD[ ] HTN[ ]   DM[ ] Type1[ ] Type 2[ ]   CVA[ ] Paresis[ ]    AMBULATION: Assisted[ ] Cane/walker[ ] Independent[ ]    MEDICATIONS  (STANDING):  calcitriol   Capsule 0.25 MICROGram(s) Oral daily  epoetin dexter-epbx (RETACRIT) Injectable 4000 Unit(s) IV Push <User Schedule>    MEDICATIONS  (PRN):  acetaminophen     Tablet .. 650 milliGRAM(s) Oral every 6 hours PRN Temp greater or equal to 38C (100.4F), Mild Pain (1 - 3)  lidocaine/prilocaine Cream 1 Application(s) Topical daily PRN pain  melatonin 3 milliGRAM(s) Oral at bedtime PRN Insomnia            REVIEW OF SYSTEMS:  Constitutional: [ ] fever, [ ]weight loss,  [ ]fatigue [ ]weight gain  Eyes: [ ] visual changes  Respiratory: [ ]shortness of breath;  [ ] cough, [ ]wheezing, [ ]chills, [ ]hemoptysis  Cardiovascular: [ ] chest pain, [ ]palpitations, [ ]dizziness,  [ ]leg swelling[ ]orthopnea[ ]PND  Gastrointestinal: [ ] abdominal pain, [ ]nausea, [ ]vomiting,  [ ]diarrhea [ ]Constipation [ ]Melena  Genitourinary: [ ] dysuria, [ ] hematuria [ ]Harper  Neurologic: [ ] headaches [ ] tremors[ ]weakness [ ]Paralysis Right[ ] Left[ ]  Skin: [ ] itching, [ ]burning, [ ] rashes  Endocrine: [ ] heat or cold intolerance  Musculoskeletal: [ ] joint pain or swelling; [ ] muscle, back, or extremity pain  Psychiatric: [ ] depression, [ ]anxiety, [ ]mood swings, or [ ]difficulty sleeping  Hematologic: [ ] easy bruising, [ ] bleeding gums    [ ] All remaining systems negative except as per above.   [ ]Unable to obtain.  [x] No change in ROS since admission      Vital Signs Last 24 Hrs  T(C): 36.8 (21 Mar 2023 19:18), Max: 37.1 (21 Mar 2023 13:29)  T(F): 98.2 (21 Mar 2023 19:18), Max: 98.8 (21 Mar 2023 13:29)  HR: 99 (21 Mar 2023 19:18) (86 - 99)  BP: 103/64 (21 Mar 2023 19:18) (103/63 - 107/69)  BP(mean): --  RR: 18 (21 Mar 2023 19:18) (18 - 18)  SpO2: 96% (21 Mar 2023 19:18) (96% - 98%)    Parameters below as of 21 Mar 2023 19:18  Patient On (Oxygen Delivery Method): room air      I&O's Summary    20 Mar 2023 07:01  -  21 Mar 2023 07:00  --------------------------------------------------------  IN: 1320 mL / OUT: 0 mL / NET: 1320 mL    21 Mar 2023 07:01  -  21 Mar 2023 21:30  --------------------------------------------------------  IN: 1080 mL / OUT: 0 mL / NET: 1080 mL        PHYSICAL EXAM:  General: No acute distress BMI-  HEENT: EOMI, PERRL  Neck: Supple, [ ] JVD  Lungs: Equal air entry bilaterally; [ ] rales [ ] wheezing [ ] rhonchi  Heart: Regular rate and rhythm; [x ] murmur   2/6 [ x] systolic [ ] diastolic [ ] radiation[ ] rubs [ ]  gallops  Abdomen: Nontender, bowel sounds present  Extremities: No clubbing, cyanosis, [ ] edema [ ]Pulses  equal and intact  Nervous system:  Alert & Oriented X3, no focal deficits  Psychiatric: Normal affect  Skin: No rashes or lesions    LABS:  03-21    139  |  95<L>  |  42<H>  ----------------------------<  122<H>  3.6   |  27  |  4.35<H>    Ca    9.2      21 Mar 2023 07:17  Phos  4.2     03-21  Mg     1.8     03-21      Creatinine Trend: 4.35<--, 5.42<--, 3.66<--, 4.68<--, 6.20<--                        8.3    7.85  )-----------( 240      ( 21 Mar 2023 07:14 )             26.2               
PATIENT SEEN AND EXAMINED ON :- 3/22/23  DATE OF SERVICE:  3/22/23           Interim events noted,Labs ,Radiological studies and Cardiology tests reviewed .    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 She describes that she has been very tired in the last few days, also has had diarrhea for several weeks and feels a loss of appetite. She also describes feeling "cold all the time".  ROS is otherwise negative.   (17 Mar 2023 16:22)      INTERIM EVENTS:Patient seen at bedside ,interim events noted.      PMH -reviewed admission note, no change since admission  HEART FAILURE: Acute[ ]Chronic[ ] Systolic[ ] Diastolic[ ] Combined Systolic and Diastolic[ ]  CAD[ ] CABG[ ] PCI[ ]  DEVICES[ ] PPM[ ] ICD[ ] ILR[ ]  ATRIAL FIBRILLATION[ ] Paroxysmal[ ] Permanent[ ] CHADS2-[  ]  SOPHIA[ ] CKD1[ ] CKD2[ ] CKD3[ ] CKD4[ ] ESRD[ ]  COPD[ ] HTN[ ]   DM[ ] Type1[ ] Type 2[ ]   CVA[ ] Paresis[ ]    AMBULATION: Assisted[ ] Cane/walker[ ] Independent[ ]    MEDICATIONS  (STANDING):  calcitriol   Capsule 0.25 MICROGram(s) Oral daily  epoetin dexter-epbx (RETACRIT) Injectable 4000 Unit(s) IV Push <User Schedule>    MEDICATIONS  (PRN):  acetaminophen     Tablet .. 650 milliGRAM(s) Oral every 6 hours PRN Temp greater or equal to 38C (100.4F), Mild Pain (1 - 3)  lidocaine/prilocaine Cream 1 Application(s) Topical daily PRN pain  melatonin 3 milliGRAM(s) Oral at bedtime PRN Insomnia            REVIEW OF SYSTEMS:  Constitutional: [ ] fever, [ ]weight loss,  [ ]fatigue [ ]weight gain  Eyes: [ ] visual changes  Respiratory: [ ]shortness of breath;  [ ] cough, [ ]wheezing, [ ]chills, [ ]hemoptysis  Cardiovascular: [ ] chest pain, [ ]palpitations, [ ]dizziness,  [ ]leg swelling[ ]orthopnea[ ]PND  Gastrointestinal: [ ] abdominal pain, [ ]nausea, [ ]vomiting,  [ ]diarrhea [ ]Constipation [ ]Melena  Genitourinary: [ ] dysuria, [ ] hematuria [ ]Harper  Neurologic: [ ] headaches [ ] tremors[ ]weakness [ ]Paralysis Right[ ] Left[ ]  Skin: [ ] itching, [ ]burning, [ ] rashes  Endocrine: [ ] heat or cold intolerance  Musculoskeletal: [ ] joint pain or swelling; [ ] muscle, back, or extremity pain  Psychiatric: [ ] depression, [ ]anxiety, [ ]mood swings, or [ ]difficulty sleeping  Hematologic: [ ] easy bruising, [ ] bleeding gums    [ ] All remaining systems negative except as per above.   [ ]Unable to obtain.  [x] No change in ROS since admission      Vital Signs Last 24 Hrs  T(C): 36.8 (22 Mar 2023 19:28), Max: 36.9 (22 Mar 2023 04:55)  T(F): 98.3 (22 Mar 2023 19:28), Max: 98.4 (22 Mar 2023 04:55)  HR: 88 (22 Mar 2023 19:28) (87 - 98)  BP: 107/65 (22 Mar 2023 19:28) (107/65 - 142/64)  BP(mean): --  RR: 18 (22 Mar 2023 19:28) (18 - 18)  SpO2: 97% (22 Mar 2023 19:28) (97% - 99%)    Parameters below as of 22 Mar 2023 19:28  Patient On (Oxygen Delivery Method): room air      I&O's Summary    21 Mar 2023 07:01  -  22 Mar 2023 07:00  --------------------------------------------------------  IN: 1080 mL / OUT: 0 mL / NET: 1080 mL    22 Mar 2023 07:01  -  22 Mar 2023 21:04  --------------------------------------------------------  IN: 1240 mL / OUT: 50 mL / NET: 1190 mL        PHYSICAL EXAM:  General: No acute distress BMI-  HEENT: EOMI, PERRL  Neck: Supple, [ ] JVD  Lungs: Equal air entry bilaterally; [ ] rales [ ] wheezing [ ] rhonchi  Heart: Regular rate and rhythm; [x ] murmur   2/6 [ x] systolic [ ] diastolic [ ] radiation[ ] rubs [ ]  gallops  Abdomen: Nontender, bowel sounds present  Extremities: No clubbing, cyanosis, [ ] edema [ ]Pulses  equal and intact  Nervous system:  Alert & Oriented X3, no focal deficits  Psychiatric: Normal affect  Skin: No rashes or lesions    LABS:  03-22    138  |  95<L>  |  62<H>  ----------------------------<  107<H>  3.6   |  25  |  6.01<H>    Ca    9.2      22 Mar 2023 07:00  Phos  5.4     03-22  Mg     1.8     03-22      Creatinine Trend: 6.01<--, 4.35<--, 5.42<--, 3.66<--, 4.68<--, 6.20<--                        8.0    8.82  )-----------( 228      ( 22 Mar 2023 07:00 )             25.2               
PATIENT SEEN AND EXAMINED ON :- 3/23/23  DATE OF SERVICE:     3/23/23        Interim events noted,Labs ,Radiological studies and Cardiology tests reviewed .    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 She describes that she has been very tired in the last few days, also has had diarrhea for several weeks and feels a loss of appetite. She also describes feeling "cold all the time".  ROS is otherwise negative.   (17 Mar 2023 16:22)      INTERIM EVENTS:Patient seen at bedside ,interim events noted.      PMH -reviewed admission note, no change since admission  HEART FAILURE: Acute[ ]Chronic[ ] Systolic[ ] Diastolic[ ] Combined Systolic and Diastolic[ ]  CAD[ ] CABG[ ] PCI[ ]  DEVICES[ ] PPM[ ] ICD[ ] ILR[ ]  ATRIAL FIBRILLATION[ ] Paroxysmal[ ] Permanent[ ] CHADS2-[  ]  SOPHIA[ ] CKD1[ ] CKD2[ ] CKD3[ ] CKD4[ ] ESRD[ ]  COPD[ ] HTN[ ]   DM[ ] Type1[ ] Type 2[ ]   CVA[ ] Paresis[ ]    AMBULATION: Assisted[ ] Cane/walker[ ] Independent[ ]    MEDICATIONS  (STANDING):  calcitriol   Capsule 0.25 MICROGram(s) Oral daily  epoetin dexter-epbx (RETACRIT) Injectable 4000 Unit(s) IV Push <User Schedule>  iron sucrose IVPB 200 milliGRAM(s) IV Intermittent every 24 hours    MEDICATIONS  (PRN):  acetaminophen     Tablet .. 650 milliGRAM(s) Oral every 6 hours PRN Temp greater or equal to 38C (100.4F), Mild Pain (1 - 3)  lidocaine/prilocaine Cream 1 Application(s) Topical daily PRN pain  melatonin 3 milliGRAM(s) Oral at bedtime PRN Insomnia            REVIEW OF SYSTEMS:  Constitutional: [ ] fever, [ ]weight loss,  [ ]fatigue [ ]weight gain  Eyes: [ ] visual changes  Respiratory: [ ]shortness of breath;  [ ] cough, [ ]wheezing, [ ]chills, [ ]hemoptysis  Cardiovascular: [ ] chest pain, [ ]palpitations, [ ]dizziness,  [ ]leg swelling[ ]orthopnea[ ]PND  Gastrointestinal: [ ] abdominal pain, [ ]nausea, [ ]vomiting,  [ ]diarrhea [ ]Constipation [ ]Melena  Genitourinary: [ ] dysuria, [ ] hematuria [ ]Harper  Neurologic: [ ] headaches [ ] tremors[ ]weakness [ ]Paralysis Right[ ] Left[ ]  Skin: [ ] itching, [ ]burning, [ ] rashes  Endocrine: [ ] heat or cold intolerance  Musculoskeletal: [ ] joint pain or swelling; [ ] muscle, back, or extremity pain  Psychiatric: [ ] depression, [ ]anxiety, [ ]mood swings, or [ ]difficulty sleeping  Hematologic: [ ] easy bruising, [ ] bleeding gums    [ ] All remaining systems negative except as per above.   [ ]Unable to obtain.  [x] No change in ROS since admission      Vital Signs Last 24 Hrs  T(C): 36.6 (23 Mar 2023 20:02), Max: 37.5 (23 Mar 2023 05:30)  T(F): 97.9 (23 Mar 2023 20:02), Max: 99.5 (23 Mar 2023 05:30)  HR: 100 (23 Mar 2023 20:02) (89 - 100)  BP: 103/67 (23 Mar 2023 20:02) (103/67 - 122/77)  BP(mean): --  RR: 18 (23 Mar 2023 20:02) (18 - 18)  SpO2: 99% (23 Mar 2023 20:02) (98% - 100%)    Parameters below as of 23 Mar 2023 20:02  Patient On (Oxygen Delivery Method): room air      I&O's Summary    22 Mar 2023 07:01  -  23 Mar 2023 07:00  --------------------------------------------------------  IN: 1240 mL / OUT: 50 mL / NET: 1190 mL    23 Mar 2023 07:01  -  23 Mar 2023 21:09  --------------------------------------------------------  IN: 540 mL / OUT: 1000 mL / NET: -460 mL        PHYSICAL EXAM:  General: No acute distress BMI-  HEENT: EOMI, PERRL  Neck: Supple, [ ] JVD  Lungs: Equal air entry bilaterally; [ ] rales [ ] wheezing [ ] rhonchi  Heart: Regular rate and rhythm; [x ] murmur   2/6 [ x] systolic [ ] diastolic [ ] radiation[ ] rubs [ ]  gallops  Abdomen: Nontender, bowel sounds present  Extremities: No clubbing, cyanosis, [ ] edema [ ]Pulses  equal and intact  Nervous system:  Alert & Oriented X3, no focal deficits  Psychiatric: Normal affect  Skin: No rashes or lesions    LABS:  03-23    141  |  98  |  72<H>  ----------------------------<  94  3.6   |  24  |  6.35<H>    Ca    9.0      23 Mar 2023 06:57  Phos  5.4     03-23  Mg     1.9     03-23      Creatinine Trend: 6.35<--, 6.01<--, 4.35<--, 5.42<--, 3.66<--, 4.68<--                        7.7    8.09  )-----------( 223      ( 23 Mar 2023 06:55 )             24.7               
[facility] DIVISION OF KIDNEY DISEASES AND HYPERTENSION -- FOLLOW UP NOTE  --------------------------------------------------------------------------------  Chief Complaint: Advanced CKD initiated on long term HD     24 hour events/subjective: Pt. was seen and examined today. Pt underwent 1st HD session on 3/17/23 & 2nd session on 3/18. Last HD done on 3/23. Tolerated HD well. Denies fever, chills, nausea, vomiting, CP and SOB.  Plan for HD today prior to discharge.    PAST HISTORY  --------------------------------------------------------------------------------  No significant changes to PMH, PSH, FHx, SHx, unless otherwise noted    ALLERGIES & MEDICATIONS  --------------------------------------------------------------------------------  Allergies    No Known Allergies    Intolerances    Standing Inpatient Medications  calcitriol   Capsule 0.25 MICROGram(s) Oral daily  epoetin dexter-epbx (RETACRIT) Injectable 4000 Unit(s) IV Push <User Schedule>  iron sucrose IVPB 200 milliGRAM(s) IV Intermittent every 24 hours    PRN Inpatient Medications  acetaminophen     Tablet .. 650 milliGRAM(s) Oral every 6 hours PRN  lidocaine/prilocaine Cream 1 Application(s) Topical daily PRN  melatonin 3 milliGRAM(s) Oral at bedtime PRN    REVIEW OF SYSTEMS  --------------------------------------------------------------------------------  Gen: No fevers   Respiratory: No dyspnea  CV: No chest pain  GI: No abdominal pain  : No dysuria  MSK: No  edema  Neuro: no dizziness   All other systems were reviewed and are negative, except as noted.    VITALS/PHYSICAL EXAM  --------------------------------------------------------------------------------  T(C): 36 (03-24-23 @ 09:14), Max: 37 (03-23-23 @ 23:56)  HR: 102 (03-24-23 @ 09:14) (89 - 103)  BP: 99/57 (03-24-23 @ 09:14) (99/57 - 122/77)  RR: 18 (03-24-23 @ 09:14) (18 - 18)  SpO2: 98% (03-24-23 @ 09:14) (97% - 100%)  Wt(kg): --    03-23-23 @ 07:01  -  03-24-23 @ 07:00  --------------------------------------------------------  IN: 540 mL / OUT: 1000 mL / NET: -460 mL    03-24-23 @ 07:01  -  03-24-23 @ 10:22  --------------------------------------------------------  IN: 480 mL / OUT: 0 mL / NET: 480 mL    Physical Exam:  	Gen: elderly female, resting, NAD  	HEENT: Anicteric  	Pulm: CTA B/L  	CV: S1S2+  	Abd: Soft, +BS   	Ext: No LE edema B/L  	Neuro: Awake  	Skin: Warm and dry  	Dialysis access: LUE AVF, thrill felt and bruit heard    LABS/STUDIES  --------------------------------------------------------------------------------              7.7    9.04  >-----------<  234      [03-24-23 @ 09:56]              24.4     141  |  98  |  72  ----------------------------<  94      [03-23-23 @ 06:57]  3.6   |  24  |  6.35        Ca     9.0     [03-23-23 @ 06:57]      Mg     1.9     [03-23-23 @ 06:57]      Phos  5.4     [03-23-23 @ 06:57]    Creatinine Trend:  SCr 6.35 [03-23 @ 06:57]  SCr 6.01 [03-22 @ 07:00]  SCr 4.35 [03-21 @ 07:17]  SCr 5.42 [03-20 @ 07:08]  SCr 3.66 [03-19 @ 07:14]    Urinalysis - [03-17-23 @ 14:53]      Color Light Yellow / Appearance Slightly Turbid / SG 1.012 / pH 6.0      Gluc Negative / Ketone Negative  / Bili Negative / Urobili Negative       Blood Moderate / Protein 100 mg/dl / Leuk Est Large / Nitrite Negative      RBC 3 /  / Hyaline 1 / Gran  / Sq Epi  / Non Sq Epi 1 / Bacteria Few    Iron 60, TIBC --, %sat --      [03-22-23 @ 07:04]    HBsAb <3.0      [03-17-23 @ 14:53]  HBsAg Nonreact      [03-17-23 @ 14:53]  HBcAb Nonreact      [03-17-23 @ 14:53]  HCV 0.13, Nonreact      [03-17-23 @ 14:53]

## 2023-03-24 NOTE — PROGRESS NOTE ADULT - PROBLEM SELECTOR PLAN 3
DVT: IMPROVE 1, no pharmacological ppx indicated  Diet: renal  Dispo: home, needs dialysis established prior to discharge
In setting of kidney failure. Serum phos is within target range at 5.4 today. Low phos diet. Monitor serum phosphorus.    If you have any questions, please feel free to contact me  José Luis Pierre  Nephrology Fellow  [phone]/ [employer] Teams(Preferred)  (After 5pm or on weekends please page the on-call fellow).
DVT: IMPROVE 1, no pharmacological ppx indicated  Diet: renal  Dispo: home, needs dialysis established prior to discharge
In setting of kidney failure. Serum phos is within target range at 5.4 today. Low phos diet. Monitor serum phosphorus    If you have any questions, please feel free to contact me  José Luis Pierre  Nephrology Fellow  [phone]/ [employer] Teams(Preferred)  (After 5pm or on weekends please page the on-call fellow).
DVT: IMPROVE 1, no pharmacological ppx indicated  Diet: renal  Dispo: home, needs dialysis established prior to discharge
In setting of kidney failure. Serum phos is within target range at 5.3 today. Low phos diet. Monitor serum phosphorus.    If you have any questions, please feel free to contact me  José Luis Pierre  Nephrology Fellow  [phone]/ [employer] Teams(Preferred)  (After 5pm or on weekends please page the on-call fellow).
In setting of kidney failure. Serum phos elevated at 5. Check ipth, Vit D levels. Low phos diet. Monitor serum phosphorus.
DVT: IMPROVE 1, no pharmacological ppx indicated  Diet: renal  Dispo: home, needs dialysis established prior to discharge
Helen Richard MD  Pager   Office     Contact me directly via [employer] Teams     (After 5 pm or on weekends please page the on-call fellow/attending, can check [email or web address] for schedule. Login is alida domínguez, schedule under [facility] medicine, psych, derm)
At goal for now  monitor but can be adjusted as outpatient    Tom Richard MD  Pager   Office     Contact me directly via Microsoft Teams     (After 5 pm or on weekends please page the on-call fellow/attending, can check [email or web address] for schedule. Login is alida domínguez, schedule under [facility] medicine, psych, derm)

## 2023-03-24 NOTE — PROGRESS NOTE ADULT - PROBLEM SELECTOR PLAN 2
In setting of kidney failure. Hemoglobin below target range. Consider 1 unit prbc with HD. Will give EPO with HD today. Monitor H/H.  Checking iron studies
In setting of kidney failure. Hemoglobin below target range. Consider 1 unit prbc with HD. rule out other causes. Monitor H/H.
DANI started with HD  Iron is low, will need IV iron, start venofer 200mg IV X 5 days if here
In setting of kidney failure. Hemoglobin below target range. Recommend 1 unit prbc today. Continue EPO with HD today. Monitor H/H
Non adherent to BP meds at home  - monitor BP and resume her home meds as needed
In setting of kidney failure. Hemoglobin below target range. Consider 1 unit prbc with HD. Will give EPO with HD today. Monitor H/H.
Non adherent to BP meds at home  - monitor BP and resume her home meds as needed
In setting of kidney failure. Hemoglobin below target range. Consider 1 unit prbc with HD. Will give EPO with HD today. Monitor H/H
Non adherent to BP meds at home  - monitor BP and resume her home meds as needed

## 2023-03-24 NOTE — PROGRESS NOTE ADULT - PROBLEM SELECTOR PROBLEM 1
ESRD on hemodialysis
ESRD with anemia
ESRD on hemodialysis
ESRD on hemodialysis
ESRD with anemia
ESRD with anemia
ESRD on hemodialysis
ESRD on hemodialysis
ESRD with anemia
ESRD on hemodialysis

## 2023-03-24 NOTE — PROGRESS NOTE ADULT - ASSESSMENT
67F with Alport Syndrome, sent in for initiation of dialysis. 
67F with Alport Syndrome, sent in for initiation of dialysis. 
Pt with advanced CKD initiated on long term HD during this admission on 3/17/23
Pt with advanced CKD initiated on long term HD during this admission on 3/17/23.
67F with Alport Syndrome, sent in for initiation of dialysis. 
67F with Alport Syndrome, sent in for initiation of dialysis. 
67F with Alport Syndrome, sent in for initiation of dialysis.      Problem/Plan - 1:  ·  Problem: ESRD with anemia.   ·  Plan: CKD V progressed to ESRD requiring dialysis  - Plan to start dialysis today due to uremic symptoms (diarrhea, lethargy)  - Nephro recommends 1u prbc with HD, cannot give during first dialysis session per nursing  - F/u iron studies.     Problem/Plan - 2:  ·  Problem: HTN (hypertension).   ·  Plan: Non adherent to BP meds at home  - monitor BP and resume her home meds as needed.     Problem/Plan - 3:  ·  Problem: Prophylactic measure.   ·  Plan: DVT: IMPROVE 1, no pharmacological ppx indicated  Diet: renal  Dispo: home, needs dialysis established prior to discharge.      
67F with Alport Syndrome, sent in for initiation of dialysis. 
Pt with advanced CKD initiated on long term HD during this admission on 3/17/23.
67F with Alport Syndrome, sent in for initiation of dialysis. 
Pt with advanced CKD initiated on long term HD during this admission on 3/17/23
Pt with advanced CKD initiated on long term HD during this admission on 3/17/23.
67F with Alport Syndrome, sent in for initiation of dialysis. 
Pt with advanced CKD initiated on long term HD during this admission on 3/17/23
67F with Alport Syndrome, sent in for initiation of dialysis.

## 2023-03-24 NOTE — PROGRESS NOTE ADULT - PROBLEM SELECTOR PROBLEM 3
Prophylactic measure
Prophylactic measure
Renal bone disease
Prophylactic measure
Renal bone disease
Prophylactic measure
Renal bone disease
Prophylactic measure
Prophylactic measure
Renal bone disease
Renal bone disease
Prophylactic measure
Renal bone disease
Prophylactic measure

## 2023-03-25 LAB — SARS-COV-2 RNA SPEC QL NAA+PROBE: SIGNIFICANT CHANGE UP

## 2023-03-27 ENCOUNTER — TRANSCRIPTION ENCOUNTER (OUTPATIENT)
Age: 68
End: 2023-03-27

## 2023-03-27 NOTE — CHART NOTE - NSCHARTNOTEFT_GEN_A_CORE
AVF worked well today,   Can do HD tomorrow am, 2 hours and dc home  Start HD at [facility] Monday  d/w with Dr Catalan
Left a message for patient in regards to follow up care with callback information.
Results of fistula duplex noted:    < from: VA Duplex Hemodialysis Access, Left (03.22.23 @ 16:16) >    INTERPRETATION:  History: End-stage kidney disease, pain and swelling at   hemodialysis, evaluate left upper extremity dialysis access    A dialysis access fistula was created by the surgical anastomosis of the   cephalic vein to the radial artery in the distal left forearm.    There is a brisk low resistance pattern of antegrade flow through the   left radial artery proximal to the surgical anastomosis. (Image 768 is   mislabeled)    There is retrograde flow through the left radial artery distal to the   surgical anastomosis, indicating steal phenomenon.    The fistula is patent.    The diameter of the fistula measured 0.29 cm    Velocities through the fistula measured 438/209 cm/s.    There are fibrin strands free floating in the egress cephalic vein in the   forearm.    Otherwise, there is unimpeded flow through the egress vein followed to   the subclavian vein.    Flow volumes measured in the range of 497 mL/m to 527 mL/m    Impression:    The flow volumes are slightly low.    There are fibrous strands in the egress cephalic vein in the forearm.    Otherwise this appears to be a normally functioning dialysis access   fistula.    < end of copied text >      Plan  - Recommend IR consult for potential permacath placement  - Attempt to use fistula again for HD to assess functionality    Vascular Surgery  Please page for all questions [email or web address]. Not available on [employer] Teams.

## 2023-03-28 ENCOUNTER — APPOINTMENT (OUTPATIENT)
Dept: TRANSPLANT | Facility: HOSPITAL | Age: 68
End: 2023-03-28

## 2023-03-28 ENCOUNTER — TRANSCRIPTION ENCOUNTER (OUTPATIENT)
Age: 68
End: 2023-03-28

## 2023-03-28 ENCOUNTER — INPATIENT (INPATIENT)
Facility: HOSPITAL | Age: 68
LOS: 8 days | Discharge: HOME HEALTH | DRG: 652 | End: 2023-04-06
Payer: MEDICARE

## 2023-03-28 ENCOUNTER — RESULT REVIEW (OUTPATIENT)
Age: 68
End: 2023-03-28

## 2023-03-28 VITALS
WEIGHT: 145.28 LBS | TEMPERATURE: 98 F | SYSTOLIC BLOOD PRESSURE: 121 MMHG | DIASTOLIC BLOOD PRESSURE: 74 MMHG | HEIGHT: 65 IN | OXYGEN SATURATION: 100 % | HEART RATE: 96 BPM | RESPIRATION RATE: 18 BRPM

## 2023-03-28 DIAGNOSIS — Z94.0 KIDNEY TRANSPLANT STATUS: ICD-10-CM

## 2023-03-28 DIAGNOSIS — Z90.89 ACQUIRED ABSENCE OF OTHER ORGANS: Chronic | ICD-10-CM

## 2023-03-28 DIAGNOSIS — I77.0 ARTERIOVENOUS FISTULA, ACQUIRED: Chronic | ICD-10-CM

## 2023-03-28 LAB
ALBUMIN SERPL ELPH-MCNC: 3.1 G/DL — LOW (ref 3.3–5)
ALBUMIN SERPL ELPH-MCNC: 3.8 G/DL — SIGNIFICANT CHANGE UP (ref 3.3–5)
ALP SERPL-CCNC: 65 U/L — SIGNIFICANT CHANGE UP (ref 40–120)
ALP SERPL-CCNC: 78 U/L — SIGNIFICANT CHANGE UP (ref 40–120)
ALT FLD-CCNC: 16 U/L — SIGNIFICANT CHANGE UP (ref 10–45)
ALT FLD-CCNC: 9 U/L — LOW (ref 10–45)
ANION GAP SERPL CALC-SCNC: 18 MMOL/L — HIGH (ref 5–17)
ANION GAP SERPL CALC-SCNC: 19 MMOL/L — HIGH (ref 5–17)
APTT BLD: 26.8 SEC — LOW (ref 27.5–35.5)
AST SERPL-CCNC: 26 U/L — SIGNIFICANT CHANGE UP (ref 10–40)
AST SERPL-CCNC: 7 U/L — LOW (ref 10–40)
BASOPHILS # BLD AUTO: 0.02 K/UL — SIGNIFICANT CHANGE UP (ref 0–0.2)
BASOPHILS # BLD AUTO: 0.05 K/UL — SIGNIFICANT CHANGE UP (ref 0–0.2)
BASOPHILS NFR BLD AUTO: 0.3 % — SIGNIFICANT CHANGE UP (ref 0–2)
BASOPHILS NFR BLD AUTO: 0.9 % — SIGNIFICANT CHANGE UP (ref 0–2)
BILIRUB SERPL-MCNC: 0.3 MG/DL — SIGNIFICANT CHANGE UP (ref 0.2–1.2)
BILIRUB SERPL-MCNC: 0.4 MG/DL — SIGNIFICANT CHANGE UP (ref 0.2–1.2)
BLD GP AB SCN SERPL QL: NEGATIVE — SIGNIFICANT CHANGE UP
BUN SERPL-MCNC: 41 MG/DL — HIGH (ref 7–23)
BUN SERPL-MCNC: 44 MG/DL — HIGH (ref 7–23)
CALCIUM SERPL-MCNC: 9.2 MG/DL — SIGNIFICANT CHANGE UP (ref 8.4–10.5)
CALCIUM SERPL-MCNC: 9.6 MG/DL — SIGNIFICANT CHANGE UP (ref 8.4–10.5)
CHLORIDE SERPL-SCNC: 90 MMOL/L — LOW (ref 96–108)
CHLORIDE SERPL-SCNC: 91 MMOL/L — LOW (ref 96–108)
CO2 SERPL-SCNC: 27 MMOL/L — SIGNIFICANT CHANGE UP (ref 22–31)
CO2 SERPL-SCNC: 28 MMOL/L — SIGNIFICANT CHANGE UP (ref 22–31)
CREAT SERPL-MCNC: 5.14 MG/DL — HIGH (ref 0.5–1.3)
CREAT SERPL-MCNC: 5.45 MG/DL — HIGH (ref 0.5–1.3)
EGFR: 8 ML/MIN/1.73M2 — LOW
EGFR: 9 ML/MIN/1.73M2 — LOW
EOSINOPHIL # BLD AUTO: 0.01 K/UL — SIGNIFICANT CHANGE UP (ref 0–0.5)
EOSINOPHIL # BLD AUTO: 0.18 K/UL — SIGNIFICANT CHANGE UP (ref 0–0.5)
EOSINOPHIL NFR BLD AUTO: 0.1 % — SIGNIFICANT CHANGE UP (ref 0–6)
EOSINOPHIL NFR BLD AUTO: 3.1 % — SIGNIFICANT CHANGE UP (ref 0–6)
GLUCOSE BLDC GLUCOMTR-MCNC: 115 MG/DL — HIGH (ref 70–99)
GLUCOSE BLDC GLUCOMTR-MCNC: 184 MG/DL — HIGH (ref 70–99)
GLUCOSE BLDC GLUCOMTR-MCNC: 187 MG/DL — HIGH (ref 70–99)
GLUCOSE SERPL-MCNC: 137 MG/DL — HIGH (ref 70–99)
GLUCOSE SERPL-MCNC: 178 MG/DL — HIGH (ref 70–99)
HBV CORE AB SER-ACNC: SIGNIFICANT CHANGE UP
HBV SURFACE AB SER-ACNC: <3 MIU/ML — LOW
HBV SURFACE AG SER-ACNC: SIGNIFICANT CHANGE UP
HCT VFR BLD CALC: 26.5 % — LOW (ref 34.5–45)
HCT VFR BLD CALC: 30 % — LOW (ref 34.5–45)
HCV AB S/CO SERPL IA: 0.09 S/CO — SIGNIFICANT CHANGE UP (ref 0–0.99)
HCV AB SERPL-IMP: SIGNIFICANT CHANGE UP
HCV RNA SPEC NAA+PROBE-LOG IU: SIGNIFICANT CHANGE UP
HCV RNA SPEC NAA+PROBE-LOG IU: SIGNIFICANT CHANGE UP LOGIU/ML
HGB BLD-MCNC: 8.4 G/DL — LOW (ref 11.5–15.5)
HGB BLD-MCNC: 9.6 G/DL — LOW (ref 11.5–15.5)
HIV 1+2 AB+HIV1 P24 AG SERPL QL IA: SIGNIFICANT CHANGE UP
IMM GRANULOCYTES NFR BLD AUTO: 0.7 % — SIGNIFICANT CHANGE UP (ref 0–0.9)
IMM GRANULOCYTES NFR BLD AUTO: 0.8 % — SIGNIFICANT CHANGE UP (ref 0–0.9)
INR BLD: 1.31 RATIO — HIGH (ref 0.88–1.16)
LYMPHOCYTES # BLD AUTO: 0.43 K/UL — LOW (ref 1–3.3)
LYMPHOCYTES # BLD AUTO: 1.05 K/UL — SIGNIFICANT CHANGE UP (ref 1–3.3)
LYMPHOCYTES # BLD AUTO: 18.2 % — SIGNIFICANT CHANGE UP (ref 13–44)
LYMPHOCYTES # BLD AUTO: 5.6 % — LOW (ref 13–44)
MAGNESIUM SERPL-MCNC: 1.7 MG/DL — SIGNIFICANT CHANGE UP (ref 1.6–2.6)
MAGNESIUM SERPL-MCNC: 2 MG/DL — SIGNIFICANT CHANGE UP (ref 1.6–2.6)
MCHC RBC-ENTMCNC: 30.4 PG — SIGNIFICANT CHANGE UP (ref 27–34)
MCHC RBC-ENTMCNC: 31.2 PG — SIGNIFICANT CHANGE UP (ref 27–34)
MCHC RBC-ENTMCNC: 31.7 GM/DL — LOW (ref 32–36)
MCHC RBC-ENTMCNC: 32 GM/DL — SIGNIFICANT CHANGE UP (ref 32–36)
MCV RBC AUTO: 96 FL — SIGNIFICANT CHANGE UP (ref 80–100)
MCV RBC AUTO: 97.4 FL — SIGNIFICANT CHANGE UP (ref 80–100)
MONOCYTES # BLD AUTO: 0.43 K/UL — SIGNIFICANT CHANGE UP (ref 0–0.9)
MONOCYTES # BLD AUTO: 0.73 K/UL — SIGNIFICANT CHANGE UP (ref 0–0.9)
MONOCYTES NFR BLD AUTO: 12.7 % — SIGNIFICANT CHANGE UP (ref 2–14)
MONOCYTES NFR BLD AUTO: 5.6 % — SIGNIFICANT CHANGE UP (ref 2–14)
NEUTROPHILS # BLD AUTO: 3.72 K/UL — SIGNIFICANT CHANGE UP (ref 1.8–7.4)
NEUTROPHILS # BLD AUTO: 6.69 K/UL — SIGNIFICANT CHANGE UP (ref 1.8–7.4)
NEUTROPHILS NFR BLD AUTO: 64.4 % — SIGNIFICANT CHANGE UP (ref 43–77)
NEUTROPHILS NFR BLD AUTO: 87.6 % — HIGH (ref 43–77)
NRBC # BLD: 0 /100 WBCS — SIGNIFICANT CHANGE UP (ref 0–0)
NRBC # BLD: 0 /100 WBCS — SIGNIFICANT CHANGE UP (ref 0–0)
PHOSPHATE SERPL-MCNC: 3.6 MG/DL — SIGNIFICANT CHANGE UP (ref 2.5–4.5)
PHOSPHATE SERPL-MCNC: 3.7 MG/DL — SIGNIFICANT CHANGE UP (ref 2.5–4.5)
PLATELET # BLD AUTO: 178 K/UL — SIGNIFICANT CHANGE UP (ref 150–400)
PLATELET # BLD AUTO: 190 K/UL — SIGNIFICANT CHANGE UP (ref 150–400)
POTASSIUM SERPL-MCNC: 3.5 MMOL/L — SIGNIFICANT CHANGE UP (ref 3.5–5.3)
POTASSIUM SERPL-MCNC: 3.8 MMOL/L — SIGNIFICANT CHANGE UP (ref 3.5–5.3)
POTASSIUM SERPL-SCNC: 3.5 MMOL/L — SIGNIFICANT CHANGE UP (ref 3.5–5.3)
POTASSIUM SERPL-SCNC: 3.8 MMOL/L — SIGNIFICANT CHANGE UP (ref 3.5–5.3)
PROT SERPL-MCNC: 5.9 G/DL — LOW (ref 6–8.3)
PROT SERPL-MCNC: 7.3 G/DL — SIGNIFICANT CHANGE UP (ref 6–8.3)
PROTHROM AB SERPL-ACNC: 15.1 SEC — HIGH (ref 10.5–13.4)
RBC # BLD: 2.76 M/UL — LOW (ref 3.8–5.2)
RBC # BLD: 3.08 M/UL — LOW (ref 3.8–5.2)
RBC # FLD: 14.3 % — SIGNIFICANT CHANGE UP (ref 10.3–14.5)
RBC # FLD: 14.3 % — SIGNIFICANT CHANGE UP (ref 10.3–14.5)
RH IG SCN BLD-IMP: NEGATIVE — SIGNIFICANT CHANGE UP
SARS-COV-2 RNA SPEC QL NAA+PROBE: SIGNIFICANT CHANGE UP
SODIUM SERPL-SCNC: 135 MMOL/L — SIGNIFICANT CHANGE UP (ref 135–145)
SODIUM SERPL-SCNC: 138 MMOL/L — SIGNIFICANT CHANGE UP (ref 135–145)
WBC # BLD: 5.77 K/UL — SIGNIFICANT CHANGE UP (ref 3.8–10.5)
WBC # BLD: 7.64 K/UL — SIGNIFICANT CHANGE UP (ref 3.8–10.5)
WBC # FLD AUTO: 5.77 K/UL — SIGNIFICANT CHANGE UP (ref 3.8–10.5)
WBC # FLD AUTO: 7.64 K/UL — SIGNIFICANT CHANGE UP (ref 3.8–10.5)

## 2023-03-28 PROCEDURE — 50605 INSERT URETERAL SUPPORT: CPT

## 2023-03-28 PROCEDURE — 88305 TISSUE EXAM BY PATHOLOGIST: CPT | Mod: 26

## 2023-03-28 PROCEDURE — 76998 US GUIDE INTRAOP: CPT | Mod: 26

## 2023-03-28 PROCEDURE — 76776 US EXAM K TRANSPL W/DOPPLER: CPT | Mod: 26,RT

## 2023-03-28 PROCEDURE — 93010 ELECTROCARDIOGRAM REPORT: CPT

## 2023-03-28 PROCEDURE — 88313 SPECIAL STAINS GROUP 2: CPT | Mod: 26

## 2023-03-28 PROCEDURE — 99223 1ST HOSP IP/OBS HIGH 75: CPT

## 2023-03-28 PROCEDURE — 50360 RNL ALTRNSPLJ W/O RCP NFRCT: CPT

## 2023-03-28 PROCEDURE — 50205 RENAL BX SURG EXPOSURE KDN: CPT

## 2023-03-28 DEVICE — STENT URET LUBRIFLEX 4.5X12CM: Type: IMPLANTABLE DEVICE | Site: RIGHT | Status: FUNCTIONAL

## 2023-03-28 DEVICE — KIT A-LINE 1LUM 20G X 12CM SAFE KIT: Type: IMPLANTABLE DEVICE | Site: RIGHT | Status: FUNCTIONAL

## 2023-03-28 RX ORDER — FAMOTIDINE 10 MG/ML
20 INJECTION INTRAVENOUS DAILY
Refills: 0 | Status: DISCONTINUED | OUTPATIENT
Start: 2023-03-29 | End: 2023-04-06

## 2023-03-28 RX ORDER — ERTAPENEM SODIUM 1 G/1
1000 INJECTION, POWDER, LYOPHILIZED, FOR SOLUTION INTRAMUSCULAR; INTRAVENOUS EVERY 24 HOURS
Refills: 0 | Status: CANCELLED | OUTPATIENT
Start: 2023-03-29 | End: 2023-03-28

## 2023-03-28 RX ORDER — HYDROMORPHONE HYDROCHLORIDE 2 MG/ML
0.5 INJECTION INTRAMUSCULAR; INTRAVENOUS; SUBCUTANEOUS
Refills: 0 | Status: DISCONTINUED | OUTPATIENT
Start: 2023-03-28 | End: 2023-03-29

## 2023-03-28 RX ORDER — MYCOPHENOLATE MOFETIL 250 MG/1
1 CAPSULE ORAL
Refills: 0 | Status: DISCONTINUED | OUTPATIENT
Start: 2023-03-29 | End: 2023-04-06

## 2023-03-28 RX ORDER — BASILIXIMAB 20 MG/5ML
20 INJECTION, POWDER, FOR SOLUTION INTRAVENOUS ONCE
Refills: 0 | Status: COMPLETED | OUTPATIENT
Start: 2023-04-01 | End: 2023-04-01

## 2023-03-28 RX ORDER — VALGANCICLOVIR 450 MG/1
450 TABLET, FILM COATED ORAL DAILY
Refills: 0 | Status: DISCONTINUED | OUTPATIENT
Start: 2023-03-29 | End: 2023-04-06

## 2023-03-28 RX ORDER — SODIUM CHLORIDE 9 MG/ML
500 INJECTION INTRAMUSCULAR; INTRAVENOUS; SUBCUTANEOUS ONCE
Refills: 0 | Status: COMPLETED | OUTPATIENT
Start: 2023-03-28 | End: 2023-03-28

## 2023-03-28 RX ORDER — CEFAZOLIN SODIUM 1 G
2000 VIAL (EA) INJECTION ONCE
Refills: 0 | Status: DISCONTINUED | OUTPATIENT
Start: 2023-03-28 | End: 2023-03-28

## 2023-03-28 RX ORDER — ACETAMINOPHEN 500 MG
1000 TABLET ORAL ONCE
Refills: 0 | Status: DISCONTINUED | OUTPATIENT
Start: 2023-03-28 | End: 2023-03-29

## 2023-03-28 RX ORDER — CHLORHEXIDINE GLUCONATE 213 G/1000ML
1 SOLUTION TOPICAL
Refills: 0 | Status: DISCONTINUED | OUTPATIENT
Start: 2023-03-28 | End: 2023-03-29

## 2023-03-28 RX ORDER — ERTAPENEM SODIUM 1 G/1
INJECTION, POWDER, LYOPHILIZED, FOR SOLUTION INTRAMUSCULAR; INTRAVENOUS
Refills: 0 | Status: DISCONTINUED | OUTPATIENT
Start: 2023-03-28 | End: 2023-03-28

## 2023-03-28 RX ORDER — VANCOMYCIN HCL 1 G
1000 VIAL (EA) INTRAVENOUS ONCE
Refills: 0 | Status: DISCONTINUED | OUTPATIENT
Start: 2023-03-28 | End: 2023-03-28

## 2023-03-28 RX ORDER — SODIUM CHLORIDE 9 MG/ML
1000 INJECTION INTRAMUSCULAR; INTRAVENOUS; SUBCUTANEOUS
Refills: 0 | Status: DISCONTINUED | OUTPATIENT
Start: 2023-03-28 | End: 2023-03-29

## 2023-03-28 RX ORDER — POLYETHYLENE GLYCOL 3350 17 G/17G
17 POWDER, FOR SOLUTION ORAL DAILY
Refills: 0 | Status: DISCONTINUED | OUTPATIENT
Start: 2023-03-29 | End: 2023-04-06

## 2023-03-28 RX ORDER — TACROLIMUS 5 MG/1
3 CAPSULE ORAL ONCE
Refills: 0 | Status: COMPLETED | OUTPATIENT
Start: 2023-03-28 | End: 2023-03-28

## 2023-03-28 RX ORDER — ERTAPENEM SODIUM 1 G/1
1000 INJECTION, POWDER, LYOPHILIZED, FOR SOLUTION INTRAMUSCULAR; INTRAVENOUS ONCE
Refills: 0 | Status: DISCONTINUED | OUTPATIENT
Start: 2023-03-28 | End: 2023-03-28

## 2023-03-28 RX ORDER — ERTAPENEM SODIUM 1 G/1
1000 INJECTION, POWDER, LYOPHILIZED, FOR SOLUTION INTRAMUSCULAR; INTRAVENOUS EVERY 24 HOURS
Refills: 0 | Status: DISCONTINUED | OUTPATIENT
Start: 2023-03-29 | End: 2023-03-29

## 2023-03-28 RX ORDER — SODIUM CHLORIDE 9 MG/ML
1000 INJECTION INTRAMUSCULAR; INTRAVENOUS; SUBCUTANEOUS
Refills: 0 | Status: DISCONTINUED | OUTPATIENT
Start: 2023-03-28 | End: 2023-03-30

## 2023-03-28 RX ORDER — BASILIXIMAB 20 MG/5ML
20 INJECTION, POWDER, FOR SOLUTION INTRAVENOUS ONCE
Refills: 0 | Status: DISCONTINUED | OUTPATIENT
Start: 2023-03-28 | End: 2023-03-28

## 2023-03-28 RX ORDER — SENNA PLUS 8.6 MG/1
2 TABLET ORAL AT BEDTIME
Refills: 0 | Status: DISCONTINUED | OUTPATIENT
Start: 2023-03-29 | End: 2023-04-06

## 2023-03-28 RX ORDER — TACROLIMUS 5 MG/1
5 CAPSULE ORAL
Refills: 0 | Status: DISCONTINUED | OUTPATIENT
Start: 2023-03-29 | End: 2023-03-29

## 2023-03-28 RX ORDER — ONDANSETRON 8 MG/1
4 TABLET, FILM COATED ORAL EVERY 6 HOURS
Refills: 0 | Status: DISCONTINUED | OUTPATIENT
Start: 2023-03-28 | End: 2023-04-06

## 2023-03-28 RX ORDER — NYSTATIN 500MM UNIT
500000 POWDER (EA) MISCELLANEOUS
Refills: 0 | Status: DISCONTINUED | OUTPATIENT
Start: 2023-03-29 | End: 2023-04-06

## 2023-03-28 RX ORDER — HYDROMORPHONE HYDROCHLORIDE 2 MG/ML
1 INJECTION INTRAMUSCULAR; INTRAVENOUS; SUBCUTANEOUS
Refills: 0 | Status: DISCONTINUED | OUTPATIENT
Start: 2023-03-28 | End: 2023-03-29

## 2023-03-28 RX ORDER — SODIUM CHLORIDE 9 MG/ML
1000 INJECTION, SOLUTION INTRAVENOUS
Refills: 0 | Status: DISCONTINUED | OUTPATIENT
Start: 2023-03-28 | End: 2023-03-28

## 2023-03-28 RX ADMIN — Medication 500000 UNIT(S): at 23:20

## 2023-03-28 RX ADMIN — HYDROMORPHONE HYDROCHLORIDE 0.5 MILLIGRAM(S): 2 INJECTION INTRAMUSCULAR; INTRAVENOUS; SUBCUTANEOUS at 20:30

## 2023-03-28 RX ADMIN — HYDROMORPHONE HYDROCHLORIDE 0.5 MILLIGRAM(S): 2 INJECTION INTRAMUSCULAR; INTRAVENOUS; SUBCUTANEOUS at 21:00

## 2023-03-28 RX ADMIN — TACROLIMUS 3 MILLIGRAM(S): 5 CAPSULE ORAL at 22:15

## 2023-03-28 RX ADMIN — SODIUM CHLORIDE 500 MILLILITER(S): 9 INJECTION INTRAMUSCULAR; INTRAVENOUS; SUBCUTANEOUS at 22:17

## 2023-03-28 RX ADMIN — HYDROMORPHONE HYDROCHLORIDE 0.5 MILLIGRAM(S): 2 INJECTION INTRAMUSCULAR; INTRAVENOUS; SUBCUTANEOUS at 22:25

## 2023-03-28 RX ADMIN — HYDROMORPHONE HYDROCHLORIDE 0.5 MILLIGRAM(S): 2 INJECTION INTRAMUSCULAR; INTRAVENOUS; SUBCUTANEOUS at 20:15

## 2023-03-28 RX ADMIN — SODIUM CHLORIDE 50 MILLILITER(S): 9 INJECTION INTRAMUSCULAR; INTRAVENOUS; SUBCUTANEOUS at 20:55

## 2023-03-28 RX ADMIN — HYDROMORPHONE HYDROCHLORIDE 0.5 MILLIGRAM(S): 2 INJECTION INTRAMUSCULAR; INTRAVENOUS; SUBCUTANEOUS at 22:40

## 2023-03-28 RX ADMIN — HYDROMORPHONE HYDROCHLORIDE 0.5 MILLIGRAM(S): 2 INJECTION INTRAMUSCULAR; INTRAVENOUS; SUBCUTANEOUS at 20:45

## 2023-03-28 RX ADMIN — SODIUM CHLORIDE 70 MILLILITER(S): 9 INJECTION INTRAMUSCULAR; INTRAVENOUS; SUBCUTANEOUS at 21:53

## 2023-03-28 NOTE — BRIEF OPERATIVE NOTE - OPERATION/FINDINGS
Donor LOIS [id number]  male in his 50s  Positive blood cultures on 03-  Recipient receiving Vancomycin and Ertapenem intra-op  Right kidney  Single artery, single vein, single ureter  CPRA 0%  KDPI 50%  Simulect induction  Terminal creatinine 0.66  Donor: blood type A, CMV+, EBV+  Recipient: blood type A, CMV-, EBV+  Crossmatch sent since recipient was transfused around 03- when she started dialysis  Weight of the organ:  Cold ischemia time:  Warm ischemia time: Donor LOIS [id number]  male in his 50s  Positive blood cultures on 03-  Recipient receiving Vancomycin and Ertapenem intra-op  Right kidney  Single artery, single vein, single ureter  CPRA 0%  KDPI 50%  Simulect induction  Terminal creatinine 0.66  Donor: blood type A, CMV+, EBV+  Recipient: blood type A, CMV-, EBV+  Crossmatch sent since recipient was transfused around 03- when she started dialysis  Weight of the organ: 245 grams  Cold ischemia time: 15 hs 02 mins  Warm ischemia time: 35 minutes  the right renal vein was reconstructed in the back table by going across the donor IVC and suturing the proximal and distal transected ends with running 6-0 Prolene sutures.  The opening of the left renal vein was used for the anastomosis of the IVC elongation to the transposed right external iliac vein. Donor LOIS [id number]  male in his 50s  Positive blood cultures on 03-  Recipient given Vancomycin and Ertapenem prior to incision  Right kidney  Single artery, single vein, single ureter  CPRA 0%  KDPI 50%  Simulect induction  Terminal creatinine 0.66  Donor: blood type A, CMV+, EBV+  Recipient: blood type A, CMV-, EBV+  Crossmatch sent since recipient was transfused around 03- when she started dialysis  Weight of the organ: 245 grams  Cold ischemia time: 15 hs 02 mins  Warm ischemia time: 35 minutes  the right renal vein was reconstructed in the back table by going across the donor IVC and suturing the proximal and distal transected ends with running 6-0 Prolene sutures.  The opening of the left renal vein was used for the anastomosis of the IVC elongation to the transposed right external iliac vein.

## 2023-03-28 NOTE — CONSULT NOTE ADULT - ASSESSMENT
68 yo F with a PMH of [place], HTN, Alport syndrome, ESRD via LUE AVF, who presents for DDRT today.     Currently in OR undergoing DDRT (CMV +/-, EBV +/+, 1a/1v/1u)  Donor with + polymicrobial blood cultures (Staph Aureus, Klebsiella, GBS)  Transplant ID consulted for recommendations    Overall,   Polymicrobial bacteremia from donor blood cx with Staph Aureus, Klebsiella, GBS    Plan:  Vancomycin, continue  Ertapenem, continue  F/u final culture results of donor for sensitivities     Luis Stone MD, PGY-4   ID Fellow  [employer] Teams Preferred  After 5pm/weekends call [phone]

## 2023-03-28 NOTE — PATIENT PROFILE ADULT - FALL HARM RISK - UNIVERSAL INTERVENTIONS
Bed in lowest position, wheels locked, appropriate side rails in place/Call bell, personal items and telephone in reach/Instruct patient to call for assistance before getting out of bed or chair/Non-slip footwear when patient is out of bed/[place] to call system/Physically safe environment - no spills, clutter or unnecessary equipment/Purposeful Proactive Rounding/Room/bathroom lighting operational, light cord in reach

## 2023-03-28 NOTE — H&P ADULT - NSHPPHYSICALEXAM_GEN_ALL_CORE
Constitutional: Well developed / well nourished  Eyes: Anicteric, PERRLA  ENMT: nc/at  Neck: supple  Respiratory: CTA B/L  Cardiovascular: RRR  Gastrointestinal: Soft, non distended, NT  Genitourinary: Voiding spontaneously  Extremities: warm,  LUE AVF  Vascular: Palpable dp pulses bilaterally  Neurological: A&O x3  Skin: no rashes, ulcerations or lesions;  Musculoskeletal: Moving all extremities  Psychiatric: Responsive

## 2023-03-28 NOTE — BRIEF OPERATIVE NOTE - ANTIBIOTIC PROTOCOL
1 gram IV vancomycin and 1 gram IV ertapenem/Followed protocol 1 gram IV vancomycin and 1 gram IV ertapenem given based on the fact that the donor had positive blood cultures 48 hours prior to procurement/Followed protocol

## 2023-03-28 NOTE — H&P ADULT - NSHPREVIEWOFSYSTEMS_GEN_ALL_CORE
Gen: No weight changes, fatigue, fevers/chills, weakness  Skin: No rashes  Head/Eyes/Ears/Mouth: No headache; [place] (uses hearing aides); Normal vision w/o blurriness; No sinus pain/discomfort, sore throat  Respiratory: No dyspnea, cough, wheezing, hemoptysis  CV: No chest pain, PND, orthopnea  GI: denies abdominal pain, diarrhea, constipation, nausea, vomiting, melena, hematochezia  : No increased frequency, dysuria, hematuria, nocturia  MSK: No joint pain/swelling; no back pain; no edema  Neuro: No dizziness/lightheadedness, weakness, seizures, numbness, tingling  Heme: No easy bruising or bleeding  Endo: No heat/cold intolerance  Psych: No significant nervousness, anxiety, stress, depression  All other systems were reviewed and are negative, except as noted.

## 2023-03-28 NOTE — H&P ADULT - ASSESSMENT
67-year-old female with HTN,  Alport syndrome and CKD 5  (x 15 years), LUE AVF placed in 2017, recently started HD on 3/17/2023 (Tanja, ) now presents for possible DDRT    ESRD  - NPO for possible  donor renal transplant today  - STAT CBC, CMP, Mag, Phos, INR, T&S, Covid swab  - Send [facility] labs  - EKG  - OR MEDS: Simulect, Merthylpred 500mg , Ancef 2gm   - Case discussed with Dr. Calderón

## 2023-03-28 NOTE — H&P ADULT - HISTORY OF PRESENT ILLNESS
67-year-old [facility] female with HTN,  Alport syndrome and CKD 5  (x 15 years), LUE AVF placed in 2017, recently started HD on 3/17/2023 (Tanja, Nephrologist Dr. Jet Horn ) now presents for possible DDRT

## 2023-03-28 NOTE — PATIENT PROFILE ADULT - OVER THE PAST TWO WEEKS, HAVE YOU FELT LITTLE INTEREST OR PLEASURE IN DOING THINGS?
----- Message from Clarissa Luevano sent at 12/3/2019  9:48 AM CST -----    ptWife christian is calling   About  Hearing  Aides  For  Pt  // has  Questions about  Pricing  And other questions [phone]  
Spoke with patient's wife regarding filing of insurance for hearing aid claims.  Answered all questions.   
no

## 2023-03-28 NOTE — CONSULT NOTE ADULT - SUBJECTIVE AND OBJECTIVE BOX
Patient is a 66 yo F who presents for renal transplant today     HPI:  66 yo F with a PMH of [place], HTN, Alport syndrome, ESRD via LUE AVF, who presents for DDRT today.     Currently in OR undergoing DDRT (CMV +/-, EBV +/+, 1a/1v/1u). Donor with + polymicrobial blood cultures (Staph Aureus, Klebsiella, GBS). Transplant ID consulted for recommendations.    REVIEW OF SYSTEMS       prior hospital charts reviewed [V]  primary team notes reviewed [V]  other consultant notes reviewed [V]    PAST MEDICAL & SURGICAL HISTORY:  Alport's syndrome    HTN (hypertension)    Hard of hearing  uses hearing aids    Anemia    ESRD with anemia  left AV fistula, not in use    S/P tonsillectomy    A-V fistula  Nov 2017    SOCIAL HISTORY:  - Denied smoking/vaping/alcohol/recreational drug use    FAMILY HISTORY:  FH: kidney disease (Child)    Allergies  No Known Allergies    ANTIMICROBIALS:  vancomycin  IVPB 1000 once    ANTIMICROBIALS (past 90 days):  MEDICATIONS  (STANDING):    OTHER MEDS:   MEDICATIONS  (STANDING):  basiliximab  IVPB 20 once    VITALS:  Vital Signs Last 24 Hrs  T(F): 98.3 (03-28-23 @ 13:06), Max: 99.5 (03-23-23 @ 05:30)    Vital Signs Last 24 Hrs  HR: 96 (03-28-23 @ 13:53) (96 - 96)  BP: 121/74 (03-28-23 @ 13:53) (121/74 - 121/74)  RR: 18 (03-28-23 @ 13:53)  SpO2: 100% (03-28-23 @ 13:53) (100% - 100%)  Wt(kg): --    EXAM:      Labs:                        9.6    5.77  )-----------( 190      ( 28 Mar 2023 13:39 )             30.0     03-28    138  |  91<L>  |  44<H>  ----------------------------<  137<H>  3.5   |  28  |  5.45<H>    Ca    9.6      28 Mar 2023 13:39  Phos  3.7     03-28  Mg     1.7     03-28    TPro  7.3  /  Alb  3.8  /  TBili  0.4  /  DBili  x   /  AST  7<L>  /  ALT  9<L>  /  AlkPhos  78  03-28    WBC Trend:  WBC Count: 5.77 (03-28-23 @ 13:39)  WBC Count: 9.04 (03-24-23 @ 09:56)    Auto Neutrophil #: 3.72 K/uL (03-28-23 @ 13:39)  Auto Neutrophil #: 5.27 K/uL (03-17-23 @ 12:39)    Creatine Trend:  Creatinine, Serum: 5.45 (03-28)  Creatinine, Serum: 4.49 (03-24)  Creatinine, Serum: 6.35 (03-23)  Creatinine, Serum: 6.01 (03-22)    Liver Biochemical Testing Trend:  Alanine Aminotransferase (ALT/SGPT): 9 *L* (03-28)  Alanine Aminotransferase (ALT/SGPT): 13 (03-17)  Aspartate Aminotransferase (AST/SGOT): 7 (03-28-23 @ 13:39)  Aspartate Aminotransferase (AST/SGOT): 10 (03-17-23 @ 12:39)  Bilirubin Total, Serum: 0.4 (03-28)  Bilirubin Total, Serum: 0.2 (03-17)    Auto Eosinophil %: 3.1 % (03-28-23 @ 13:39)    MICROBIOLOGY:    COVID-19 PCR: NotDetec (03-28-23 @ 13:41)  COVID-19 PCR: NotDetec (03-24-23 @ 17:53)  COVID-19 PCR: NotDetec (03-17-23 @ 13:17)    RADIOLOGY:  imaging below personally reviewed Patient is a 66 yo F who presents for renal transplant today     HPI:  66 yo F with a PMH of [place], HTN, Alport syndrome, ESRD via LUE AVF, who presents for DDRT today.     Currently in OR undergoing DDRT (CMV +/-, EBV +/+, 1a/1v/1u). Donor with + polymicrobial blood cultures (Staph Aureus, Klebsiella, GBS). Transplant ID consulted for recommendations.    REVIEW OF SYSTEMS       prior hospital charts reviewed [V]  primary team notes reviewed [V]  other consultant notes reviewed [V]    PAST MEDICAL & SURGICAL HISTORY:  Alport's syndrome    HTN (hypertension)    Hard of hearing  uses hearing aids    Anemia    ESRD with anemia  left AV fistula, not in use    S/P tonsillectomy    A-V fistula  Nov 2017    SOCIAL HISTORY:  - Denied smoking/vaping/alcohol/recreational drug use    FAMILY HISTORY:  FH: kidney disease (Child)    Allergies  No Known Allergies    ANTIMICROBIALS:  vancomycin  IVPB 1000 once    ANTIMICROBIALS (past 90 days):  MEDICATIONS  (STANDING):    OTHER MEDS:   MEDICATIONS  (STANDING):  basiliximab  IVPB 20 once    VITALS:  Vital Signs Last 24 Hrs  T(F): 98.3 (03-28-23 @ 13:06), Max: 99.5 (03-23-23 @ 05:30)    Vital Signs Last 24 Hrs  HR: 96 (03-28-23 @ 13:53) (96 - 96)  BP: 121/74 (03-28-23 @ 13:53) (121/74 - 121/74)  RR: 18 (03-28-23 @ 13:53)  SpO2: 100% (03-28-23 @ 13:53) (100% - 100%)  Wt(kg): --    EXAM:  post transplant in PACU  awake, alert   lungs- clear anteriorly  Cor- reg  Abd RLQ large transplant site with two drains in place      Labs:                        9.6    5.77  )-----------( 190      ( 28 Mar 2023 13:39 )             30.0     03-28    138  |  91<L>  |  44<H>  ----------------------------<  137<H>  3.5   |  28  |  5.45<H>    Ca    9.6      28 Mar 2023 13:39  Phos  3.7     03-28  Mg     1.7     03-28    TPro  7.3  /  Alb  3.8  /  TBili  0.4  /  DBili  x   /  AST  7<L>  /  ALT  9<L>  /  AlkPhos  78  03-28    WBC Trend:  WBC Count: 5.77 (03-28-23 @ 13:39)  WBC Count: 9.04 (03-24-23 @ 09:56)    Auto Neutrophil #: 3.72 K/uL (03-28-23 @ 13:39)  Auto Neutrophil #: 5.27 K/uL (03-17-23 @ 12:39)    Creatine Trend:  Creatinine, Serum: 5.45 (03-28)  Creatinine, Serum: 4.49 (03-24)  Creatinine, Serum: 6.35 (03-23)  Creatinine, Serum: 6.01 (03-22)    Liver Biochemical Testing Trend:  Alanine Aminotransferase (ALT/SGPT): 9 *L* (03-28)  Alanine Aminotransferase (ALT/SGPT): 13 (03-17)  Aspartate Aminotransferase (AST/SGOT): 7 (03-28-23 @ 13:39)  Aspartate Aminotransferase (AST/SGOT): 10 (03-17-23 @ 12:39)  Bilirubin Total, Serum: 0.4 (03-28)  Bilirubin Total, Serum: 0.2 (03-17)    Auto Eosinophil %: 3.1 % (03-28-23 @ 13:39)    MICROBIOLOGY:    COVID-19 PCR: NotDetec (03-28-23 @ 13:41)  COVID-19 PCR: NotDetec (03-24-23 @ 17:53)  COVID-19 PCR: NotDetec (03-17-23 @ 13:17)    RADIOLOGY:  imaging below personally reviewed

## 2023-03-28 NOTE — BRIEF OPERATIVE NOTE - COMMENTS
Referring MD: Jet Horn  Op note dictation # Referring MD: Jet Horn (I personally notified before and after procedure)  Op note dictation # [id number]

## 2023-03-28 NOTE — H&P ADULT - NS ATTEND AMEND GEN_ALL_CORE FT
Patient presents to undergo a  donor kidney transplantation.  ESKD on dialysis (started recently)   (Ethan) also present.  Alert, oriented, responsive.  Abdomen soft and non-tender.  Palpable DP pulses bilaterally.  I discussed case with Aj Tobin(nephrology), Randell (Nephrology), Trinidad (ID), and Vianca (ID).  Patient and  aware that:  - donor had positive blood cultures  - she will need to receive antibiotics for these blood cultures (we have a preliminary result)  - this history of infection increases the risk of infection in the recipient (including but not limited to anastomotic infections)  I explained these findings (in a loud voice to make sure patient heard since she is hard of hearing and did not have her hearing aids with her)  Patient agrees to proceed.  I personally marked the surgical site, marked the DP pulses bilaterally, and obtained the surgical and transplant consents myself.

## 2023-03-28 NOTE — BRIEF OPERATIVE NOTE - BRIEF OP NOTE DRAINS
Retro-peritoneal drain  Ureteral stent 1 Retro-peritoneal drain  1 Sub-cutaneous drain  1 Ureteral stent

## 2023-03-28 NOTE — PRE-ANESTHESIA EVALUATION ADULT - NSANTHPMHFT_GEN_ALL_CORE
67-year-old [place] female with HTN,  Alport syndrome and CKD 5  (x 15 years), LUE AVF placed in 2017, recently started HD on 3/17/2023

## 2023-03-29 DIAGNOSIS — D84.9 IMMUNODEFICIENCY, UNSPECIFIED: ICD-10-CM

## 2023-03-29 LAB
ALBUMIN SERPL ELPH-MCNC: 2.8 G/DL — LOW (ref 3.3–5)
ALBUMIN SERPL ELPH-MCNC: 3.1 G/DL — LOW (ref 3.3–5)
ALP SERPL-CCNC: 63 U/L — SIGNIFICANT CHANGE UP (ref 40–120)
ALP SERPL-CCNC: 65 U/L — SIGNIFICANT CHANGE UP (ref 40–120)
ALT FLD-CCNC: 17 U/L — SIGNIFICANT CHANGE UP (ref 10–45)
ALT FLD-CCNC: 18 U/L — SIGNIFICANT CHANGE UP (ref 10–45)
ANION GAP SERPL CALC-SCNC: 15 MMOL/L — SIGNIFICANT CHANGE UP (ref 5–17)
ANION GAP SERPL CALC-SCNC: 16 MMOL/L — SIGNIFICANT CHANGE UP (ref 5–17)
AST SERPL-CCNC: 26 U/L — SIGNIFICANT CHANGE UP (ref 10–40)
AST SERPL-CCNC: 28 U/L — SIGNIFICANT CHANGE UP (ref 10–40)
BASOPHILS # BLD AUTO: 0.01 K/UL — SIGNIFICANT CHANGE UP (ref 0–0.2)
BASOPHILS # BLD AUTO: 0.01 K/UL — SIGNIFICANT CHANGE UP (ref 0–0.2)
BASOPHILS # BLD AUTO: 0.02 K/UL — SIGNIFICANT CHANGE UP (ref 0–0.2)
BASOPHILS NFR BLD AUTO: 0.1 % — SIGNIFICANT CHANGE UP (ref 0–2)
BASOPHILS NFR BLD AUTO: 0.1 % — SIGNIFICANT CHANGE UP (ref 0–2)
BASOPHILS NFR BLD AUTO: 0.2 % — SIGNIFICANT CHANGE UP (ref 0–2)
BILIRUB SERPL-MCNC: 0.2 MG/DL — SIGNIFICANT CHANGE UP (ref 0.2–1.2)
BILIRUB SERPL-MCNC: 0.3 MG/DL — SIGNIFICANT CHANGE UP (ref 0.2–1.2)
BUN SERPL-MCNC: 43 MG/DL — HIGH (ref 7–23)
BUN SERPL-MCNC: 44 MG/DL — HIGH (ref 7–23)
CALCIUM SERPL-MCNC: 8.6 MG/DL — SIGNIFICANT CHANGE UP (ref 8.4–10.5)
CALCIUM SERPL-MCNC: 8.6 MG/DL — SIGNIFICANT CHANGE UP (ref 8.4–10.5)
CHLORIDE SERPL-SCNC: 95 MMOL/L — LOW (ref 96–108)
CHLORIDE SERPL-SCNC: 96 MMOL/L — SIGNIFICANT CHANGE UP (ref 96–108)
CO2 SERPL-SCNC: 23 MMOL/L — SIGNIFICANT CHANGE UP (ref 22–31)
CO2 SERPL-SCNC: 26 MMOL/L — SIGNIFICANT CHANGE UP (ref 22–31)
CREAT SERPL-MCNC: 4.5 MG/DL — HIGH (ref 0.5–1.3)
CREAT SERPL-MCNC: 4.84 MG/DL — HIGH (ref 0.5–1.3)
EGFR: 10 ML/MIN/1.73M2 — LOW
EGFR: 9 ML/MIN/1.73M2 — LOW
EOSINOPHIL # BLD AUTO: 0 K/UL — SIGNIFICANT CHANGE UP (ref 0–0.5)
EOSINOPHIL NFR BLD AUTO: 0 % — SIGNIFICANT CHANGE UP (ref 0–6)
GLUCOSE BLDC GLUCOMTR-MCNC: 206 MG/DL — HIGH (ref 70–99)
GLUCOSE BLDC GLUCOMTR-MCNC: 249 MG/DL — HIGH (ref 70–99)
GLUCOSE BLDC GLUCOMTR-MCNC: 264 MG/DL — HIGH (ref 70–99)
GLUCOSE BLDC GLUCOMTR-MCNC: 265 MG/DL — HIGH (ref 70–99)
GLUCOSE BLDC GLUCOMTR-MCNC: 267 MG/DL — HIGH (ref 70–99)
GLUCOSE SERPL-MCNC: 198 MG/DL — HIGH (ref 70–99)
GLUCOSE SERPL-MCNC: 214 MG/DL — HIGH (ref 70–99)
HCT VFR BLD CALC: 24.3 % — LOW (ref 34.5–45)
HCT VFR BLD CALC: 26.1 % — LOW (ref 34.5–45)
HCT VFR BLD CALC: 26.7 % — LOW (ref 34.5–45)
HGB BLD-MCNC: 7.6 G/DL — LOW (ref 11.5–15.5)
HGB BLD-MCNC: 8.3 G/DL — LOW (ref 11.5–15.5)
HGB BLD-MCNC: 8.4 G/DL — LOW (ref 11.5–15.5)
IMM GRANULOCYTES NFR BLD AUTO: 0.4 % — SIGNIFICANT CHANGE UP (ref 0–0.9)
IMM GRANULOCYTES NFR BLD AUTO: 0.6 % — SIGNIFICANT CHANGE UP (ref 0–0.9)
IMM GRANULOCYTES NFR BLD AUTO: 0.6 % — SIGNIFICANT CHANGE UP (ref 0–0.9)
LYMPHOCYTES # BLD AUTO: 0.44 K/UL — LOW (ref 1–3.3)
LYMPHOCYTES # BLD AUTO: 0.49 K/UL — LOW (ref 1–3.3)
LYMPHOCYTES # BLD AUTO: 0.64 K/UL — LOW (ref 1–3.3)
LYMPHOCYTES # BLD AUTO: 5.6 % — LOW (ref 13–44)
LYMPHOCYTES # BLD AUTO: 5.8 % — LOW (ref 13–44)
LYMPHOCYTES # BLD AUTO: 5.9 % — LOW (ref 13–44)
MAGNESIUM SERPL-MCNC: 1.9 MG/DL — SIGNIFICANT CHANGE UP (ref 1.6–2.6)
MAGNESIUM SERPL-MCNC: 2 MG/DL — SIGNIFICANT CHANGE UP (ref 1.6–2.6)
MCHC RBC-ENTMCNC: 30.9 PG — SIGNIFICANT CHANGE UP (ref 27–34)
MCHC RBC-ENTMCNC: 31.1 PG — SIGNIFICANT CHANGE UP (ref 27–34)
MCHC RBC-ENTMCNC: 31.3 GM/DL — LOW (ref 32–36)
MCHC RBC-ENTMCNC: 31.3 PG — SIGNIFICANT CHANGE UP (ref 27–34)
MCHC RBC-ENTMCNC: 31.5 GM/DL — LOW (ref 32–36)
MCHC RBC-ENTMCNC: 31.8 GM/DL — LOW (ref 32–36)
MCV RBC AUTO: 100 FL — SIGNIFICANT CHANGE UP (ref 80–100)
MCV RBC AUTO: 97 FL — SIGNIFICANT CHANGE UP (ref 80–100)
MCV RBC AUTO: 98.9 FL — SIGNIFICANT CHANGE UP (ref 80–100)
MONOCYTES # BLD AUTO: 0.46 K/UL — SIGNIFICANT CHANGE UP (ref 0–0.9)
MONOCYTES # BLD AUTO: 0.65 K/UL — SIGNIFICANT CHANGE UP (ref 0–0.9)
MONOCYTES # BLD AUTO: 1.34 K/UL — HIGH (ref 0–0.9)
MONOCYTES NFR BLD AUTO: 12.4 % — SIGNIFICANT CHANGE UP (ref 2–14)
MONOCYTES NFR BLD AUTO: 5.9 % — SIGNIFICANT CHANGE UP (ref 2–14)
MONOCYTES NFR BLD AUTO: 7.7 % — SIGNIFICANT CHANGE UP (ref 2–14)
NEUTROPHILS # BLD AUTO: 6.84 K/UL — SIGNIFICANT CHANGE UP (ref 1.8–7.4)
NEUTROPHILS # BLD AUTO: 7.22 K/UL — SIGNIFICANT CHANGE UP (ref 1.8–7.4)
NEUTROPHILS # BLD AUTO: 8.8 K/UL — HIGH (ref 1.8–7.4)
NEUTROPHILS NFR BLD AUTO: 81.2 % — HIGH (ref 43–77)
NEUTROPHILS NFR BLD AUTO: 85.7 % — HIGH (ref 43–77)
NEUTROPHILS NFR BLD AUTO: 87.8 % — HIGH (ref 43–77)
NRBC # BLD: 0 /100 WBCS — SIGNIFICANT CHANGE UP (ref 0–0)
PHOSPHATE SERPL-MCNC: 4.9 MG/DL — HIGH (ref 2.5–4.5)
PHOSPHATE SERPL-MCNC: 5.1 MG/DL — HIGH (ref 2.5–4.5)
PLATELET # BLD AUTO: 170 K/UL — SIGNIFICANT CHANGE UP (ref 150–400)
PLATELET # BLD AUTO: 175 K/UL — SIGNIFICANT CHANGE UP (ref 150–400)
PLATELET # BLD AUTO: 191 K/UL — SIGNIFICANT CHANGE UP (ref 150–400)
POTASSIUM SERPL-MCNC: 4.4 MMOL/L — SIGNIFICANT CHANGE UP (ref 3.5–5.3)
POTASSIUM SERPL-MCNC: 4.5 MMOL/L — SIGNIFICANT CHANGE UP (ref 3.5–5.3)
POTASSIUM SERPL-SCNC: 4.4 MMOL/L — SIGNIFICANT CHANGE UP (ref 3.5–5.3)
POTASSIUM SERPL-SCNC: 4.5 MMOL/L — SIGNIFICANT CHANGE UP (ref 3.5–5.3)
PROT SERPL-MCNC: 5.6 G/DL — LOW (ref 6–8.3)
PROT SERPL-MCNC: 5.7 G/DL — LOW (ref 6–8.3)
RBC # BLD: 2.43 M/UL — LOW (ref 3.8–5.2)
RBC # BLD: 2.69 M/UL — LOW (ref 3.8–5.2)
RBC # BLD: 2.7 M/UL — LOW (ref 3.8–5.2)
RBC # FLD: 14.2 % — SIGNIFICANT CHANGE UP (ref 10.3–14.5)
RBC # FLD: 14.5 % — SIGNIFICANT CHANGE UP (ref 10.3–14.5)
RBC # FLD: 14.6 % — HIGH (ref 10.3–14.5)
SODIUM SERPL-SCNC: 135 MMOL/L — SIGNIFICANT CHANGE UP (ref 135–145)
SODIUM SERPL-SCNC: 136 MMOL/L — SIGNIFICANT CHANGE UP (ref 135–145)
TACROLIMUS SERPL-MCNC: 7.4 NG/ML — SIGNIFICANT CHANGE UP
VANCOMYCIN FLD-MCNC: 11.7 UG/ML — SIGNIFICANT CHANGE UP
WBC # BLD: 10.83 K/UL — HIGH (ref 3.8–10.5)
WBC # BLD: 7.8 K/UL — SIGNIFICANT CHANGE UP (ref 3.8–10.5)
WBC # BLD: 8.43 K/UL — SIGNIFICANT CHANGE UP (ref 3.8–10.5)
WBC # FLD AUTO: 10.83 K/UL — HIGH (ref 3.8–10.5)
WBC # FLD AUTO: 7.8 K/UL — SIGNIFICANT CHANGE UP (ref 3.8–10.5)
WBC # FLD AUTO: 8.43 K/UL — SIGNIFICANT CHANGE UP (ref 3.8–10.5)

## 2023-03-29 PROCEDURE — 99222 1ST HOSP IP/OBS MODERATE 55: CPT

## 2023-03-29 PROCEDURE — 99232 SBSQ HOSP IP/OBS MODERATE 35: CPT

## 2023-03-29 PROCEDURE — 99232 SBSQ HOSP IP/OBS MODERATE 35: CPT | Mod: 24

## 2023-03-29 RX ORDER — VANCOMYCIN HCL 1 G
500 VIAL (EA) INTRAVENOUS ONCE
Refills: 0 | Status: COMPLETED | OUTPATIENT
Start: 2023-03-29 | End: 2023-03-29

## 2023-03-29 RX ORDER — GLUCAGON INJECTION, SOLUTION 0.5 MG/.1ML
1 INJECTION, SOLUTION SUBCUTANEOUS ONCE
Refills: 0 | Status: DISCONTINUED | OUTPATIENT
Start: 2023-03-29 | End: 2023-04-06

## 2023-03-29 RX ORDER — CHLORHEXIDINE GLUCONATE 213 G/1000ML
1 SOLUTION TOPICAL DAILY
Refills: 0 | Status: DISCONTINUED | OUTPATIENT
Start: 2023-03-29 | End: 2023-04-06

## 2023-03-29 RX ORDER — DEXTROSE 50 % IN WATER 50 %
25 SYRINGE (ML) INTRAVENOUS ONCE
Refills: 0 | Status: DISCONTINUED | OUTPATIENT
Start: 2023-03-29 | End: 2023-04-06

## 2023-03-29 RX ORDER — SODIUM CHLORIDE 9 MG/ML
500 INJECTION INTRAMUSCULAR; INTRAVENOUS; SUBCUTANEOUS ONCE
Refills: 0 | Status: COMPLETED | OUTPATIENT
Start: 2023-03-29 | End: 2023-03-29

## 2023-03-29 RX ORDER — SODIUM CHLORIDE 9 MG/ML
1000 INJECTION, SOLUTION INTRAVENOUS
Refills: 0 | Status: DISCONTINUED | OUTPATIENT
Start: 2023-03-29 | End: 2023-04-06

## 2023-03-29 RX ORDER — INSULIN LISPRO 100/ML
VIAL (ML) SUBCUTANEOUS AT BEDTIME
Refills: 0 | Status: DISCONTINUED | OUTPATIENT
Start: 2023-03-29 | End: 2023-04-06

## 2023-03-29 RX ORDER — TRAMADOL HYDROCHLORIDE 50 MG/1
50 TABLET ORAL EVERY 4 HOURS
Refills: 0 | Status: DISCONTINUED | OUTPATIENT
Start: 2023-03-29 | End: 2023-04-05

## 2023-03-29 RX ORDER — ACETAMINOPHEN 500 MG
1000 TABLET ORAL ONCE
Refills: 0 | Status: COMPLETED | OUTPATIENT
Start: 2023-03-29 | End: 2023-03-29

## 2023-03-29 RX ORDER — DEXTROSE 50 % IN WATER 50 %
15 SYRINGE (ML) INTRAVENOUS ONCE
Refills: 0 | Status: DISCONTINUED | OUTPATIENT
Start: 2023-03-29 | End: 2023-04-06

## 2023-03-29 RX ORDER — SIMETHICONE 80 MG/1
80 TABLET, CHEWABLE ORAL EVERY 6 HOURS
Refills: 0 | Status: DISCONTINUED | OUTPATIENT
Start: 2023-03-29 | End: 2023-04-06

## 2023-03-29 RX ORDER — ERTAPENEM SODIUM 1 G/1
500 INJECTION, POWDER, LYOPHILIZED, FOR SOLUTION INTRAMUSCULAR; INTRAVENOUS EVERY 24 HOURS
Refills: 0 | Status: DISCONTINUED | OUTPATIENT
Start: 2023-03-29 | End: 2023-03-30

## 2023-03-29 RX ORDER — CALCIUM CARBONATE 500(1250)
2 TABLET ORAL EVERY 8 HOURS
Refills: 0 | Status: DISCONTINUED | OUTPATIENT
Start: 2023-03-29 | End: 2023-04-06

## 2023-03-29 RX ORDER — INSULIN LISPRO 100/ML
VIAL (ML) SUBCUTANEOUS
Refills: 0 | Status: DISCONTINUED | OUTPATIENT
Start: 2023-03-29 | End: 2023-04-06

## 2023-03-29 RX ORDER — TRAMADOL HYDROCHLORIDE 50 MG/1
25 TABLET ORAL EVERY 4 HOURS
Refills: 0 | Status: DISCONTINUED | OUTPATIENT
Start: 2023-03-29 | End: 2023-04-04

## 2023-03-29 RX ORDER — DEXTROSE 50 % IN WATER 50 %
12.5 SYRINGE (ML) INTRAVENOUS ONCE
Refills: 0 | Status: DISCONTINUED | OUTPATIENT
Start: 2023-03-29 | End: 2023-04-06

## 2023-03-29 RX ADMIN — Medication 125 MILLIGRAM(S): at 05:12

## 2023-03-29 RX ADMIN — MYCOPHENOLATE MOFETIL 1 GRAM(S): 250 CAPSULE ORAL at 20:00

## 2023-03-29 RX ADMIN — CHLORHEXIDINE GLUCONATE 1 APPLICATION(S): 213 SOLUTION TOPICAL at 12:02

## 2023-03-29 RX ADMIN — TRAMADOL HYDROCHLORIDE 50 MILLIGRAM(S): 50 TABLET ORAL at 21:00

## 2023-03-29 RX ADMIN — Medication 2 TABLET(S): at 12:00

## 2023-03-29 RX ADMIN — POLYETHYLENE GLYCOL 3350 17 GRAM(S): 17 POWDER, FOR SOLUTION ORAL at 11:52

## 2023-03-29 RX ADMIN — TRAMADOL HYDROCHLORIDE 25 MILLIGRAM(S): 50 TABLET ORAL at 15:33

## 2023-03-29 RX ADMIN — TACROLIMUS 5 MILLIGRAM(S): 5 CAPSULE ORAL at 07:37

## 2023-03-29 RX ADMIN — TRAMADOL HYDROCHLORIDE 25 MILLIGRAM(S): 50 TABLET ORAL at 15:03

## 2023-03-29 RX ADMIN — TRAMADOL HYDROCHLORIDE 50 MILLIGRAM(S): 50 TABLET ORAL at 12:31

## 2023-03-29 RX ADMIN — TRAMADOL HYDROCHLORIDE 50 MILLIGRAM(S): 50 TABLET ORAL at 13:01

## 2023-03-29 RX ADMIN — MYCOPHENOLATE MOFETIL 1 GRAM(S): 250 CAPSULE ORAL at 07:37

## 2023-03-29 RX ADMIN — Medication 500000 UNIT(S): at 17:08

## 2023-03-29 RX ADMIN — Medication 1 TABLET(S): at 11:52

## 2023-03-29 RX ADMIN — ERTAPENEM SODIUM 100 MILLIGRAM(S): 1 INJECTION, POWDER, LYOPHILIZED, FOR SOLUTION INTRAMUSCULAR; INTRAVENOUS at 14:47

## 2023-03-29 RX ADMIN — Medication 500000 UNIT(S): at 05:08

## 2023-03-29 RX ADMIN — SODIUM CHLORIDE 500 MILLILITER(S): 9 INJECTION INTRAMUSCULAR; INTRAVENOUS; SUBCUTANEOUS at 21:48

## 2023-03-29 RX ADMIN — SENNA PLUS 2 TABLET(S): 8.6 TABLET ORAL at 21:18

## 2023-03-29 RX ADMIN — FAMOTIDINE 20 MILLIGRAM(S): 10 INJECTION INTRAVENOUS at 11:56

## 2023-03-29 RX ADMIN — Medication 400 MILLIGRAM(S): at 10:17

## 2023-03-29 RX ADMIN — Medication 100 MILLIGRAM(S): at 15:20

## 2023-03-29 RX ADMIN — Medication 125 MILLIGRAM(S): at 17:07

## 2023-03-29 RX ADMIN — TRAMADOL HYDROCHLORIDE 50 MILLIGRAM(S): 50 TABLET ORAL at 19:59

## 2023-03-29 RX ADMIN — VALGANCICLOVIR 450 MILLIGRAM(S): 450 TABLET, FILM COATED ORAL at 11:52

## 2023-03-29 RX ADMIN — Medication 500000 UNIT(S): at 11:52

## 2023-03-29 RX ADMIN — SODIUM CHLORIDE 500 MILLILITER(S): 9 INJECTION INTRAMUSCULAR; INTRAVENOUS; SUBCUTANEOUS at 04:43

## 2023-03-29 RX ADMIN — Medication 1000 MILLIGRAM(S): at 10:33

## 2023-03-29 NOTE — PROGRESS NOTE ADULT - SUBJECTIVE AND OBJECTIVE BOX
Transplant Surgery - Multidisciplinary Progress Note  --------------------------------------------------------------  DDRT 3/28  POD#0          Interval Events:    Immunosuppression:      Potential Discharge date:    Education:  Medications    Plan of care:  See Below    MEDICATIONS  (STANDING):  acetaminophen   IVPB .. 1000 milliGRAM(s) IV Intermittent once  chlorhexidine 4% Liquid 1 Application(s) Topical <User Schedule>  ertapenem  IVPB 1000 milliGRAM(s) IV Intermittent every 24 hours  famotidine    Tablet 20 milliGRAM(s) Oral daily  methylPREDNISolone sodium succinate Injectable   IV Push   methylPREDNISolone sodium succinate Injectable 125 milliGRAM(s) IV Push two times a day  mycophenolate mofetil 1 Gram(s) Oral <User Schedule>  nystatin    Suspension 413416 Unit(s) Swish and Swallow four times a day  polyethylene glycol 3350 17 Gram(s) Oral daily  senna 2 Tablet(s) Oral at bedtime  sodium chloride 0.9%. 1000 milliLiter(s) (50 mL/Hr) IV Continuous <Continuous>  sodium chloride 0.9%. 1000 milliLiter(s) (70 mL/Hr) IV Continuous <Continuous>  tacrolimus ER Tablet (ENVARSUS XR) 5 milliGRAM(s) Oral <User Schedule>  trimethoprim   80 mG/sulfamethoxazole 400 mG 1 Tablet(s) Oral daily  valGANciclovir 450 milliGRAM(s) Oral daily    MEDICATIONS  (PRN):  HYDROmorphone  Injectable 0.5 milliGRAM(s) IV Push every 10 minutes PRN Moderate Pain (4 - 6)  HYDROmorphone  Injectable 1 milliGRAM(s) IV Push every 10 minutes PRN Severe Pain (7 - 10)  ondansetron Injectable 4 milliGRAM(s) IV Push every 6 hours PRN Nausea and/or Vomiting      PAST MEDICAL & SURGICAL HISTORY:  Alport's syndrome      HTN (hypertension)      Hard of hearing  uses hearing aids      Anemia      ESRD with anemia  left AV fistula, not in use      S/P tonsillectomy      A-V fistula  Nov 2017          Vital Signs Last 24 Hrs  T(C): 36.4 (29 Mar 2023 00:00), Max: 36.8 (28 Mar 2023 13:06)  T(F): 97.5 (29 Mar 2023 00:00), Max: 98.3 (28 Mar 2023 13:06)  HR: 104 (29 Mar 2023 01:00) (94 - 107)  BP: 133/65 (29 Mar 2023 01:00) (110/52 - 150/70)  BP(mean): 93 (29 Mar 2023 01:00) (75 - 97)  RR: 12 (29 Mar 2023 01:00) (12 - 18)  SpO2: 96% (29 Mar 2023 01:00) (94% - 100%)    Parameters below as of 29 Mar 2023 01:00  Patient On (Oxygen Delivery Method): room air        I&O's Summary    28 Mar 2023 07:01  -  29 Mar 2023 01:12  --------------------------------------------------------  IN: 660 mL / OUT: 765 mL / NET: -105 mL                              8.4    7.64  )-----------( 178      ( 28 Mar 2023 20:18 )             26.5     03-28    135  |  90<L>  |  41<H>  ----------------------------<  178<H>  3.8   |  27  |  5.14<H>    Ca    9.2      28 Mar 2023 20:18  Phos  3.6     03-28  Mg     2.0     03-28    TPro  5.9<L>  /  Alb  3.1<L>  /  TBili  0.3  /  DBili  x   /  AST  26  /  ALT  16  /  AlkPhos  65  03-28            Review of systems  Gen: No weight changes, fatigue, fevers/chills, weakness  Skin: No rashes  Head/Eyes/Ears/Mouth: No headache; Normal hearing; Normal vision w/o blurriness; No sinus pain/discomfort, sore throat  Respiratory: No dyspnea, cough, wheezing, hemoptysis  CV: No chest pain, PND, orthopnea  GI: Mild abdominal pain at surgical incision site; denies diarrhea, constipation, nausea, vomiting, melena, hematochezia  : No increased frequency, dysuria, hematuria, nocturia  MSK: No joint pain/swelling; no back pain; no edema  Neuro: No dizziness/lightheadedness, weakness, seizures, numbness, tingling  Heme: No easy bruising or bleeding  Endo: No heat/cold intolerance  Psych: No significant nervousness, anxiety, stress, depression  All other systems were reviewed and are negative, except as noted.      PHYSICAL EXAM:  Constitutional: Well developed / well nourished  Eyes: Anicteric, PERRLA  ENMT: nc/at  Neck: central line *****************  Respiratory: CTA B/L  Cardiovascular: RRR  Gastrointestinal: Soft, non distended, mild tenderness at the incision site; incision c/d/i; SHEELA .....   Genitourinary: Urinary catheter in place*****Voiding spontaneously  Extremities: SCD's in place and working bilaterally, AVF.....  Vascular: Palpable dp pulses bilaterally  Neurological: A&O x3  Skin: no rashes, ulcerations or lesions;  Musculoskeletal: Moving all extremities  Psychiatric: Responsive     Transplant Surgery - Multidisciplinary POC  --------------------------------------------------------------  DDRT 3/28  POD#0    67-year-old [place] female with HTN,  Alport syndrome and CKD 5  (x 15 years), LUE AVF placed in 2017, recently started HD on 3/17/2023 ([facility], Nephrologist Dr. Jet Horn ) now s/p R DDRT under Simulect.      Interval Events:  AFebrile, VSS  good UO ~100/h  comfortable, pain control      Immunosuppression:  FK 3mg x1 post-op, ENV to start in AM  MMF 1/1  SST  SIMULECT POD#4    Potential Discharge date: TBD    Education:  Medications    Plan of care:  See Below    MEDICATIONS  (STANDING):  acetaminophen   IVPB .. 1000 milliGRAM(s) IV Intermittent once  chlorhexidine 4% Liquid 1 Application(s) Topical <User Schedule>  ertapenem  IVPB 1000 milliGRAM(s) IV Intermittent every 24 hours  famotidine    Tablet 20 milliGRAM(s) Oral daily  methylPREDNISolone sodium succinate Injectable   IV Push   methylPREDNISolone sodium succinate Injectable 125 milliGRAM(s) IV Push two times a day  mycophenolate mofetil 1 Gram(s) Oral <User Schedule>  nystatin    Suspension 877413 Unit(s) Swish and Swallow four times a day  polyethylene glycol 3350 17 Gram(s) Oral daily  senna 2 Tablet(s) Oral at bedtime  sodium chloride 0.9%. 1000 milliLiter(s) (50 mL/Hr) IV Continuous <Continuous>  sodium chloride 0.9%. 1000 milliLiter(s) (70 mL/Hr) IV Continuous <Continuous>  tacrolimus ER Tablet (ENVARSUS XR) 5 milliGRAM(s) Oral <User Schedule>  trimethoprim   80 mG/sulfamethoxazole 400 mG 1 Tablet(s) Oral daily  valGANciclovir 450 milliGRAM(s) Oral daily    MEDICATIONS  (PRN):  HYDROmorphone  Injectable 0.5 milliGRAM(s) IV Push every 10 minutes PRN Moderate Pain (4 - 6)  HYDROmorphone  Injectable 1 milliGRAM(s) IV Push every 10 minutes PRN Severe Pain (7 - 10)  ondansetron Injectable 4 milliGRAM(s) IV Push every 6 hours PRN Nausea and/or Vomiting      PAST MEDICAL & SURGICAL HISTORY:  Alport's syndrome      HTN (hypertension)      Hard of hearing  uses hearing aids      Anemia      ESRD with anemia  left AV fistula, not in use      S/P tonsillectomy      A-V fistula  Nov 2017          Vital Signs Last 24 Hrs  T(C): 36.4 (29 Mar 2023 00:00), Max: 36.8 (28 Mar 2023 13:06)  T(F): 97.5 (29 Mar 2023 00:00), Max: 98.3 (28 Mar 2023 13:06)  HR: 104 (29 Mar 2023 01:00) (94 - 107)  BP: 133/65 (29 Mar 2023 01:00) (110/52 - 150/70)  BP(mean): 93 (29 Mar 2023 01:00) (75 - 97)  RR: 12 (29 Mar 2023 01:00) (12 - 18)  SpO2: 96% (29 Mar 2023 01:00) (94% - 100%)    Parameters below as of 29 Mar 2023 01:00  Patient On (Oxygen Delivery Method): room air        I&O's Summary    28 Mar 2023 07:01  -  29 Mar 2023 01:12  --------------------------------------------------------  IN: 660 mL / OUT: 765 mL / NET: -105 mL                              8.4    7.64  )-----------( 178      ( 28 Mar 2023 20:18 )             26.5     03-28    135  |  90<L>  |  41<H>  ----------------------------<  178<H>  3.8   |  27  |  5.14<H>    Ca    9.2      28 Mar 2023 20:18  Phos  3.6     03-28  Mg     2.0     03-28    TPro  5.9<L>  /  Alb  3.1<L>  /  TBili  0.3  /  DBili  x   /  AST  26  /  ALT  16  /  AlkPhos  65  03-28            Review of systems  Gen: No weight changes, fatigue, fevers/chills, weakness  Skin: No rashes  Head/Eyes/Ears/Mouth: No headache; Normal hearing; Normal vision w/o blurriness; No sinus pain/discomfort, sore throat  Respiratory: No dyspnea, cough, wheezing, hemoptysis  CV: No chest pain, PND, orthopnea  GI: Mild abdominal pain at surgical incision site; denies diarrhea, constipation, nausea, vomiting, melena, hematochezia  : No increased frequency, dysuria, hematuria, nocturia  MSK: No joint pain/swelling; no back pain; no edema  Neuro: No dizziness/lightheadedness, weakness, seizures, numbness, tingling  Heme: No easy bruising or bleeding  Endo: No heat/cold intolerance  Psych: No significant nervousness, anxiety, stress, depression  All other systems were reviewed and are negative, except as noted.      PHYSICAL EXAM:  Constitutional: Well developed / well nourished  Eyes: Anicteric, PERRLA  ENMT: nc/at  Neck: supple  Respiratory: CTA B/L  Cardiovascular: RRR  Gastrointestinal: Soft, non distended, mild tenderness at the incision site; incision c/d/i; JPs x2 ss  Genitourinary: Urinary catheter in place  Extremities: SCD's in place and working bilaterally, no edema, AVF palpable  Vascular: Palpable dp pulses bilaterally  Neurological: A&O x3  Skin: no rashes, ulcerations or lesions;  Musculoskeletal: Moving all extremities  Psychiatric: Responsive

## 2023-03-29 NOTE — PROGRESS NOTE ADULT - ASSESSMENT
67-year-old [place] female with HTN,  Alport syndrome and CKD 5  (x 15 years), LUE AVF placed in 2017, recently started HD on 3/17/2023 ([facility], Nephrologist Dr. Jet Horn ) now s/p R DDRT under Simulect.    POD#0 s/p DDRT  -good graft function. continue to trend labs  -IVF with replacements as ordered  -strict I&Os: JPs x2 (Subcutaneous and Perinephric)  -ADAT  -SCDs/spirometry  -PT in AM  -bowel regimen  -restart home meds as able  -transfer to [facility]

## 2023-03-29 NOTE — DIETITIAN INITIAL EVALUATION ADULT - PERTINENT LABORATORY DATA
03-29    135  |  96  |  44<H>  ----------------------------<  214<H>  4.4   |  23  |  4.50<H>    Ca    8.6      29 Mar 2023 06:43  Phos  5.1     03-29  Mg     2.0     03-29    TPro  5.6<L>  /  Alb  2.8<L>  /  TBili  0.3  /  DBili  x   /  AST  28  /  ALT  18  /  AlkPhos  65  03-29    POCT Blood Glucose.: 267 mg/dL (03-29-23 @ 12:29)  POCT Blood Glucose.: 206 mg/dL (03-29-23 @ 08:18)  POCT Blood Glucose.: 264 mg/dL (03-29-23 @ 04:10)  POCT Blood Glucose.: 184 mg/dL (03-28-23 @ 23:17)  POCT Blood Glucose.: 187 mg/dL (03-28-23 @ 19:47)

## 2023-03-29 NOTE — PROGRESS NOTE ADULT - ASSESSMENT
66 yo F with a PMH of [place], HTN, Alport syndrome, ESRD via LUE AVF, s/p DDRT    S/p DDRT (CMV +/-, EBV +/+, 1a/1v/1u) on 3/28  Donor with + polymicrobial blood cultures  Transplant ID consulted for recommendations    Overall,   Polymicrobial bacteremia from donor blood cx (preliminary results)    Plan:  Continue Vancomycin, check Vancomycin trough before 4th sequential dose  Continue Ertapenem  F/u final donor culture results for sensitivities  Please send 2 sets of surveillance blood cultures      Luis Stone MD, PGY-4   ID Fellow  [employer] Preferred  After 5pm/weekends call [phone]  66 yo F with a PMH of [place], HTN, Alport syndrome, ESRD via LUE AVF, s/p DDRT    S/p DDRT (CMV +/-, EBV +/+, 1a/1v/1u) on 3/28  Donor with + polymicrobial blood cultures  Transplant ID consulted for recommendations    Overall,   Polymicrobial bacteremia from donor blood cx (preliminary results)    Plan:  Continue Vancomycin, being dosed by level   Continue Ertapenem  F/u final donor culture results for sensitivities  Please send 2 sets of surveillance blood cultures      Luis Stone MD, PGY-4   ID Fellow  [employer] Teams Preferred  After 5pm/weekends call [phone]

## 2023-03-29 NOTE — DIETITIAN INITIAL EVALUATION ADULT - OTHER INFO
-- Deltasone, Simulect, Solu-medrol, Cellcept ordered  -- Pt denies history of diabetes. Not receiving any insulin or oral anti-hyperglycemic medications for BG management at this time  -- Urine output per flow sheets 925 ml thus far (3/29), 1,285ml (3/27)   -- Good graft function with good UOP noted      Weights:  -- Drug Dosing Weight: 65.9 kg/ 145.3 pounds (28 Mar 2023 13:53)  -- Daily Weights: 69.2 kg/ 152.6 pounds  (03-29)  -- Pt reports her Dry Weight was 67 kg/ 147.7 pounds   -- Weight History per [facility]: 71.2 kg/ 157 pounds (9/29/2), 70.8 kg/ 156.1 pounds (3/11/22)  -- Weight fluctuations  likely secondary to  perioperative fluid shifts, will continue to monitor  -- IBW: 125 pounds %IBW: 122 %

## 2023-03-29 NOTE — PROGRESS NOTE ADULT - NS ATTEND AMEND GEN_ALL_CORE FT
I personally saw and examined patient.  Kidney transplant recipient.  Alert, oriented, responsive.  Abdomen soft and non-tender.  Wound clean and dry.    IMMUNOSUPPRESSION MANAGEMENT:  Envarsus level today: 7.4 (after 3 mg tacrolimus last night)  Envarsus level yesterday: not done (transplanted yesterday)  Aim for level of 8-10  Current envarsus dose: 5 mg by mouth daily.  Envarsus dose management: No change in current dose

## 2023-03-29 NOTE — CONSULT NOTE ADULT - SUBJECTIVE AND OBJECTIVE BOX
CONSULT NOTE  --------------------------------------------------------------------------------  HPI: Patient received DDRT on 3/38/23.  Reviewed admission H and P and details as noted below:  67-year-old [facility] female with HTN,  Alport syndrome and CKD 5  (x 15 years), LUE AVF placed in 2017, recently started HD on 3/17/2023 (Tanja, Nephrologist Dr. Jet Horn ).  Details of donor reviewed and noted as below:  Donor LOIS [id number]  male in his 50s  Positive blood cultures on 03-  Recipient given Vancomycin and Ertapenem prior to incision  Right kidney  Single artery, single vein, single ureter  CPRA 0%  KDPI 50%  Simulect induction  Terminal creatinine 0.66  Donor: blood type A, CMV+, EBV+  Recipient: blood type A, CMV-, EBV+  Crossmatch sent since recipient was transfused around 03- when she started dialysis  Weight of the organ: 245 grams  Cold ischemia time: 15 hs 02 mins  Warm ischemia time: 35 minutes          PAST HISTORY  --------------------------------------------------------------------------------  PAST MEDICAL & SURGICAL HISTORY:  Alport's syndrome      HTN (hypertension)      Hard of hearing  uses hearing aids      Anemia      ESRD with anemia  left AV fistula, not in use      S/P tonsillectomy      A-V fistula  Nov 2017        FAMILY HISTORY:  FH: kidney disease (Child)      PAST SOCIAL HISTORY:    ALLERGIES & MEDICATIONS  --------------------------------------------------------------------------------  Allergies    No Known Allergies    Intolerances      Standing Inpatient Medications  chlorhexidine 2% Cloths 1 Application(s) Topical daily  ertapenem  IVPB 500 milliGRAM(s) IV Intermittent every 24 hours  famotidine    Tablet 20 milliGRAM(s) Oral daily  methylPREDNISolone sodium succinate Injectable   IV Push   mycophenolate mofetil 1 Gram(s) Oral <User Schedule>  nystatin    Suspension 300664 Unit(s) Swish and Swallow four times a day  polyethylene glycol 3350 17 Gram(s) Oral daily  senna 2 Tablet(s) Oral at bedtime  sodium chloride 0.9%. 1000 milliLiter(s) IV Continuous <Continuous>  trimethoprim   80 mG/sulfamethoxazole 400 mG 1 Tablet(s) Oral daily  valGANciclovir 450 milliGRAM(s) Oral daily    PRN Inpatient Medications  calcium carbonate    500 mG (Tums) Chewable 2 Tablet(s) Chew every 8 hours PRN  ondansetron Injectable 4 milliGRAM(s) IV Push every 6 hours PRN  simethicone 80 milliGRAM(s) Chew every 6 hours PRN  traMADol 25 milliGRAM(s) Oral every 4 hours PRN  traMADol 50 milliGRAM(s) Oral every 4 hours PRN      REVIEW OF SYSTEMS  --------------------------------------------------------------------------------  Gen: No weight changes, fatigue, fevers/chills, weakness  Skin: No rashes  Head/Eyes/Ears/Mouth: No headache; Normal vision w/o blurriness; No sinus pain/discomfort, sore throat  Respiratory: No dyspnea, cough, wheezing, hemoptysis  CV: No chest pain, PND, orthopnea  GI: No abdominal pain, diarrhea, constipation, nausea, vomiting, melena, hematochezia  : No increased frequency, dysuria, hematuria, nocturia  MSK: No joint pain/swelling; no back pain; no edema  Neuro: No dizziness/lightheadedness, weakness, seizures, numbness, tingling  Heme: No easy bruising or bleeding  Endo: No heat/cold intolerance  Psych: No significant nervousness, anxiety, stress, depression    All other systems were reviewed and are negative, except as noted.    VITALS/PHYSICAL EXAM  --------------------------------------------------------------------------------  T(C): 36.3 (03-29-23 @ 17:00), Max: 36.7 (03-29-23 @ 04:00)  HR: 106 (03-29-23 @ 17:00) (94 - 115)  BP: 122/72 (03-29-23 @ 17:00) (110/52 - 146/65)  RR: 20 (03-29-23 @ 17:00) (12 - 20)  SpO2: 95% (03-29-23 @ 17:00) (94% - 100%)  Height (cm): 165.1 (03-28-23 @ 13:53)  Weight (kg): 65.9 (03-28-23 @ 13:53)  BMI (kg/m2): 24.2 (03-28-23 @ 13:53)  BSA (m2): 1.73 (03-28-23 @ 13:53)      03-28-23 @ 07:01  -  03-29-23 @ 07:00  --------------------------------------------------------  IN: 1985 mL / OUT: 1442 mL / NET: 543 mL    03-29-23 @ 07:01  -  03-29-23 @ 20:05  --------------------------------------------------------  IN: 2150 mL / OUT: 1310 mL / NET: 840 mL      Physical Exam:  	Gen: NAD, well-appearing  	HEENT: PERRL, supple neck, clear oropharynx  	Pulm: CTA B/L  	CV: RRR, S1S2; no rub  	Back: No spinal or CVA tenderness; no sacral edema  	Abd: +BS, soft, nontender/nondistended                      Transplant: No bleeding or discharge; Drains+  	: No suprapubic tenderness  	UE: no edema; no asterixis  	LE: no edema  	Neuro: No focal deficits, speech: normal.  	Psych: Normal affect and mood  	Skin: Warm, without rashes  	Vascular access: UE AVF noted.    LABS/STUDIES  --------------------------------------------------------------------------------              8.4    8.43  >-----------<  175      [03-29-23 @ 06:41]              26.7     135  |  96  |  44  ----------------------------<  214      [03-29-23 @ 06:43]  4.4   |  23  |  4.50        Ca     8.6     [03-29-23 @ 06:43]      Mg     2.0     [03-29-23 @ 06:43]      Phos  5.1     [03-29-23 @ 06:43]    TPro  5.6  /  Alb  2.8  /  TBili  0.3  /  DBili  x   /  AST  28  /  ALT  18  /  AlkPhos  65  [03-29-23 @ 06:43]    PT/INR: PT 15.1 , INR 1.31       [03-28-23 @ 13:39]  PTT: 26.8       [03-28-23 @ 13:39]      Creatinine Trend:  SCr 4.50 [03-29 @ 06:43]  SCr 4.84 [03-29 @ 01:27]  SCr 5.14 [03-28 @ 20:18]  SCr 5.45 [03-28 @ 13:39]  SCr 4.49 [03-24 @ 09:56]    Urinalysis - [03-17-23 @ 14:53]      Color Light Yellow / Appearance Slightly Turbid / SG 1.012 / pH 6.0      Gluc Negative / Ketone Negative  / Bili Negative / Urobili Negative       Blood Moderate / Protein 100 mg/dl / Leuk Est Large / Nitrite Negative      RBC 3 /  / Hyaline 1 / Gran  / Sq Epi  / Non Sq Epi 1 / Bacteria Few      Iron 60, TIBC --, %sat --      [03-22-23 @ 07:04]    HBsAb <3.0      [03-28-23 @ 13:31]  HBsAg Nonreact      [03-28-23 @ 14:10]  HBcAb Nonreact      [03-28-23 @ 13:31]  HCV 0.09, Nonreact      [03-28-23 @ 13:31]  HIV Nonreact      [03-28-23 @ 14:10]      TacrolimusTacrolimus (), Serum: 7.4 ng/mL (03-29 @ 06:40)    < from: US Trans Kidney w/ Doppler, Right (03.28.23 @ 22:27) >  Renal Transplant: 11.6 cm. No renal mass, hydronephrosis or calculi.  Urinary bladder: Harper catheter in place. Within normal limits.    Color and spectral Doppler reveals homogeneous flow throughout the   transplant.    Peak iliac artery velocity is 208 cm/sec pre-anastomosis, 206 cm/sec at   the anastomosis, and 189 cm/sec post anastomosis.    Transplant Renal Artery:  Peak systolic velocity is 307 cm/sec anastomosis, 229 cm/sec proximal,   171 cm/sec mid, 140 cm/sec distal and 77 cm/sec hilum. There is   associated spectral broadening.  Resistive Indices Range: 0.55-0.63    Transplant Renal Vein: Patent.    IMPRESSION: Normal range renal resistive indices.    Nonspecific elevated peak systolic velocities at the anastomosis and   along the proximal transplant renal artery. There are also borderline   elevated velocities of the iliac artery. Continued surveillance advised.    < end of copied text >

## 2023-03-29 NOTE — DIETITIAN INITIAL EVALUATION ADULT - EDUCATION DIETARY MODIFICATIONS
Provided education on post transplant nutrition therapy and food safety guidelines for transplant recipients. Discussed importance of thoroughly washing all fresh fruits/vegetables, importance of avoiding uncooked/raw/unpasteurized foods, avoiding pre-made deli/buffet/salad bar meals. Foods recommended as healthy well balanced diet and importance of adequate protein intakes for proper post-surgical healing discussed. Reviewed recommendations to avoid grapefruit, pomegranate and star fruit while taking immunosuppressant medication. Reviewed recommendations for moderate intake of sodium and carbohydrates with transplant medications. Reviewed effect of steroids on BG levels and importance of limiting concentrated sweets. Pt was receptive and expressed understanding.All questions answered./teach back/(1) partially meets; needs review/practice/verbalization

## 2023-03-29 NOTE — PROGRESS NOTE ADULT - ASSESSMENT
67-year-old [place] female with HTN,  Alport syndrome and CKD 5  (x 15 years), LUE AVF placed in 2017, recently started HD on 3/17/2023 (Tanja, Nephrologist Dr. Jet Horn ) now s/p R DDRT under Simulect.     s/p DDRT  - good graft function woth good UOP  - DC IVF replacements.  Continue maintenance IVF at 70ml/hour  - strict I&Os: JPs x2 (Subcutaneous and Perinephric)  - ADAT  - SCDs/spirometry  - PT consult  - bowel regimen  - Immunosuppresion: ENV daily based on level, MMF 1gm BID, SST. Simulect due POD4  - PPX: nystatin, bactrim, Valcyte  - ID: Continue Ertapenem and Vancomycin for donor + blood cultures.  FU ID regarding LOT.  May need PICC/Midine prior to DC  - Daily CBC, CMP, Mag, Phos, Tacrolimus level

## 2023-03-29 NOTE — DIETITIAN INITIAL EVALUATION ADULT - OTHER CALCULATIONS
-- Defer fluid needs to medical team  --Estimated calorie/protein needs are based on IBW of 125 pounds

## 2023-03-29 NOTE — DIETITIAN INITIAL EVALUATION ADULT - PERTINENT MEDS FT
MEDICATIONS  (STANDING):  chlorhexidine 2% Cloths 1 Application(s) Topical daily  ertapenem  IVPB 500 milliGRAM(s) IV Intermittent every 24 hours  famotidine    Tablet 20 milliGRAM(s) Oral daily  methylPREDNISolone sodium succinate Injectable 125 milliGRAM(s) IV Push two times a day  methylPREDNISolone sodium succinate Injectable   IV Push   mycophenolate mofetil 1 Gram(s) Oral <User Schedule>  nystatin    Suspension 623356 Unit(s) Swish and Swallow four times a day  polyethylene glycol 3350 17 Gram(s) Oral daily  senna 2 Tablet(s) Oral at bedtime  sodium chloride 0.9%. 1000 milliLiter(s) (70 mL/Hr) IV Continuous <Continuous>  trimethoprim   80 mG/sulfamethoxazole 400 mG 1 Tablet(s) Oral daily  valGANciclovir 450 milliGRAM(s) Oral daily    MEDICATIONS  (PRN):  calcium carbonate    500 mG (Tums) Chewable 2 Tablet(s) Chew every 8 hours PRN Heartburn  ondansetron Injectable 4 milliGRAM(s) IV Push every 6 hours PRN Nausea and/or Vomiting  simethicone 80 milliGRAM(s) Chew every 6 hours PRN Gas  traMADol 25 milliGRAM(s) Oral every 4 hours PRN Moderate Pain (4 - 6)  traMADol 50 milliGRAM(s) Oral every 4 hours PRN Severe Pain (7 - 10)

## 2023-03-29 NOTE — DIETITIAN INITIAL EVALUATION ADULT - FLUID ACCUMULATION
Subjective:      Patient ID: Андрей Costa is a 61 y.o. male. HPI  1. Bronchitis --cough and congestion --achy legs ---congestion --no fever-- head pablo--on mucinex-dm --also zmax qod--     2. Pure hypercholesterolemia --Stable--no new issues----lab noted and reviewed      3. Anticoagulated on Coumadin --Stable--no new issues      Urine freq--?/prostate-- I have a stone out --    Review of Systems   Respiratory: Negative for shortness of breath. Cardiovascular: Negative for chest pain. Gastrointestinal: Negative for abdominal pain. Objective:   Physical Exam   HENT:   Mod pnd---rt frontal sinusitis    Eyes: Pupils are equal, round, and reactive to light. Neck: Normal range of motion. Cardiovascular: Normal rate and normal heart sounds. Pulmonary/Chest: Effort normal.   Abdominal: Soft. Genitourinary: Rectal exam shows guaiac negative stool. Genitourinary Comments: Prostate --1+ full--sl tender--        Assessment:      1. Bronchitis --add flonase--incr to bid --cont zmax qod --cont mucionex dm  --    2. Pure hypercholesterolemia --Continue current therapy      3.  Anticoagulated on Coumadin --Continue current therapy      prostaititis--incr flomax to bid x 1 week --ck u/a today --  U/a--pos blood ---send for C&S--no antibs for that --just incr flomax       Plan:
-- per flow sheets, noted with +1 edema to right wrist

## 2023-03-29 NOTE — PROGRESS NOTE ADULT - SUBJECTIVE AND OBJECTIVE BOX
NGUYEN GARCIA is a 67y femalewith HTN, Alport syndrome and CKD 5 (x 15 years), LUE AVF placed in 2017, recently started HD on 3/17/2023 (Tanja, Nephrologist Dr. Jet Horn) now s/p R DDRT under Simulect     Allergies: NKDA  Serologies:   -CMV D+/R- (high risk)   -EBV D+/R+   -Donor: Hepatitis C Ab and PAVAN negative     Transplant Medications:  Induction  -Basiliximab 20 mg POD 0 (given in OR) and POD 4  -Methyprednisolone taper (switch to PO prednisone on POD 4)            POD 0: 500 mg IV in OR            POD 1: 125 mg IV Q12H            POD 2: 60 mg IV Q12H            POD 3: 30 mg IV Q12H        Maintenance Immunosuppression  -Tacrolimus (Envarsus) 0.14 mg/kg daily (Adjust for goal trough: 8-10) (Prograf 3mg x1 given in OR post-op)  -Mycophenolate 1,000 mg PO Q12H  -Prednisone             POD 4: 20 mg PO Q12H            POD 5: 10 mg PO Q12H            POD 6: 5 mg PO Q12H            POD 7-: 5 mg daily     Anti-infection   - Bactrim SS tablet (frequency based on renal function)  - Valganciclovir (dose based on CMV serostatus and frequency based on renal function)  - Nystatin swish and swallow 5 mL four times daily  Donor + polymicrobial blood cultures: Staph aureus, klebsiella, GBS:  - Ertapenem 500mg qd (renally dosed, f/u sensitivities)  - Vancomycin (dose by level, f/u sensitivities)    Surgical prophylaxis pre- and intra-operative dosing  -Ertapenem 1gm IV x 1  -Vancomycin 1gm IV x 1    Prophylaxis  -GI ppx: famotidine 20 mg daily, calcium carbonate 500mg (TUMS) PRN  -Bowel ppx: senna, polyethylene glycol     Pain:   ·	Mild: Acetaminophen 650 mg every 6 hours PRN  ·	Moderate: Tramadol 25 mg every 4 hours PRN (adjust for renal function)  ·	Severe: Tramadol 50 mg every 4 hours PRN (adjust for renal function)    Home Medications:  ·	Calcitriol 0.25 mcg oral capsule: 1 cap(s) orally once a day    Outpatient medication reconciliation reviewed and will be re-started appropriately.  Plan discussed with multidisciplinary team.     Tashi Hurst, PharmD   PGY-1 Pharmacy Resident

## 2023-03-29 NOTE — PROGRESS NOTE ADULT - SUBJECTIVE AND OBJECTIVE BOX
Transplant Surgery - Multidisciplinary Rounds  --------------------------------------------------------------  DDRT   Date:   3/28/23       POD# 1    Present:   Patient seen and examined with multidisciplinary team including Transplant Surgeon:, Dr. Calderón, Transplant Nephrologist: Dr. Mejias,  Transplant Pharmacist: JULIA Carcamo and unit RN during am rounds.  Disciplines not in attendance will be notified of the plan.     HPI: 67-year-old [place] female with HTN,  Alport syndrome and CKD 5  (x 15 years), LUE AVF placed in 2017, recently started HD on 3/17/2023 (Tanja, Nephrologist Dr. Jet Horn ) now s/p R DDRT under Simulect.    Interval Events:  - Intra-op Received 1gm Vanco and 1gm Ertapenem for donor + blood cultures   ID consulted  - US w/ homogenous flow  - Cr 4.5 from 4.8  UOP 1.2   SHEELA ss output  - Received FK 3mg po x1 at 10Pm      Immunosupression:   Induction:  simulect                                              Maintenance immunosuppression:  Envarsus 5mg po qd, MMF 1gm BId, SST  Ongoing monitoring for signs of rejection.    Potential Discharge date: pending clinical improvement    Education:  Medications    Plan of care:  See Below    MEDICATIONS  (STANDING):  chlorhexidine 2% Cloths 1 Application(s) Topical daily  ertapenem  IVPB 500 milliGRAM(s) IV Intermittent every 24 hours  famotidine    Tablet 20 milliGRAM(s) Oral daily  methylPREDNISolone sodium succinate Injectable   IV Push   methylPREDNISolone sodium succinate Injectable 125 milliGRAM(s) IV Push two times a day  mycophenolate mofetil 1 Gram(s) Oral <User Schedule>  nystatin    Suspension 183031 Unit(s) Swish and Swallow four times a day  polyethylene glycol 3350 17 Gram(s) Oral daily  senna 2 Tablet(s) Oral at bedtime  sodium chloride 0.9%. 1000 milliLiter(s) (70 mL/Hr) IV Continuous <Continuous>  tacrolimus ER Tablet (ENVARSUS XR) 5 milliGRAM(s) Oral <User Schedule>  trimethoprim   80 mG/sulfamethoxazole 400 mG 1 Tablet(s) Oral daily  valGANciclovir 450 milliGRAM(s) Oral daily  vancomycin  IVPB 500 milliGRAM(s) IV Intermittent once    MEDICATIONS  (PRN):  calcium carbonate    500 mG (Tums) Chewable 2 Tablet(s) Chew every 8 hours PRN Heartburn  ondansetron Injectable 4 milliGRAM(s) IV Push every 6 hours PRN Nausea and/or Vomiting  traMADol 25 milliGRAM(s) Oral every 4 hours PRN Moderate Pain (4 - 6)  traMADol 50 milliGRAM(s) Oral every 4 hours PRN Severe Pain (7 - 10)      PAST MEDICAL & SURGICAL HISTORY:  Alport's syndrome      HTN (hypertension)      Hard of hearing  uses hearing aids      Anemia      ESRD with anemia  left AV fistula, not in use      S/P tonsillectomy      A-V fistula  Nov 2017          Vital Signs Last 24 Hrs  T(C): 36.7 (29 Mar 2023 09:38), Max: 36.8 (28 Mar 2023 13:06)  T(F): 98.1 (29 Mar 2023 09:38), Max: 98.3 (28 Mar 2023 13:06)  HR: 110 (29 Mar 2023 09:38) (94 - 111)  BP: 135/73 (29 Mar 2023 09:38) (110/52 - 150/70)  BP(mean): 82 (29 Mar 2023 02:30) (75 - 97)  RR: 18 (29 Mar 2023 09:38) (12 - 18)  SpO2: 97% (29 Mar 2023 09:38) (94% - 100%)    Parameters below as of 29 Mar 2023 09:38  Patient On (Oxygen Delivery Method): room air        I&O's Summary    28 Mar 2023 07:01  -  29 Mar 2023 07:00  --------------------------------------------------------  IN: 1985 mL / OUT: 1442 mL / NET: 543 mL    29 Mar 2023 07:01  -  29 Mar 2023 11:10  --------------------------------------------------------  IN: 670 mL / OUT: 390 mL / NET: 280 mL                              8.4    8.43  )-----------( 175      ( 29 Mar 2023 06:41 )             26.7     03-29    135  |  96  |  44<H>  ----------------------------<  214<H>  4.4   |  23  |  4.50<H>    Ca    8.6      29 Mar 2023 06:43  Phos  5.1     03-29  Mg     2.0     03-29    TPro  5.6<L>  /  Alb  2.8<L>  /  TBili  0.3  /  DBili  x   /  AST  28  /  ALT  18  /  AlkPhos  65  03-29    Tacrolimus (), Serum: 7.4 ng/mL (03-29 @ 06:40)      Review of systems  Gen: No weight changes, fatigue, fevers/chills, weakness  Skin: No rashes  Head/Eyes/Ears/Mouth: No headache; Normal hearing; Normal vision w/o blurriness; No sinus pain/discomfort, sore throat  Respiratory: No dyspnea, cough, wheezing, hemoptysis  CV: No chest pain, PND, orthopnea  GI: Mild abdominal pain at surgical incision site; denies diarrhea, constipation, nausea, vomiting, melena, hematochezia  : No increased frequency, dysuria, hematuria, nocturia  MSK: No joint pain/swelling; no back pain; no edema  Neuro: No dizziness/lightheadedness, weakness, seizures, numbness, tingling  Heme: No easy bruising or bleeding  Endo: No heat/cold intolerance  Psych: No significant nervousness, anxiety, stress, depression  All other systems were reviewed and are negative, except as noted.      PHYSICAL EXAM:  Constitutional: Well developed / well nourished  Eyes: Anicteric, PERRLA  ENMT: nc/at  Neck: supple  Respiratory: CTA B/L  Cardiovascular: RRR  Gastrointestinal: Soft, non distended, mild tenderness at the incision site; incision c/d/i; JPs x2 ss  Genitourinary: Urinary catheter in place  Extremities: SCD's in place and working bilaterally, no edema, AVF palpable  Vascular: Palpable dp pulses bilaterally  Neurological: A&O x3  Skin: no rashes, ulcerations or lesions;  Musculoskeletal: Moving all extremities  Psychiatric: Responsive

## 2023-03-29 NOTE — DIETITIAN INITIAL EVALUATION ADULT - ORAL INTAKE PTA/DIET HISTORY
Pt confirms no known food allergies/intolerances. Reports having an ok appetite and PO intakes at home, usually did not eat a big breakfast meal, ate lunch and dinner and sometimes snacks. Followed a low phosphorus & low potassium diet at home. Pt denies any regular nausea, vomiting, diarrhea, or constipation. Denies difficulty chewing/swallowing. Denies any vitamin/mineral use at home, pt reports not taking oral nutritional supplement at home as well.

## 2023-03-29 NOTE — DIETITIAN INITIAL EVALUATION ADULT - NSICDXPASTMEDICALHX_GEN_ALL_CORE_FT
70.3
PAST MEDICAL HISTORY:  Alport's syndrome     Anemia     ESRD with anemia left AV fistula, not in use    Hard of hearing uses hearing aids    HTN (hypertension)

## 2023-03-29 NOTE — DIETITIAN INITIAL EVALUATION ADULT - REASON FOR ADMISSION
Chart Reviewed, Events Noted  "67-year-old female with HTN,  Alport syndrome and CKD 5  (x 15 years), LUE AVF placed in 2017, recently started HD on 3/17/2023 (Tanja, ) now presents for possible DDRT"

## 2023-03-29 NOTE — PROGRESS NOTE ADULT - SUBJECTIVE AND OBJECTIVE BOX
Follow Up: S/p DDRT, + donor blood cultures     Interval History/ROS: Seen and examined at bedside, POD#1    REVIEW OF SYSTEMS  Constitutional: No fevers, chills  Skin: No rash  Eyes: No discharge  ENMT: No sore throat  Respiratory: No cough, no SOB  Cardiovascular:  No chest pain   Gastrointestinal: Mild abd pain 	  Genitourinary: No dysuria  Neurological: No AMS     Allergies  No Known Allergies    ANTIMICROBIALS:    ertapenem  IVPB 500 every 24 hours  nystatin    Suspension [id number] four times a day  trimethoprim   80 mG/sulfamethoxazole 400 mG 1 daily  valGANciclovir 450 daily  vancomycin  IVPB 500 once    OTHER MEDS: MEDICATIONS  (STANDING):  acetaminophen   IVPB .. 1000 once  calcium carbonate    500 mG (Tums) Chewable 2 every 8 hours PRN  famotidine    Tablet 20 daily  methylPREDNISolone sodium succinate Injectable    methylPREDNISolone sodium succinate Injectable 125 two times a day  mycophenolate mofetil 1 <User Schedule>  ondansetron Injectable 4 every 6 hours PRN  polyethylene glycol 3350 17 daily  senna 2 at bedtime  tacrolimus ER Tablet (ENVARSUS XR) 5 <User Schedule>  traMADol 25 every 4 hours PRN  traMADol 50 every 4 hours PRN    Vital Signs Last 24 Hrs  T(F): 98.1 (03-29-23 @ 09:38), Max: 99.5 (03-23-23 @ 05:30)    Vital Signs Last 24 Hrs  HR: 110 (03-29-23 @ 09:38) (94 - 111)  BP: 135/73 (03-29-23 @ 09:38) (110/52 - 150/70)  RR: 18 (03-29-23 @ 09:38)  SpO2: 97% (03-29-23 @ 09:38) (94% - 100%)  Wt(kg): --    EXAM:  General: Patient in no acute distress   HEENT: NCAT, EOMI  CV: S1+S2  Lungs: No respiratory distress, CTAB  Abd: Soft, mildly sore in RLQ, no guarding  : [place]   Ext: No cyanosis  Neuro: Alert and oriented, no focal deficits  Skin: No rash   IV: No phlebitis seen at IV sites     Labs:                        8.4    8.43  )-----------( 175      ( 29 Mar 2023 06:41 )             26.7     03-29    135  |  96  |  44<H>  ----------------------------<  214<H>  4.4   |  23  |  4.50<H>    Ca    8.6      29 Mar 2023 06:43  Phos  5.1     03-29  Mg     2.0     03-29    TPro  5.6<L>  /  Alb  2.8<L>  /  TBili  0.3  /  DBili  x   /  AST  28  /  ALT  18  /  AlkPhos  65  03-29    WBC Trend:  WBC Count: 8.43 (03-29-23 @ 06:41)  WBC Count: 7.80 (03-29-23 @ 01:27)  WBC Count: 7.64 (03-28-23 @ 20:18)  WBC Count: 5.77 (03-28-23 @ 13:39)    Creatine Trend:  Creatinine, Serum: 4.50 (03-29)  Creatinine, Serum: 4.84 (03-29)  Creatinine, Serum: 5.14 (03-28)  Creatinine, Serum: 5.45 (03-28)    Liver Biochemical Testing Trend:  Alanine Aminotransferase (ALT/SGPT): 18 (03-29)  Alanine Aminotransferase (ALT/SGPT): 17 (03-29)  Alanine Aminotransferase (ALT/SGPT): 16 (03-28)  Alanine Aminotransferase (ALT/SGPT): 9 *L* (03-28)  Alanine Aminotransferase (ALT/SGPT): 13 (03-17)  Aspartate Aminotransferase (AST/SGOT): 28 (03-29-23 @ 06:43)  Aspartate Aminotransferase (AST/SGOT): 26 (03-29-23 @ 01:27)  Aspartate Aminotransferase (AST/SGOT): 26 (03-28-23 @ 20:18)  Aspartate Aminotransferase (AST/SGOT): 7 (03-28-23 @ 13:39)  Aspartate Aminotransferase (AST/SGOT): 10 (03-17-23 @ 12:39)  Bilirubin Total, Serum: 0.3 (03-29)  Bilirubin Total, Serum: 0.2 (03-29)  Bilirubin Total, Serum: 0.3 (03-28)  Bilirubin Total, Serum: 0.4 (03-28)  Bilirubin Total, Serum: 0.2 (03-17)    MICROBIOLOGY:  Vancomycin Level, Random: 11.7 (03-29 @ 06:41)    HIV-1/2 Combo Result: Nonreact (03-28-23 @ 14:10)    COVID-19 PCR: NotDetec (03-28-23 @ 13:41)  COVID-19 PCR: NotDetec (03-24-23 @ 17:53)  COVID-19 PCR: NotDetec (03-17-23 @ 13:17)    RADIOLOGY:  imaging below personally reviewed    < from: US Trans Kidney w/ Doppler, Right (03.28.23 @ 22:27) >  IMPRESSION: Normal range renal resistive indices.    Nonspecific elevated peak systolic velocities at the anastomosis and   along the proximal transplant renal artery. There are also borderline   elevated velocities of the iliac artery. Continued surveillance advised.    --- End of Report ---    < end of copied text >

## 2023-03-29 NOTE — DIETITIAN INITIAL EVALUATION ADULT - REASON INDICATOR FOR ASSESSMENT
Pt seen for post kidney transplant recipient nutrition evaluation per department protocol.   Information obtained from: Review of pt's current medical record, interview with pt in her assigned room on 6MONTI  Pt is s/p DDRT Date: 3/28/23 POD# 1

## 2023-03-29 NOTE — DIETITIAN INITIAL EVALUATION ADULT - ADD RECOMMEND
1) Continue Regular Diet. Defer diet/texture modification to medical team/SLP as indicated   2) Reinforce post-transplant nutrition therapy and food safety guidelines in-house and prior to discharge.   3) Discharge diet: Continue as above. Recommend follow up visit with Transplant MD and outpatient RD for dietary modifications as warranted.  4) Monitor PO intake,  Urine Output, GI tolerance, skin integrity, and labs. RD remains available if needed, pt is aware.

## 2023-03-30 ENCOUNTER — TRANSCRIPTION ENCOUNTER (OUTPATIENT)
Age: 68
End: 2023-03-30

## 2023-03-30 LAB
A1C WITH ESTIMATED AVERAGE GLUCOSE RESULT: 5.5 % — SIGNIFICANT CHANGE UP (ref 4–5.6)
ALBUMIN SERPL ELPH-MCNC: 3 G/DL — LOW (ref 3.3–5)
ALBUMIN SERPL ELPH-MCNC: 3.1 G/DL — LOW (ref 3.3–5)
ALP SERPL-CCNC: 64 U/L — SIGNIFICANT CHANGE UP (ref 40–120)
ALP SERPL-CCNC: 66 U/L — SIGNIFICANT CHANGE UP (ref 40–120)
ALT FLD-CCNC: 15 U/L — SIGNIFICANT CHANGE UP (ref 10–45)
ALT FLD-CCNC: 16 U/L — SIGNIFICANT CHANGE UP (ref 10–45)
ANION GAP SERPL CALC-SCNC: 14 MMOL/L — SIGNIFICANT CHANGE UP (ref 5–17)
ANION GAP SERPL CALC-SCNC: 14 MMOL/L — SIGNIFICANT CHANGE UP (ref 5–17)
AST SERPL-CCNC: 18 U/L — SIGNIFICANT CHANGE UP (ref 10–40)
AST SERPL-CCNC: 19 U/L — SIGNIFICANT CHANGE UP (ref 10–40)
BASOPHILS # BLD AUTO: 0.01 K/UL — SIGNIFICANT CHANGE UP (ref 0–0.2)
BASOPHILS NFR BLD AUTO: 0.1 % — SIGNIFICANT CHANGE UP (ref 0–2)
BILIRUB SERPL-MCNC: 0.2 MG/DL — SIGNIFICANT CHANGE UP (ref 0.2–1.2)
BILIRUB SERPL-MCNC: 0.3 MG/DL — SIGNIFICANT CHANGE UP (ref 0.2–1.2)
BUN SERPL-MCNC: 50 MG/DL — HIGH (ref 7–23)
BUN SERPL-MCNC: 51 MG/DL — HIGH (ref 7–23)
CALCIUM SERPL-MCNC: 8.7 MG/DL — SIGNIFICANT CHANGE UP (ref 8.4–10.5)
CALCIUM SERPL-MCNC: 8.8 MG/DL — SIGNIFICANT CHANGE UP (ref 8.4–10.5)
CHLORIDE SERPL-SCNC: 98 MMOL/L — SIGNIFICANT CHANGE UP (ref 96–108)
CHLORIDE SERPL-SCNC: 99 MMOL/L — SIGNIFICANT CHANGE UP (ref 96–108)
CO2 SERPL-SCNC: 23 MMOL/L — SIGNIFICANT CHANGE UP (ref 22–31)
CO2 SERPL-SCNC: 25 MMOL/L — SIGNIFICANT CHANGE UP (ref 22–31)
CREAT SERPL-MCNC: 3.12 MG/DL — HIGH (ref 0.5–1.3)
CREAT SERPL-MCNC: 3.65 MG/DL — HIGH (ref 0.5–1.3)
EGFR: 13 ML/MIN/1.73M2 — LOW
EGFR: 16 ML/MIN/1.73M2 — LOW
EOSINOPHIL # BLD AUTO: 0 K/UL — SIGNIFICANT CHANGE UP (ref 0–0.5)
EOSINOPHIL NFR BLD AUTO: 0 % — SIGNIFICANT CHANGE UP (ref 0–6)
ESTIMATED AVERAGE GLUCOSE: 111 MG/DL — SIGNIFICANT CHANGE UP (ref 68–114)
GLUCOSE BLDC GLUCOMTR-MCNC: 175 MG/DL — HIGH (ref 70–99)
GLUCOSE BLDC GLUCOMTR-MCNC: 181 MG/DL — HIGH (ref 70–99)
GLUCOSE BLDC GLUCOMTR-MCNC: 193 MG/DL — HIGH (ref 70–99)
GLUCOSE BLDC GLUCOMTR-MCNC: 197 MG/DL — HIGH (ref 70–99)
GLUCOSE SERPL-MCNC: 194 MG/DL — HIGH (ref 70–99)
GLUCOSE SERPL-MCNC: 221 MG/DL — HIGH (ref 70–99)
HCT VFR BLD CALC: 23.3 % — LOW (ref 34.5–45)
HGB BLD-MCNC: 7.3 G/DL — LOW (ref 11.5–15.5)
IMM GRANULOCYTES NFR BLD AUTO: 0.6 % — SIGNIFICANT CHANGE UP (ref 0–0.9)
LYMPHOCYTES # BLD AUTO: 0.52 K/UL — LOW (ref 1–3.3)
LYMPHOCYTES # BLD AUTO: 5.1 % — LOW (ref 13–44)
MAGNESIUM SERPL-MCNC: 2.1 MG/DL — SIGNIFICANT CHANGE UP (ref 1.6–2.6)
MCHC RBC-ENTMCNC: 30.8 PG — SIGNIFICANT CHANGE UP (ref 27–34)
MCHC RBC-ENTMCNC: 31.3 GM/DL — LOW (ref 32–36)
MCV RBC AUTO: 98.3 FL — SIGNIFICANT CHANGE UP (ref 80–100)
MONOCYTES # BLD AUTO: 0.65 K/UL — SIGNIFICANT CHANGE UP (ref 0–0.9)
MONOCYTES NFR BLD AUTO: 6.4 % — SIGNIFICANT CHANGE UP (ref 2–14)
NEUTROPHILS # BLD AUTO: 8.92 K/UL — HIGH (ref 1.8–7.4)
NEUTROPHILS NFR BLD AUTO: 87.8 % — HIGH (ref 43–77)
NRBC # BLD: 0 /100 WBCS — SIGNIFICANT CHANGE UP (ref 0–0)
PHOSPHATE SERPL-MCNC: 4.2 MG/DL — SIGNIFICANT CHANGE UP (ref 2.5–4.5)
PLATELET # BLD AUTO: 192 K/UL — SIGNIFICANT CHANGE UP (ref 150–400)
POTASSIUM SERPL-MCNC: 4.1 MMOL/L — SIGNIFICANT CHANGE UP (ref 3.5–5.3)
POTASSIUM SERPL-MCNC: 4.2 MMOL/L — SIGNIFICANT CHANGE UP (ref 3.5–5.3)
POTASSIUM SERPL-SCNC: 4.1 MMOL/L — SIGNIFICANT CHANGE UP (ref 3.5–5.3)
POTASSIUM SERPL-SCNC: 4.2 MMOL/L — SIGNIFICANT CHANGE UP (ref 3.5–5.3)
PROT SERPL-MCNC: 5.6 G/DL — LOW (ref 6–8.3)
PROT SERPL-MCNC: 5.7 G/DL — LOW (ref 6–8.3)
RBC # BLD: 2.37 M/UL — LOW (ref 3.8–5.2)
RBC # FLD: 14.7 % — HIGH (ref 10.3–14.5)
SODIUM SERPL-SCNC: 136 MMOL/L — SIGNIFICANT CHANGE UP (ref 135–145)
SODIUM SERPL-SCNC: 137 MMOL/L — SIGNIFICANT CHANGE UP (ref 135–145)
SURGICAL PATHOLOGY STUDY: SIGNIFICANT CHANGE UP
T3 SERPL-MCNC: 69 NG/DL — LOW (ref 80–200)
T4 AB SER-ACNC: 8.5 UG/DL — SIGNIFICANT CHANGE UP (ref 4.6–12)
TACROLIMUS SERPL-MCNC: 10 NG/ML — SIGNIFICANT CHANGE UP
TSH SERPL-MCNC: 0.32 UIU/ML — SIGNIFICANT CHANGE UP (ref 0.27–4.2)
VANCOMYCIN FLD-MCNC: 10 UG/ML — SIGNIFICANT CHANGE UP
WBC # BLD: 10.16 K/UL — SIGNIFICANT CHANGE UP (ref 3.8–10.5)
WBC # FLD AUTO: 10.16 K/UL — SIGNIFICANT CHANGE UP (ref 3.8–10.5)

## 2023-03-30 PROCEDURE — 99232 SBSQ HOSP IP/OBS MODERATE 35: CPT

## 2023-03-30 PROCEDURE — 99232 SBSQ HOSP IP/OBS MODERATE 35: CPT | Mod: 24

## 2023-03-30 RX ORDER — METOPROLOL TARTRATE 50 MG
12.5 TABLET ORAL
Refills: 0 | Status: DISCONTINUED | OUTPATIENT
Start: 2023-03-30 | End: 2023-04-06

## 2023-03-30 RX ORDER — VANCOMYCIN HCL 1 G
500 VIAL (EA) INTRAVENOUS ONCE
Refills: 0 | Status: COMPLETED | OUTPATIENT
Start: 2023-03-30 | End: 2023-03-30

## 2023-03-30 RX ORDER — METOPROLOL TARTRATE 50 MG
12.5 TABLET ORAL ONCE
Refills: 0 | Status: COMPLETED | OUTPATIENT
Start: 2023-03-30 | End: 2023-03-30

## 2023-03-30 RX ORDER — CEFTRIAXONE 500 MG/1
2000 INJECTION, POWDER, FOR SOLUTION INTRAMUSCULAR; INTRAVENOUS EVERY 24 HOURS
Refills: 0 | Status: DISCONTINUED | OUTPATIENT
Start: 2023-03-30 | End: 2023-04-03

## 2023-03-30 RX ORDER — TACROLIMUS 5 MG/1
3 CAPSULE ORAL ONCE
Refills: 0 | Status: DISCONTINUED | OUTPATIENT
Start: 2023-03-30 | End: 2023-03-30

## 2023-03-30 RX ORDER — TACROLIMUS 5 MG/1
2 CAPSULE ORAL ONCE
Refills: 0 | Status: COMPLETED | OUTPATIENT
Start: 2023-03-30 | End: 2023-03-30

## 2023-03-30 RX ADMIN — CHLORHEXIDINE GLUCONATE 1 APPLICATION(S): 213 SOLUTION TOPICAL at 11:15

## 2023-03-30 RX ADMIN — Medication 500000 UNIT(S): at 21:32

## 2023-03-30 RX ADMIN — VALGANCICLOVIR 450 MILLIGRAM(S): 450 TABLET, FILM COATED ORAL at 11:14

## 2023-03-30 RX ADMIN — Medication 1 TABLET(S): at 11:15

## 2023-03-30 RX ADMIN — TRAMADOL HYDROCHLORIDE 50 MILLIGRAM(S): 50 TABLET ORAL at 02:27

## 2023-03-30 RX ADMIN — Medication 500000 UNIT(S): at 05:11

## 2023-03-30 RX ADMIN — MYCOPHENOLATE MOFETIL 1 GRAM(S): 250 CAPSULE ORAL at 20:12

## 2023-03-30 RX ADMIN — TRAMADOL HYDROCHLORIDE 50 MILLIGRAM(S): 50 TABLET ORAL at 10:58

## 2023-03-30 RX ADMIN — Medication 1: at 12:29

## 2023-03-30 RX ADMIN — Medication 60 MILLIGRAM(S): at 05:12

## 2023-03-30 RX ADMIN — Medication 500000 UNIT(S): at 17:44

## 2023-03-30 RX ADMIN — Medication 12.5 MILLIGRAM(S): at 09:57

## 2023-03-30 RX ADMIN — TRAMADOL HYDROCHLORIDE 50 MILLIGRAM(S): 50 TABLET ORAL at 09:58

## 2023-03-30 RX ADMIN — Medication 1: at 08:47

## 2023-03-30 RX ADMIN — FAMOTIDINE 20 MILLIGRAM(S): 10 INJECTION INTRAVENOUS at 11:14

## 2023-03-30 RX ADMIN — Medication 1: at 17:44

## 2023-03-30 RX ADMIN — SENNA PLUS 2 TABLET(S): 8.6 TABLET ORAL at 21:31

## 2023-03-30 RX ADMIN — CEFTRIAXONE 100 MILLIGRAM(S): 500 INJECTION, POWDER, FOR SOLUTION INTRAMUSCULAR; INTRAVENOUS at 14:19

## 2023-03-30 RX ADMIN — TRAMADOL HYDROCHLORIDE 50 MILLIGRAM(S): 50 TABLET ORAL at 15:05

## 2023-03-30 RX ADMIN — Medication 60 MILLIGRAM(S): at 17:45

## 2023-03-30 RX ADMIN — Medication 12.5 MILLIGRAM(S): at 21:31

## 2023-03-30 RX ADMIN — TACROLIMUS 2 MILLIGRAM(S): 5 CAPSULE ORAL at 15:59

## 2023-03-30 RX ADMIN — Medication 500000 UNIT(S): at 11:14

## 2023-03-30 RX ADMIN — TRAMADOL HYDROCHLORIDE 50 MILLIGRAM(S): 50 TABLET ORAL at 14:05

## 2023-03-30 RX ADMIN — MYCOPHENOLATE MOFETIL 1 GRAM(S): 250 CAPSULE ORAL at 08:04

## 2023-03-30 RX ADMIN — TRAMADOL HYDROCHLORIDE 50 MILLIGRAM(S): 50 TABLET ORAL at 03:30

## 2023-03-30 RX ADMIN — Medication 500000 UNIT(S): at 00:02

## 2023-03-30 RX ADMIN — Medication 100 MILLIGRAM(S): at 15:28

## 2023-03-30 NOTE — PROGRESS NOTE ADULT - SUBJECTIVE AND OBJECTIVE BOX
Transplant Surgery - Multidisciplinary Rounds  --------------------------------------------------------------  DDRT   Date:   3/28/23       POD# 2    Present:   Patient seen and examined with multidisciplinary team including Transplant Surgeon:, Dr. Calderón, Transplant Nephrologist: Dr. Mejias,  Transplant Pharmacist,  NP Po and unit RN during am rounds.  Disciplines not in attendance will be notified of the plan.     HPI: 67-year-old [place] female with HTN,  Alport syndrome and CKD 5  (x 15 years), LUE AVF placed in 2017, recently started HD on 3/17/2023 ([facility], Nephrologist Dr. Jet Horn ) now s/p R DDRT under Simulect.    Interval Events:  - Overnight UOP decreased and slightly Tachycardic-500cc NS bolus given with appropriate response   - Cr xxx from 4.5  UOP 1.8L   SHEELA ss output      Immunosupression:   Induction:  simulect                                              Maintenance immunosuppression:  Envarsus 5mg po qd, MMF 1gm BId, SST  Ongoing monitoring for signs of rejection.    Potential Discharge date: pending clinical improvement    Education:  Medications    Plan of care:  See Below    MEDICATIONS  (STANDING):  chlorhexidine 2% Cloths 1 Application(s) Topical daily  dextrose 5%. 1000 milliLiter(s) (50 mL/Hr) IV Continuous <Continuous>  dextrose 5%. 1000 milliLiter(s) (100 mL/Hr) IV Continuous <Continuous>  dextrose 50% Injectable 25 Gram(s) IV Push once  dextrose 50% Injectable 12.5 Gram(s) IV Push once  dextrose 50% Injectable 25 Gram(s) IV Push once  ertapenem  IVPB 500 milliGRAM(s) IV Intermittent every 24 hours  famotidine    Tablet 20 milliGRAM(s) Oral daily  glucagon  Injectable 1 milliGRAM(s) IntraMuscular once  insulin lispro (ADMELOG) corrective regimen sliding scale   SubCutaneous three times a day before meals  insulin lispro (ADMELOG) corrective regimen sliding scale   SubCutaneous at bedtime  methylPREDNISolone sodium succinate Injectable   IV Push   methylPREDNISolone sodium succinate Injectable 60 milliGRAM(s) IV Push two times a day  metoprolol tartrate 12.5 milliGRAM(s) Oral two times a day  mycophenolate mofetil 1 Gram(s) Oral <User Schedule>  nystatin    Suspension 972945 Unit(s) Swish and Swallow four times a day  polyethylene glycol 3350 17 Gram(s) Oral daily  senna 2 Tablet(s) Oral at bedtime  trimethoprim   80 mG/sulfamethoxazole 400 mG 1 Tablet(s) Oral daily  valGANciclovir 450 milliGRAM(s) Oral daily    MEDICATIONS  (PRN):  calcium carbonate    500 mG (Tums) Chewable 2 Tablet(s) Chew every 8 hours PRN Heartburn  dextrose Oral Gel 15 Gram(s) Oral once PRN Blood Glucose LESS THAN 70 milliGRAM(s)/deciliter  ondansetron Injectable 4 milliGRAM(s) IV Push every 6 hours PRN Nausea and/or Vomiting  simethicone 80 milliGRAM(s) Chew every 6 hours PRN Gas  traMADol 25 milliGRAM(s) Oral every 4 hours PRN Moderate Pain (4 - 6)  traMADol 50 milliGRAM(s) Oral every 4 hours PRN Severe Pain (7 - 10)      Allergies  No Known Allergies    Intolerances      Vital Signs Last 24 Hrs  T(C): 36.4 (30 Mar 2023 09:47), Max: 36.7 (29 Mar 2023 13:10)  T(F): 97.6 (30 Mar 2023 09:47), Max: 98.1 (29 Mar 2023 13:10)  HR: 98 (30 Mar 2023 09:47) (74 - 115)  BP: 140/70 (30 Mar 2023 09:47) (122/72 - 163/77)  BP(mean): --  RR: 20 (30 Mar 2023 09:47) (18 - 20)  SpO2: 95% (30 Mar 2023 09:47) (95% - 98%)    Parameters below as of 30 Mar 2023 09:47  Patient On (Oxygen Delivery Method): room air    03-29-23 @ 07:01  -  03-30-23 @ 07:00  --------------------------------------------------------  IN: 3570 mL / OUT: 1902 mL / NET: 1668 mL    03-30-23 @ 07:01  -  03-30-23 @ 10:17  --------------------------------------------------------  IN: 430 mL / OUT: 105 mL / NET: 325 mL      LABS:                        7.6    10.83 )-----------( 191      ( 29 Mar 2023 23:41 )             24.3     03-29    137  |  98  |  50<H>  ----------------------------<  221<H>  4.2   |  25  |  3.65<H>    Ca    8.8      29 Mar 2023 23:41  Phos  5.1     03-29  Mg     2.0     03-29    TPro  5.6<L>  /  Alb  3.0<L>  /  TBili  0.2  /  DBili  x   /  AST  19  /  ALT  16  /  AlkPhos  64  03-29    PT/INR - ( 28 Mar 2023 13:39 )   PT: 15.1 sec;   INR: 1.31 ratio         PTT - ( 28 Mar 2023 13:39 )  PTT:26.8 sec      Review of systems  Gen: No weight changes, fatigue, fevers/chills, weakness  Skin: No rashes  Head/Eyes/Ears/Mouth: No headache; Normal hearing; Normal vision w/o blurriness; No sinus pain/discomfort, sore throat  Respiratory: No dyspnea, cough, wheezing, hemoptysis  CV: No chest pain, PND, orthopnea  GI: Mild abdominal pain at surgical incision site; denies diarrhea, constipation, nausea, vomiting, melena, hematochezia  : No increased frequency, dysuria, hematuria, nocturia  MSK: No joint pain/swelling; no back pain; no edema  Neuro: No dizziness/lightheadedness, weakness, seizures, numbness, tingling  Heme: No easy bruising or bleeding  Endo: No heat/cold intolerance  Psych: No significant nervousness, anxiety, stress, depression  All other systems were reviewed and are negative, except as noted.      PHYSICAL EXAM:  Constitutional: Well developed / well nourished  Eyes: Anicteric, PERRLA  ENMT: nc/at  Neck: supple  Respiratory: CTA B/L  Cardiovascular: RRR  Gastrointestinal: Soft, non distended, mild tenderness at the incision site; incision c/d/i; JPs x2 ss  Genitourinary: Urinary catheter in place  Extremities: SCD's in place and working bilaterally, no edema, AVF palpable  Vascular: Palpable dp pulses bilaterally  Neurological: A&O x3  Skin: no rashes, ulcerations or lesions;  Musculoskeletal: Moving all extremities  Psychiatric: Responsive     Transplant Surgery - Multidisciplinary Rounds  --------------------------------------------------------------  DDRT   Date:   3/28/23       POD# 2    Present:   Patient seen and examined with multidisciplinary team including Transplant Surgeon:, Dr. Calderón, Transplant Nephrologist: Dr. Mejias,  Transplant Pharmacist,  NP Po and unit RN during am rounds.  Disciplines not in attendance will be notified of the plan.     HPI: 67-year-old [place] female with HTN,  Alport syndrome and CKD 5  (x 15 years), LUE AVF placed in 2017, recently started HD on 3/17/2023 ([facility]ja, Nephrologist Dr. Jet Horn ) now s/p R DDRT under Simu[facility].    Interval Events:  - Overnight UOP decreased and slightly Tachycardic-500cc NS bolus given with appropriate response   - Cr 3.12  from 4.5  UOP 1.8L   SHEELA ss output      Immunosupression:   Induction:  simulect                                              Maintenance immunosuppression:  Envarsus 5mg po qd, MMF 1gm BId, SST  Ongoing monitoring for signs of rejection.    Potential Discharge date: pending clinical improvement    Education:  Medications    Plan of care:  See Below    MEDICATIONS  (STANDING):  chlorhexidine 2% Cloths 1 Application(s) Topical daily  dextrose 5%. 1000 milliLiter(s) (50 mL/Hr) IV Continuous <Continuous>  dextrose 5%. 1000 milliLiter(s) (100 mL/Hr) IV Continuous <Continuous>  dextrose 50% Injectable 25 Gram(s) IV Push once  dextrose 50% Injectable 12.5 Gram(s) IV Push once  dextrose 50% Injectable 25 Gram(s) IV Push once  ertapenem  IVPB 500 milliGRAM(s) IV Intermittent every 24 hours  famotidine    Tablet 20 milliGRAM(s) Oral daily  glucagon  Injectable 1 milliGRAM(s) IntraMuscular once  insulin lispro (ADMELOG) corrective regimen sliding scale   SubCutaneous three times a day before meals  insulin lispro (ADMELOG) corrective regimen sliding scale   SubCutaneous at bedtime  methylPREDNISolone sodium succinate Injectable   IV Push   methylPREDNISolone sodium succinate Injectable 60 milliGRAM(s) IV Push two times a day  metoprolol tartrate 12.5 milliGRAM(s) Oral two times a day  mycophenolate mofetil 1 Gram(s) Oral <User Schedule>  nystatin    Suspension 051450 Unit(s) Swish and Swallow four times a day  polyethylene glycol 3350 17 Gram(s) Oral daily  senna 2 Tablet(s) Oral at bedtime  trimethoprim   80 mG/sulfamethoxazole 400 mG 1 Tablet(s) Oral daily  valGANciclovir 450 milliGRAM(s) Oral daily    MEDICATIONS  (PRN):  calcium carbonate    500 mG (Tums) Chewable 2 Tablet(s) Chew every 8 hours PRN Heartburn  dextrose Oral Gel 15 Gram(s) Oral once PRN Blood Glucose LESS THAN 70 milliGRAM(s)/deciliter  ondansetron Injectable 4 milliGRAM(s) IV Push every 6 hours PRN Nausea and/or Vomiting  simethicone 80 milliGRAM(s) Chew every 6 hours PRN Gas  traMADol 25 milliGRAM(s) Oral every 4 hours PRN Moderate Pain (4 - 6)  traMADol 50 milliGRAM(s) Oral every 4 hours PRN Severe Pain (7 - 10)      Allergies  No Known Allergies    Intolerances      Vital Signs Last 24 Hrs  T(C): 36.4 (30 Mar 2023 09:47), Max: 36.7 (29 Mar 2023 13:10)  T(F): 97.6 (30 Mar 2023 09:47), Max: 98.1 (29 Mar 2023 13:10)  HR: 98 (30 Mar 2023 09:47) (74 - 115)  BP: 140/70 (30 Mar 2023 09:47) (122/72 - 163/77)  BP(mean): --  RR: 20 (30 Mar 2023 09:47) (18 - 20)  SpO2: 95% (30 Mar 2023 09:47) (95% - 98%)    Parameters below as of 30 Mar 2023 09:47  Patient On (Oxygen Delivery Method): room air    03-29-23 @ 07:01  -  03-30-23 @ 07:00  --------------------------------------------------------  IN: 3570 mL / OUT: 1902 mL / NET: 1668 mL    03-30-23 @ 07:01  -  03-30-23 @ 10:17  --------------------------------------------------------  IN: 430 mL / OUT: 105 mL / NET: 325 mL      LABS:                        7.6    10.83 )-----------( 191      ( 29 Mar 2023 23:41 )             24.3     03-29    137  |  98  |  50<H>  ----------------------------<  221<H>  4.2   |  25  |  3.65<H>    Ca    8.8      29 Mar 2023 23:41  Phos  5.1     03-29  Mg     2.0     03-29    TPro  5.6<L>  /  Alb  3.0<L>  /  TBili  0.2  /  DBili  x   /  AST  19  /  ALT  16  /  AlkPhos  64  03-29    PT/INR - ( 28 Mar 2023 13:39 )   PT: 15.1 sec;   INR: 1.31 ratio         PTT - ( 28 Mar 2023 13:39 )  PTT:26.8 sec      Review of systems  Gen: No weight changes, fatigue, fevers/chills, weakness  Skin: No rashes  Head/Eyes/Ears/Mouth: No headache; Normal hearing; Normal vision w/o blurriness; No sinus pain/discomfort, sore throat  Respiratory: No dyspnea, cough, wheezing, hemoptysis  CV: No chest pain, PND, orthopnea  GI: Mild abdominal pain at surgical incision site; denies diarrhea, constipation, nausea, vomiting, melena, hematochezia  : No increased frequency, dysuria, hematuria, nocturia  MSK: No joint pain/swelling; no back pain; no edema  Neuro: No dizziness/lightheadedness, weakness, seizures, numbness, tingling  Heme: No easy bruising or bleeding  Endo: No heat/cold intolerance  Psych: No significant nervousness, anxiety, stress, depression  All other systems were reviewed and are negative, except as noted.      PHYSICAL EXAM:  Constitutional: Well developed / well nourished  Eyes: Anicteric, PERRLA  ENMT: nc/at  Neck: supple  Respiratory: CTA B/L  Cardiovascular: RRR  Gastrointestinal: Soft, non distended, mild tenderness at the incision site; incision c/d/i; JPs x2 ss  Genitourinary: Urinary catheter in place  Extremities: SCD's in place and working bilaterally, no edema, AVF palpable  Vascular: Palpable dp pulses bilaterally  Neurological: A&O x3  Skin: no rashes, ulcerations or lesions;  Musculoskeletal: Moving all extremities  Psychiatric: Responsive     Transplant Surgery - Multidisciplinary Rounds  --------------------------------------------------------------  DDRT   Date:   3/28/23       POD# 2    Present:   Patient seen and examined with multidisciplinary team including Transplant Surgeon:, Dr. Calderón, Transplant Nephrologist: Dr. Mejias,  Transplant Pharmacist,  NP Po and unit RN during am rounds.  Disciplines not in attendance will be notified of the plan.     HPI: 67-year-old [place] female with HTN,  Alport syndrome and CKD 5  (x 15 years), LUE AVF placed in 2017, recently started HD on 3/17/2023 (Tanja, Nephrologist Dr. Jet Horn ) now s/p R DDRT under Simulect.    Interval Events:  - Overnight UOP decreased and slightly Tachycardic-500cc NS bolus given with appropriate response   - Cr 3.12  from 4.5  UOP 1.8L   SHEELA ss output  - Surveillance  blood cultures sent       Immunosupression:   Induction:  simulect                                              Maintenance immunosuppression:  Envarsus 5mg po qd, MMF 1gm BId, SST  Ongoing monitoring for signs of rejection.    Potential Discharge date: pending clinical improvement    Education:  Medications    Plan of care:  See Below    MEDICATIONS  (STANDING):  chlorhexidine 2% Cloths 1 Application(s) Topical daily  dextrose 5%. 1000 milliLiter(s) (50 mL/Hr) IV Continuous <Continuous>  dextrose 5%. 1000 milliLiter(s) (100 mL/Hr) IV Continuous <Continuous>  dextrose 50% Injectable 25 Gram(s) IV Push once  dextrose 50% Injectable 12.5 Gram(s) IV Push once  dextrose 50% Injectable 25 Gram(s) IV Push once  ertapenem  IVPB 500 milliGRAM(s) IV Intermittent every 24 hours  famotidine    Tablet 20 milliGRAM(s) Oral daily  glucagon  Injectable 1 milliGRAM(s) IntraMuscular once  insulin lispro (ADMELOG) corrective regimen sliding scale   SubCutaneous three times a day before meals  insulin lispro (ADMELOG) corrective regimen sliding scale   SubCutaneous at bedtime  methylPREDNISolone sodium succinate Injectable   IV Push   methylPREDNISolone sodium succinate Injectable 60 milliGRAM(s) IV Push two times a day  metoprolol tartrate 12.5 milliGRAM(s) Oral two times a day  mycophenolate mofetil 1 Gram(s) Oral <User Schedule>  nystatin    Suspension 081265 Unit(s) Swish and Swallow four times a day  polyethylene glycol 3350 17 Gram(s) Oral daily  senna 2 Tablet(s) Oral at bedtime  trimethoprim   80 mG/sulfamethoxazole 400 mG 1 Tablet(s) Oral daily  valGANciclovir 450 milliGRAM(s) Oral daily    MEDICATIONS  (PRN):  calcium carbonate    500 mG (Tums) Chewable 2 Tablet(s) Chew every 8 hours PRN Heartburn  dextrose Oral Gel 15 Gram(s) Oral once PRN Blood Glucose LESS THAN 70 milliGRAM(s)/deciliter  ondansetron Injectable 4 milliGRAM(s) IV Push every 6 hours PRN Nausea and/or Vomiting  simethicone 80 milliGRAM(s) Chew every 6 hours PRN Gas  traMADol 25 milliGRAM(s) Oral every 4 hours PRN Moderate Pain (4 - 6)  traMADol 50 milliGRAM(s) Oral every 4 hours PRN Severe Pain (7 - 10)      Allergies  No Known Allergies    Intolerances      Vital Signs Last 24 Hrs  T(C): 36.4 (30 Mar 2023 09:47), Max: 36.7 (29 Mar 2023 13:10)  T(F): 97.6 (30 Mar 2023 09:47), Max: 98.1 (29 Mar 2023 13:10)  HR: 98 (30 Mar 2023 09:47) (74 - 115)  BP: 140/70 (30 Mar 2023 09:47) (122/72 - 163/77)  BP(mean): --  RR: 20 (30 Mar 2023 09:47) (18 - 20)  SpO2: 95% (30 Mar 2023 09:47) (95% - 98%)    Parameters below as of 30 Mar 2023 09:47  Patient On (Oxygen Delivery Method): room air    03-29-23 @ 07:01  -  03-30-23 @ 07:00  --------------------------------------------------------  IN: 3570 mL / OUT: 1902 mL / NET: 1668 mL    03-30-23 @ 07:01  -  03-30-23 @ 10:17  --------------------------------------------------------  IN: 430 mL / OUT: 105 mL / NET: 325 mL      LABS:                        7.6    10.83 )-----------( 191      ( 29 Mar 2023 23:41 )             24.3     03-29    137  |  98  |  50<H>  ----------------------------<  221<H>  4.2   |  25  |  3.65<H>    Ca    8.8      29 Mar 2023 23:41  Phos  5.1     03-29  Mg     2.0     03-29    TPro  5.6<L>  /  Alb  3.0<L>  /  TBili  0.2  /  DBili  x   /  AST  19  /  ALT  16  /  AlkPhos  64  03-29    PT/INR - ( 28 Mar 2023 13:39 )   PT: 15.1 sec;   INR: 1.31 ratio         PTT - ( 28 Mar 2023 13:39 )  PTT:26.8 sec      Review of systems  Gen: No weight changes, fatigue, fevers/chills, weakness  Skin: No rashes  Head/Eyes/Ears/Mouth: No headache; Normal hearing; Normal vision w/o blurriness; No sinus pain/discomfort, sore throat  Respiratory: No dyspnea, cough, wheezing, hemoptysis  CV: No chest pain, PND, orthopnea  GI: Mild abdominal pain at surgical incision site; denies diarrhea, constipation, nausea, vomiting, melena, hematochezia  : No increased frequency, dysuria, hematuria, nocturia  MSK: No joint pain/swelling; no back pain; no edema  Neuro: No dizziness/lightheadedness, weakness, seizures, numbness, tingling  Heme: No easy bruising or bleeding  Endo: No heat/cold intolerance  Psych: No significant nervousness, anxiety, stress, depression  All other systems were reviewed and are negative, except as noted.      PHYSICAL EXAM:  Constitutional: Well developed / well nourished  Eyes: Anicteric, PERRLA  ENMT: nc/at  Neck: supple  Respiratory: CTA B/L  Cardiovascular: RRR  Gastrointestinal: Soft, non distended, mild tenderness at the incision site; incision c/d/i; JPs x2 ss  Genitourinary: Urinary catheter in place  Extremities: SCD's in place and working bilaterally, no edema, AVF palpable  Vascular: Palpable dp pulses bilaterally  Neurological: A&O x3  Skin: no rashes, ulcerations or lesions;  Musculoskeletal: Moving all extremities  Psychiatric: Responsive

## 2023-03-30 NOTE — PHARMACY EDUCATION NOTE - EDUCATION SUMMARY
Discharge immunosuppressant medications and prophylactic anti-infective agents reviewed with the patient. Outpatient medication schedule was discussed in detail including: medication name, indication, dose, administration times, treatment duration, side effects, drug interactions, and special instructions. Patient questions and concerns were answered and addressed. Patient demonstrated understanding.

## 2023-03-30 NOTE — PHYSICAL THERAPY INITIAL EVALUATION ADULT - ADDITIONAL COMMENTS
Patient lives in a house with 3 steps to enter with her  and son. Patient states they can help with ADL's and ambulation if needed after discharge. Patient lives in a house with 3 steps with rail to enter with her  and son. Patient states they can help with ADL's and ambulation if needed after discharge.

## 2023-03-30 NOTE — PROGRESS NOTE ADULT - ASSESSMENT
66 yo F with a PMH of [place], HTN, Alport syndrome, ESRD via LUE AVF, s/p DDRT    S/p DDRT (CMV +/-, EBV +/+, 1a/1v/1u) on 3/28  Donor with + polymicrobial blood cultures  Transplant ID consulted for recommendations    Overall,   Polymicrobial bacteremia from donor  Confirmed donor blood cultures with Klebsiella and GBS, both S to CTX   Pending confirmation of source of Staph Aureus (blood vs urine vs sputum)     Plan:  Continue Vancomycin, being dosed by level   Can de-escalate Ertapenem to Rocephin 2g IV QD   F/u final donor culture results  F/u surveillance blood cultures sent     Luis Stone MD, PGY-4   ID Fellow  [facility] Teams Preferred  After 5pm/weekends call [phone]  66 yo F with a PMH of [place], HTN, Alport syndrome, ESRD via LUE AVF, s/p DDRT    S/p DDRT (CMV +/-, EBV +/+, 1a/1v/1u) on 3/28  Donor with + polymicrobial blood cultures  Transplant ID consulted for recommendations    Overall,   Polymicrobial bacteremia from donor  Confirmed donor blood cultures with Klebsiella and GBS, both S to CTX   Donor bronch culture with MSSA    Plan:  Can stop Vancomycin   Can de-escalate Ertapenem to Rocephin 2g IV QD   F/u surveillance blood cultures sent     Luis Stone MD, PGY-4   ID Fellow  [facility] Teams Preferred  After 5pm/weekends call [phone]

## 2023-03-30 NOTE — PROGRESS NOTE ADULT - SUBJECTIVE AND OBJECTIVE BOX
--------------------------------------------------------------------------------  Chief Complaint: did n't sleep well    24 hour events/subjective:    reviewed    PAST HISTORY  --------------------------------------------------------------------------------  No significant changes to PMH, PSH, FHx, SHx, unless otherwise noted    ALLERGIES & MEDICATIONS  --------------------------------------------------------------------------------  Allergies    No Known Allergies    Intolerances      Standing Inpatient Medications  cefTRIAXone   IVPB 2000 milliGRAM(s) IV Intermittent every 24 hours  chlorhexidine 2% Cloths 1 Application(s) Topical daily  dextrose 5%. 1000 milliLiter(s) IV Continuous <Continuous>  dextrose 5%. 1000 milliLiter(s) IV Continuous <Continuous>  dextrose 50% Injectable 25 Gram(s) IV Push once  dextrose 50% Injectable 12.5 Gram(s) IV Push once  dextrose 50% Injectable 25 Gram(s) IV Push once  famotidine    Tablet 20 milliGRAM(s) Oral daily  glucagon  Injectable 1 milliGRAM(s) IntraMuscular once  insulin lispro (ADMELOG) corrective regimen sliding scale   SubCutaneous three times a day before meals  insulin lispro (ADMELOG) corrective regimen sliding scale   SubCutaneous at bedtime  methylPREDNISolone sodium succinate Injectable   IV Push   metoprolol tartrate 12.5 milliGRAM(s) Oral two times a day  mycophenolate mofetil 1 Gram(s) Oral <User Schedule>  nystatin    Suspension 991012 Unit(s) Swish and Swallow four times a day  polyethylene glycol 3350 17 Gram(s) Oral daily  senna 2 Tablet(s) Oral at bedtime  trimethoprim   80 mG/sulfamethoxazole 400 mG 1 Tablet(s) Oral daily  valGANciclovir 450 milliGRAM(s) Oral daily    PRN Inpatient Medications  calcium carbonate    500 mG (Tums) Chewable 2 Tablet(s) Chew every 8 hours PRN  dextrose Oral Gel 15 Gram(s) Oral once PRN  ondansetron Injectable 4 milliGRAM(s) IV Push every 6 hours PRN  simethicone 80 milliGRAM(s) Chew every 6 hours PRN  traMADol 25 milliGRAM(s) Oral every 4 hours PRN  traMADol 50 milliGRAM(s) Oral every 4 hours PRN      REVIEW OF SYSTEMS  --------------------------------------------------------------------------------  Gen: fatigue, fevers/chills, weakness  Skin: No rashes  Head/Eyes/Ears/Mouth: No headache;No sore throat  Respiratory: No dyspnea, cough,   CV: No chest pain, PND, orthopnea  GI: No abdominal pain, diarrhea, constipation, nausea, vomiting  Transplant: No pain  : No increased frequency, dysuria, hematuria, nocturia  MSK: No joint pain/swelling; no back pain; no edema  Neuro: No dizziness/lightheadedness, weakness, seizures, numbness, tingling  Psych: No significant nervousness, anxiety, stress, depression    All other systems were reviewed and are negative, except as noted.    VITALS/PHYSICAL EXAM  --------------------------------------------------------------------------------  T(C): 36.6 (03-30-23 @ 21:00), Max: 36.6 (03-30-23 @ 01:07)  HR: 91 (03-30-23 @ 21:00) (88 - 108)  BP: 138/66 (03-30-23 @ 21:00) (114/53 - 163/77)  RR: 18 (03-30-23 @ 21:00) (18 - 20)  SpO2: 100% (03-30-23 @ 21:00) (95% - 100%)        03-29-23 @ 07:01  -  03-30-23 @ 07:00  --------------------------------------------------------  IN: 3570 mL / OUT: 1902 mL / NET: 1668 mL    03-30-23 @ 07:01  -  03-30-23 @ 22:38  --------------------------------------------------------  IN: 970 mL / OUT: 668 mL / NET: 302 mL      Physical Exam:  	Gen: NAD, 	  HEENT: no acute signs  	Pulm: CTA B/L  	CV:   no rub  	Abd: +BS, soft, nontender/nondistended                      Transplant: healing incision  	   	UE: no asterixis  	LE: no edema  	Neuro: No focal deficits   	Psych: Normal affect and mood  	Skin: Warm, without rashes      LABS/STUDIES  --------------------------------------------------------------------------------              7.3    10.16 >-----------<  192      [03-30-23 @ 09:21]              23.3     136  |  99  |  51  ----------------------------<  194      [03-30-23 @ 09:21]  4.1   |  23  |  3.12        Ca     8.7     [03-30-23 @ 09:21]      Mg     2.1     [03-30-23 @ 09:21]      Phos  4.2     [03-30-23 @ 09:21]    TPro  5.7  /  Alb  3.1  /  TBili  0.3  /  DBili  x   /  AST  18  /  ALT  15  /  AlkPhos  66  [03-30-23 @ 09:21]      Creatinine Trend:  SCr 3.12 [03-30 @ 09:21]  SCr 3.65 [03-29 @ 23:41]  SCr 4.50 [03-29 @ 06:43]  SCr 4.84 [03-29 @ 01:27]  SCr 5.14 [03-28 @ 20:18]    Tacrolimus (), Serum: 10.0 ng/mL (03-30 @ 09:21)  Tacrolimus (), Serum: 7.4 ng/mL (03-29 @ 06:40)    Urinalysis - [03-17-23 @ 14:53]      Color Light Yellow / Appearance Slightly Turbid / SG 1.012 / pH 6.0      Gluc Negative / Ketone Negative  / Bili Negative / Urobili Negative       Blood Moderate / Protein 100 mg/dl / Leuk Est Large / Nitrite Negative      RBC 3 /  / Hyaline 1 / Gran  / Sq Epi  / Non Sq Epi 1 / Bacteria Few      Iron 60, TIBC --, %sat --      [03-22-23 @ 07:04]  TSH 0.32      [03-30-23 @ 09:21]    HBsAb <3.0      [03-28-23 @ 13:31]  HBsAg Nonreact      [03-28-23 @ 14:10]  HBcAb Nonreact      [03-28-23 @ 13:31]  HCV 0.09, Nonreact      [03-28-23 @ 13:31]  HIV Nonreact      [03-28-23 @ 14:10]

## 2023-03-30 NOTE — PHYSICAL THERAPY INITIAL EVALUATION ADULT - PERTINENT HX OF CURRENT PROBLEM, REHAB EVAL
Patient is a 67-year-old [facility] female with HTN,  Alport syndrome and CKD 5  (x 15 years), LUE AVF placed in 2017, recently started HD on 3/17/2023 (Tanja, Nephrologist Dr. Jet Horn) now s/p DDRT on 3/28/23. Imaging Results- 3/17 CT Head: No acute intracranial hemorrhage, mass effect, or CT evidence of acute territorial infarct. 3/20 X-ray Chest: Clear lungs. 3/22 [facility] Duplex HD Access L: The flow volumes are slightly low. There are fibrous strands in the egress cephalic vein in the forearm. Otherwise this appears to be a normally functioning dialysis access fistula. 3/28: US Trans Kidney with Doppler R: Normal range renal resistive indices. Nonspecific elevated peak systolic velocities at the anastomosis and   along the proximal transplant renal artery. There are also borderline elevated velocities of the iliac artery. Continued surveillance advised.

## 2023-03-30 NOTE — DISCHARGE NOTE NURSING/CASE MANAGEMENT/SOCIAL WORK - NSDCPEFALRISK_GEN_ALL_CORE
For information on Fall & Injury Prevention, visit: [email or web address] OR  [email or web address] OR  https://www.cdc.gov/steadi/patient.html

## 2023-03-30 NOTE — PROGRESS NOTE ADULT - SUBJECTIVE AND OBJECTIVE BOX
Follow Up: + Donor blood cultures     Interval History/ROS: Seen and examined at bedside     REVIEW OF SYSTEMS  Unchanged  Overall feels better     Allergies  No Known Allergies    ANTIMICROBIALS:    ertapenem  IVPB 500 every 24 hours  nystatin    Suspension 688733 four times a day  trimethoprim   80 mG/sulfamethoxazole 400 mG 1 daily  valGANciclovir 450 daily  vancomycin  IVPB 500 once    OTHER MEDS: MEDICATIONS  (STANDING):  calcium carbonate    500 mG (Tums) Chewable 2 every 8 hours PRN  dextrose 50% Injectable 25 once  dextrose 50% Injectable 12.5 once  dextrose 50% Injectable 25 once  dextrose Oral Gel 15 once PRN  famotidine    Tablet 20 daily  glucagon  Injectable 1 once  insulin lispro (ADMELOG) corrective regimen sliding scale  three times a day before meals  insulin lispro (ADMELOG) corrective regimen sliding scale  at bedtime  methylPREDNISolone sodium succinate Injectable    methylPREDNISolone sodium succinate Injectable 60 two times a day  metoprolol tartrate 12.5 two times a day  mycophenolate mofetil 1 <User Schedule>  ondansetron Injectable 4 every 6 hours PRN  polyethylene glycol 3350 17 daily  senna 2 at bedtime  simethicone 80 every 6 hours PRN  traMADol 50 every 4 hours PRN  traMADol 25 every 4 hours PRN    Vital Signs Last 24 Hrs  T(F): 97.6 (03-30-23 @ 09:47), Max: 98.9 (03-24-23 @ 16:25)    Vital Signs Last 24 Hrs  HR: 108 (03-30-23 @ 10:30) (74 - 115)  BP: 114/53 (03-30-23 @ 10:30) (114/53 - 163/77)  RR: 20 (03-30-23 @ 09:47)  SpO2: 95% (03-30-23 @ 09:47) (95% - 97%)  Wt(kg): --    EXAM:  General: Patient in NAD   HEENT: NCAT, EOMI  CV: S1+S2  Lungs: No respiratory distress, CTAB  Abd: Soft, no guarding  : [place]   Ext: No cyanosis  Neuro: Alert and oriented, no focal deficits  Skin: No rash   IV: No phlebitis    Labs:                        7.3    10.16 )-----------( 192      ( 30 Mar 2023 09:21 )             23.3     03-30    136  |  99  |  51<H>  ----------------------------<  194<H>  4.1   |  23  |  3.12<H>    Ca    8.7      30 Mar 2023 09:21  Phos  4.2     03-30  Mg     2.1     03-30    TPro  5.7<L>  /  Alb  3.1<L>  /  TBili  0.3  /  DBili  x   /  AST  18  /  ALT  15  /  AlkPhos  66  03-30    WBC Trend:  WBC Count: 10.16 (03-30-23 @ 09:21)  WBC Count: 10.83 (03-29-23 @ 23:41)  WBC Count: 8.43 (03-29-23 @ 06:41)  WBC Count: 7.80 (03-29-23 @ 01:27)    Creatine Trend:  Creatinine, Serum: 3.12 (03-30)  Creatinine, Serum: 3.65 (03-29)  Creatinine, Serum: 4.50 (03-29)  Creatinine, Serum: 4.84 (03-29)    Liver Biochemical Testing Trend:  Alanine Aminotransferase (ALT/SGPT): 15 (03-30)  Alanine Aminotransferase (ALT/SGPT): 16 (03-29)  Alanine Aminotransferase (ALT/SGPT): 18 (03-29)  Alanine Aminotransferase (ALT/SGPT): 17 (03-29)  Alanine Aminotransferase (ALT/SGPT): 16 (03-28)  Aspartate Aminotransferase (AST/SGOT): 18 (03-30-23 @ 09:21)  Aspartate Aminotransferase (AST/SGOT): 19 (03-29-23 @ 23:41)  Aspartate Aminotransferase (AST/SGOT): 28 (03-29-23 @ 06:43)  Aspartate Aminotransferase (AST/SGOT): 26 (03-29-23 @ 01:27)  Aspartate Aminotransferase (AST/SGOT): 26 (03-28-23 @ 20:18)  Bilirubin Total, Serum: 0.3 (03-30)  Bilirubin Total, Serum: 0.2 (03-29)  Bilirubin Total, Serum: 0.3 (03-29)  Bilirubin Total, Serum: 0.2 (03-29)  Bilirubin Total, Serum: 0.3 (03-28)    MICROBIOLOGY:  Vancomycin Level, Random: 10.0 (03-30 @ 09:21)  Vancomycin Level, Random: 11.7 (03-29 @ 06:41)    HIV-1/2 Combo Result: Nonreact (03-28-23 @ 14:10)    COVID-19 PCR: NotDetec (03-28-23 @ 13:41)  COVID-19 PCR: NotDetec (03-24-23 @ 17:53)  COVID-19 PCR: NotDetec (03-17-23 @ 13:17)    RADIOLOGY:  imaging below personally reviewed

## 2023-03-30 NOTE — PROGRESS NOTE ADULT - NS ATTEND AMEND GEN_ALL_CORE FT
I personally saw and examined patient.  Kidney transplant recipient.  Alert, oriented, responsive.  Abdomen soft and non-tender.  Wound clean and dry.    IMMUNOSUPPRESSION MANAGEMENT:  Envarsus level today:   Envarsus level yesterday:   Aim for level of 8-10  Current envarsus dose:  mg by mouth daily.  Envarsus dose management: change to  mg by mouth daily.   No change in current dose I personally saw and examined patient.  Kidney transplant recipient.  Alert, oriented, responsive.  Abdomen soft and non-tender.  Wound clean and dry.    IMMUNOSUPPRESSION MANAGEMENT:  Envarsus level today: pending (drawn very late, unclear when it will be reported)  Envarsus level yesterday: 7.4  Aim for level of 8-10  Current envarsus dose: 5 mg by mouth daily.  Envarsus dose management: change to 3 mg by mouth now of tacrolimus (rather than envarsus) given the time of the day and the fact that the level is not available. I personally saw and examined patient.  Kidney transplant recipient.  Alert, oriented, responsive.  Abdomen soft and non-tender.  Wound clean and dry.    IMMUNOSUPPRESSION MANAGEMENT:  Envarsus level today: 10  Envarsus level yesterday: 7.4  Aim for level of 8-10  Current envarsus dose: 5 mg by mouth daily.  Envarsus dose management: change to 2 mg by mouth now of tacrolimus (rather than envarsus) given the time of the day and the fact that the level just became available.

## 2023-03-30 NOTE — PROGRESS NOTE ADULT - ASSESSMENT
67-year-old [place] female with HTN,  Alport syndrome and CKD 5  (x 15 years), LUE AVF placed in 2017, recently started HD on 3/17/2023 (Tanja, Nephrologist Dr. Jet Horn ) now s/p R DDRT under Simulect.     s/p DDRT-POD #2   - good graft function woth good UOP  - d/c maintained IVF   - strict I&Os: JPs x2 (Subcutaneous and Perinephric)  - Tolerating regular diet   - SCDs/spirometry  - PT/ OT consult  - bowel regimen  - Immunosuppresion: ENV daily based on level, MMF 1gm BID, SST. Simulect due POD4  - PPX: nystatin, bactrim, Valcyte  - ID: Continue Ertapenem and Vancomycin for donor + blood cultures.  FU ID regarding LOT.  May need PICC/Midine prior to DC  - Daily CBC, CMP, Mag, Phos, Tacrolimus level  - Thyroid panel sent, f/u  67-year-old [place] female with HTN,  Alport syndrome and CKD 5  (x 15 years), LUE AVF placed in 2017, recently started HD on 3/17/2023 (Tanja, Nephrologist Dr. Jet Horn ) now s/p R DDRT under Simulect.     s/p DDRT-POD #2   - good graft function woth good UOP  - d/c maintained IVF   - strict I&Os: JPs x2 (Subcutaneous and Perinephric)  - Tolerating regular diet   - SCDs/spirometry  - PT/ OT consult  - bowel regimen  - Immunosuppresion: ENV daily based on level, MMF 1gm BID, SST. Simulect due POD4  - PPX: nystatin, bactrim, Valcyte  - ID: Continue Ertapenem and Vancomycin for donor + blood cultures x 7 days post transplant   May need PICC/Midine prior to DC  - Daily CBC, CMP, Mag, Phos, Tacrolimus level  - Thyroid panel sent, f/u

## 2023-03-30 NOTE — OCCUPATIONAL THERAPY INITIAL EVALUATION ADULT - PERTINENT HX OF CURRENT PROBLEM, REHAB EVAL
Patient is a 67-year-old [facility] female with HTN,  Alport syndrome and CKD 5  (x 15 years), LUE AVF placed in 2017, recently started HD on 3/17/2023 (Tanja, Nephrologist Dr. Jet Horn) now s/p DDRT on 3/28/23. Imaging Results-   3/17 CT Head: No acute intracranial hemorrhage, mass effect, or CT evidence of acute territorial infarct. 3/20 X-ray Chest: Clear lungs. 3/22 VA Duplex HD Access L: The flow volumes are slightly low. There are fibrous strands in the egress cephalic vein in the forearm. Otherwise this appears to be a normally functioning dialysis access fistula. 3/28: US Trans Kidney with Doppler R: Normal range renal resistive indices. Nonspecific elevated peak systolic velocities at the anastomosis and along the proximal transplant renal artery. There are also borderline elevated velocities of the iliac artery. Continued surveillance advised.

## 2023-03-30 NOTE — DISCHARGE NOTE NURSING/CASE MANAGEMENT/SOCIAL WORK - PATIENT PORTAL LINK FT
You can access the FollowMyHealth Patient Portal offered by [facility] by registering at the following website: [email or web address]. By joining [facility]’s FollowMyHealth portal, you will also be able to view your health information using other applications (apps) compatible with our system.

## 2023-03-30 NOTE — PROGRESS NOTE ADULT - TIME BILLING
Kidney Transplant recipient with functioning allograft, post op day 2  Creatinine trend noted  Comorbidities reviewed. Noted antibiotic coverage for donor bacteremia  Patient seen, examined and reviewed available clinical and lab data including history,  progress notes and consult notes.  Reviewed immunosuppression and allograft function including urine out put, creatinine trend, and any allograft and bladder imaging.  Reviewed medication regimen for prophylaxis  Suggestions:  Tacrolimus target trough level 8-10 ng/ml  MMF/Steroid taper as per protocol  Transplant ID follow up  Continue to monitor allograft function, urine output, creatinine, electrolytes  I was present during and reviewed clinical and lab data as well as assessment and plan as documented. Please contact if any additional questions with any change in clinical condition or on availability of any additional information or reports.

## 2023-03-31 DIAGNOSIS — D64.9 ANEMIA, UNSPECIFIED: ICD-10-CM

## 2023-03-31 LAB
ALBUMIN SERPL ELPH-MCNC: 3.1 G/DL — LOW (ref 3.3–5)
ALP SERPL-CCNC: 61 U/L — SIGNIFICANT CHANGE UP (ref 40–120)
ALT FLD-CCNC: 17 U/L — SIGNIFICANT CHANGE UP (ref 10–45)
AST SERPL-CCNC: 21 U/L — SIGNIFICANT CHANGE UP (ref 10–40)
BASOPHILS # BLD AUTO: 0 K/UL — SIGNIFICANT CHANGE UP (ref 0–0.2)
BASOPHILS NFR BLD AUTO: 0 % — SIGNIFICANT CHANGE UP (ref 0–2)
BILIRUB SERPL-MCNC: 0.3 MG/DL — SIGNIFICANT CHANGE UP (ref 0.2–1.2)
BUN SERPL-MCNC: 55 MG/DL — HIGH (ref 7–23)
CALCIUM SERPL-MCNC: 8.7 MG/DL — SIGNIFICANT CHANGE UP (ref 8.4–10.5)
CHLORIDE SERPL-SCNC: 100 MMOL/L — SIGNIFICANT CHANGE UP (ref 96–108)
CO2 SERPL-SCNC: 23 MMOL/L — SIGNIFICANT CHANGE UP (ref 22–31)
CREAT SERPL-MCNC: 2.79 MG/DL — HIGH (ref 0.5–1.3)
EGFR: 18 ML/MIN/1.73M2 — LOW
EOSINOPHIL # BLD AUTO: 0 K/UL — SIGNIFICANT CHANGE UP (ref 0–0.5)
EOSINOPHIL NFR BLD AUTO: 0 % — SIGNIFICANT CHANGE UP (ref 0–6)
GLUCOSE BLDC GLUCOMTR-MCNC: 146 MG/DL — HIGH (ref 70–99)
GLUCOSE BLDC GLUCOMTR-MCNC: 165 MG/DL — HIGH (ref 70–99)
GLUCOSE BLDC GLUCOMTR-MCNC: 237 MG/DL — HIGH (ref 70–99)
GLUCOSE BLDC GLUCOMTR-MCNC: 259 MG/DL — HIGH (ref 70–99)
GLUCOSE SERPL-MCNC: 131 MG/DL — HIGH (ref 70–99)
HCT VFR BLD CALC: 22.7 % — LOW (ref 34.5–45)
HGB BLD-MCNC: 7.2 G/DL — LOW (ref 11.5–15.5)
IMM GRANULOCYTES NFR BLD AUTO: 0.9 % — SIGNIFICANT CHANGE UP (ref 0–0.9)
LYMPHOCYTES # BLD AUTO: 0.84 K/UL — LOW (ref 1–3.3)
LYMPHOCYTES # BLD AUTO: 9.2 % — LOW (ref 13–44)
MAGNESIUM SERPL-MCNC: 2.1 MG/DL — SIGNIFICANT CHANGE UP (ref 1.6–2.6)
MCHC RBC-ENTMCNC: 31.4 PG — SIGNIFICANT CHANGE UP (ref 27–34)
MCHC RBC-ENTMCNC: 31.7 GM/DL — LOW (ref 32–36)
MCV RBC AUTO: 99.1 FL — SIGNIFICANT CHANGE UP (ref 80–100)
MONOCYTES # BLD AUTO: 0.88 K/UL — SIGNIFICANT CHANGE UP (ref 0–0.9)
MONOCYTES NFR BLD AUTO: 9.7 % — SIGNIFICANT CHANGE UP (ref 2–14)
NEUTROPHILS # BLD AUTO: 7.31 K/UL — SIGNIFICANT CHANGE UP (ref 1.8–7.4)
NEUTROPHILS NFR BLD AUTO: 80.2 % — HIGH (ref 43–77)
NRBC # BLD: 0 /100 WBCS — SIGNIFICANT CHANGE UP (ref 0–0)
PHOSPHATE SERPL-MCNC: 3.6 MG/DL — SIGNIFICANT CHANGE UP (ref 2.5–4.5)
PLATELET # BLD AUTO: 190 K/UL — SIGNIFICANT CHANGE UP (ref 150–400)
POTASSIUM SERPL-MCNC: 4.3 MMOL/L — SIGNIFICANT CHANGE UP (ref 3.5–5.3)
POTASSIUM SERPL-SCNC: 4.3 MMOL/L — SIGNIFICANT CHANGE UP (ref 3.5–5.3)
PROT SERPL-MCNC: 5.7 G/DL — LOW (ref 6–8.3)
RBC # BLD: 2.29 M/UL — LOW (ref 3.8–5.2)
RBC # FLD: 14.7 % — HIGH (ref 10.3–14.5)
SODIUM SERPL-SCNC: 135 MMOL/L — SIGNIFICANT CHANGE UP (ref 135–145)
TACROLIMUS SERPL-MCNC: 5.7 NG/ML — SIGNIFICANT CHANGE UP
VANCOMYCIN FLD-MCNC: 14 UG/ML — SIGNIFICANT CHANGE UP
WBC # BLD: 9.11 K/UL — SIGNIFICANT CHANGE UP (ref 3.8–10.5)
WBC # FLD AUTO: 9.11 K/UL — SIGNIFICANT CHANGE UP (ref 3.8–10.5)

## 2023-03-31 PROCEDURE — 99232 SBSQ HOSP IP/OBS MODERATE 35: CPT

## 2023-03-31 PROCEDURE — 99232 SBSQ HOSP IP/OBS MODERATE 35: CPT | Mod: 24

## 2023-03-31 RX ORDER — TACROLIMUS 5 MG/1
4 CAPSULE ORAL
Refills: 0 | Status: DISCONTINUED | OUTPATIENT
Start: 2023-03-31 | End: 2023-04-01

## 2023-03-31 RX ORDER — METOPROLOL TARTRATE 50 MG
0.5 TABLET ORAL
Qty: 90 | Refills: 0
Start: 2023-03-31 | End: 2023-06-28

## 2023-03-31 RX ORDER — HEPARIN SODIUM 5000 [USP'U]/ML
5000 INJECTION INTRAVENOUS; SUBCUTANEOUS EVERY 8 HOURS
Refills: 0 | Status: DISCONTINUED | OUTPATIENT
Start: 2023-03-31 | End: 2023-04-03

## 2023-03-31 RX ORDER — SODIUM CHLORIDE 9 MG/ML
250 INJECTION INTRAMUSCULAR; INTRAVENOUS; SUBCUTANEOUS ONCE
Refills: 0 | Status: COMPLETED | OUTPATIENT
Start: 2023-03-31 | End: 2023-03-31

## 2023-03-31 RX ORDER — TACROLIMUS 5 MG/1
4 CAPSULE ORAL ONCE
Refills: 0 | Status: COMPLETED | OUTPATIENT
Start: 2023-03-31 | End: 2023-03-31

## 2023-03-31 RX ADMIN — TRAMADOL HYDROCHLORIDE 25 MILLIGRAM(S): 50 TABLET ORAL at 02:11

## 2023-03-31 RX ADMIN — Medication 12.5 MILLIGRAM(S): at 05:28

## 2023-03-31 RX ADMIN — CEFTRIAXONE 100 MILLIGRAM(S): 500 INJECTION, POWDER, FOR SOLUTION INTRAMUSCULAR; INTRAVENOUS at 16:45

## 2023-03-31 RX ADMIN — POLYETHYLENE GLYCOL 3350 17 GRAM(S): 17 POWDER, FOR SOLUTION ORAL at 11:49

## 2023-03-31 RX ADMIN — MYCOPHENOLATE MOFETIL 1 GRAM(S): 250 CAPSULE ORAL at 07:32

## 2023-03-31 RX ADMIN — CHLORHEXIDINE GLUCONATE 1 APPLICATION(S): 213 SOLUTION TOPICAL at 11:49

## 2023-03-31 RX ADMIN — TACROLIMUS 4 MILLIGRAM(S): 5 CAPSULE ORAL at 11:47

## 2023-03-31 RX ADMIN — MYCOPHENOLATE MOFETIL 1 GRAM(S): 250 CAPSULE ORAL at 19:56

## 2023-03-31 RX ADMIN — Medication 12.5 MILLIGRAM(S): at 17:43

## 2023-03-31 RX ADMIN — Medication 500000 UNIT(S): at 23:38

## 2023-03-31 RX ADMIN — TRAMADOL HYDROCHLORIDE 50 MILLIGRAM(S): 50 TABLET ORAL at 18:32

## 2023-03-31 RX ADMIN — FAMOTIDINE 20 MILLIGRAM(S): 10 INJECTION INTRAVENOUS at 11:47

## 2023-03-31 RX ADMIN — TRAMADOL HYDROCHLORIDE 50 MILLIGRAM(S): 50 TABLET ORAL at 08:30

## 2023-03-31 RX ADMIN — VALGANCICLOVIR 450 MILLIGRAM(S): 450 TABLET, FILM COATED ORAL at 11:47

## 2023-03-31 RX ADMIN — Medication 30 MILLIGRAM(S): at 05:28

## 2023-03-31 RX ADMIN — HEPARIN SODIUM 5000 UNIT(S): 5000 INJECTION INTRAVENOUS; SUBCUTANEOUS at 21:48

## 2023-03-31 RX ADMIN — TRAMADOL HYDROCHLORIDE 25 MILLIGRAM(S): 50 TABLET ORAL at 03:20

## 2023-03-31 RX ADMIN — Medication 1 TABLET(S): at 11:47

## 2023-03-31 RX ADMIN — SODIUM CHLORIDE 500 MILLILITER(S): 9 INJECTION INTRAMUSCULAR; INTRAVENOUS; SUBCUTANEOUS at 03:45

## 2023-03-31 RX ADMIN — Medication 500000 UNIT(S): at 05:28

## 2023-03-31 RX ADMIN — Medication 500000 UNIT(S): at 17:43

## 2023-03-31 RX ADMIN — SENNA PLUS 2 TABLET(S): 8.6 TABLET ORAL at 21:48

## 2023-03-31 RX ADMIN — SODIUM CHLORIDE 250 MILLILITER(S): 9 INJECTION INTRAMUSCULAR; INTRAVENOUS; SUBCUTANEOUS at 03:45

## 2023-03-31 RX ADMIN — TRAMADOL HYDROCHLORIDE 50 MILLIGRAM(S): 50 TABLET ORAL at 17:43

## 2023-03-31 RX ADMIN — HEPARIN SODIUM 5000 UNIT(S): 5000 INJECTION INTRAVENOUS; SUBCUTANEOUS at 13:03

## 2023-03-31 RX ADMIN — TRAMADOL HYDROCHLORIDE 50 MILLIGRAM(S): 50 TABLET ORAL at 07:32

## 2023-03-31 RX ADMIN — Medication 3: at 12:32

## 2023-03-31 RX ADMIN — Medication 30 MILLIGRAM(S): at 17:43

## 2023-03-31 RX ADMIN — Medication 500000 UNIT(S): at 11:47

## 2023-03-31 RX ADMIN — Medication 1: at 17:42

## 2023-03-31 NOTE — PROGRESS NOTE ADULT - ASSESSMENT
67-year-old [place] female with HTN,  Alport syndrome and CKD 5  (x 15 years), LUE AVF placed in 2017, recently started HD on 3/17/2023 (Tanja, Nephrologist Dr. Jet Horn ) now s/p R DDRT under Simulect.     S/P DDRT-POD #3  - good graft function with good UOP  - strict I&Os: JPs x2 (Subcutaneous and Perinephric)  - DC ansari check TOV  - Tolerating regular diet   - SCDs / spirometry  - PT/ OT consult  - bowel regimen  - Immunosuppression: ENV daily based on level, MMF 1gm BID, SST. Simulect due POD4  - PPX: nystatin, bactrim, Valcyte  - ID: Continue Ertapenem and Vancomycin for donor + blood cultures x 7 days post transplant      - Daily CBC, CMP, Mag, Phos, Tacrolimus level  - Thyroid panel sent, f/u   - [facility] 67-year-old [place] female with HTN,  Alport syndrome and CKD 5  (x 15 years), LUE AVF placed in 2017, recently started HD on 3/17/2023 (Tanja, Nephrologist Dr. Jet Horn ) now s/p R DDRT under Simulect.     S/P DDRT-POD #3  - good graft function with good UOP  - strict I&Os: JPs x2 (Subcutaneous and Perinephric)  - DC ansari check TOV  - Low H&H 1 u PRBC today  - Tolerating regular diet   - SCDs / spirometry  - PT/ OT consult  - bowel regimen  - Immunosuppression: ENV daily based on level, MMF 1gm BID, SST. Simulect due POD4  - PPX: nystatin, bactrim, Valcyte  - ID: Continue Ertapenem and Vancomycin for donor + blood cultures x 7 days post transplant      - Daily CBC, CMP, Mag, Phos, Tacrolimus level  - Thyroid panel sent, f/u   - [facility]

## 2023-03-31 NOTE — ADVANCED PRACTICE NURSE CONSULT - ASSESSMENT
Midline Catheter Insertion Note    Catheter type: 4F  : Bard  Power injectable: Yes  Ref#: [id number]                                                                                                                                                                                                            Procedure assisted by: Gertrude Osorio RN  Time out was preformed, confirming the patient's first and last name, date of birth, procedure, and correct site prior to state of procedure.    Patient was placed with HOB 30 degrees. Patient placement site was prepped with chlorhexidine solution, then draped using maximum sterile barrier protection. Using the Bard Site Rite 8, the catheter was placed using the Modified Seldinger Technique. 2ml Lidocaine 1% injected at the site. Strict adherence to outline aseptic technique including handwashing, glove and gown, utilizing mask and cap, plus draping the patient with a sterile drape was observed. Upon completion of line placement, the insertion site was covered with a sterile occlusive CHG dressing. Pt tolerated procedure well.     All materials used for catheter insertion, including the intact guide wires, were accounted for at the end of the procedure.  Number of attempts: 1  Complications/Comments: None    Emergency Placement: No  Site: New  Anatomical Site of insertion: R Brachial   Catheter size/length: 4Fr., 20 cm.   US guided Bard SL Power MIDLINE placed    Pre-procedure vitals:  T: 97.6 F  HR: 89   BP: 125/67  RR: 16  02: 95%    Post-procedure vitals WDL

## 2023-03-31 NOTE — PROGRESS NOTE ADULT - SUBJECTIVE AND OBJECTIVE BOX
Transplant Surgery - Multidisciplinary Rounds  --------------------------------------------------------------  DDRT   Date:   3/28/23       POD# 3    Present:   Patient seen and examined with multidisciplinary team including Transplant Surgeon: Dr. Calderón,. Transplant Nephrologist: Dr. Mejias, Transplant Pharmacist FREDDIE Carcamo,  NP Sammy and unit RN during am rounds.  Disciplines not in attendance will be notified of the plan.     HPI: 67-year-old [place] female with HTN,  Alport syndrome and CKD 5  (x 15 years), LUE AVF placed in 2017, recently started HD on 3/17/2023 (Tanja, Nephrologist Dr. Jet Horn ) now s/p R DDRT under Simulect.    Interval Events:  - Overnight UOP decreased -250cc NS bolus given with appropriate response   - Cr down to 2.79 from 3.12  from 4.5  UOP 1.1L   SHEELA ss output  - Surveillance  blood cultures sent       Immunosupression:   Induction:  simulect                                              Maintenance immunosuppression:  Envarsus 5mg po qd, MMF 1gm BId, SST  Ongoing monitoring for signs of rejection.    Potential Discharge date: pending clinical improvement    Education:  Medications    Plan of care:  See Below       MEDICATIONS  (STANDING):  cefTRIAXone   IVPB 2000 milliGRAM(s) IV Intermittent every 24 hours  chlorhexidine 2% Cloths 1 Application(s) Topical daily  dextrose 5%. 1000 milliLiter(s) (50 mL/Hr) IV Continuous <Continuous>  dextrose 5%. 1000 milliLiter(s) (100 mL/Hr) IV Continuous <Continuous>  dextrose 50% Injectable 25 Gram(s) IV Push once  dextrose 50% Injectable 12.5 Gram(s) IV Push once  dextrose 50% Injectable 25 Gram(s) IV Push once  famotidine    Tablet 20 milliGRAM(s) Oral daily  glucagon  Injectable 1 milliGRAM(s) IntraMuscular once  heparin   Injectable 5000 Unit(s) SubCutaneous every 8 hours  insulin lispro (ADMELOG) corrective regimen sliding scale   SubCutaneous three times a day before meals  insulin lispro (ADMELOG) corrective regimen sliding scale   SubCutaneous at bedtime  methylPREDNISolone sodium succinate Injectable 30 milliGRAM(s) IV Push two times a day  methylPREDNISolone sodium succinate Injectable   IV Push   metoprolol tartrate 12.5 milliGRAM(s) Oral two times a day  mycophenolate mofetil 1 Gram(s) Oral <User Schedule>  nystatin    Suspension 586141 Unit(s) Swish and Swallow four times a day  polyethylene glycol 3350 17 Gram(s) Oral daily  senna 2 Tablet(s) Oral at bedtime  tacrolimus ER Tablet (ENVARSUS XR) 4 milliGRAM(s) Oral <User Schedule>  trimethoprim   80 mG/sulfamethoxazole 400 mG 1 Tablet(s) Oral daily  valGANciclovir 450 milliGRAM(s) Oral daily    MEDICATIONS  (PRN):  calcium carbonate    500 mG (Tums) Chewable 2 Tablet(s) Chew every 8 hours PRN Heartburn  dextrose Oral Gel 15 Gram(s) Oral once PRN Blood Glucose LESS THAN 70 milliGRAM(s)/deciliter  ondansetron Injectable 4 milliGRAM(s) IV Push every 6 hours PRN Nausea and/or Vomiting  simethicone 80 milliGRAM(s) Chew every 6 hours PRN Gas  traMADol 25 milliGRAM(s) Oral every 4 hours PRN Moderate Pain (4 - 6)  traMADol 50 milliGRAM(s) Oral every 4 hours PRN Severe Pain (7 - 10)      PAST MEDICAL & SURGICAL HISTORY:  Alport's syndrome      HTN (hypertension)      Hard of hearing  uses hearing aids      Anemia      ESRD with anemia  left AV fistula, not in use      S/P tonsillectomy      A-V fistula  Nov 2017          Vital Signs Last 24 Hrs  T(C): 36.5 (31 Mar 2023 08:46), Max: 36.6 (30 Mar 2023 21:00)  T(F): 97.7 (31 Mar 2023 08:46), Max: 97.9 (31 Mar 2023 05:01)  HR: 73 (31 Mar 2023 08:46) (73 - 91)  BP: 137/69 (31 Mar 2023 08:46) (128/72 - 148/73)  BP(mean): --  RR: 18 (31 Mar 2023 08:46) (18 - 18)  SpO2: 96% (31 Mar 2023 08:46) (95% - 100%)    Parameters below as of 31 Mar 2023 08:46  Patient On (Oxygen Delivery Method): room air        I&O's Summary    30 Mar 2023 07:01  -  31 Mar 2023 07:00  --------------------------------------------------------  IN: 2270 mL / OUT: 1248 mL / NET: 1022 mL    31 Mar 2023 07:01  -  31 Mar 2023 12:41  --------------------------------------------------------  IN: 520 mL / OUT: 400 mL / NET: 120 mL                              7.2    9.11  )-----------( 190      ( 31 Mar 2023 06:07 )             22.7     03-31    135  |  100  |  55<H>  ----------------------------<  131<H>  4.3   |  23  |  2.79<H>    Ca    8.7      31 Mar 2023 06:09  Phos  3.6     03-31  Mg     2.1     03-31    TPro  5.7<L>  /  Alb  3.1<L>  /  TBili  0.3  /  DBili  x   /  AST  21  /  ALT  17  /  AlkPhos  61  03-31    Tacrolimus (), Serum: 5.7 ng/mL (03-31 @ 06:06)      Culture - Blood (collected 03-29-23 @ 12:40)  Source: .Blood Blood-Peripheral  Preliminary Report (03-30-23 @ 18:01):    No growth to date.    Culture - Blood (collected 03-29-23 @ 12:00)  Source: .Blood Blood-Peripheral  Preliminary Report (03-30-23 @ 18:01):    No growth to date.      Review of systems  Gen: No weight changes, fatigue, fevers/chills, weakness  Skin: No rashes  Head/Eyes/Ears/Mouth: No headache; Normal hearing; Normal vision w/o blurriness; No sinus pain/discomfort, sore throat  Respiratory: No dyspnea, cough, wheezing, hemoptysis  CV: No chest pain, PND, orthopnea  GI: Mild abdominal pain at surgical incision site; denies diarrhea, constipation, nausea, vomiting, melena, hematochezia  : No increased frequency, dysuria, hematuria, nocturia  MSK: No joint pain/swelling; no back pain; no edema  Neuro: No dizziness/lightheadedness, weakness, seizures, numbness, tingling  Heme: No easy bruising or bleeding  Endo: No heat/cold intolerance  Psych: No significant nervousness, anxiety, stress, depression  All other systems were reviewed and are negative, except as noted.      PHYSICAL EXAM:  Constitutional: Well developed / well nourished  Eyes: Anicteric, PERRLA  ENMT: nc/at  Neck: supple  Respiratory: CTA B/L  Cardiovascular: RRR  Gastrointestinal: Soft, non distended, mild tenderness at the incision site; incision c/d/i; JPs x2 ss  Genitourinary: Urinary catheter in place  Extremities: SCD's in place and working bilaterally, no edema, AVF palpable  Vascular: Palpable dp pulses bilaterally  Neurological: A&O x3  Skin: no rashes, ulcerations or lesions;  Musculoskeletal: Moving all extremities  Psychiatric: Responsive

## 2023-03-31 NOTE — PROGRESS NOTE ADULT - ASSESSMENT
ESRD s/p ddrt  Donor had polymicrobial bacteremia    records from OSH clarified  Donor had stre and klebsiella in blood   Staph aurues (MSSA) was from a respiratory culture  Surveillance blood cultures are negative    Can change antibiotics to ceftriaxone  Continue antibiotics for 7 days post transplant    Anticipated duration of abx is 7 days post trx    If blood cultures remain negative, would be reasonable to finish 7 day antibiotic course with oral antibiotics at discharge . At that time, could change ceftriaxone to Vantin  (vantin 200 q 12 if GFR > 30; Vantin 200 mg po daily if GFR < 30)    Continue prophylaxis with valcyte, Bactrim, nystatin     Call the ID service with questions or concerns over the weekend.  [phone]

## 2023-03-31 NOTE — PROGRESS NOTE ADULT - NS ATTEND AMEND GEN_ALL_CORE FT
I personally saw and examined patient.  Kidney transplant recipient.  Alert, oriented, responsive.  Abdomen soft and non-tender.  Wound clean and dry.    IMMUNOSUPPRESSION MANAGEMENT:  Envarsus level today: 5.7  Envarsus level yesterday: 10  Aim for level of 8-10  Current envarsus dose: received 2 mg tacrolimus by mouth later in the day yesterday.  Envarsus dose management: change to 4 mg by mouth daily.

## 2023-03-31 NOTE — PROGRESS NOTE ADULT - SUBJECTIVE AND OBJECTIVE BOX
Follow Up:      Inverval History/ROS:Patient is a 67y old  Female who presents with a chief complaint of possible  donor renal transplant (31 Mar 2023 12:37)    No fever  No events      Allergies    No Known Allergies    Intolerances        ANTIMICROBIALS:  cefTRIAXone   IVPB 2000 every 24 hours  nystatin    Suspension 397116 four times a day  trimethoprim   80 mG/sulfamethoxazole 400 mG 1 daily  valGANciclovir 450 daily      OTHER MEDS:  calcium carbonate    500 mG (Tums) Chewable 2 Tablet(s) Chew every 8 hours PRN  chlorhexidine 2% Cloths 1 Application(s) Topical daily  dextrose 5%. 1000 milliLiter(s) IV Continuous <Continuous>  dextrose 5%. 1000 milliLiter(s) IV Continuous <Continuous>  dextrose 50% Injectable 25 Gram(s) IV Push once  dextrose 50% Injectable 12.5 Gram(s) IV Push once  dextrose 50% Injectable 25 Gram(s) IV Push once  dextrose Oral Gel 15 Gram(s) Oral once PRN  famotidine    Tablet 20 milliGRAM(s) Oral daily  glucagon  Injectable 1 milliGRAM(s) IntraMuscular once  heparin   Injectable 5000 Unit(s) SubCutaneous every 8 hours  insulin lispro (ADMELOG) corrective regimen sliding scale   SubCutaneous three times a day before meals  insulin lispro (ADMELOG) corrective regimen sliding scale   SubCutaneous at bedtime  methylPREDNISolone sodium succinate Injectable 30 milliGRAM(s) IV Push two times a day  methylPREDNISolone sodium succinate Injectable   IV Push   metoprolol tartrate 12.5 milliGRAM(s) Oral two times a day  mycophenolate mofetil 1 Gram(s) Oral <User Schedule>  ondansetron Injectable 4 milliGRAM(s) IV Push every 6 hours PRN  polyethylene glycol 3350 17 Gram(s) Oral daily  senna 2 Tablet(s) Oral at bedtime  simethicone 80 milliGRAM(s) Chew every 6 hours PRN  tacrolimus ER Tablet (ENVARSUS XR) 4 milliGRAM(s) Oral <User Schedule>  traMADol 25 milliGRAM(s) Oral every 4 hours PRN  traMADol 50 milliGRAM(s) Oral every 4 hours PRN      Vital Signs Last 24 Hrs  T(C): 36.6 (31 Mar 2023 13:01), Max: 36.6 (30 Mar 2023 21:00)  T(F): 97.8 (31 Mar 2023 13:01), Max: 97.9 (31 Mar 2023 05:01)  HR: 84 (31 Mar 2023 13:01) (73 - 91)  BP: 154/69 (31 Mar 2023 13:01) (128/72 - 154/69)  BP(mean): --  RR: 20 (31 Mar 2023 13:01) (18 - 20)  SpO2: 98% (31 Mar 2023 13:01) (95% - 100%)    Parameters below as of 31 Mar 2023 13:01  Patient On (Oxygen Delivery Method): room air        PHYSICAL EXAM:  General: [x ] non-toxic  HEAD/EYES: [ ] PERRL [x ] white sclera [ ] icterus  ENT:  [ ] normal [ x] supple [ ] thrush [ ] pharyngeal exudate  Cardiovascular:   [ ] murmur x[ ] normal [ ] PPM/AICD  Respiratory:  [ ] clear to ausculation bilaterally  GI:  [ x] soft, non-tender, normal bowel sounds  :  [ ] ansari [ ] no CVA tenderness   Musculoskeletal:  [ ] no synovitis  Neurologic:  [ ] non-focal exam   Skin:  [ x] no rash  Lymph: [ ] no lymphadenopathy  Psychiatric:  [ ] appropriate affect [ ] alert & oriented  Lines:  x[ ] no phlebitis [ ] central line                                7.2    9.11  )-----------( 190      ( 31 Mar 2023 06:07 )             22.7           135  |  100  |  55<H>  ----------------------------<  131<H>  4.3   |  23  |  2.79<H>    Ca    8.7      31 Mar 2023 06:09  Phos  3.6       Mg     2.1         TPro  5.7<L>  /  Alb  3.1<L>  /  TBili  0.3  /  DBili  x   /  AST  21  /  ALT  17  /  AlkPhos  61            MICROBIOLOGY:Culture Results:   No growth to date. (23 @ 12:40)  Culture Results:   No growth to date. (23 @ 12:00)      RADIOLOGY:

## 2023-03-31 NOTE — ADVANCED PRACTICE NURSE CONSULT - RECOMMEDATIONS
Post procedure verbal instructions given to the patient or patient representative. Patient or patient representative  instructed regarding signs and symptoms of infection. Patient instructed to keep dressing dry and clean. Care for catheter as per hospital protocols

## 2023-03-31 NOTE — PROGRESS NOTE ADULT - SUBJECTIVE AND OBJECTIVE BOX
--------------------------------------------------------------------------------  Chief Complaint: I feel better    24 hour events/subjective:    Reviewed    PAST HISTORY  --------------------------------------------------------------------------------  No significant changes to PMH, PSH, FHx, SHx, unless otherwise noted    ALLERGIES & MEDICATIONS  --------------------------------------------------------------------------------  Allergies    No Known Allergies    Intolerances      Standing Inpatient Medications  cefTRIAXone   IVPB 2000 milliGRAM(s) IV Intermittent every 24 hours  chlorhexidine 2% Cloths 1 Application(s) Topical daily  dextrose 5%. 1000 milliLiter(s) IV Continuous <Continuous>  dextrose 5%. 1000 milliLiter(s) IV Continuous <Continuous>  dextrose 50% Injectable 25 Gram(s) IV Push once  dextrose 50% Injectable 12.5 Gram(s) IV Push once  dextrose 50% Injectable 25 Gram(s) IV Push once  famotidine    Tablet 20 milliGRAM(s) Oral daily  glucagon  Injectable 1 milliGRAM(s) IntraMuscular once  heparin   Injectable 5000 Unit(s) SubCutaneous every 8 hours  insulin lispro (ADMELOG) corrective regimen sliding scale   SubCutaneous three times a day before meals  insulin lispro (ADMELOG) corrective regimen sliding scale   SubCutaneous at bedtime  metoprolol tartrate 12.5 milliGRAM(s) Oral two times a day  mycophenolate mofetil 1 Gram(s) Oral <User Schedule>  nystatin    Suspension 283773 Unit(s) Swish and Swallow four times a day  polyethylene glycol 3350 17 Gram(s) Oral daily  senna 2 Tablet(s) Oral at bedtime  tacrolimus ER Tablet (ENVARSUS XR) 4 milliGRAM(s) Oral <User Schedule>  trimethoprim   80 mG/sulfamethoxazole 400 mG 1 Tablet(s) Oral daily  valGANciclovir 450 milliGRAM(s) Oral daily    PRN Inpatient Medications  calcium carbonate    500 mG (Tums) Chewable 2 Tablet(s) Chew every 8 hours PRN  dextrose Oral Gel 15 Gram(s) Oral once PRN  ondansetron Injectable 4 milliGRAM(s) IV Push every 6 hours PRN  simethicone 80 milliGRAM(s) Chew every 6 hours PRN  traMADol 25 milliGRAM(s) Oral every 4 hours PRN  traMADol 50 milliGRAM(s) Oral every 4 hours PRN      REVIEW OF SYSTEMS  --------------------------------------------------------------------------------  Gen: Has fatigue, No fevers/chills   Skin: No rashes  Head/Eyes/Ears/Mouth: No headache;No sore throat  Respiratory: No dyspnea, cough,   CV: No chest pain , orthopnea  GI: No abdominal pain, diarrhea, constipation, nausea, vomiting  Transplant: No pain  : No increased frequency, dysuria, hematuria, nocturia  MSK: No joint pain/swelling; no back pain; no edema  Neuro: No  seizures, numbness, tingling      All other systems were reviewed and are negative, except as noted.    VITALS/PHYSICAL EXAM  --------------------------------------------------------------------------------  T(C): 36.5 (03-31-23 @ 16:54), Max: 36.6 (03-30-23 @ 21:00)  HR: 82 (03-31-23 @ 16:54) (73 - 91)  BP: 138/74 (03-31-23 @ 16:54) (137/69 - 154/69)  RR: 18 (03-31-23 @ 16:54) (18 - 20)  SpO2: 97% (03-31-23 @ 16:54) (95% - 100%)  Wt(kg): --        03-30-23 @ 07:01  -  03-31-23 @ 07:00  --------------------------------------------------------  IN: 2270 mL / OUT: 1248 mL / NET: 1022 mL    03-31-23 @ 07:01  -  03-31-23 @ 19:47  --------------------------------------------------------  IN: 1300 mL / OUT: 675 mL / NET: 625 mL      Physical Exam:  	Gen: NAD, well-appearing  	HEENT: PERRL, supple neck, clear oropharynx  	Pulm: CTA B/L  	CV: RRR, S1S2; no rub  Abd: +BS, soft, nontender/nondistended                      Transplant: healing incision  	: No suprapubic tenderness  	UE:  no asterixis  	LE:   no edema  	Neuro: No focal deficits   	Psych: Normal affect and mood  	Skin: Warm, without rashes      LABS/STUDIES  --------------------------------------------------------------------------------              7.2    9.11  >-----------<  190      [03-31-23 @ 06:07]              22.7     135  |  100  |  55  ----------------------------<  131      [03-31-23 @ 06:09]  4.3   |  23  |  2.79        Ca     8.7     [03-31-23 @ 06:09]      Mg     2.1     [03-31-23 @ 06:09]      Phos  3.6     [03-31-23 @ 06:09]    TPro  5.7  /  Alb  3.1  /  TBili  0.3  /  DBili  x   /  AST  21  /  ALT  17  /  AlkPhos  61  [03-31-23 @ 06:09]    Creatinine Trend:  SCr 2.79 [03-31 @ 06:09]  SCr 3.12 [03-30 @ 09:21]  SCr 3.65 [03-29 @ 23:41]  SCr 4.50 [03-29 @ 06:43]  SCr 4.84 [03-29 @ 01:27]    Tacrolimus (), Serum: 5.7 ng/mL (03-31 @ 06:06)  Tacrolimus (), Serum: 10.0 ng/mL (03-30 @ 09:21)  Tacrolimus (), Serum: 7.4 ng/mL (03-29 @ 06:40)            Urinalysis - [03-17-23 @ 14:53]      Color Light Yellow / Appearance Slightly Turbid / SG 1.012 / pH 6.0      Gluc Negative / Ketone Negative  / Bili Negative / Urobili Negative       Blood Moderate / Protein 100 mg/dl / Leuk Est Large / Nitrite Negative      RBC 3 /  / Hyaline 1 / Gran  / Sq Epi  / Non Sq Epi 1 / Bacteria Few      Iron 60, TIBC --, %sat --      [03-22-23 @ 07:04]  TSH 0.32      [03-30-23 @ 09:21]    HBsAb <3.0      [03-28-23 @ 13:31]  HBsAg Nonreact      [03-28-23 @ 14:10]  HBcAb Nonreact      [03-28-23 @ 13:31]  HCV 0.09, Nonreact      [03-28-23 @ 13:31]  HIV Nonreact      [03-28-23 @ 14:10]

## 2023-03-31 NOTE — PROGRESS NOTE ADULT - TIME BILLING
Kidney Transplant recipient with functioning allograft, downtrending creatinine  Creatinine trend noted, non oliguric  Comorbidities reviewed. Noted antibiotic coverage for donor bacteremia  Patient seen, examined and reviewed available clinical and lab data including history,  progress notes and consult notes.  Reviewed immunosuppression and allograft function including urine out put, creatinine trend, and any allograft and bladder imaging.  Reviewed medication regimen for prophylaxis  Suggestions:  Tacrolimus target trough level 8-10 ng/ml  MMF/Steroid taper as per protocol  Check antiGBM antibody  Antibiotics as per ID for donor bacteremia  Continue to monitor allograft function, urine output, creatinine, electrolytes  I was present during and reviewed clinical and lab data as well as assessment and plan as documented. Please contact if any additional questions with any change in clinical condition or on availability of any additional information or reports. Kidney Transplant recipient with functioning allograft, downtrending creatinine  Creatinine trend noted, non oliguric  Comorbidities reviewed. Noted antibiotic coverage for donor bacteremia  Patient seen, examined and reviewed available clinical and lab data including history,  progress notes and consult notes.  Reviewed immunosuppression and allograft function including urine out put, creatinine trend, and any allograft and bladder imaging.  Reviewed medication regimen for prophylaxis  Suggestions:  Tacrolimus target trough level 8-10 ng/ml  MMF/Steroid taper as per protocol  Check antiGBM antibody  Antibiotics as per ID for donor bacteremia  PRBC transfusion in view of low hemoglobin and tachycardia  Continue to monitor allograft function, urine output, creatinine, electrolytes  I was present during and reviewed clinical and lab data as well as assessment and plan as documented. Please contact if any additional questions with any change in clinical condition or on availability of any additional information or reports.  D/w transplant team

## 2023-04-01 ENCOUNTER — TRANSCRIPTION ENCOUNTER (OUTPATIENT)
Age: 68
End: 2023-04-01

## 2023-04-01 LAB
ALBUMIN SERPL ELPH-MCNC: 3.2 G/DL — LOW (ref 3.3–5)
ALP SERPL-CCNC: 62 U/L — SIGNIFICANT CHANGE UP (ref 40–120)
ALT FLD-CCNC: 25 U/L — SIGNIFICANT CHANGE UP (ref 10–45)
ANION GAP SERPL CALC-SCNC: 14 MMOL/L — SIGNIFICANT CHANGE UP (ref 5–17)
AST SERPL-CCNC: 22 U/L — SIGNIFICANT CHANGE UP (ref 10–40)
BILIRUB SERPL-MCNC: 0.4 MG/DL — SIGNIFICANT CHANGE UP (ref 0.2–1.2)
BUN SERPL-MCNC: 53 MG/DL — HIGH (ref 7–23)
CALCIUM SERPL-MCNC: 9 MG/DL — SIGNIFICANT CHANGE UP (ref 8.4–10.5)
CHLORIDE SERPL-SCNC: 105 MMOL/L — SIGNIFICANT CHANGE UP (ref 96–108)
CO2 SERPL-SCNC: 20 MMOL/L — LOW (ref 22–31)
CREAT SERPL-MCNC: 2.17 MG/DL — HIGH (ref 0.5–1.3)
EGFR: 24 ML/MIN/1.73M2 — LOW
GLUCOSE BLDC GLUCOMTR-MCNC: 152 MG/DL — HIGH (ref 70–99)
GLUCOSE BLDC GLUCOMTR-MCNC: 198 MG/DL — HIGH (ref 70–99)
GLUCOSE BLDC GLUCOMTR-MCNC: 198 MG/DL — HIGH (ref 70–99)
GLUCOSE BLDC GLUCOMTR-MCNC: 258 MG/DL — HIGH (ref 70–99)
GLUCOSE SERPL-MCNC: 178 MG/DL — HIGH (ref 70–99)
HCT VFR BLD CALC: 26.7 % — LOW (ref 34.5–45)
HGB BLD-MCNC: 8.5 G/DL — LOW (ref 11.5–15.5)
MAGNESIUM SERPL-MCNC: 2.3 MG/DL — SIGNIFICANT CHANGE UP (ref 1.6–2.6)
MCHC RBC-ENTMCNC: 31 PG — SIGNIFICANT CHANGE UP (ref 27–34)
MCHC RBC-ENTMCNC: 31.8 GM/DL — LOW (ref 32–36)
MCV RBC AUTO: 97.4 FL — SIGNIFICANT CHANGE UP (ref 80–100)
NRBC # BLD: 0 /100 WBCS — SIGNIFICANT CHANGE UP (ref 0–0)
PHOSPHATE SERPL-MCNC: 2.8 MG/DL — SIGNIFICANT CHANGE UP (ref 2.5–4.5)
PLATELET # BLD AUTO: 158 K/UL — SIGNIFICANT CHANGE UP (ref 150–400)
POTASSIUM SERPL-MCNC: 4.6 MMOL/L — SIGNIFICANT CHANGE UP (ref 3.5–5.3)
POTASSIUM SERPL-SCNC: 4.6 MMOL/L — SIGNIFICANT CHANGE UP (ref 3.5–5.3)
PROT SERPL-MCNC: 5.8 G/DL — LOW (ref 6–8.3)
RBC # BLD: 2.74 M/UL — LOW (ref 3.8–5.2)
RBC # FLD: 16.1 % — HIGH (ref 10.3–14.5)
SODIUM SERPL-SCNC: 139 MMOL/L — SIGNIFICANT CHANGE UP (ref 135–145)
TACROLIMUS SERPL-MCNC: 6 NG/ML — SIGNIFICANT CHANGE UP
VANCOMYCIN FLD-MCNC: 7.8 UG/ML — SIGNIFICANT CHANGE UP
WBC # BLD: 7.12 K/UL — SIGNIFICANT CHANGE UP (ref 3.8–10.5)
WBC # FLD AUTO: 7.12 K/UL — SIGNIFICANT CHANGE UP (ref 3.8–10.5)

## 2023-04-01 PROCEDURE — 99232 SBSQ HOSP IP/OBS MODERATE 35: CPT

## 2023-04-01 PROCEDURE — 99232 SBSQ HOSP IP/OBS MODERATE 35: CPT | Mod: 24

## 2023-04-01 RX ORDER — TACROLIMUS 5 MG/1
6 CAPSULE ORAL
Refills: 0 | Status: DISCONTINUED | OUTPATIENT
Start: 2023-04-02 | End: 2023-04-02

## 2023-04-01 RX ORDER — TACROLIMUS 5 MG/1
2 CAPSULE ORAL ONCE
Refills: 0 | Status: COMPLETED | OUTPATIENT
Start: 2023-04-01 | End: 2023-04-01

## 2023-04-01 RX ORDER — ERYTHROPOIETIN 10000 [IU]/ML
10000 INJECTION, SOLUTION INTRAVENOUS; SUBCUTANEOUS ONCE
Refills: 0 | Status: COMPLETED | OUTPATIENT
Start: 2023-04-01 | End: 2023-04-01

## 2023-04-01 RX ORDER — NIFEDIPINE 30 MG
30 TABLET, EXTENDED RELEASE 24 HR ORAL DAILY
Refills: 0 | Status: DISCONTINUED | OUTPATIENT
Start: 2023-04-01 | End: 2023-04-06

## 2023-04-01 RX ADMIN — CHLORHEXIDINE GLUCONATE 1 APPLICATION(S): 213 SOLUTION TOPICAL at 11:59

## 2023-04-01 RX ADMIN — HEPARIN SODIUM 5000 UNIT(S): 5000 INJECTION INTRAVENOUS; SUBCUTANEOUS at 14:07

## 2023-04-01 RX ADMIN — TRAMADOL HYDROCHLORIDE 50 MILLIGRAM(S): 50 TABLET ORAL at 06:15

## 2023-04-01 RX ADMIN — HEPARIN SODIUM 5000 UNIT(S): 5000 INJECTION INTRAVENOUS; SUBCUTANEOUS at 05:35

## 2023-04-01 RX ADMIN — ERYTHROPOIETIN 10000 UNIT(S): 10000 INJECTION, SOLUTION INTRAVENOUS; SUBCUTANEOUS at 14:07

## 2023-04-01 RX ADMIN — TACROLIMUS 4 MILLIGRAM(S): 5 CAPSULE ORAL at 08:42

## 2023-04-01 RX ADMIN — Medication 1: at 17:46

## 2023-04-01 RX ADMIN — TRAMADOL HYDROCHLORIDE 50 MILLIGRAM(S): 50 TABLET ORAL at 18:45

## 2023-04-01 RX ADMIN — VALGANCICLOVIR 450 MILLIGRAM(S): 450 TABLET, FILM COATED ORAL at 11:54

## 2023-04-01 RX ADMIN — Medication 500000 UNIT(S): at 05:35

## 2023-04-01 RX ADMIN — Medication 20 MILLIGRAM(S): at 17:46

## 2023-04-01 RX ADMIN — FAMOTIDINE 20 MILLIGRAM(S): 10 INJECTION INTRAVENOUS at 11:53

## 2023-04-01 RX ADMIN — CEFTRIAXONE 100 MILLIGRAM(S): 500 INJECTION, POWDER, FOR SOLUTION INTRAMUSCULAR; INTRAVENOUS at 14:08

## 2023-04-01 RX ADMIN — HEPARIN SODIUM 5000 UNIT(S): 5000 INJECTION INTRAVENOUS; SUBCUTANEOUS at 22:13

## 2023-04-01 RX ADMIN — BASILIXIMAB 100 MILLIGRAM(S): 20 INJECTION, POWDER, FOR SOLUTION INTRAVENOUS at 09:38

## 2023-04-01 RX ADMIN — Medication 500000 UNIT(S): at 23:29

## 2023-04-01 RX ADMIN — Medication 30 MILLIGRAM(S): at 09:47

## 2023-04-01 RX ADMIN — Medication 20 MILLIGRAM(S): at 05:35

## 2023-04-01 RX ADMIN — TRAMADOL HYDROCHLORIDE 50 MILLIGRAM(S): 50 TABLET ORAL at 05:41

## 2023-04-01 RX ADMIN — Medication 500000 UNIT(S): at 17:46

## 2023-04-01 RX ADMIN — Medication 12.5 MILLIGRAM(S): at 05:35

## 2023-04-01 RX ADMIN — SENNA PLUS 2 TABLET(S): 8.6 TABLET ORAL at 22:12

## 2023-04-01 RX ADMIN — TRAMADOL HYDROCHLORIDE 50 MILLIGRAM(S): 50 TABLET ORAL at 17:45

## 2023-04-01 RX ADMIN — Medication 12.5 MILLIGRAM(S): at 17:45

## 2023-04-01 RX ADMIN — TACROLIMUS 2 MILLIGRAM(S): 5 CAPSULE ORAL at 14:07

## 2023-04-01 RX ADMIN — Medication 500000 UNIT(S): at 11:53

## 2023-04-01 RX ADMIN — MYCOPHENOLATE MOFETIL 1 GRAM(S): 250 CAPSULE ORAL at 19:49

## 2023-04-01 RX ADMIN — Medication 3: at 12:43

## 2023-04-01 RX ADMIN — Medication 1: at 09:38

## 2023-04-01 RX ADMIN — MYCOPHENOLATE MOFETIL 1 GRAM(S): 250 CAPSULE ORAL at 08:41

## 2023-04-01 RX ADMIN — Medication 1 TABLET(S): at 11:54

## 2023-04-01 NOTE — DISCHARGE NOTE PROVIDER - HOSPITAL COURSE
67-year-old [place] female with HTN,  Alport syndrome and CKD 5  (x 15 years), LUE AVF placed in 2017, recently started HD on 3/17/2023 (Tanja, Nephrologist Dr. Jet Horn ) now s/p R DDRT under Simulect. Did well from surgical perspective. Tolerated PO, ambulated, had bowel function. passed TOV after ansari removal. Excellent graft function. Cr downtrended to 2.08 on d/c. good flow on doppler. Completed Simulect    Was evaluated daily by our multidisciplinary transplant team of surgeons, nephrologists, pharmacists, ACPs, RNs, Nutrition and deemed safe for d/c home with the following plan:  -ENVARSUS-----, MMF 1g BID, Pred taper  -standard PPx Nystatin/Bactrim/Valcyte   -f/u with nephrologist on Teusday 4/4/23  -f/u with  in 4-6 weeks for ureteral stent removal.  Education provided on 4/2/23 67-year-old [facility] female with HTN,  Alport syndrome and CKD 5 (x 15 years), LUE AVF placed in 2017, recently started HD on 3/17/2023 (Tanja, Nephrologist Dr. Jet Horn). She presented to [facility] on 3/28/23 as renal transplant candidate.     Now s/p DDRT 3/28/23 (KDPI 50%, DBD, RCr 0.66, CMV/EBV+), with 1a/1v/1u + stent and Simulect induction. Immediate post-op US with patent vasculature. Creatinine continued to steadily improve, adequate urine output and ansari was removed, patient passed TOV. Tolerating diet, return of bowel function, ambulating in hallway. SHEELA x2 removed. Reported some dysuria, UA with some WBC, started on Ciprofloxacin with improvement in urinary symptoms.     Donor was noted to have Klebsiella bacteremia, patient treated with course of Ertapenem/Vanco intra-op, and was de-escalated to Ceftriaxone for total course of 7 days (end date 4/3/23).  Midline placed due to poor PIV access in RUE after infiltrated PIV, noted to have increased edema in RUE with duplex positive for provoked DVT in basilic vein where the midline catheter was placed. Vascular surgery consulted, recommending removal of midline and full anticoagulation which was started with Eliquis.     Patient seen by the multidisciplinary renal transplant team and deemed stable for discharge today. 67-year-old [facility] female with HTN,  Alport syndrome and CKD 5 (x 15 years), LUE AVF placed in 2017, recently started HD on 3/17/2023 (Tanja, Nephrologist Dr. Jet Horn). She presented to [facility] on 3/28/23 as renal transplant candidate.     Now s/p DDRT 3/28/23 (KDPI 50%, DBD, RCr 0.66, CMV/EBV+), with 1a/1v/1u + stent and Simulect induction. Immediate post-op US with patent vasculature. Creatinine continued to steadily improve, adequate urine output and ansari was removed, patient passed TOV. Tolerating diet, return of bowel function, ambulating in hallway. SHEELA x2 removed. Reported some dysuria, UA with some WBC, started on Ciprofloxacin with improvement in urinary symptoms.     Donor was noted to have Klebsiella bacteremia, patient treated with course of Ertapenem/Vanco intra-op, and was de-escalated to Ceftriaxone for total course of 7 days (end date 4/3/23).  Midline placed due to poor PIV access in RUE after infiltrated PIV, noted to have increased edema in RUE with duplex positive for provoked DVT in basilic vein where the midline catheter was placed. Vascular surgery consulted, recommending removal of midline and anticoagulation which was started with Eliquis    C/o Dysuria, UA mild UTI, treated with Cipro for 7d course with relief of symptoms.    Patient seen by the multidisciplinary renal transplant team and deemed stable for discharge today.     Will f/u in clinic with lab check on 4/10  Will return for ureteral stent removal in 4-6 weeks    ENV 67-year-old [facility] female with HTN,  Alport syndrome and CKD 5 (x 15 years), LUE AVF placed in 2017, recently started HD on 3/17/2023 (Tanja, Nephrologist Dr. Jet Horn). She presented to [facility] on 3/28/23 as renal transplant candidate.     Now s/p DDRT 3/28/23 (KDPI 50%, DBD, RCr 0.66, CMV/EBV+), with 1a/1v/1u + stent and Simulect induction. Immediate post-op US with patent vasculature. Creatinine continued to steadily improve, adequate urine output and ansari was removed, patient passed TOV. Tolerating diet, return of bowel function, ambulating in hallway. SHEELA x2 removed. Reported some dysuria, UA with some WBC, started on Ciprofloxacin with improvement in urinary symptoms.     Donor was noted to have Klebsiella bacteremia, patient treated with course of Ertapenem/Vanco intra-op, and was de-escalated to Ceftriaxone for total course of 7 days (end date 4/3/23).  Midline placed due to poor PIV access in RUE after infiltrated PIV, noted to have increased edema in RUE with duplex positive for provoked DVT in basilic vein where the midline catheter was placed. Vascular surgery consulted, recommending removal of midline and anticoagulation which was started with Eliquis    C/o Dysuria, UA mild UTI, treated with Cipro for 7d course with relief of symptoms.    Patient seen by the multidisciplinary renal transplant team and deemed stable for discharge today.     Will f/u in clinic with lab check on 4/10  Will return for ureteral stent removal in 4-6 weeks    ENVARSUS 2, rest standard

## 2023-04-01 NOTE — PROGRESS NOTE ADULT - NS ATTEND AMEND GEN_ALL_CORE FT
I personally saw and examined patient.  Kidney transplant recipient.  Alert, oriented, responsive.  Abdomen soft and non-tender.    IMMUNOSUPPRESSION MANAGEMENT:  Envarsus level today: 6  Envarsus level yesterday: 3.6  Envarsus dose:  4 mg by mouth daily  Aim for level of 8-10  ENVARSUS DOSE MANAGEMENT:  change to 6 mg by mouth daily.

## 2023-04-01 NOTE — DISCHARGE NOTE PROVIDER - NSDCCPCAREPLAN_GEN_ALL_CORE_FT
PRINCIPAL DISCHARGE DIAGNOSIS  Diagnosis: Kidney transplanted  Assessment and Plan of Treatment: - Avoid heavy lifting aything over 5lbs for six weeks following your surgery date. Do NOT drive a car or operate machinery unless cleared by your transplant surgeon during your follow up visit. Avoid straining, use stool softners as needed. Stop stool softners if diarrhea develops.   - Call transplant clinic if you develop fever, increased abdominal pain, redness/swelling or bleeding around your incision site  - Call transplant clinic if you have difficulty urinating, notice decrease in urine output or blood in urine.   - Follow FOOD SAFETY instructions provided to you in your patient education guide booklet   - Bathing: Shower is allowed, you can let soap and water flow over your incision; do NOT rub the area. Bath is NOT allowed until your incision is healed and you have been cleared by your transplant surgeon.      SECONDARY DISCHARGE DIAGNOSES  Diagnosis: Immunosuppressed status  Assessment and Plan of Treatment: - Transplant recipients are at risk for developing infection because immune system is lowered due to transplant medications.   - Avoid contact with sick people; practice good hand hygiene;   - Take medications as directed by your translant team. Never stop taking medications unless instructed by your transplant physician.  - Do NOT double up medication dose if you missed your dose; call the transplant office for instructions.    Diagnosis: Acute DVT (deep venous thrombosis)  Assessment and Plan of Treatment: Continue Eliquis as prescribed, monitor for signs of bleeding including dark/tarry and/or bloody stools, excessive blood clots in urine, bleeding that is not controlled with manual pressure.    Diagnosis: Hypertension  Assessment and Plan of Treatment: Be sure to follow a low salt diet, avoid caffeine, reduce alcohol intake.  If you have been prescribed antihypertensive medications to control your blood pressure, be sure to take them every day as prescribed and do not miss any doses, the medications do not work if they are not taken consistently. Follow up with your Primary Care Doctor and have your Blood Pressure checked regularly.

## 2023-04-01 NOTE — PROGRESS NOTE ADULT - SUBJECTIVE AND OBJECTIVE BOX
[facility] DIVISION OF KIDNEY DISEASES AND HYPERTENSION -- FOLLOW UP NOTE  --------------------------------------------------------------------------------  Chief Complaint:  kidney transplant    24 hour events/subjective:  PT feels ok, arm is sore.  Blood sugars are elevated.       PAST HISTORY  --------------------------------------------------------------------------------  No significant changes to PMH, PSH, FHx, SHx, unless otherwise noted    ALLERGIES & MEDICATIONS  --------------------------------------------------------------------------------  Allergies    No Known Allergies    Intolerances      Standing Inpatient Medications  cefTRIAXone   IVPB 2000 milliGRAM(s) IV Intermittent every 24 hours  chlorhexidine 2% Cloths 1 Application(s) Topical daily  dextrose 5%. 1000 milliLiter(s) IV Continuous <Continuous>  dextrose 5%. 1000 milliLiter(s) IV Continuous <Continuous>  dextrose 50% Injectable 25 Gram(s) IV Push once  dextrose 50% Injectable 12.5 Gram(s) IV Push once  dextrose 50% Injectable 25 Gram(s) IV Push once  famotidine    Tablet 20 milliGRAM(s) Oral daily  glucagon  Injectable 1 milliGRAM(s) IntraMuscular once  heparin   Injectable 5000 Unit(s) SubCutaneous every 8 hours  insulin lispro (ADMELOG) corrective regimen sliding scale   SubCutaneous three times a day before meals  insulin lispro (ADMELOG) corrective regimen sliding scale   SubCutaneous at bedtime  metoprolol tartrate 12.5 milliGRAM(s) Oral two times a day  mycophenolate mofetil 1 Gram(s) Oral <User Schedule>  NIFEdipine XL 30 milliGRAM(s) Oral daily  nystatin    Suspension 612229 Unit(s) Swish and Swallow four times a day  polyethylene glycol 3350 17 Gram(s) Oral daily  predniSONE   Tablet 20 milliGRAM(s) Oral two times a day  predniSONE   Tablet   Oral   senna 2 Tablet(s) Oral at bedtime  tacrolimus ER Tablet (ENVARSUS XR) 4 milliGRAM(s) Oral <User Schedule>  trimethoprim   80 mG/sulfamethoxazole 400 mG 1 Tablet(s) Oral daily  valGANciclovir 450 milliGRAM(s) Oral daily    PRN Inpatient Medications  calcium carbonate    500 mG (Tums) Chewable 2 Tablet(s) Chew every 8 hours PRN  dextrose Oral Gel 15 Gram(s) Oral once PRN  ondansetron Injectable 4 milliGRAM(s) IV Push every 6 hours PRN  simethicone 80 milliGRAM(s) Chew every 6 hours PRN  traMADol 50 milliGRAM(s) Oral every 4 hours PRN  traMADol 25 milliGRAM(s) Oral every 4 hours PRN      REVIEW OF SYSTEMS  --------------------------------------------------------------------------------  Gen: No fatigue, fevers/chills, weakness  Skin: No rashes  Head/Eyes/Ears/Mouth: No headache;No sore throat  Respiratory: No dyspnea, cough,   CV: No chest pain, PND, orthopnea  GI: No abdominal pain, diarrhea, constipation, nausea, vomiting  Transplant: mild incisional pain  : No increased frequency, dysuria, hematuria, nocturia  MSK: RUE midline and bruising.    Neuro: No dizziness/lightheadedness, weakness, seizures, numbness, tingling  Psych: No significant nervousness, anxiety, stress, depression    All other systems were reviewed and are negative, except as noted.    VITALS/PHYSICAL EXAM  --------------------------------------------------------------------------------  T(C): 37.1 (04-01-23 @ 10:19), Max: 37.1 (04-01-23 @ 10:19)  HR: 86 (04-01-23 @ 10:19) (82 - 88)  BP: 162/70 (04-01-23 @ 10:19) (136/65 - 178/82)  RR: 16 (04-01-23 @ 10:19) (16 - 20)  SpO2: 98% (04-01-23 @ 10:19) (97% - 100%)  Wt(kg): --        03-31-23 @ 07:01  -  04-01-23 @ 07:00  --------------------------------------------------------  IN: 1540 mL / OUT: 1040 mL / NET: 500 mL    04-01-23 @ 07:01  -  04-01-23 @ 11:21  --------------------------------------------------------  IN: 240 mL / OUT: 100 mL / NET: 140 mL      Physical Exam:  	Gen: NAD  	HEENT: PERRL, supple neck, clear oropharynx  	Pulm: CTA B/L  	CV: RRR, S1S2; no rub  	Back: No spinal or CVA tenderness; no sacral edema  	Abd: +BS, soft, nontender/nondistended                      Transplant: No tenderness, swelling, incision c/d/i  	: No suprapubic tenderness  	UE: RUE midline and bruising.   	LE: trace LE edema  	Neuro: No focal deficits  	Psych: Normal affect and mood  	Skin: Warm, without rashes      LABS/STUDIES  --------------------------------------------------------------------------------              8.5    7.12  >-----------<  158      [04-01-23 @ 06:28]              26.7     139  |  105  |  53  ----------------------------<  178      [04-01-23 @ 06:28]  4.6   |  20  |  2.17        Ca     9.0     [04-01-23 @ 06:28]      Mg     2.3     [04-01-23 @ 06:28]      Phos  2.8     [04-01-23 @ 06:28]    TPro  5.8  /  Alb  3.2  /  TBili  0.4  /  DBili  x   /  AST  22  /  ALT  25  /  AlkPhos  62  [04-01-23 @ 06:28]          Creatinine Trend:  SCr 2.17 [04-01 @ 06:28]  SCr 2.79 [03-31 @ 06:09]  SCr 3.12 [03-30 @ 09:21]  SCr 3.65 [03-29 @ 23:41]  SCr 4.50 [03-29 @ 06:43]    Tacrolimus (), Serum: 6.0 ng/mL (04-01 @ 06:28)  Tacrolimus (), Serum: 5.7 ng/mL (03-31 @ 06:06)  Tacrolimus (), Serum: 10.0 ng/mL (03-30 @ 09:21)  Tacrolimus (), Serum: 7.4 ng/mL (03-29 @ 06:40)            Urinalysis - [03-17-23 @ 14:53]      Color Light Yellow / Appearance Slightly Turbid / SG 1.012 / pH 6.0      Gluc Negative / Ketone Negative  / Bili Negative / Urobili Negative       Blood Moderate / Protein 100 mg/dl / Leuk Est Large / Nitrite Negative      RBC 3 /  / Hyaline 1 / Gran  / Sq Epi  / Non Sq Epi 1 / Bacteria Few      Iron 60, TIBC --, %sat --      [03-22-23 @ 07:04]  TSH 0.32      [03-30-23 @ 09:21]    HBsAb <3.0      [03-28-23 @ 13:31]  HBsAg Nonreact      [03-28-23 @ 14:10]  HBcAb Nonreact      [03-28-23 @ 13:31]  HCV 0.09, Nonreact      [03-28-23 @ 13:31]  HIV Nonreact      [03-28-23 @ 14:10]

## 2023-04-01 NOTE — PROGRESS NOTE ADULT - ASSESSMENT
67-year-old [place] female with HTN,  Alport syndrome and CKD 5  (x 15 years), LUE AVF placed in 2017, recently started HD on 3/17/2023 (Tanja, Nephrologist Dr. Jet Horn ) now s/p R DDRT under Simulect.     S/P DDRT-POD #4  - good graft function with good UOP  - Epogen x1  - strict I&Os   - Tolerating regular diet   - SCDs / spirometry  - PT/ OT consult  - bowel regimen  - ID: Continue Ceftriaxone and Vancomycin for donor + blood cultures x 7 days post transplant      - FU ID recs for duration and DC plan  - Daily CBC, CMP, Mag, Phos, Tacrolimus level  - Thyroid panel sent, f/u   - SQH     Immunosuppression:   - ENV daily based on level, MMF 1gm BID, SST. Simulect due POD4  - PPX: nystatin, bactrim, Valcyte

## 2023-04-01 NOTE — PROGRESS NOTE ADULT - SUBJECTIVE AND OBJECTIVE BOX
Transplant Surgery - Multidisciplinary Rounds  --------------------------------------------------------------  DDRT   Date:   3/28/23       POD# 4    Present:   Patient seen and examined with multidisciplinary team including Transplant Surgeon: Dr. Calderón,. Transplant Nephrologist: Dr. Mejias, Transplant Pharmacist FREDDIE Carcamo,  JULIA Christianson and unit RN during am rounds.  Disciplines not in attendance will be notified of the plan.     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 Jet Horn ) now s/p R DDRT under Simulect.    Interval Events:  - Received one u PRBC yesterday for low H&H now improved 8.5/26.7  - Cr down to 2/1 from 2.79   UOP 1.1L   SHEELA ss output  - Surveillance  blood cultures sNTD       Immunosupression:   Induction:  simulect                                              Maintenance immunosuppression:  Envarsus 7mg po qd, MMF 1gm BId, SST  Ongoing monitoring for signs of rejection.    Potential Discharge date: pending clinical improvement    Education:  Medications    Plan of care:  See Below          MEDICATIONS  (STANDING):  cefTRIAXone   IVPB 2000 milliGRAM(s) IV Intermittent every 24 hours  chlorhexidine 2% Cloths 1 Application(s) Topical daily  dextrose 5%. 1000 milliLiter(s) (50 mL/Hr) IV Continuous <Continuous>  dextrose 5%. 1000 milliLiter(s) (100 mL/Hr) IV Continuous <Continuous>  dextrose 50% Injectable 25 Gram(s) IV Push once  dextrose 50% Injectable 12.5 Gram(s) IV Push once  dextrose 50% Injectable 25 Gram(s) IV Push once  famotidine    Tablet 20 milliGRAM(s) Oral daily  glucagon  Injectable 1 milliGRAM(s) IntraMuscular once  heparin   Injectable 5000 Unit(s) SubCutaneous every 8 hours  insulin lispro (ADMELOG) corrective regimen sliding scale   SubCutaneous three times a day before meals  insulin lispro (ADMELOG) corrective regimen sliding scale   SubCutaneous at bedtime  metoprolol tartrate 12.5 milliGRAM(s) Oral two times a day  mycophenolate mofetil 1 Gram(s) Oral <User Schedule>  NIFEdipine XL 30 milliGRAM(s) Oral daily  nystatin    Suspension 201109 Unit(s) Swish and Swallow four times a day  polyethylene glycol 3350 17 Gram(s) Oral daily  predniSONE   Tablet 20 milliGRAM(s) Oral two times a day  predniSONE   Tablet   Oral   senna 2 Tablet(s) Oral at bedtime  trimethoprim   80 mG/sulfamethoxazole 400 mG 1 Tablet(s) Oral daily  valGANciclovir 450 milliGRAM(s) Oral daily    MEDICATIONS  (PRN):  calcium carbonate    500 mG (Tums) Chewable 2 Tablet(s) Chew every 8 hours PRN Heartburn  dextrose Oral Gel 15 Gram(s) Oral once PRN Blood Glucose LESS THAN 70 milliGRAM(s)/deciliter  ondansetron Injectable 4 milliGRAM(s) IV Push every 6 hours PRN Nausea and/or Vomiting  simethicone 80 milliGRAM(s) Chew every 6 hours PRN Gas  traMADol 50 milliGRAM(s) Oral every 4 hours PRN Severe Pain (7 - 10)  traMADol 25 milliGRAM(s) Oral every 4 hours PRN Moderate Pain (4 - 6)      PAST MEDICAL & SURGICAL HISTORY:  Alport's syndrome      HTN (hypertension)      Hard of hearing  uses hearing aids      Anemia      ESRD with anemia  left AV fistula, not in use      S/P tonsillectomy      A-V fistula  Nov 2017          Vital Signs Last 24 Hrs  T(C): 36.6 (01 Apr 2023 13:00), Max: 37.1 (01 Apr 2023 10:19)  T(F): 97.8 (01 Apr 2023 13:00), Max: 98.7 (01 Apr 2023 10:19)  HR: 85 (01 Apr 2023 13:00) (82 - 88)  BP: 129/61 (01 Apr 2023 13:00) (129/61 - 178/82)  BP(mean): 93 (01 Apr 2023 05:32) (93 - 100)  RR: 18 (01 Apr 2023 13:00) (16 - 18)  SpO2: 97% (01 Apr 2023 13:00) (97% - 100%)    Parameters below as of 01 Apr 2023 13:00  Patient On (Oxygen Delivery Method): room air        I&O's Summary    31 Mar 2023 07:01  -  01 Apr 2023 07:00  --------------------------------------------------------  IN: 1540 mL / OUT: 1040 mL / NET: 500 mL    01 Apr 2023 07:01  -  01 Apr 2023 14:40  --------------------------------------------------------  IN: 530 mL / OUT: 300 mL / NET: 230 mL                              8.5    7.12  )-----------( 158      ( 01 Apr 2023 06:28 )             26.7     04-01    139  |  105  |  53<H>  ----------------------------<  178<H>  4.6   |  20<L>  |  2.17<H>    Ca    9.0      01 Apr 2023 06:28  Phos  2.8     04-01  Mg     2.3     04-01    TPro  5.8<L>  /  Alb  3.2<L>  /  TBili  0.4  /  DBili  x   /  AST  22  /  ALT  25  /  AlkPhos  62  04-01    Tacrolimus (), Serum: 6.0 ng/mL (04-01 @ 06:28)        Culture - Blood (collected 03-29-23 @ 12:40)  Source: .Blood Blood-Peripheral  Preliminary Report (03-30-23 @ 18:01):    No growth to date.    Culture - Blood (collected 03-29-23 @ 12:00)  Source: .Blood Blood-Peripheral  Preliminary Report (03-30-23 @ 18:01):    No growth to date.                Review of systems  Gen: No weight changes, fatigue, fevers/chills, weakness  Skin: No rashes  Head/Eyes/Ears/Mouth: No headache; Normal hearing; Normal vision w/o blurriness; No sinus pain/discomfort, sore throat  Respiratory: No dyspnea, cough, wheezing, hemoptysis  CV: No chest pain, PND, orthopnea  GI: Mild abdominal pain at surgical incision site; denies diarrhea, constipation, nausea, vomiting, melena, hematochezia  : No increased frequency, dysuria, hematuria, nocturia  MSK: No joint pain/swelling; no back pain; no edema  Neuro: No dizziness/lightheadedness, weakness, seizures, numbness, tingling  Heme: No easy bruising or bleeding  Endo: No heat/cold intolerance  Psych: No significant nervousness, anxiety, stress, depression  All other systems were reviewed and are negative, except as noted.      PHYSICAL EXAM:  Constitutional: Well developed / well nourished  Eyes: Anicteric, PERRLA  ENMT: nc/at  Neck: supple  Respiratory: CTA B/L  Cardiovascular: RRR  Gastrointestinal: Soft, non distended, mild tenderness at the incision site; incision c/d/i;    Genitourinary: Voids  Extremities: SCD's in place and working bilaterally, no edema, AVF palpable  Vascular: Palpable dp pulses bilaterally  Neurological: A&O x3  Skin: no rashes, ulcerations or lesions;  Musculoskeletal: Moving all extremities  Psychiatric: Responsive

## 2023-04-01 NOTE — PROGRESS NOTE ADULT - ASSESSMENT
67-year-old [place] female with HTN,  Alport syndrome and CKD 5  (x 15 years), LUE AVF placed in 2017, recently started HD on 3/17/2023 (Tanja, Nephrologist Dr. Jet Horn ) now s/p R DDRT under Simulect.    1.  Renal transplant - pt is POD 4 s/p DDRT.  Has ATN but improving.  Mild LE edema.   2.  Immunosuppression - simulect induction, on tacro for target 8-10, MMF 1gm BID and pred taper.    3.  HTN - elevated, add nifedipine.  4.  Anemia - transfused yesterday, add procrit 10,000 units SQ today.   5.  Hyperglycemia - no history of diabetes, change diet to no concentrated carbs.  Discussed with patient.

## 2023-04-01 NOTE — DISCHARGE NOTE PROVIDER - NSDCMRMEDTOKEN_GEN_ALL_CORE_FT
calcitriol 0.25 mcg oral capsule: 1 cap(s) orally once a day  Metoprolol Succinate ER 25 mg oral tablet, extended release: 0.5 tab(s) orally 2 times a day  rolling walker: use as directed   calcium carbonate 500 mg (200 mg elemental calcium) oral tablet, chewable: 2 tab(s) orally every 8 hours As needed Heartburn  cefpodoxime 200 mg oral tablet: 1 tab(s) orally once a day  famotidine 20 mg oral tablet: 1 tab(s) orally once a day  Metoprolol Succinate ER 25 mg oral tablet, extended release: 0.5 tab(s) orally 2 times a day  mycophenolate mofetil 250 mg oral capsule: 1,000 milligram(s) orally 2 times a day  NIFEdipine 30 mg oral tablet, extended release: 1 tab(s) orally once a day  nystatin 100,000 units/mL oral suspension: 5 milliliter(s) orally 4 times a day  polyethylene glycol 3350 oral powder for reconstitution: 17 gram(s) orally once a day  senna leaf extract oral tablet: 2 tab(s) orally once a day (at bedtime)  sulfamethoxazole-trimethoprim 400 mg-80 mg oral tablet: 1 tab(s) orally once a day  traMADol 50 mg oral tablet: 1 tab(s) orally every 4 hours As needed Severe Pain (7 - 10)  traMADol 50 mg oral tablet: 1 tab(s) orally every 6 hours as needed for Severe Pain (7 - 10) for pain MDD: 4  valGANciclovir 450 mg oral tablet: 1 tab(s) orally once a day   calcium carbonate 500 mg (200 mg elemental calcium) oral tablet, chewable: 2 tab(s) orally every 8 hours As needed Heartburn  cefpodoxime 200 mg oral tablet: 1 tab(s) orally once a day  Eliquis 5 mg oral tablet: 1 tab(s) orally 2 times a day  famotidine 20 mg oral tablet: 1 tab(s) orally once a day  Metoprolol Succinate ER 25 mg oral tablet, extended release: 0.5 tab(s) orally 2 times a day  mycophenolate mofetil 250 mg oral capsule: 1,000 milligram(s) orally 2 times a day  NIFEdipine (Eqv-Procardia XL) 30 mg oral tablet, extended release: 1 tab(s) orally once a day  NIFEdipine 30 mg oral tablet, extended release: 1 tab(s) orally once a day  nystatin 100,000 units/mL oral suspension: 5 milliliter(s) orally 4 times a day  polyethylene glycol 3350 oral powder for reconstitution: 17 gram(s) orally once a day  senna leaf extract oral tablet: 2 tab(s) orally once a day (at bedtime)  sulfamethoxazole-trimethoprim 400 mg-80 mg oral tablet: 1 tab(s) orally once a day  traMADol 50 mg oral tablet: 1 tab(s) orally every 4 hours As needed Severe Pain (7 - 10)  traMADol 50 mg oral tablet: 1 tab(s) orally every 6 hours as needed for Severe Pain (7 - 10) for pain MDD: 4  valGANciclovir 450 mg oral tablet: 1 tab(s) orally once a day   apixaban 5 mg oral tablet: 1 tab(s) orally every 12 hours  Cipro 250 mg oral tablet: 1 tab(s) orally 2 times a day  famotidine 20 mg oral tablet: 1 tab(s) orally once a day  Metoprolol Tartrate 25 mg oral tablet: 0.5 tab(s) orally 2 times a day  mycophenolate mofetil 250 mg oral capsule: 1,000 milligram(s) orally 2 times a day  NIFEdipine 30 mg oral tablet, extended release: 1 tab(s) orally once a day  nystatin 100,000 units/mL oral suspension: 5 milliliter(s) orally 4 times a day  polyethylene glycol 3350 oral powder for reconstitution: 17 gram(s) orally once a day  predniSONE: 5 milligram(s) orally once a day  senna leaf extract oral tablet: 2 tab(s) orally once a day (at bedtime)  sulfamethoxazole-trimethoprim 400 mg-80 mg oral tablet: 1 tab(s) orally once a day  valGANciclovir 450 mg oral tablet: 1 tab(s) orally once a day   apixaban 5 mg oral tablet: 1 tab(s) orally every 12 hours  Cipro 250 mg oral tablet: 1 tab(s) orally 2 times a day  Envarsus XR 1 mg oral tablet, extended release: 2 tab(s) orally once a day  famotidine 20 mg oral tablet: 1 tab(s) orally once a day  Metoprolol Tartrate 25 mg oral tablet: 0.5 tab(s) orally 2 times a day  mycophenolate mofetil 250 mg oral capsule: 1,000 milligram(s) orally 2 times a day  NIFEdipine 30 mg oral tablet, extended release: 1 tab(s) orally once a day  nystatin 100,000 units/mL oral suspension: 5 milliliter(s) orally 4 times a day  polyethylene glycol 3350 oral powder for reconstitution: 17 gram(s) orally once a day  predniSONE: 5 milligram(s) orally once a day  senna leaf extract oral tablet: 2 tab(s) orally once a day (at bedtime)  sulfamethoxazole-trimethoprim 400 mg-80 mg oral tablet: 1 tab(s) orally once a day  valGANciclovir 450 mg oral tablet: 1 tab(s) orally once a day

## 2023-04-01 NOTE — DISCHARGE NOTE PROVIDER - CARE PROVIDER_API CALL
Abner Calderón (PhD; VIRI)  [facility], [address] [place] [address]  Phone: [phone]  Fax: [phone]  Follow Up Time:

## 2023-04-02 LAB
ALBUMIN SERPL ELPH-MCNC: 3.4 G/DL — SIGNIFICANT CHANGE UP (ref 3.3–5)
ALP SERPL-CCNC: 58 U/L — SIGNIFICANT CHANGE UP (ref 40–120)
ALT FLD-CCNC: 18 U/L — SIGNIFICANT CHANGE UP (ref 10–45)
ANION GAP SERPL CALC-SCNC: 11 MMOL/L — SIGNIFICANT CHANGE UP (ref 5–17)
APPEARANCE UR: ABNORMAL
AST SERPL-CCNC: 12 U/L — SIGNIFICANT CHANGE UP (ref 10–40)
BACTERIA # UR AUTO: NEGATIVE — SIGNIFICANT CHANGE UP
BASOPHILS # BLD AUTO: 0.02 K/UL — SIGNIFICANT CHANGE UP (ref 0–0.2)
BASOPHILS NFR BLD AUTO: 0.3 % — SIGNIFICANT CHANGE UP (ref 0–2)
BILIRUB SERPL-MCNC: 0.3 MG/DL — SIGNIFICANT CHANGE UP (ref 0.2–1.2)
BILIRUB UR-MCNC: NEGATIVE — SIGNIFICANT CHANGE UP
BUN SERPL-MCNC: 55 MG/DL — HIGH (ref 7–23)
CALCIUM SERPL-MCNC: 9.2 MG/DL — SIGNIFICANT CHANGE UP (ref 8.4–10.5)
CHLORIDE SERPL-SCNC: 105 MMOL/L — SIGNIFICANT CHANGE UP (ref 96–108)
CO2 SERPL-SCNC: 24 MMOL/L — SIGNIFICANT CHANGE UP (ref 22–31)
COLOR SPEC: COLORLESS — SIGNIFICANT CHANGE UP
CREAT SERPL-MCNC: 2.08 MG/DL — HIGH (ref 0.5–1.3)
DIFF PNL FLD: ABNORMAL
EGFR: 25 ML/MIN/1.73M2 — LOW
EOSINOPHIL # BLD AUTO: 0.01 K/UL — SIGNIFICANT CHANGE UP (ref 0–0.5)
EOSINOPHIL NFR BLD AUTO: 0.1 % — SIGNIFICANT CHANGE UP (ref 0–6)
EPI CELLS # UR: 2 /HPF — SIGNIFICANT CHANGE UP
GLUCOSE BLDC GLUCOMTR-MCNC: 149 MG/DL — HIGH (ref 70–99)
GLUCOSE BLDC GLUCOMTR-MCNC: 167 MG/DL — HIGH (ref 70–99)
GLUCOSE BLDC GLUCOMTR-MCNC: 187 MG/DL — HIGH (ref 70–99)
GLUCOSE BLDC GLUCOMTR-MCNC: 212 MG/DL — HIGH (ref 70–99)
GLUCOSE SERPL-MCNC: 161 MG/DL — HIGH (ref 70–99)
GLUCOSE UR QL: ABNORMAL
HCT VFR BLD CALC: 25.6 % — LOW (ref 34.5–45)
HGB BLD-MCNC: 8.2 G/DL — LOW (ref 11.5–15.5)
HYALINE CASTS # UR AUTO: 1 /LPF — SIGNIFICANT CHANGE UP (ref 0–2)
IMM GRANULOCYTES NFR BLD AUTO: 4.6 % — HIGH (ref 0–0.9)
IRON SATN MFR SERPL: 133 UG/DL — SIGNIFICANT CHANGE UP (ref 30–160)
IRON SATN MFR SERPL: 48 % — SIGNIFICANT CHANGE UP (ref 14–50)
KETONES UR-MCNC: NEGATIVE — SIGNIFICANT CHANGE UP
LEUKOCYTE ESTERASE UR-ACNC: ABNORMAL
LYMPHOCYTES # BLD AUTO: 1 K/UL — SIGNIFICANT CHANGE UP (ref 1–3.3)
LYMPHOCYTES # BLD AUTO: 14.5 % — SIGNIFICANT CHANGE UP (ref 13–44)
MAGNESIUM SERPL-MCNC: 2.2 MG/DL — SIGNIFICANT CHANGE UP (ref 1.6–2.6)
MCHC RBC-ENTMCNC: 30.8 PG — SIGNIFICANT CHANGE UP (ref 27–34)
MCHC RBC-ENTMCNC: 32 GM/DL — SIGNIFICANT CHANGE UP (ref 32–36)
MCV RBC AUTO: 96.2 FL — SIGNIFICANT CHANGE UP (ref 80–100)
MONOCYTES # BLD AUTO: 0.79 K/UL — SIGNIFICANT CHANGE UP (ref 0–0.9)
MONOCYTES NFR BLD AUTO: 11.4 % — SIGNIFICANT CHANGE UP (ref 2–14)
NEUTROPHILS # BLD AUTO: 4.77 K/UL — SIGNIFICANT CHANGE UP (ref 1.8–7.4)
NEUTROPHILS NFR BLD AUTO: 69.1 % — SIGNIFICANT CHANGE UP (ref 43–77)
NITRITE UR-MCNC: NEGATIVE — SIGNIFICANT CHANGE UP
NRBC # BLD: 0 /100 WBCS — SIGNIFICANT CHANGE UP (ref 0–0)
PH UR: 6.5 — SIGNIFICANT CHANGE UP (ref 5–8)
PHOSPHATE SERPL-MCNC: 2.3 MG/DL — LOW (ref 2.5–4.5)
PLATELET # BLD AUTO: 170 K/UL — SIGNIFICANT CHANGE UP (ref 150–400)
POTASSIUM SERPL-MCNC: 4.3 MMOL/L — SIGNIFICANT CHANGE UP (ref 3.5–5.3)
POTASSIUM SERPL-SCNC: 4.3 MMOL/L — SIGNIFICANT CHANGE UP (ref 3.5–5.3)
PROT SERPL-MCNC: 5.7 G/DL — LOW (ref 6–8.3)
PROT UR-MCNC: ABNORMAL
RBC # BLD: 2.66 M/UL — LOW (ref 3.8–5.2)
RBC # BLD: 3.06 M/UL — LOW (ref 3.8–5.2)
RBC # FLD: 15.6 % — HIGH (ref 10.3–14.5)
RBC CASTS # UR COMP ASSIST: 224 /HPF — HIGH (ref 0–4)
RETICS #: 134 K/UL — HIGH (ref 25–125)
RETICS/RBC NFR: 4.4 % — HIGH (ref 0.5–2.5)
SODIUM SERPL-SCNC: 140 MMOL/L — SIGNIFICANT CHANGE UP (ref 135–145)
SP GR SPEC: 1.01 — SIGNIFICANT CHANGE UP (ref 1.01–1.02)
TACROLIMUS SERPL-MCNC: 7.2 NG/ML — SIGNIFICANT CHANGE UP
TIBC SERPL-MCNC: 274 UG/DL — SIGNIFICANT CHANGE UP (ref 220–430)
UIBC SERPL-MCNC: 141 UG/DL — SIGNIFICANT CHANGE UP (ref 110–370)
UROBILINOGEN FLD QL: NEGATIVE — SIGNIFICANT CHANGE UP
VANCOMYCIN FLD-MCNC: 4.9 UG/ML — SIGNIFICANT CHANGE UP
WBC # BLD: 6.91 K/UL — SIGNIFICANT CHANGE UP (ref 3.8–10.5)
WBC # FLD AUTO: 6.91 K/UL — SIGNIFICANT CHANGE UP (ref 3.8–10.5)
WBC UR QL: 44 /HPF — HIGH (ref 0–5)

## 2023-04-02 PROCEDURE — 99232 SBSQ HOSP IP/OBS MODERATE 35: CPT

## 2023-04-02 PROCEDURE — 93971 EXTREMITY STUDY: CPT | Mod: 26,RT

## 2023-04-02 PROCEDURE — 99232 SBSQ HOSP IP/OBS MODERATE 35: CPT | Mod: 24

## 2023-04-02 PROCEDURE — 93970 EXTREMITY STUDY: CPT | Mod: 26

## 2023-04-02 PROCEDURE — 99222 1ST HOSP IP/OBS MODERATE 55: CPT

## 2023-04-02 RX ORDER — POLYETHYLENE GLYCOL 3350 17 G/17G
17 POWDER, FOR SOLUTION ORAL
Qty: 0 | Refills: 0 | DISCHARGE
Start: 2023-04-02

## 2023-04-02 RX ORDER — TACROLIMUS 5 MG/1
7 CAPSULE ORAL
Refills: 0 | Status: DISCONTINUED | OUTPATIENT
Start: 2023-04-03 | End: 2023-04-03

## 2023-04-02 RX ORDER — TRAMADOL HYDROCHLORIDE 50 MG/1
1 TABLET ORAL
Qty: 12 | Refills: 0
Start: 2023-04-02 | End: 2023-04-04

## 2023-04-02 RX ORDER — TACROLIMUS 5 MG/1
1 CAPSULE ORAL ONCE
Refills: 0 | Status: COMPLETED | OUTPATIENT
Start: 2023-04-02 | End: 2023-04-02

## 2023-04-02 RX ORDER — TRAMADOL HYDROCHLORIDE 50 MG/1
1 TABLET ORAL
Qty: 0 | Refills: 0 | DISCHARGE
Start: 2023-04-02

## 2023-04-02 RX ORDER — CEFPODOXIME PROXETIL 100 MG
1 TABLET ORAL
Qty: 2 | Refills: 0
Start: 2023-04-02

## 2023-04-02 RX ORDER — SENNA PLUS 8.6 MG/1
2 TABLET ORAL
Qty: 0 | Refills: 0 | DISCHARGE
Start: 2023-04-02

## 2023-04-02 RX ORDER — MYCOPHENOLATE MOFETIL 250 MG/1
250 CAPSULE ORAL
Refills: 0 | DISCHARGE
Start: 2023-04-02

## 2023-04-02 RX ORDER — FAMOTIDINE 10 MG/ML
1 INJECTION INTRAVENOUS
Qty: 0 | Refills: 0 | DISCHARGE
Start: 2023-04-02

## 2023-04-02 RX ORDER — NYSTATIN 500MM UNIT
5 POWDER (EA) MISCELLANEOUS
Qty: 0 | Refills: 0 | DISCHARGE
Start: 2023-04-02

## 2023-04-02 RX ORDER — CALCIUM CARBONATE 500(1250)
2 TABLET ORAL
Qty: 0 | Refills: 0 | DISCHARGE
Start: 2023-04-02

## 2023-04-02 RX ORDER — CALCITRIOL 0.5 UG/1
1 CAPSULE ORAL
Qty: 0 | Refills: 0 | DISCHARGE

## 2023-04-02 RX ORDER — NIFEDIPINE 30 MG
1 TABLET, EXTENDED RELEASE 24 HR ORAL
Qty: 0 | Refills: 0 | DISCHARGE
Start: 2023-04-02

## 2023-04-02 RX ORDER — FUROSEMIDE 40 MG
40 TABLET ORAL ONCE
Refills: 0 | Status: COMPLETED | OUTPATIENT
Start: 2023-04-02 | End: 2023-04-02

## 2023-04-02 RX ORDER — VALGANCICLOVIR 450 MG/1
1 TABLET, FILM COATED ORAL
Qty: 0 | Refills: 0 | DISCHARGE
Start: 2023-04-02

## 2023-04-02 RX ADMIN — HEPARIN SODIUM 5000 UNIT(S): 5000 INJECTION INTRAVENOUS; SUBCUTANEOUS at 13:16

## 2023-04-02 RX ADMIN — MYCOPHENOLATE MOFETIL 1 GRAM(S): 250 CAPSULE ORAL at 20:30

## 2023-04-02 RX ADMIN — TRAMADOL HYDROCHLORIDE 50 MILLIGRAM(S): 50 TABLET ORAL at 16:34

## 2023-04-02 RX ADMIN — Medication 500000 UNIT(S): at 17:24

## 2023-04-02 RX ADMIN — HEPARIN SODIUM 5000 UNIT(S): 5000 INJECTION INTRAVENOUS; SUBCUTANEOUS at 05:53

## 2023-04-02 RX ADMIN — TRAMADOL HYDROCHLORIDE 50 MILLIGRAM(S): 50 TABLET ORAL at 06:47

## 2023-04-02 RX ADMIN — TRAMADOL HYDROCHLORIDE 50 MILLIGRAM(S): 50 TABLET ORAL at 17:34

## 2023-04-02 RX ADMIN — TRAMADOL HYDROCHLORIDE 50 MILLIGRAM(S): 50 TABLET ORAL at 05:53

## 2023-04-02 RX ADMIN — MYCOPHENOLATE MOFETIL 1 GRAM(S): 250 CAPSULE ORAL at 07:42

## 2023-04-02 RX ADMIN — TACROLIMUS 1 MILLIGRAM(S): 5 CAPSULE ORAL at 12:12

## 2023-04-02 RX ADMIN — Medication 500000 UNIT(S): at 22:50

## 2023-04-02 RX ADMIN — CHLORHEXIDINE GLUCONATE 1 APPLICATION(S): 213 SOLUTION TOPICAL at 12:51

## 2023-04-02 RX ADMIN — Medication 30 MILLIGRAM(S): at 05:53

## 2023-04-02 RX ADMIN — HEPARIN SODIUM 5000 UNIT(S): 5000 INJECTION INTRAVENOUS; SUBCUTANEOUS at 20:30

## 2023-04-02 RX ADMIN — Medication 12.5 MILLIGRAM(S): at 05:53

## 2023-04-02 RX ADMIN — Medication 10 MILLIGRAM(S): at 05:53

## 2023-04-02 RX ADMIN — Medication 1: at 13:17

## 2023-04-02 RX ADMIN — Medication 12.5 MILLIGRAM(S): at 17:25

## 2023-04-02 RX ADMIN — SENNA PLUS 2 TABLET(S): 8.6 TABLET ORAL at 20:30

## 2023-04-02 RX ADMIN — Medication 1 TABLET(S): at 12:15

## 2023-04-02 RX ADMIN — Medication 500000 UNIT(S): at 12:14

## 2023-04-02 RX ADMIN — VALGANCICLOVIR 450 MILLIGRAM(S): 450 TABLET, FILM COATED ORAL at 12:14

## 2023-04-02 RX ADMIN — Medication 40 MILLIGRAM(S): at 12:12

## 2023-04-02 RX ADMIN — Medication 500000 UNIT(S): at 05:53

## 2023-04-02 RX ADMIN — POLYETHYLENE GLYCOL 3350 17 GRAM(S): 17 POWDER, FOR SOLUTION ORAL at 12:15

## 2023-04-02 RX ADMIN — FAMOTIDINE 20 MILLIGRAM(S): 10 INJECTION INTRAVENOUS at 12:14

## 2023-04-02 RX ADMIN — Medication 1: at 17:25

## 2023-04-02 RX ADMIN — TACROLIMUS 6 MILLIGRAM(S): 5 CAPSULE ORAL at 07:42

## 2023-04-02 RX ADMIN — Medication 10 MILLIGRAM(S): at 17:25

## 2023-04-02 RX ADMIN — CEFTRIAXONE 100 MILLIGRAM(S): 500 INJECTION, POWDER, FOR SOLUTION INTRAMUSCULAR; INTRAVENOUS at 13:19

## 2023-04-02 NOTE — CONSULT NOTE ADULT - SUBJECTIVE AND OBJECTIVE BOX
General Surgery Consult  Consulting surgical team: Vascular surgery  Consulting attending: Madelyn    HPI: 67-year-old [place] female with HTN,  Alport syndrome and CKD 5  (x 15 years), LUE AVF placed in 2017, recently started HD on 3/17/2023 (Tanja, Nephrologist Dr. Jet Horn ) now s/p R DDRT under Simulect. Found to have LUE DVT. Vascular surgery consulted to evaluate.                (28 Mar 2023 12:51)      PAST MEDICAL HISTORY:  Alport's syndrome    CKD (chronic kidney disease) stage 4, GFR 15-29 ml/min    HTN (hypertension)    Hard of hearing    Anemia    ESRD with anemia        PAST SURGICAL HISTORY:  S/P tonsillectomy    A-V fistula        MEDICATIONS:  calcium carbonate    500 mG (Tums) Chewable 2 Tablet(s) Chew every 8 hours PRN  cefTRIAXone   IVPB 2000 milliGRAM(s) IV Intermittent every 24 hours  chlorhexidine 2% Cloths 1 Application(s) Topical daily  dextrose 5%. 1000 milliLiter(s) IV Continuous <Continuous>  dextrose 5%. 1000 milliLiter(s) IV Continuous <Continuous>  dextrose 50% Injectable 25 Gram(s) IV Push once  dextrose 50% Injectable 12.5 Gram(s) IV Push once  dextrose 50% Injectable 25 Gram(s) IV Push once  dextrose Oral Gel 15 Gram(s) Oral once PRN  famotidine    Tablet 20 milliGRAM(s) Oral daily  glucagon  Injectable 1 milliGRAM(s) IntraMuscular once  heparin   Injectable 5000 Unit(s) SubCutaneous every 8 hours  insulin lispro (ADMELOG) corrective regimen sliding scale   SubCutaneous three times a day before meals  insulin lispro (ADMELOG) corrective regimen sliding scale   SubCutaneous at bedtime  metoprolol tartrate 12.5 milliGRAM(s) Oral two times a day  mycophenolate mofetil 1 Gram(s) Oral <User Schedule>  NIFEdipine XL 30 milliGRAM(s) Oral daily  nystatin    Suspension 966032 Unit(s) Swish and Swallow four times a day  ondansetron Injectable 4 milliGRAM(s) IV Push every 6 hours PRN  polyethylene glycol 3350 17 Gram(s) Oral daily  predniSONE   Tablet   Oral   senna 2 Tablet(s) Oral at bedtime  simethicone 80 milliGRAM(s) Chew every 6 hours PRN  traMADol 25 milliGRAM(s) Oral every 4 hours PRN  traMADol 50 milliGRAM(s) Oral every 4 hours PRN  trimethoprim   80 mG/sulfamethoxazole 400 mG 1 Tablet(s) Oral daily  valGANciclovir 450 milliGRAM(s) Oral daily      ALLERGIES:  No Known Allergies      VITALS & I/Os:  Vital Signs Last 24 Hrs  T(C): 36.5 (2023 17:02), Max: 36.8 (2023 21:00)  T(F): 97.7 (2023 17:02), Max: 98.2 (2023 21:00)  HR: 86 (2023 17:02) (66 - 86)  BP: 132/63 (2023 17:02) (132/63 - 154/70)  BP(mean): 91 (2023 08:50) (91 - 92)  RR: 18 (2023 17:02) (17 - 18)  SpO2: 99% (2023 17:02) (97% - 99%)    Parameters below as of 2023 17:02  Patient On (Oxygen Delivery Method): room air        I&O's Summary    2023 07:01  -  2023 07:00  --------------------------------------------------------  IN: 770 mL / OUT: 1900 mL / NET: -1130 mL    2023 07:01  -  2023 17:31  --------------------------------------------------------  IN: 480 mL / OUT: 700 mL / NET: -220 mL        PHYSICAL EXAM:  General: No acute distress  Respiratory: Nonlabored  Cardiovascular: RRR  Abdominal: Soft, nondistended, nontender. No rebound or guarding. No organomegaly, no palpable mass.  Extremities: Warm. There is a midline catheter in LUE. Mild soft tissue swelling. Neurovascular exam is intact.     LABS:                        8.2    6.91  )-----------( 170      ( 2023 06:52 )             25.6     04-02    140  |  105  |  55<H>  ----------------------------<  161<H>  4.3   |  24  |  2.08<H>    Ca    9.2      2023 06:53  Phos  2.3     04-02  Mg     2.2     04-02    TPro  5.7<L>  /  Alb  3.4  /  TBili  0.3  /  DBili  x   /  AST  12  /  ALT  18  /  AlkPhos  58  04-02    Lactate:              Urinalysis Basic - ( 2023 01:38 )    Color: Colorless / Appearance: Slightly Turbid / S.013 / pH: x  Gluc: x / Ketone: Negative  / Bili: Negative / Urobili: Negative   Blood: x / Protein: 30 mg/dL / Nitrite: Negative   Leuk Esterase: Large / RBC: 224 /hpf / WBC 44 /HPF   Sq Epi: x / Non Sq Epi: 2 /hpf / Bacteria: Negative        IMAGING:

## 2023-04-02 NOTE — PROGRESS NOTE ADULT - SUBJECTIVE AND OBJECTIVE BOX
Transplant Surgery - Multidisciplinary Rounds  --------------------------------------------------------------  DDRT   Date:   3/28/23       POD# 5    Present:   Patient seen and examined with multidisciplinary team including Transplant Surgeon: Dr. Calderón,. Transplant Nephrologist: Dr. Mejias, Transplant Pharmacist FREDDIE Carcamo,  NP Sammy and unit RN during am rounds.  Disciplines not in attendance will be notified of the plan.     HPI: 67-year-old [place] female with HTN,  Alport syndrome and CKD 5  (x 15 years), LUE AVF placed in 2017, recently started HD on 3/17/2023 (Tanja, Nephrologist Dr. Jet Horn ) now s/p R DDRT under Simulect.    Interval Events:  - Received procirt for anemia   - Cr down to 2.08 from 2.17   UOP 1.5L   [facility] ed yesterday  - C/O burning urination UA with leuks and WBC 44, culture sent  - SIDRA IV infiltrated now with increased ecchymosis pending duplex       Immunosupression:   Induction:  simulect                                              Maintenance immunosuppression:  Envarsus 7mg po qd, MMF 1gm BId, SST  Ongoing monitoring for signs of rejection.    Potential Discharge date: pending clinical improvement    Education:  Medications    Plan of care:  See Below        MEDICATIONS  (STANDING):  cefTRIAXone   IVPB 2000 milliGRAM(s) IV Intermittent every 24 hours  chlorhexidine 2% Cloths 1 Application(s) Topical daily  dextrose 5%. 1000 milliLiter(s) (50 mL/Hr) IV Continuous <Continuous>  dextrose 5%. 1000 milliLiter(s) (100 mL/Hr) IV Continuous <Continuous>  dextrose 50% Injectable 25 Gram(s) IV Push once  dextrose 50% Injectable 12.5 Gram(s) IV Push once  dextrose 50% Injectable 25 Gram(s) IV Push once  famotidine    Tablet 20 milliGRAM(s) Oral daily  furosemide   Injectable 40 milliGRAM(s) IV Push once  glucagon  Injectable 1 milliGRAM(s) IntraMuscular once  heparin   Injectable 5000 Unit(s) SubCutaneous every 8 hours  insulin lispro (ADMELOG) corrective regimen sliding scale   SubCutaneous three times a day before meals  insulin lispro (ADMELOG) corrective regimen sliding scale   SubCutaneous at bedtime  metoprolol tartrate 12.5 milliGRAM(s) Oral two times a day  mycophenolate mofetil 1 Gram(s) Oral <User Schedule>  NIFEdipine XL 30 milliGRAM(s) Oral daily  nystatin    Suspension 044328 Unit(s) Swish and Swallow four times a day  polyethylene glycol 3350 17 Gram(s) Oral daily  predniSONE   Tablet 10 milliGRAM(s) Oral two times a day  predniSONE   Tablet   Oral   senna 2 Tablet(s) Oral at bedtime  tacrolimus ER Tablet (ENVARSUS XR) 6 milliGRAM(s) Oral <User Schedule>  trimethoprim   80 mG/sulfamethoxazole 400 mG 1 Tablet(s) Oral daily  valGANciclovir 450 milliGRAM(s) Oral daily    MEDICATIONS  (PRN):  calcium carbonate    500 mG (Tums) Chewable 2 Tablet(s) Chew every 8 hours PRN Heartburn  dextrose Oral Gel 15 Gram(s) Oral once PRN Blood Glucose LESS THAN 70 milliGRAM(s)/deciliter  ondansetron Injectable 4 milliGRAM(s) IV Push every 6 hours PRN Nausea and/or Vomiting  simethicone 80 milliGRAM(s) Chew every 6 hours PRN Gas  traMADol 25 milliGRAM(s) Oral every 4 hours PRN Moderate Pain (4 - 6)  traMADol 50 milliGRAM(s) Oral every 4 hours PRN Severe Pain (7 - 10)      PAST MEDICAL & SURGICAL HISTORY:  Alport's syndrome      HTN (hypertension)      Hard of hearing  uses hearing aids      Anemia      ESRD with anemia  left AV fistula, not in use      S/P tonsillectomy      A-V fistula  Nov 2017          Vital Signs Last 24 Hrs  T(C): 36.6 (02 Apr 2023 08:50), Max: 36.8 (01 Apr 2023 21:00)  T(F): 97.8 (02 Apr 2023 08:50), Max: 98.2 (01 Apr 2023 21:00)  HR: 66 (02 Apr 2023 08:50) (66 - 95)  BP: 143/63 (02 Apr 2023 08:50) (129/61 - 154/70)  BP(mean): 91 (02 Apr 2023 08:50) (91 - 92)  RR: 18 (02 Apr 2023 08:50) (18 - 18)  SpO2: 97% (02 Apr 2023 08:50) (97% - 99%)    Parameters below as of 02 Apr 2023 08:50  Patient On (Oxygen Delivery Method): room air        I&O's Summary    01 Apr 2023 07:01  -  02 Apr 2023 07:00  --------------------------------------------------------  IN: 770 mL / OUT: 1900 mL / NET: -1130 mL                              8.2    6.91  )-----------( 170      ( 02 Apr 2023 06:52 )             25.6     04-02    140  |  105  |  55<H>  ----------------------------<  161<H>  4.3   |  24  |  2.08<H>    Ca    9.2      02 Apr 2023 06:53  Phos  2.3     04-02  Mg     2.2     04-02    TPro  5.7<L>  /  Alb  3.4  /  TBili  0.3  /  DBili  x   /  AST  12  /  ALT  18  /  AlkPhos  58  04-02    Tacrolimus (), Serum: 7.2 ng/mL (04-02 @ 06:52)        Culture - Blood (collected 03-29-23 @ 12:40)  Source: .Blood Blood-Peripheral  Preliminary Report (03-30-23 @ 18:01):    No growth to date.    Culture - Blood (collected 03-29-23 @ 12:00)  Source: .Blood Blood-Peripheral  Preliminary Report (03-30-23 @ 18:01):    No growth to date.                Review of systems  Gen: No weight changes, fatigue, fevers/chills, weakness  Skin: No rashes  Head/Eyes/Ears/Mouth: No headache; Normal hearing; Normal vision w/o blurriness; No sinus pain/discomfort, sore throat  Respiratory: No dyspnea, cough, wheezing, hemoptysis  CV: No chest pain, PND, orthopnea  GI: Mild abdominal pain at surgical incision site; denies diarrhea, constipation, nausea, vomiting, melena, hematochezia  : No increased frequency, dysuria, hematuria, nocturia  MSK: No joint pain/swelling; no back pain; no edema  Neuro: No dizziness/lightheadedness, weakness, seizures, numbness, tingling  Heme: No easy bruising or bleeding  Endo: No heat/cold intolerance  Psych: No significant nervousness, anxiety, stress, depression  All other systems were reviewed and are negative, except as noted.      PHYSICAL EXAM:  Constitutional: Well developed / well nourished  Eyes: Anicteric, PERRLA  ENMT: nc/at  Neck: supple  Respiratory: CTA B/L  Cardiovascular: RRR  Gastrointestinal: Soft, non distended, mild tenderness at the incision site; incision c/d/i;    Genitourinary: Voids  Extremities: SCD's in place and working bilaterally, no edema, AVF palpable  Vascular: Palpable dp pulses bilaterally  Neurological: A&O x3  Skin: no rashes, ulcerations or lesions;  Musculoskeletal: Moving all extremities  Psychiatric: Responsive     Transplant Surgery - Multidisciplinary Rounds  --------------------------------------------------------------  DDRT   Date:   3/28/23       POD# 5      Present:   Patient seen and examined with multidisciplinary team including Transplant Surgeon: Dr. Calderón,. Transplant Nephrologist: Dr. Tobin. V, NP Sammy and unit RN during am rounds.  Disciplines not in attendance will be notified of the plan.      HPI: 67-year-old [place] female with HTN,  Alport syndrome and CKD 5  (x 15 years), LUE AVF placed in 2017, recently started HD on 3/17/2023 (Tanja, Nephrologist Dr. Jet Horn ) now s/p R DDRT under Simulect.    Interval Events:  - Received procirt for anemia   - Cr down to 2.08 from 2.17   UOP 1.5L   [facility] ed yesterday  - C/O burning urination UA with leuks and WBC 44, culture sent  - SIDRA IV infiltrated now with increased ecchymosis pending duplex       Immunosupression:   Induction:  simulect                                              Maintenance immunosuppression:  Envarsus 7mg po qd, MMF 1gm BId, SST  Ongoing monitoring for signs of rejection.    Potential Discharge date: pending clinical improvement    Education:  Medications    Plan of care:  See Below        MEDICATIONS  (STANDING):  cefTRIAXone   IVPB 2000 milliGRAM(s) IV Intermittent every 24 hours  chlorhexidine 2% Cloths 1 Application(s) Topical daily  dextrose 5%. 1000 milliLiter(s) (50 mL/Hr) IV Continuous <Continuous>  dextrose 5%. 1000 milliLiter(s) (100 mL/Hr) IV Continuous <Continuous>  dextrose 50% Injectable 25 Gram(s) IV Push once  dextrose 50% Injectable 12.5 Gram(s) IV Push once  dextrose 50% Injectable 25 Gram(s) IV Push once  famotidine    Tablet 20 milliGRAM(s) Oral daily  furosemide   Injectable 40 milliGRAM(s) IV Push once  glucagon  Injectable 1 milliGRAM(s) IntraMuscular once  heparin   Injectable 5000 Unit(s) SubCutaneous every 8 hours  insulin lispro (ADMELOG) corrective regimen sliding scale   SubCutaneous three times a day before meals  insulin lispro (ADMELOG) corrective regimen sliding scale   SubCutaneous at bedtime  metoprolol tartrate 12.5 milliGRAM(s) Oral two times a day  mycophenolate mofetil 1 Gram(s) Oral <User Schedule>  NIFEdipine XL 30 milliGRAM(s) Oral daily  nystatin    Suspension 790215 Unit(s) Swish and Swallow four times a day  polyethylene glycol 3350 17 Gram(s) Oral daily  predniSONE   Tablet 10 milliGRAM(s) Oral two times a day  predniSONE   Tablet   Oral   senna 2 Tablet(s) Oral at bedtime  tacrolimus ER Tablet (ENVARSUS XR) 6 milliGRAM(s) Oral <User Schedule>  trimethoprim   80 mG/sulfamethoxazole 400 mG 1 Tablet(s) Oral daily  valGANciclovir 450 milliGRAM(s) Oral daily    MEDICATIONS  (PRN):  calcium carbonate    500 mG (Tums) Chewable 2 Tablet(s) Chew every 8 hours PRN Heartburn  dextrose Oral Gel 15 Gram(s) Oral once PRN Blood Glucose LESS THAN 70 milliGRAM(s)/deciliter  ondansetron Injectable 4 milliGRAM(s) IV Push every 6 hours PRN Nausea and/or Vomiting  simethicone 80 milliGRAM(s) Chew every 6 hours PRN Gas  traMADol 25 milliGRAM(s) Oral every 4 hours PRN Moderate Pain (4 - 6)  traMADol 50 milliGRAM(s) Oral every 4 hours PRN Severe Pain (7 - 10)      PAST MEDICAL & SURGICAL HISTORY:  Alport's syndrome      HTN (hypertension)      Hard of hearing  uses hearing aids      Anemia      ESRD with anemia  left AV fistula, not in use      S/P tonsillectomy      A-V fistula  Nov 2017          Vital Signs Last 24 Hrs  T(C): 36.6 (02 Apr 2023 08:50), Max: 36.8 (01 Apr 2023 21:00)  T(F): 97.8 (02 Apr 2023 08:50), Max: 98.2 (01 Apr 2023 21:00)  HR: 66 (02 Apr 2023 08:50) (66 - 95)  BP: 143/63 (02 Apr 2023 08:50) (129/61 - 154/70)  BP(mean): 91 (02 Apr 2023 08:50) (91 - 92)  RR: 18 (02 Apr 2023 08:50) (18 - 18)  SpO2: 97% (02 Apr 2023 08:50) (97% - 99%)    Parameters below as of 02 Apr 2023 08:50  Patient On (Oxygen Delivery Method): room air        I&O's Summary    01 Apr 2023 07:01  -  02 Apr 2023 07:00  --------------------------------------------------------  IN: 770 mL / OUT: 1900 mL / NET: -1130 mL                              8.2    6.91  )-----------( 170      ( 02 Apr 2023 06:52 )             25.6     04-02    140  |  105  |  55<H>  ----------------------------<  161<H>  4.3   |  24  |  2.08<H>    Ca    9.2      02 Apr 2023 06:53  Phos  2.3     04-02  Mg     2.2     04-02    TPro  5.7<L>  /  Alb  3.4  /  TBili  0.3  /  DBili  x   /  AST  12  /  ALT  18  /  AlkPhos  58  04-02    Tacrolimus (), Serum: 7.2 ng/mL (04-02 @ 06:52)        Culture - Blood (collected 03-29-23 @ 12:40)  Source: .Blood Blood-Peripheral  Preliminary Report (03-30-23 @ 18:01):    No growth to date.    Culture - Blood (collected 03-29-23 @ 12:00)  Source: .Blood Blood-Peripheral  Preliminary Report (03-30-23 @ 18:01):    No growth to date.                Review of systems  Gen: No weight changes, fatigue, fevers/chills, weakness  Skin: No rashes  Head/Eyes/Ears/Mouth: No headache; Normal hearing; Normal vision w/o blurriness; No sinus pain/discomfort, sore throat  Respiratory: No dyspnea, cough, wheezing, hemoptysis  CV: No chest pain, PND, orthopnea  GI: Mild abdominal pain at surgical incision site; denies diarrhea, constipation, nausea, vomiting, melena, hematochezia  : No increased frequency, dysuria, hematuria, nocturia  MSK: No joint pain/swelling; no back pain; no edema  Neuro: No dizziness/lightheadedness, weakness, seizures, numbness, tingling  Heme: No easy bruising or bleeding  Endo: No heat/cold intolerance  Psych: No significant nervousness, anxiety, stress, depression  All other systems were reviewed and are negative, except as noted.      PHYSICAL EXAM:  Constitutional: Well developed / well nourished  Eyes: Anicteric, PERRLA  ENMT: nc/at  Neck: supple  Respiratory: CTA B/L  Cardiovascular: RRR  Gastrointestinal: Soft, non distended, mild tenderness at the incision site; incision c/d/i;    Genitourinary: Voids  Extremities: SCD's in place and working bilaterally, no edema, AVF palpable  Vascular: Palpable dp pulses bilaterally  Neurological: A&O x3  Skin: no rashes, ulcerations or lesions;  Musculoskeletal: Moving all extremities  Psychiatric: Responsive

## 2023-04-02 NOTE — CONSULT NOTE ADULT - TIME BILLING
upper extremity deep venous thrombosis and superficial venous thrombosis
Kidney Transplant recipient with functioning allograft, post op day 1  Creatinine trend noted  Comorbidities reviewed. Noted antibiotic coverage for donor bacteremia  Patient seen, examined and reviewed available clinical and lab data including history,  progress notes and consult notes.  Reviewed immunosuppression and allograft function including urine out put, creatinine trend, and any allograft and bladder imaging.  Reviewed medication regimen for prophylaxis  Suggestions:  Tacrolimus target trough level 8-10 ng/ml  MMF/Steroid taper as per protocol  Check anti GBM antibody in view of Alport syndrome in the family  Transplant ID follow up  Continue to monitor allograft function, urine output, creatinine, electrolytes  I was present during and reviewed clinical and lab data as well as assessment and plan as documented. Please contact if any additional questions with any change in clinical condition or on availability of any additional information or reports.

## 2023-04-02 NOTE — PROGRESS NOTE ADULT - SUBJECTIVE AND OBJECTIVE BOX
[facility] DIVISION OF KIDNEY DISEASES AND HYPERTENSION -- FOLLOW UP NOTE  --------------------------------------------------------------------------------  Chief Complaint:  kidney transplant    24 hour events/subjective:  PT feels ok, right arm is sore and bruised.       PAST HISTORY  --------------------------------------------------------------------------------  No significant changes to PMH, PSH, FHx, SHx, unless otherwise noted    ALLERGIES & MEDICATIONS  --------------------------------------------------------------------------------  Allergies    No Known Allergies    Intolerances        REVIEW OF SYSTEMS  --------------------------------------------------------------------------------  Gen: No fatigue, fevers/chills, weakness  Skin: No rashes  Head/Eyes/Ears/Mouth: No headache;No sore throat  Respiratory: No dyspnea, cough,   CV: No chest pain, PND, orthopnea  GI: No abdominal pain, diarrhea, constipation, nausea, vomiting  Transplant: mild incisional pain  : No increased frequency, dysuria, hematuria, nocturia  MSK: RUE midline and bruising.    Neuro: No dizziness/lightheadedness, weakness, seizures, numbness, tingling  Psych: No significant nervousness, anxiety, stress, depression    All other systems were reviewed and are negative, except as noted.    MEDICATIONS  (STANDING):  cefTRIAXone   IVPB 2000 milliGRAM(s) IV Intermittent every 24 hours  chlorhexidine 2% Cloths 1 Application(s) Topical daily  dextrose 5%. 1000 milliLiter(s) (50 mL/Hr) IV Continuous <Continuous>  dextrose 5%. 1000 milliLiter(s) (100 mL/Hr) IV Continuous <Continuous>  dextrose 50% Injectable 25 Gram(s) IV Push once  dextrose 50% Injectable 12.5 Gram(s) IV Push once  dextrose 50% Injectable 25 Gram(s) IV Push once  famotidine    Tablet 20 milliGRAM(s) Oral daily  glucagon  Injectable 1 milliGRAM(s) IntraMuscular once  heparin   Injectable 5000 Unit(s) SubCutaneous every 8 hours  insulin lispro (ADMELOG) corrective regimen sliding scale   SubCutaneous three times a day before meals  insulin lispro (ADMELOG) corrective regimen sliding scale   SubCutaneous at bedtime  metoprolol tartrate 12.5 milliGRAM(s) Oral two times a day  mycophenolate mofetil 1 Gram(s) Oral <User Schedule>  NIFEdipine XL 30 milliGRAM(s) Oral daily  nystatin    Suspension 082449 Unit(s) Swish and Swallow four times a day  polyethylene glycol 3350 17 Gram(s) Oral daily  predniSONE   Tablet 10 milliGRAM(s) Oral two times a day  predniSONE   Tablet   Oral   senna 2 Tablet(s) Oral at bedtime  tacrolimus ER Tablet (ENVARSUS XR) 6 milliGRAM(s) Oral <User Schedule>  trimethoprim   80 mG/sulfamethoxazole 400 mG 1 Tablet(s) Oral daily  valGANciclovir 450 milliGRAM(s) Oral daily    MEDICATIONS  (PRN):  calcium carbonate    500 mG (Tums) Chewable 2 Tablet(s) Chew every 8 hours PRN Heartburn  dextrose Oral Gel 15 Gram(s) Oral once PRN Blood Glucose LESS THAN 70 milliGRAM(s)/deciliter  ondansetron Injectable 4 milliGRAM(s) IV Push every 6 hours PRN Nausea and/or Vomiting  simethicone 80 milliGRAM(s) Chew every 6 hours PRN Gas  traMADol 25 milliGRAM(s) Oral every 4 hours PRN Moderate Pain (4 - 6)  traMADol 50 milliGRAM(s) Oral every 4 hours PRN Severe Pain (7 - 10)      Vital Signs Last 24 Hrs  T(C): 36.6 (02 Apr 2023 08:50), Max: 36.8 (01 Apr 2023 21:00)  T(F): 97.8 (02 Apr 2023 08:50), Max: 98.2 (01 Apr 2023 21:00)  HR: 66 (02 Apr 2023 08:50) (66 - 95)  BP: 143/63 (02 Apr 2023 08:50) (129/61 - 154/70)  BP(mean): 91 (02 Apr 2023 08:50) (91 - 92)  RR: 18 (02 Apr 2023 08:50) (18 - 18)  SpO2: 97% (02 Apr 2023 08:50) (97% - 99%)    Parameters below as of 02 Apr 2023 08:50  Patient On (Oxygen Delivery Method): room air        I&O's Summary    01 Apr 2023 07:01  -  02 Apr 2023 07:00  --------------------------------------------------------  IN: 770 mL / OUT: 1900 mL / NET: -1130 mL      Physical Exam:  	Gen: NAD  	HEENT: PERRL, supple neck, clear oropharynx  	Pulm: CTA B/L  	CV: RRR, S1S2; no rub  	Back: No spinal or CVA tenderness; no sacral edema  	Abd: +BS, soft, nontender/nondistended                      Transplant: No tenderness, swelling, incision c/d/i  	: No suprapubic tenderness  	UE: RUE midline and bruising.   	LE: trace LE edema  	Neuro: No focal deficits  	Psych: Normal affect and mood  	Skin: Warm, without rashes                            8.2    6.91  )-----------( 170      ( 02 Apr 2023 06:52 )             25.6     04-02    140  |  105  |  55<H>  ----------------------------<  161<H>  4.3   |  24  |  2.08<H>    Ca    9.2      02 Apr 2023 06:53  Phos  2.3     04-02  Mg     2.2     04-02    TPro  5.7<L>  /  Alb  3.4  /  TBili  0.3  /  DBili  x   /  AST  12  /  ALT  18  /  AlkPhos  58  04-02    Tacrolimus (), Serum: 7.2 ng/mL (04-02 @ 06:52)        Culture - Blood (collected 03-29-23 @ 12:40)  Source: .Blood Blood-Peripheral  Preliminary Report (03-30-23 @ 18:01):    No growth to date.    Culture - Blood (collected 03-29-23 @ 12:00)  Source: .Blood Blood-Peripheral  Preliminary Report (03-30-23 @ 18:01):    No growth to date.

## 2023-04-02 NOTE — CONSULT NOTE ADULT - ATTENDING COMMENTS
66 yo F with a PMH of [place], HTN, Alport syndrome, ESRD via LUE AVF, who presents for DDRT today. Appears to be doing well post transplant in the PACU.    Currently in OR undergoing DDRT (CMV +/-, EBV +/+, 1a/1v/1u)  Donor with + polymicrobial blood cultures (Staph Aureus, Klebsiella, GBS)  Transplant ID consulted for recommendations    Overall,   Polymicrobial bacteremia from donor blood cx with Staph Aureus, Klebsiella, GBS    Plan:  Vancomycin, continue  Ertapenem, continue  F/u final culture results of donor for sensitivities   send Vanco trough prior to third dose  will plan to treat for 7days as donor with bacteremia    Discussed with transplant team  Henrry Abdi MD  Can be called via Teams  After 5pm/weekends [phone]
67 year old woman with line associated brachial vein thrombus  also with cephalic and basilic vein thrombus  recommend arm elevation and warm compresses  recommend therapeutic anticoagulation at the discretion of the primary team  if unable to anticoagulate, recommend interval venous duplex in one week  will follow

## 2023-04-02 NOTE — PROGRESS NOTE ADULT - ASSESSMENT
67-year-old [place] female with HTN,  Alport syndrome and CKD 5  (x 15 years), LUE AVF placed in 2017, recently started HD on 3/17/2023 (Tanja, Nephrologist Dr. Jet Horn ) now s/p R DDRT under Simulect.    1.  Renal transplant - pt is POD 5 s/p DDRT.  Has ATN but improving.  Mild LE edema. Will give one dose of lasix 40mgs IV.   2.  Immunosuppression - simulect induction, on tacro for target 8-10, MMF 1gm BID and pred taper.    3.  HTN - improved.   4.  Anemia - s/p transfusion and procrit 10,000 yesterday  5.  Hyperglycemia - no history of diabetes, changed diet to no concentrated carbs.  Glucose improved.   6.  Right arm bruising - no palpable hematoma, will check sonogram.

## 2023-04-02 NOTE — CONSULT NOTE ADULT - ASSESSMENT
67-year-old [place] female with HTN,  Alport syndrome and CKD 5  (x 15 years), LUE AVF placed in 2017, recently started HD on 3/17/2023 (Tanja, Nephrologist Dr. Jet Horn ) now s/p R DDRT under Simulect. Vascular surgery consulted for LUE DVT    Recommendations:    - No surgical intervention at this time  - c/w therapeutic Heparin  - Vascular surgery will follow      Patent seen and evaluated. Plan discussed with vascular fellow, Dr. Shauna Dey, PGY2  [phone] 67-year-old [place] female with HTN,  Alport syndrome and CKD 5  (x 15 years), LUE AVF placed in 2017, recently started HD on 3/17/2023 (Tanja, Nephrologist Dr. Jet Horn ) now s/p R DDRT under Simulect. Vascular surgery consulted for RUE DVT    Recommendations:    - No surgical intervention at this time  - c/w therapeutic Heparin  - Vascular surgery will follow      Patent seen and evaluated. Plan discussed with vascular fellow, Dr. Shauna Dey, PGY2  [phone]

## 2023-04-02 NOTE — PROGRESS NOTE ADULT - NS ATTEND AMEND GEN_ALL_CORE FT
I personally saw and examined patient.  Kidney transplant recipient.  Alert, oriented, responsive.  Abdomen soft and non-tender.  Wound clean and dry.    IMMUNOSUPPRESSION MANAGEMENT:  Envarsus level today: 7.2  Envarsus level yesterday: 6  Aim for level of 8-10  Current envarsus dose: 6 mg by mouth daily.  Envarsus dose management: change to 7 mg by mouth daily.

## 2023-04-02 NOTE — PROGRESS NOTE ADULT - ASSESSMENT
67-year-old [place] female with HTN,  Alport syndrome and CKD 5  (x 15 years), LUE AVF placed in 2017, recently started HD on 3/17/2023 (Tanja, Nephrologist Dr. Jet Horn ) now s/p R DDRT under Simulect.     S/P DDRT-POD #5  - good graft function with good UOP  - Lasix 40mg IV x1  - strict I&Os   - Tolerating regular diet   - SCDs / spirometry  - bowel regimen  - ID: Continue Ceftriaxone and Vancomycin for donor + blood cultures x 7 days post transplant      - ID recs if discharge before 7 days can complete with Vantin 200mg QD  - Daily CBC, CMP, Mag, Phos, Tacrolimus level  - Thyroid panel sent, f/u   - SQH  - Send Iron study     Immunosuppression:   - ENV daily based on level, MMF 1gm BID, SST. Simulect due POD4  - PPX: nystatin, bactrim, Valcyte

## 2023-04-03 LAB
ALBUMIN SERPL ELPH-MCNC: 3.4 G/DL — SIGNIFICANT CHANGE UP (ref 3.3–5)
ALP SERPL-CCNC: 60 U/L — SIGNIFICANT CHANGE UP (ref 40–120)
ALT FLD-CCNC: 23 U/L — SIGNIFICANT CHANGE UP (ref 10–45)
ANION GAP SERPL CALC-SCNC: 12 MMOL/L — SIGNIFICANT CHANGE UP (ref 5–17)
AST SERPL-CCNC: 15 U/L — SIGNIFICANT CHANGE UP (ref 10–40)
BASOPHILS # BLD AUTO: 0.04 K/UL — SIGNIFICANT CHANGE UP (ref 0–0.2)
BASOPHILS NFR BLD AUTO: 0.5 % — SIGNIFICANT CHANGE UP (ref 0–2)
BILIRUB SERPL-MCNC: 0.4 MG/DL — SIGNIFICANT CHANGE UP (ref 0.2–1.2)
BUN SERPL-MCNC: 54 MG/DL — HIGH (ref 7–23)
CALCIUM SERPL-MCNC: 9.3 MG/DL — SIGNIFICANT CHANGE UP (ref 8.4–10.5)
CHLORIDE SERPL-SCNC: 104 MMOL/L — SIGNIFICANT CHANGE UP (ref 96–108)
CO2 SERPL-SCNC: 23 MMOL/L — SIGNIFICANT CHANGE UP (ref 22–31)
CREAT SERPL-MCNC: 1.99 MG/DL — HIGH (ref 0.5–1.3)
CULTURE RESULTS: SIGNIFICANT CHANGE UP
CULTURE RESULTS: SIGNIFICANT CHANGE UP
EGFR: 27 ML/MIN/1.73M2 — LOW
EOSINOPHIL # BLD AUTO: 0.09 K/UL — SIGNIFICANT CHANGE UP (ref 0–0.5)
EOSINOPHIL NFR BLD AUTO: 1.2 % — SIGNIFICANT CHANGE UP (ref 0–6)
FERRITIN SERPL-MCNC: 1145 NG/ML — HIGH (ref 15–150)
FOLATE SERPL-MCNC: 7.1 NG/ML — SIGNIFICANT CHANGE UP
GLUCOSE BLDC GLUCOMTR-MCNC: 139 MG/DL — HIGH (ref 70–99)
GLUCOSE BLDC GLUCOMTR-MCNC: 210 MG/DL — HIGH (ref 70–99)
GLUCOSE BLDC GLUCOMTR-MCNC: 219 MG/DL — HIGH (ref 70–99)
GLUCOSE BLDC GLUCOMTR-MCNC: 251 MG/DL — HIGH (ref 70–99)
GLUCOSE SERPL-MCNC: 140 MG/DL — HIGH (ref 70–99)
HCT VFR BLD CALC: 26.6 % — LOW (ref 34.5–45)
HGB BLD-MCNC: 8.5 G/DL — LOW (ref 11.5–15.5)
IMM GRANULOCYTES NFR BLD AUTO: 5.3 % — HIGH (ref 0–0.9)
LYMPHOCYTES # BLD AUTO: 1.52 K/UL — SIGNIFICANT CHANGE UP (ref 1–3.3)
LYMPHOCYTES # BLD AUTO: 19.8 % — SIGNIFICANT CHANGE UP (ref 13–44)
MAGNESIUM SERPL-MCNC: 2 MG/DL — SIGNIFICANT CHANGE UP (ref 1.6–2.6)
MCHC RBC-ENTMCNC: 31.1 PG — SIGNIFICANT CHANGE UP (ref 27–34)
MCHC RBC-ENTMCNC: 32 GM/DL — SIGNIFICANT CHANGE UP (ref 32–36)
MCV RBC AUTO: 97.4 FL — SIGNIFICANT CHANGE UP (ref 80–100)
MONOCYTES # BLD AUTO: 0.9 K/UL — SIGNIFICANT CHANGE UP (ref 0–0.9)
MONOCYTES NFR BLD AUTO: 11.7 % — SIGNIFICANT CHANGE UP (ref 2–14)
NEUTROPHILS # BLD AUTO: 4.72 K/UL — SIGNIFICANT CHANGE UP (ref 1.8–7.4)
NEUTROPHILS NFR BLD AUTO: 61.5 % — SIGNIFICANT CHANGE UP (ref 43–77)
NRBC # BLD: 0 /100 WBCS — SIGNIFICANT CHANGE UP (ref 0–0)
PHOSPHATE SERPL-MCNC: 2.5 MG/DL — SIGNIFICANT CHANGE UP (ref 2.5–4.5)
PLATELET # BLD AUTO: 157 K/UL — SIGNIFICANT CHANGE UP (ref 150–400)
POTASSIUM SERPL-MCNC: 4.2 MMOL/L — SIGNIFICANT CHANGE UP (ref 3.5–5.3)
POTASSIUM SERPL-SCNC: 4.2 MMOL/L — SIGNIFICANT CHANGE UP (ref 3.5–5.3)
PROT SERPL-MCNC: 5.6 G/DL — LOW (ref 6–8.3)
RBC # BLD: 2.73 M/UL — LOW (ref 3.8–5.2)
RBC # FLD: 15.3 % — HIGH (ref 10.3–14.5)
SODIUM SERPL-SCNC: 139 MMOL/L — SIGNIFICANT CHANGE UP (ref 135–145)
SPECIMEN SOURCE: SIGNIFICANT CHANGE UP
SPECIMEN SOURCE: SIGNIFICANT CHANGE UP
TACROLIMUS SERPL-MCNC: 13.9 NG/ML — SIGNIFICANT CHANGE UP
VANCOMYCIN FLD-MCNC: <4 UG/ML — SIGNIFICANT CHANGE UP
VIT B12 SERPL-MCNC: 630 PG/ML — SIGNIFICANT CHANGE UP (ref 232–1245)
WBC # BLD: 7.68 K/UL — SIGNIFICANT CHANGE UP (ref 3.8–10.5)
WBC # FLD AUTO: 7.68 K/UL — SIGNIFICANT CHANGE UP (ref 3.8–10.5)

## 2023-04-03 PROCEDURE — 99232 SBSQ HOSP IP/OBS MODERATE 35: CPT

## 2023-04-03 PROCEDURE — 99233 SBSQ HOSP IP/OBS HIGH 50: CPT | Mod: GC

## 2023-04-03 RX ORDER — CEFTRIAXONE 500 MG/1
2000 INJECTION, POWDER, FOR SOLUTION INTRAMUSCULAR; INTRAVENOUS ONCE
Refills: 0 | Status: COMPLETED | OUTPATIENT
Start: 2023-04-03 | End: 2023-04-03

## 2023-04-03 RX ORDER — APIXABAN 2.5 MG/1
2 TABLET, FILM COATED ORAL
Qty: 72 | Refills: 2
Start: 2023-04-03 | End: 2023-12-28

## 2023-04-03 RX ORDER — NIFEDIPINE 30 MG
1 TABLET, EXTENDED RELEASE 24 HR ORAL
Qty: 30 | Refills: 1
Start: 2023-04-03

## 2023-04-03 RX ORDER — APIXABAN 2.5 MG/1
10 TABLET, FILM COATED ORAL
Refills: 0 | Status: DISCONTINUED | OUTPATIENT
Start: 2023-04-03 | End: 2023-04-04

## 2023-04-03 RX ORDER — TACROLIMUS 5 MG/1
5 CAPSULE ORAL
Refills: 0 | Status: DISCONTINUED | OUTPATIENT
Start: 2023-04-03 | End: 2023-04-04

## 2023-04-03 RX ADMIN — TRAMADOL HYDROCHLORIDE 50 MILLIGRAM(S): 50 TABLET ORAL at 22:42

## 2023-04-03 RX ADMIN — Medication 500000 UNIT(S): at 17:13

## 2023-04-03 RX ADMIN — VALGANCICLOVIR 450 MILLIGRAM(S): 450 TABLET, FILM COATED ORAL at 11:55

## 2023-04-03 RX ADMIN — TRAMADOL HYDROCHLORIDE 50 MILLIGRAM(S): 50 TABLET ORAL at 10:45

## 2023-04-03 RX ADMIN — Medication 12.5 MILLIGRAM(S): at 17:13

## 2023-04-03 RX ADMIN — TRAMADOL HYDROCHLORIDE 50 MILLIGRAM(S): 50 TABLET ORAL at 11:45

## 2023-04-03 RX ADMIN — TACROLIMUS 7 MILLIGRAM(S): 5 CAPSULE ORAL at 07:37

## 2023-04-03 RX ADMIN — Medication 2: at 13:11

## 2023-04-03 RX ADMIN — Medication 500000 UNIT(S): at 05:36

## 2023-04-03 RX ADMIN — MYCOPHENOLATE MOFETIL 1 GRAM(S): 250 CAPSULE ORAL at 19:23

## 2023-04-03 RX ADMIN — Medication 12.5 MILLIGRAM(S): at 05:36

## 2023-04-03 RX ADMIN — Medication 30 MILLIGRAM(S): at 05:36

## 2023-04-03 RX ADMIN — Medication 5 MILLIGRAM(S): at 17:13

## 2023-04-03 RX ADMIN — HEPARIN SODIUM 5000 UNIT(S): 5000 INJECTION INTRAVENOUS; SUBCUTANEOUS at 05:37

## 2023-04-03 RX ADMIN — TRAMADOL HYDROCHLORIDE 50 MILLIGRAM(S): 50 TABLET ORAL at 21:46

## 2023-04-03 RX ADMIN — APIXABAN 10 MILLIGRAM(S): 2.5 TABLET, FILM COATED ORAL at 17:12

## 2023-04-03 RX ADMIN — TRAMADOL HYDROCHLORIDE 25 MILLIGRAM(S): 50 TABLET ORAL at 01:10

## 2023-04-03 RX ADMIN — CEFTRIAXONE 100 MILLIGRAM(S): 500 INJECTION, POWDER, FOR SOLUTION INTRAMUSCULAR; INTRAVENOUS at 09:16

## 2023-04-03 RX ADMIN — TRAMADOL HYDROCHLORIDE 25 MILLIGRAM(S): 50 TABLET ORAL at 00:25

## 2023-04-03 RX ADMIN — Medication 1: at 21:42

## 2023-04-03 RX ADMIN — Medication 1 TABLET(S): at 11:54

## 2023-04-03 RX ADMIN — Medication 500000 UNIT(S): at 23:11

## 2023-04-03 RX ADMIN — Medication 500000 UNIT(S): at 11:55

## 2023-04-03 RX ADMIN — MYCOPHENOLATE MOFETIL 1 GRAM(S): 250 CAPSULE ORAL at 07:37

## 2023-04-03 RX ADMIN — APIXABAN 10 MILLIGRAM(S): 2.5 TABLET, FILM COATED ORAL at 11:54

## 2023-04-03 RX ADMIN — Medication 2: at 17:13

## 2023-04-03 RX ADMIN — FAMOTIDINE 20 MILLIGRAM(S): 10 INJECTION INTRAVENOUS at 11:54

## 2023-04-03 RX ADMIN — CHLORHEXIDINE GLUCONATE 1 APPLICATION(S): 213 SOLUTION TOPICAL at 11:58

## 2023-04-03 RX ADMIN — Medication 5 MILLIGRAM(S): at 05:36

## 2023-04-03 NOTE — PROVIDER CONTACT NOTE (OTHER) - BACKGROUND
patient admitted for DDRT had infiltrate in RUE that remained bruised and swollen
patient admitted for DDRT post op day 4
Pt s/p DDRT POD #1
patient admitted s/p DDRT

## 2023-04-03 NOTE — PROGRESS NOTE ADULT - ASSESSMENT
67-year-old [place] female with HTN,  Alport syndrome and CKD 5  (x 15 years), LUE AVF placed in 2017, recently started HD on 3/17/2023 (Tanja, Nephrologist Dr. Jet Horn ) now s/p R DDRT under Simulect. Vascular surgery consulted for RUE DVT in R brachial vein, and superficial thrombosis of R cephalic and basilic veins.     Recommendations:  - Please start patient on therapeutic AC if ok with primary team  - Please remove RUE midline catheter  - No surgical intervention at this time  - Care per primary    Vascular Surgery  Please page for all questions [phone], not available on [employer] Teams.

## 2023-04-03 NOTE — PROGRESS NOTE ADULT - ASSESSMENT
ESRD s/p ddrt  Donor had polymicrobial bacteremia    records from OSH clarified  Donor had stre and klebsiella in blood   Staph aurues (MSSA) was from a respiratory culture  Surveillance blood cultures are negative    Can change antibiotics to ceftriaxone  Continue antibiotics for 7 days post transplant  Completing today    Continue prophylaxis with valcyte, Bactrim, nystatin     No ID contra indications to discharge home on 4/4    Henrry Abdi MD  Can be called via Teams  After 5pm/weekends [phone]

## 2023-04-03 NOTE — PROGRESS NOTE ADULT - SUBJECTIVE AND OBJECTIVE BOX
Transplant Surgery - Multidisciplinary Rounds  --------------------------------------------------------------  DDRT   Date:  3/28/23      POD#6    Present:  Patient seen and examined with multidisciplinary team including Transplant Surgeon: Dr. Baer, Transplant Nephrologist: Dr. DAVIN Tobin, [facility] Erica and unit RN during am rounds.  Disciplines not in attendance will be notified of the plan.      HPI: 67-year-old [place] female with HTN,  Alport syndrome and CKD 5  (x 15 years), LUE AVF placed in 2017, recently started HD on 3/17/2023 (Tanja, Nephrologist Dr. Jet Horn ) now s/p RLW  DDRT under Simulect indcution on 3/28/23    Interval Events:  - POD6 from DDRT  - Improving graft function daily  - Cr down to 1.9 today  - UOP 2.6L/24 hours  - Increased LE edema overnight duplex negative  - RUE swelling- acute DVT found basilic near midline- Vascular consulted     Immunosuppression   Induction:  Simulect  Maintenance immunosuppression:  Envarsus by level, MMF 1gm BId, SST  Ongoing monitoring for signs of rejection.    Potential Discharge date: pending clinical improvement  Education:  Medications  Plan of care:  See Below          MEDICATIONS  (STANDING):  apixaban 10 milliGRAM(s) Oral two times a day  chlorhexidine 2% Cloths 1 Application(s) Topical daily  dextrose 5%. 1000 milliLiter(s) (50 mL/Hr) IV Continuous <Continuous>  dextrose 5%. 1000 milliLiter(s) (100 mL/Hr) IV Continuous <Continuous>  dextrose 50% Injectable 25 Gram(s) IV Push once  dextrose 50% Injectable 12.5 Gram(s) IV Push once  dextrose 50% Injectable 25 Gram(s) IV Push once  famotidine    Tablet 20 milliGRAM(s) Oral daily  glucagon  Injectable 1 milliGRAM(s) IntraMuscular once  insulin lispro (ADMELOG) corrective regimen sliding scale   SubCutaneous three times a day before meals  insulin lispro (ADMELOG) corrective regimen sliding scale   SubCutaneous at bedtime  metoprolol tartrate 12.5 milliGRAM(s) Oral two times a day  mycophenolate mofetil 1 Gram(s) Oral <User Schedule>  NIFEdipine XL 30 milliGRAM(s) Oral daily  nystatin    Suspension 271442 Unit(s) Swish and Swallow four times a day  polyethylene glycol 3350 17 Gram(s) Oral daily  predniSONE   Tablet   Oral   senna 2 Tablet(s) Oral at bedtime  tacrolimus ER Tablet (ENVARSUS XR) 5 milliGRAM(s) Oral <User Schedule>  trimethoprim   80 mG/sulfamethoxazole 400 mG 1 Tablet(s) Oral daily  valGANciclovir 450 milliGRAM(s) Oral daily    MEDICATIONS  (PRN):  calcium carbonate    500 mG (Tums) Chewable 2 Tablet(s) Chew every 8 hours PRN Heartburn  dextrose Oral Gel 15 Gram(s) Oral once PRN Blood Glucose LESS THAN 70 milliGRAM(s)/deciliter  ondansetron Injectable 4 milliGRAM(s) IV Push every 6 hours PRN Nausea and/or Vomiting  simethicone 80 milliGRAM(s) Chew every 6 hours PRN Gas  traMADol 25 milliGRAM(s) Oral every 4 hours PRN Moderate Pain (4 - 6)  traMADol 50 milliGRAM(s) Oral every 4 hours PRN Severe Pain (7 - 10)      PAST MEDICAL & SURGICAL HISTORY:  Alport's syndrome  HTN (hypertension)  Hard of hearing-uses hearing aids  Anemia  ESRD with anemia-left AV fistula, not in use  Deep vein thrombosis (DVT) of right upper extremity  Deep vein thrombosis (DVT) of right upper extremity  S/P tonsillectomy  A-V fistula-Nov 2017      Vital Signs Last 24 Hrs  T(C): 36.4 (03 Apr 2023 17:00), Max: 36.8 (03 Apr 2023 05:31)  T(F): 97.6 (03 Apr 2023 17:00), Max: 98.2 (03 Apr 2023 05:31)  HR: 85 (03 Apr 2023 17:00) (79 - 86)  BP: 125/80 (03 Apr 2023 17:00) (125/80 - 145/67)  BP(mean): 95 (03 Apr 2023 05:31) (95 - 97)  RR: 18 (03 Apr 2023 17:00) (18 - 18)  SpO2: 97% (03 Apr 2023 17:00) (97% - 100%)    Parameters below as of 03 Apr 2023 17:00  Patient On (Oxygen Delivery Method): room air      I&O's Summary    02 Apr 2023 07:01  -  03 Apr 2023 07:00  --------------------------------------------------------  IN: 960 mL / OUT: 2650 mL / NET: -1690 mL    03 Apr 2023 07:01  -  03 Apr 2023 17:58  --------------------------------------------------------  IN: 340 mL / OUT: 850 mL / NET: -510 mL        Labs                     8.5    7.68  )-----------( 157      ( 03 Apr 2023 07:34 )             26.6     04-03    139  |  104  |  54<H>  ----------------------------<  140<H>  4.2   |  23  |  1.99<H>    Ca    9.3      03 Apr 2023 07:34  Phos  2.5     04-03  Mg     2.0     04-03    TPro  5.6<L>  /  Alb  3.4  /  TBili  0.4  /  DBili  x   /  AST  15  /  ALT  23  /  AlkPhos  60  04-03    Tacrolimus (), Serum: 13.9 ng/mL (04-03 @ 07:34)      Culture - Blood (collected 03-29-23 @ 12:40)  Source: .Blood Blood-Peripheral  Preliminary Report (03-30-23 @ 18:01):    No growth to date.    Culture - Blood (collected 03-29-23 @ 12:00)  Source: .Blood Blood-Peripheral  Preliminary Report (03-30-23 @ 18:01):    No growth to date.      Review of systems  All other systems were reviewed and are negative, except as noted.      PHYSICAL EXAM:  Constitutional: Well developed / well nourished  Eyes: Anicteric, PERRLA  ENMT: nc/at  Neck: supple  Respiratory: CTA B/L  Cardiovascular: RRR  Gastrointestinal: Soft, non distended, mild tenderness at the incision site; incision c/d/i;    Genitourinary: Voids  Extremities: SCD's in place and working bilaterally, no edema, AVF +Thrill/bruit   Vascular: Palpable dp pulses bilaterally  Neurological: A&O x3  Skin: no rashes, ulcerations or lesions;  Musculoskeletal: Moving all extremities  Psychiatric: Responsive

## 2023-04-03 NOTE — PROVIDER CONTACT NOTE (OTHER) - RECOMMENDATIONS
please come assess or can send a picture via teams
any further interventions at this time?
please confirm it is okay to use line in extremity, if it is okay please place provider to RN order that it is okay to use midline in affected arm

## 2023-04-03 NOTE — PROGRESS NOTE ADULT - SUBJECTIVE AND OBJECTIVE BOX
[facility] DIVISION OF KIDNEY DISEASES AND HYPERTENSION -- FOLLOW UP NOTE  --------------------------------------------------------------------------------  Chief Complaint:  kidney transplant    24 hour events/subjective:  Pt. seen this AM. to get last dose of IV antibiotics and then midline removed. Scr. stable. Tacrolimus level supratherapeutic.       PAST HISTORY  --------------------------------------------------------------------------------  No significant changes to PMH, PSH, FHx, SHx, unless otherwise noted    ALLERGIES & MEDICATIONS  --------------------------------------------------------------------------------  Allergies    No Known Allergies    Intolerances      Standing Inpatient Medications  apixaban 10 milliGRAM(s) Oral two times a day  chlorhexidine 2% Cloths 1 Application(s) Topical daily  dextrose 5%. 1000 milliLiter(s) IV Continuous <Continuous>  dextrose 5%. 1000 milliLiter(s) IV Continuous <Continuous>  dextrose 50% Injectable 25 Gram(s) IV Push once  dextrose 50% Injectable 12.5 Gram(s) IV Push once  dextrose 50% Injectable 25 Gram(s) IV Push once  famotidine    Tablet 20 milliGRAM(s) Oral daily  glucagon  Injectable 1 milliGRAM(s) IntraMuscular once  insulin lispro (ADMELOG) corrective regimen sliding scale   SubCutaneous three times a day before meals  insulin lispro (ADMELOG) corrective regimen sliding scale   SubCutaneous at bedtime  metoprolol tartrate 12.5 milliGRAM(s) Oral two times a day  mycophenolate mofetil 1 Gram(s) Oral <User Schedule>  NIFEdipine XL 30 milliGRAM(s) Oral daily  nystatin    Suspension 021159 Unit(s) Swish and Swallow four times a day  polyethylene glycol 3350 17 Gram(s) Oral daily  predniSONE   Tablet 5 milliGRAM(s) Oral two times a day  predniSONE   Tablet   Oral   senna 2 Tablet(s) Oral at bedtime  tacrolimus ER Tablet (ENVARSUS XR) 5 milliGRAM(s) Oral <User Schedule>  trimethoprim   80 mG/sulfamethoxazole 400 mG 1 Tablet(s) Oral daily  valGANciclovir 450 milliGRAM(s) Oral daily    PRN Inpatient Medications  calcium carbonate    500 mG (Tums) Chewable 2 Tablet(s) Chew every 8 hours PRN  dextrose Oral Gel 15 Gram(s) Oral once PRN  ondansetron Injectable 4 milliGRAM(s) IV Push every 6 hours PRN  simethicone 80 milliGRAM(s) Chew every 6 hours PRN  traMADol 50 milliGRAM(s) Oral every 4 hours PRN  traMADol 25 milliGRAM(s) Oral every 4 hours PRN      REVIEW OF SYSTEMS  --------------------------------------------------------------------------------  As per HPI    VITALS/PHYSICAL EXAM  --------------------------------------------------------------------------------  T(C): 36.8 (04-03-23 @ 05:31), Max: 36.8 (04-03-23 @ 05:31)  HR: 86 (04-03-23 @ 05:31) (68 - 86)  BP: 141/68 (04-03-23 @ 05:31) (132/63 - 145/67)  RR: 18 (04-03-23 @ 05:31) (17 - 18)  SpO2: 99% (04-03-23 @ 05:31) (99% - 100%)  Wt(kg): --        04-02-23 @ 07:01  -  04-03-23 @ 07:00  --------------------------------------------------------  IN: 960 mL / OUT: 2650 mL / NET: -1690 mL    04-03-23 @ 07:01  -  04-03-23 @ 11:31  --------------------------------------------------------  IN: 240 mL / OUT: 150 mL / NET: 90 mL    Physical Exam:  	Gen: NAD  	HEENT: PERRL, supple neck, clear oropharynx  	Pulm: CTA B/L  	CV: RRR, S1S2; no rub  	Back: No spinal or CVA tenderness; no sacral edema  	Abd: +BS, soft, nontender/nondistended                      Transplant: No tenderness, swelling, incision c/d/i  	: No suprapubic tenderness  	UE: RUE midline and bruising.   	LE: trace LE edema  	Neuro: No focal deficits  	Psych: Normal affect and mood  	Skin: Warm, without rashes    LABS/STUDIES  --------------------------------------------------------------------------------              8.5    7.68  >-----------<  157      [04-03-23 @ 07:34]              26.6     139  |  104  |  54  ----------------------------<  140      [04-03-23 @ 07:34]  4.2   |  23  |  1.99        Ca     9.3     [04-03-23 @ 07:34]      Mg     2.0     [04-03-23 @ 07:34]      Phos  2.5     [04-03-23 @ 07:34]    TPro  5.6  /  Alb  3.4  /  TBili  0.4  /  DBili  x   /  AST  15  /  ALT  23  /  AlkPhos  60  [04-03-23 @ 07:34]      Creatinine Trend:  SCr 1.99 [04-03 @ 07:34]  SCr 2.08 [04-02 @ 06:53]  SCr 2.17 [04-01 @ 06:28]  SCr 2.79 [03-31 @ 06:09]  SCr 3.12 [03-30 @ 09:21]    Tacrolimus (), Serum: 13.9 ng/mL (04-03 @ 07:34)  Tacrolimus (), Serum: 7.2 ng/mL (04-02 @ 06:52)  Tacrolimus (), Serum: 6.0 ng/mL (04-01 @ 06:28)  Tacrolimus (), Serum: 5.7 ng/mL (03-31 @ 06:06)      Urinalysis - [04-02-23 @ 01:38]      Color Colorless / Appearance Slightly Turbid / SG 1.013 / pH 6.5      Gluc Trace / Ketone Negative  / Bili Negative / Urobili Negative       Blood Large / Protein 30 mg/dL / Leuk Est Large / Nitrite Negative       / WBC 44 / Hyaline 1 / Gran  / Sq Epi  / Non Sq Epi 2 / Bacteria Negative      Iron 133, TIBC 274, %sat 48      [04-02-23 @ 12:02]  TSH 0.32      [03-30-23 @ 09:21]    HBsAb <3.0      [03-28-23 @ 13:31]  HBsAg Nonreact      [03-28-23 @ 14:10]  HBcAb Nonreact      [03-28-23 @ 13:31]  HCV 0.09, Nonreact      [03-28-23 @ 13:31]  HIV Nonreact      [03-28-23 @ 14:10]

## 2023-04-03 NOTE — PROVIDER CONTACT NOTE (OTHER) - REASON
Pt had drop in urine output to 30cc
patient US revealed DVT in arm with midline. Is it okay to use extremity with midline still?
patient hypertensive
patient urine went from clear to blood tinged, wants provider to come assess

## 2023-04-03 NOTE — PROGRESS NOTE ADULT - ASSESSMENT
67-year-old [place] female with HTN,  Alport syndrome and CKD 5  (x 15 years), LUE AVF placed in 2017, recently started HD on 3/17/2023 (Tanja, Nephrologist Dr. Jet Horn ) now s/p R DDRT under Simulect.    #S/P DDRT-POD #6  - good graft function with good UOP  - strict I&Os   - Tolerating regular diet   - SCDs / spirometry  - bowel regimen  - ID: Donor BCx with Klebsiella Completes 7d course of Ceftriaxone (last dose today 4/3)   - SQH    #Immunosuppression:   - ENV daily based on level, MMF 1gm BID, SST, Simulect completed  - PPX: nystatin, bactrim, Valcyte    #Acute RUE DVT   - RUE duplex with acute DVT in R brachial vein in the mid upper arm with venous catheter in place  - Vascular consulted, rec full anticoagulation  - Will start Eliquis loading dose, goal treatment duration 3 months

## 2023-04-03 NOTE — PROVIDER CONTACT NOTE (OTHER) - ACTION/TREATMENT ORDERED:
MD Doan made aware. 500cc NS bolus ordered & administered. STAT labs to be drawn & sent. Continue to monitor urine output closely.
okay to use midline, will place provider to RN
will come assess and speak with patient
will follow up with proper interventions

## 2023-04-03 NOTE — PROGRESS NOTE ADULT - SUBJECTIVE AND OBJECTIVE BOX
INFECTIOUS DISEASES FOLLOW UP-- Silvia Abdi  [phone]    This is a follow up note for this  67yFemale with  Status post kidney transplant  completing IV antibiotics for donor + cultures  feels ok- extensvie bruise RUE site of prior midline        ROS:  CONSTITUTIONAL:  No fever, good appetite  CARDIOVASCULAR:  No chest pain or palpitations  RESPIRATORY:  No dyspnea  GASTROINTESTINAL:  No nausea, vomiting, diarrhea, or abdominal pain  GENITOURINARY:  No dysuria  NEUROLOGIC:  No headache,     Allergies    No Known Allergies    Intolerances        ANTIBIOTICS/RELEVANT:  antimicrobials  nystatin    Suspension 543742 Unit(s) Swish and Swallow four times a day  trimethoprim   80 mG/sulfamethoxazole 400 mG 1 Tablet(s) Oral daily  valGANciclovir 450 milliGRAM(s) Oral daily    immunologic:  mycophenolate mofetil 1 Gram(s) Oral <User Schedule>  tacrolimus ER Tablet (ENVARSUS XR) 5 milliGRAM(s) Oral <User Schedule>    OTHER:  apixaban 10 milliGRAM(s) Oral two times a day  calcium carbonate    500 mG (Tums) Chewable 2 Tablet(s) Chew every 8 hours PRN  chlorhexidine 2% Cloths 1 Application(s) Topical daily  dextrose 5%. 1000 milliLiter(s) IV Continuous <Continuous>  dextrose 5%. 1000 milliLiter(s) IV Continuous <Continuous>  dextrose 50% Injectable 25 Gram(s) IV Push once  dextrose 50% Injectable 12.5 Gram(s) IV Push once  dextrose 50% Injectable 25 Gram(s) IV Push once  dextrose Oral Gel 15 Gram(s) Oral once PRN  famotidine    Tablet 20 milliGRAM(s) Oral daily  glucagon  Injectable 1 milliGRAM(s) IntraMuscular once  insulin lispro (ADMELOG) corrective regimen sliding scale   SubCutaneous three times a day before meals  insulin lispro (ADMELOG) corrective regimen sliding scale   SubCutaneous at bedtime  metoprolol tartrate 12.5 milliGRAM(s) Oral two times a day  NIFEdipine XL 30 milliGRAM(s) Oral daily  ondansetron Injectable 4 milliGRAM(s) IV Push every 6 hours PRN  polyethylene glycol 3350 17 Gram(s) Oral daily  predniSONE   Tablet   Oral   senna 2 Tablet(s) Oral at bedtime  simethicone 80 milliGRAM(s) Chew every 6 hours PRN  traMADol 25 milliGRAM(s) Oral every 4 hours PRN  traMADol 50 milliGRAM(s) Oral every 4 hours PRN      Objective:  Vital Signs Last 24 Hrs  T(C): 36.4 (2023 17:00), Max: 36.8 (2023 05:31)  T(F): 97.6 (2023 17:00), Max: 98.2 (2023 05:31)  HR: 85 (2023 17:00) (79 - 86)  BP: 125/80 (2023 17:00) (125/80 - 145/67)  BP(mean): 95 (2023 05:31) (95 - 97)  RR: 18 (2023 17:00) (18 - 18)  SpO2: 97% (2023 17:00) (97% - 100%)    Parameters below as of 2023 17:00  Patient On (Oxygen Delivery Method): room air        PHYSICAL EXAM:  Constitutional:no acute distress  Eyes:SAEID, EOMI  Ear/Nose/Throat: no oral lesions, 	  Respiratory: clear BL  Cardiovascular: S1S2  Gastrointestinal:soft, (+) BS, no tenderness  RLQ transplant wound CDI some oozing in area of prior drains  Extremities:no e/e/c  No Lymphadenopathy  IV sites not inflammed.    LABS:                        8.5    7.68  )-----------( 157      ( 2023 07:34 )             26.6     04-03    139  |  104  |  54<H>  ----------------------------<  140<H>  4.2   |  23  |  1.99<H>    Ca    9.3      2023 07:34  Phos  2.5     04-03  Mg     2.0     04-03    TPro  5.6<L>  /  Alb  3.4  /  TBili  0.4  /  DBili  x   /  AST  15  /  ALT  23  /  AlkPhos  60  04-03      Urinalysis Basic - ( 2023 01:38 )    Color: Colorless / Appearance: Slightly Turbid / S.013 / pH: x  Gluc: x / Ketone: Negative  / Bili: Negative / Urobili: Negative   Blood: x / Protein: 30 mg/dL / Nitrite: Negative   Leuk Esterase: Large / RBC: 224 /hpf / WBC 44 /HPF   Sq Epi: x / Non Sq Epi: 2 /hpf / Bacteria: Negative        MICROBIOLOGY:            RECENT CULTURES:   @ 12:40  .Blood Blood-Peripheral  --  --  --    No Growth Final  --   @ 12:00  .Blood Blood-Peripheral  --  --  --    No Growth Final  --      RADIOLOGY & ADDITIONAL STUDIES:  < from: [facility] Duplex Lower Ext Vein Scan, Lyric (23 @ 22:18) >  IMPRESSION:  No evidence of deep venous thrombosis in either lower extremity.    < end of copied text >

## 2023-04-03 NOTE — PROGRESS NOTE ADULT - SUBJECTIVE AND OBJECTIVE BOX
Subjective:   Patient seen at bedside this AM. Pt with some swelling and pain on RUE but manageable    Objective:  Vital Signs  T(C): 36.8 (04-03 @ 05:31), Max: 36.8 (04-03 @ 05:31)  HR: 86 (04-03 @ 05:31) (68 - 86)  BP: 141/68 (04-03 @ 05:31) (132/63 - 145/67)  RR: 18 (04-03 @ 05:31) (17 - 18)  SpO2: 99% (04-03 @ 05:31) (99% - 100%)  04-02-23 @ 07:01  -  04-03-23 @ 07:00  --------------------------------------------------------  IN:  Total IN: 0 mL    OUT:    Voided (mL): 2650 mL  Total OUT: 2650 mL    Total NET: -2650 mL      04-03-23 @ 07:01  -  04-03-23 @ 10:26  --------------------------------------------------------  IN:  Total IN: 0 mL    OUT:    Voided (mL): 150 mL  Total OUT: 150 mL    Total NET: -150 mL          Physical Exam:  GEN: resting in bed comfortably in NAD  RESP: no increased WOB  EXTR: RUE with ecchymosis across upper arm, minimal TTP. Palpable radial pulse. Midline in upper arm  NEURO: awake, alert    Labs:                        8.5    7.68  )-----------( 157      ( 03 Apr 2023 07:34 )             26.6   04-03    139  |  104  |  54<H>  ----------------------------<  140<H>  4.2   |  23  |  1.99<H>    Ca    9.3      03 Apr 2023 07:34  Phos  2.5     04-03  Mg     2.0     04-03    TPro  5.6<L>  /  Alb  3.4  /  TBili  0.4  /  DBili  x   /  AST  15  /  ALT  23  /  AlkPhos  60  04-03    CAPILLARY BLOOD GLUCOSE      POCT Blood Glucose.: 139 mg/dL (03 Apr 2023 08:33)  POCT Blood Glucose.: 212 mg/dL (02 Apr 2023 21:14)  POCT Blood Glucose.: 167 mg/dL (02 Apr 2023 16:58)  POCT Blood Glucose.: 187 mg/dL (02 Apr 2023 12:58)      Imaging:    [facility] Duplex Upper Ext Vein Scan, Right (04.02.23 @ 11:30)  FINDINGS:    The right internal jugular, subclavian and axillary veins are patent and   compressible where applicable.  There is deep venous thrombosis in the   right brachial vein, in the mid upper arm, with a venous catheter in   place.  There is superficial venous thrombosis/thrombophlebitis of the   right cephalic and basilic veins.    Doppler examination shows normal spontaneous and phasic flow.    IMPRESSION:  Acute deep venous thrombosis in the right brachial vein, in the mid upper   arm, with a venous catheter in place.  Superficial venous thrombosis/thrombophlebitis of the right cephalic and   basilic veins.

## 2023-04-03 NOTE — PROVIDER CONTACT NOTE (OTHER) - ASSESSMENT
patient US revealed DVT in arm with midline. Is it okay to use extremity with midline still? patient due for lasix IVP wanted to clarify if it is okay to continue use of line in affected extremity.
patient hypertensive. Patient taken for ambulation lap on unit, was given relaxation techniques and deep breathing instruction. Denies pain or discomfort. BP documented in flowsheet, denies any s/s related to hypertension.
patient urine went from clear to blood tinged, wants provider to come assess. Denied pain or discomfort while voiding or any s/s related to blood loss. Patient feels more comfortable having primary PA come assess urine.
Pt A&Ox4. Pt denies any complaints of distention/ fullness at this time - resting comfortably in bed. Bladder scan performed- see flow sheet

## 2023-04-03 NOTE — PROGRESS NOTE ADULT - ASSESSMENT
67-year-old [facility] female with HTN,  Alport syndrome and CKD 5  (x 15 years), LUE AVF placed in 2017, recently started HD on 3/17/2023 (Tanja, Nephrologist Dr. Jet Horn ) now s/p R DDRT under Simulect.    1.  Renal transplant - s/p DDRT 3/29.  Has ATN but improving.    2.  Immunosuppression - simulect induction, on tacro for target 8-10, MMF 1gm BID and pred taper.    3.  HTN - improved.   4.  Anemia - s/p transfusion and procrit 10,000 4/1  5.  Hyperglycemia - no history of diabetes, changed diet to no concentrated carbs.  Glucose improved.   6.  Right arm bruising - no palpable hematoma. Will discontinue IV after Abx today. No further need for AC as this is provoked DVT.     If you have any questions, please feel free to contact me  Leroy Marroquin  Nephrology Fellow  [phone]; Prefer [employer] TEAMS  (After 5pm or on weekends please page the on-call fellow)

## 2023-04-04 LAB
ALBUMIN SERPL ELPH-MCNC: 3.3 G/DL — SIGNIFICANT CHANGE UP (ref 3.3–5)
ALP SERPL-CCNC: 64 U/L — SIGNIFICANT CHANGE UP (ref 40–120)
ALT FLD-CCNC: 32 U/L — SIGNIFICANT CHANGE UP (ref 10–45)
ANION GAP SERPL CALC-SCNC: 11 MMOL/L — SIGNIFICANT CHANGE UP (ref 5–17)
APPEARANCE UR: ABNORMAL
AST SERPL-CCNC: 20 U/L — SIGNIFICANT CHANGE UP (ref 10–40)
BACTERIA # UR AUTO: NEGATIVE — SIGNIFICANT CHANGE UP
BASOPHILS # BLD AUTO: 0 K/UL — SIGNIFICANT CHANGE UP (ref 0–0.2)
BASOPHILS NFR BLD AUTO: 0 % — SIGNIFICANT CHANGE UP (ref 0–2)
BILIRUB SERPL-MCNC: 0.4 MG/DL — SIGNIFICANT CHANGE UP (ref 0.2–1.2)
BILIRUB UR-MCNC: NEGATIVE — SIGNIFICANT CHANGE UP
BUN SERPL-MCNC: 48 MG/DL — HIGH (ref 7–23)
CALCIUM SERPL-MCNC: 9.2 MG/DL — SIGNIFICANT CHANGE UP (ref 8.4–10.5)
CHLORIDE SERPL-SCNC: 107 MMOL/L — SIGNIFICANT CHANGE UP (ref 96–108)
CO2 SERPL-SCNC: 24 MMOL/L — SIGNIFICANT CHANGE UP (ref 22–31)
COLOR SPEC: SIGNIFICANT CHANGE UP
CREAT SERPL-MCNC: 1.66 MG/DL — HIGH (ref 0.5–1.3)
DACRYOCYTES BLD QL SMEAR: SLIGHT — SIGNIFICANT CHANGE UP
DIFF PNL FLD: ABNORMAL
EGFR: 33 ML/MIN/1.73M2 — LOW
EOSINOPHIL # BLD AUTO: 0.21 K/UL — SIGNIFICANT CHANGE UP (ref 0–0.5)
EOSINOPHIL NFR BLD AUTO: 2.6 % — SIGNIFICANT CHANGE UP (ref 0–6)
EPI CELLS # UR: 20 /HPF — HIGH
GLUCOSE BLDC GLUCOMTR-MCNC: 154 MG/DL — HIGH (ref 70–99)
GLUCOSE BLDC GLUCOMTR-MCNC: 162 MG/DL — HIGH (ref 70–99)
GLUCOSE BLDC GLUCOMTR-MCNC: 165 MG/DL — HIGH (ref 70–99)
GLUCOSE BLDC GLUCOMTR-MCNC: 166 MG/DL — HIGH (ref 70–99)
GLUCOSE SERPL-MCNC: 141 MG/DL — HIGH (ref 70–99)
GLUCOSE UR QL: ABNORMAL
HCT VFR BLD CALC: 29 % — LOW (ref 34.5–45)
HGB BLD-MCNC: 9 G/DL — LOW (ref 11.5–15.5)
HYALINE CASTS # UR AUTO: 2 /LPF — SIGNIFICANT CHANGE UP (ref 0–2)
KETONES UR-MCNC: NEGATIVE — SIGNIFICANT CHANGE UP
LEUKOCYTE ESTERASE UR-ACNC: ABNORMAL
LYMPHOCYTES # BLD AUTO: 2.1 K/UL — SIGNIFICANT CHANGE UP (ref 1–3.3)
LYMPHOCYTES # BLD AUTO: 25.9 % — SIGNIFICANT CHANGE UP (ref 13–44)
MAGNESIUM SERPL-MCNC: 2 MG/DL — SIGNIFICANT CHANGE UP (ref 1.6–2.6)
MANUAL SMEAR VERIFICATION: SIGNIFICANT CHANGE UP
MCHC RBC-ENTMCNC: 30.8 PG — SIGNIFICANT CHANGE UP (ref 27–34)
MCHC RBC-ENTMCNC: 31 GM/DL — LOW (ref 32–36)
MCV RBC AUTO: 99.3 FL — SIGNIFICANT CHANGE UP (ref 80–100)
METAMYELOCYTES # FLD: 3.4 % — HIGH (ref 0–0)
MONOCYTES # BLD AUTO: 0.28 K/UL — SIGNIFICANT CHANGE UP (ref 0–0.9)
MONOCYTES NFR BLD AUTO: 3.5 % — SIGNIFICANT CHANGE UP (ref 2–14)
MYELOCYTES NFR BLD: 1.7 % — HIGH (ref 0–0)
NEUTROPHILS # BLD AUTO: 5.11 K/UL — SIGNIFICANT CHANGE UP (ref 1.8–7.4)
NEUTROPHILS NFR BLD AUTO: 62.9 % — SIGNIFICANT CHANGE UP (ref 43–77)
NITRITE UR-MCNC: NEGATIVE — SIGNIFICANT CHANGE UP
PH UR: 6 — SIGNIFICANT CHANGE UP (ref 5–8)
PHOSPHATE SERPL-MCNC: 2.6 MG/DL — SIGNIFICANT CHANGE UP (ref 2.5–4.5)
PLAT MORPH BLD: NORMAL — SIGNIFICANT CHANGE UP
PLATELET # BLD AUTO: 214 K/UL — SIGNIFICANT CHANGE UP (ref 150–400)
POIKILOCYTOSIS BLD QL AUTO: SLIGHT — SIGNIFICANT CHANGE UP
POLYCHROMASIA BLD QL SMEAR: SLIGHT — SIGNIFICANT CHANGE UP
POTASSIUM SERPL-MCNC: 4.7 MMOL/L — SIGNIFICANT CHANGE UP (ref 3.5–5.3)
POTASSIUM SERPL-SCNC: 4.7 MMOL/L — SIGNIFICANT CHANGE UP (ref 3.5–5.3)
PROT SERPL-MCNC: 5.8 G/DL — LOW (ref 6–8.3)
PROT UR-MCNC: ABNORMAL
RBC # BLD: 2.92 M/UL — LOW (ref 3.8–5.2)
RBC # FLD: 15.4 % — HIGH (ref 10.3–14.5)
RBC BLD AUTO: ABNORMAL
RBC CASTS # UR COMP ASSIST: 3357 /HPF — HIGH (ref 0–4)
SODIUM SERPL-SCNC: 142 MMOL/L — SIGNIFICANT CHANGE UP (ref 135–145)
SP GR SPEC: 1.02 — SIGNIFICANT CHANGE UP (ref 1.01–1.02)
TACROLIMUS SERPL-MCNC: 17.4 NG/ML — SIGNIFICANT CHANGE UP
UROBILINOGEN FLD QL: NEGATIVE — SIGNIFICANT CHANGE UP
WBC # BLD: 8.12 K/UL — SIGNIFICANT CHANGE UP (ref 3.8–10.5)
WBC # FLD AUTO: 8.12 K/UL — SIGNIFICANT CHANGE UP (ref 3.8–10.5)
WBC UR QL: 42 /HPF — HIGH (ref 0–5)

## 2023-04-04 PROCEDURE — 93010 ELECTROCARDIOGRAM REPORT: CPT

## 2023-04-04 PROCEDURE — 99232 SBSQ HOSP IP/OBS MODERATE 35: CPT

## 2023-04-04 PROCEDURE — 99232 SBSQ HOSP IP/OBS MODERATE 35: CPT | Mod: GC

## 2023-04-04 RX ORDER — CIPROFLOXACIN LACTATE 400MG/40ML
500 VIAL (ML) INTRAVENOUS EVERY 12 HOURS
Refills: 0 | Status: DISCONTINUED | OUTPATIENT
Start: 2023-04-04 | End: 2023-04-05

## 2023-04-04 RX ORDER — APIXABAN 2.5 MG/1
5 TABLET, FILM COATED ORAL EVERY 12 HOURS
Refills: 0 | Status: DISCONTINUED | OUTPATIENT
Start: 2023-04-04 | End: 2023-04-06

## 2023-04-04 RX ORDER — APIXABAN 2.5 MG/1
1 TABLET, FILM COATED ORAL
Qty: 180 | Refills: 2
Start: 2023-04-04 | End: 2023-12-29

## 2023-04-04 RX ORDER — CIPROFLOXACIN LACTATE 400MG/40ML
500 VIAL (ML) INTRAVENOUS EVERY 12 HOURS
Refills: 0 | Status: DISCONTINUED | OUTPATIENT
Start: 2023-04-04 | End: 2023-04-04

## 2023-04-04 RX ADMIN — Medication 500000 UNIT(S): at 05:03

## 2023-04-04 RX ADMIN — TRAMADOL HYDROCHLORIDE 25 MILLIGRAM(S): 50 TABLET ORAL at 05:33

## 2023-04-04 RX ADMIN — Medication 12.5 MILLIGRAM(S): at 05:02

## 2023-04-04 RX ADMIN — MYCOPHENOLATE MOFETIL 1 GRAM(S): 250 CAPSULE ORAL at 20:30

## 2023-04-04 RX ADMIN — TRAMADOL HYDROCHLORIDE 50 MILLIGRAM(S): 50 TABLET ORAL at 22:00

## 2023-04-04 RX ADMIN — FAMOTIDINE 20 MILLIGRAM(S): 10 INJECTION INTRAVENOUS at 11:56

## 2023-04-04 RX ADMIN — Medication 30 MILLIGRAM(S): at 05:03

## 2023-04-04 RX ADMIN — TACROLIMUS 5 MILLIGRAM(S): 5 CAPSULE ORAL at 07:41

## 2023-04-04 RX ADMIN — VALGANCICLOVIR 450 MILLIGRAM(S): 450 TABLET, FILM COATED ORAL at 11:56

## 2023-04-04 RX ADMIN — Medication 1: at 17:16

## 2023-04-04 RX ADMIN — Medication 1: at 12:21

## 2023-04-04 RX ADMIN — TRAMADOL HYDROCHLORIDE 25 MILLIGRAM(S): 50 TABLET ORAL at 05:03

## 2023-04-04 RX ADMIN — SENNA PLUS 2 TABLET(S): 8.6 TABLET ORAL at 20:29

## 2023-04-04 RX ADMIN — Medication 12.5 MILLIGRAM(S): at 17:16

## 2023-04-04 RX ADMIN — MYCOPHENOLATE MOFETIL 1 GRAM(S): 250 CAPSULE ORAL at 07:42

## 2023-04-04 RX ADMIN — APIXABAN 10 MILLIGRAM(S): 2.5 TABLET, FILM COATED ORAL at 05:03

## 2023-04-04 RX ADMIN — TRAMADOL HYDROCHLORIDE 25 MILLIGRAM(S): 50 TABLET ORAL at 10:22

## 2023-04-04 RX ADMIN — Medication 500000 UNIT(S): at 11:56

## 2023-04-04 RX ADMIN — Medication 500000 UNIT(S): at 17:16

## 2023-04-04 RX ADMIN — TRAMADOL HYDROCHLORIDE 50 MILLIGRAM(S): 50 TABLET ORAL at 21:33

## 2023-04-04 RX ADMIN — Medication 1 TABLET(S): at 11:56

## 2023-04-04 RX ADMIN — Medication 5 MILLIGRAM(S): at 05:03

## 2023-04-04 RX ADMIN — Medication 500000 UNIT(S): at 23:22

## 2023-04-04 RX ADMIN — Medication 500 MILLIGRAM(S): at 16:29

## 2023-04-04 RX ADMIN — APIXABAN 5 MILLIGRAM(S): 2.5 TABLET, FILM COATED ORAL at 17:15

## 2023-04-04 RX ADMIN — TRAMADOL HYDROCHLORIDE 25 MILLIGRAM(S): 50 TABLET ORAL at 11:00

## 2023-04-04 RX ADMIN — Medication 1: at 09:20

## 2023-04-04 NOTE — PROGRESS NOTE ADULT - ASSESSMENT
67-year-old [facility] female with HTN,  Alport syndrome and CKD 5  (x 15 years), LUE AVF placed in 2017, recently started HD on 3/17/2023 (Tanja, Nephrologist Dr. Jet Horn ) now s/p R DDRT under Simulect.    1.  Renal transplant - s/p DDRT 3/29.  Scr. now improved to 1.6.  Check UA(r/o infection). check PVR (r/o obstruction).   2.  Immunosuppression - simulect induction, on tacro for target 8-10, MMF 1gm BID and pred taper.    3.  HTN - improved.   4.  Anemia - s/p transfusion and procrit 10,000 4/1.   5.  Hyperglycemia - no history of diabetes, changed diet to no concentrated carbs.  Glucose improved.   6.  Right arm bruising - no palpable hematoma. Line removed. Per Vascular would like Eliquis, will give Elisuir 5mg BID x 3months.    If you have any questions, please feel free to contact me  Leroy Marroquin  Nephrology Fellow  [phone]; Prefer [employer] TEAMS  (After 5pm or on weekends please page the on-call fellow)

## 2023-04-04 NOTE — PROGRESS NOTE ADULT - SUBJECTIVE AND OBJECTIVE BOX
INFECTIOUS DISEASES FOLLOW UP-- Silvia Abdi  [phone]    This is a follow up note for this  67yFemale with  Status post kidney transplant        ROS:  CONSTITUTIONAL:  No fever, good appetite  CARDIOVASCULAR:  No chest pain or palpitations  RESPIRATORY:  No dyspnea  GASTROINTESTINAL:  No nausea, vomiting, diarrhea, or abdominal pain  GENITOURINARY:  No dysuria  NEUROLOGIC:  No headache,     Allergies    No Known Allergies    Intolerances        ANTIBIOTICS/RELEVANT:  antimicrobials  ciprofloxacin     Tablet 500 milliGRAM(s) Oral every 12 hours  nystatin    Suspension 113421 Unit(s) Swish and Swallow four times a day  trimethoprim   80 mG/sulfamethoxazole 400 mG 1 Tablet(s) Oral daily  valGANciclovir 450 milliGRAM(s) Oral daily    immunologic:  mycophenolate mofetil 1 Gram(s) Oral <User Schedule>    OTHER:  apixaban 5 milliGRAM(s) Oral every 12 hours  calcium carbonate    500 mG (Tums) Chewable 2 Tablet(s) Chew every 8 hours PRN  chlorhexidine 2% Cloths 1 Application(s) Topical daily  dextrose 5%. 1000 milliLiter(s) IV Continuous <Continuous>  dextrose 5%. 1000 milliLiter(s) IV Continuous <Continuous>  dextrose 50% Injectable 25 Gram(s) IV Push once  dextrose 50% Injectable 12.5 Gram(s) IV Push once  dextrose 50% Injectable 25 Gram(s) IV Push once  dextrose Oral Gel 15 Gram(s) Oral once PRN  famotidine    Tablet 20 milliGRAM(s) Oral daily  glucagon  Injectable 1 milliGRAM(s) IntraMuscular once  insulin lispro (ADMELOG) corrective regimen sliding scale   SubCutaneous three times a day before meals  insulin lispro (ADMELOG) corrective regimen sliding scale   SubCutaneous at bedtime  metoprolol tartrate 12.5 milliGRAM(s) Oral two times a day  NIFEdipine XL 30 milliGRAM(s) Oral daily  ondansetron Injectable 4 milliGRAM(s) IV Push every 6 hours PRN  polyethylene glycol 3350 17 Gram(s) Oral daily  predniSONE   Tablet   Oral   predniSONE   Tablet 5 milliGRAM(s) Oral daily  senna 2 Tablet(s) Oral at bedtime  simethicone 80 milliGRAM(s) Chew every 6 hours PRN  traMADol 50 milliGRAM(s) Oral every 4 hours PRN  traMADol 25 milliGRAM(s) Oral every 4 hours PRN      Objective:  Vital Signs Last 24 Hrs  T(C): 37.1 (2023 17:00), Max: 37.1 (2023 17:00)  T(F): 98.8 (2023 17:00), Max: 98.8 (2023 17:00)  HR: 86 (2023 17:00) (75 - 86)  BP: 147/83 (2023 17:00) (132/59 - 165/82)  BP(mean): --  RR: 18 (2023 17:00) (18 - 18)  SpO2: 97% (2023 17:00) (97% - 99%)    Parameters below as of 2023 17:00  Patient On (Oxygen Delivery Method): room air        PHYSICAL EXAM:  Constitutional:no acute distress  Eyes:SAEID, EOMI  Ear/Nose/Throat: no oral lesions, 	  Respiratory: clear BL  Cardiovascular: S1S2  Gastrointestinal:soft, (+) BS, no tenderness RLQ transplant wound site CDI  Extremities:no e/e/c  No Lymphadenopathy  IV sites not inflammed.    LABS:                        9.0    8.12  )-----------( 214      ( 2023 06:36 )             29.0     04-04    142  |  107  |  48<H>  ----------------------------<  141<H>  4.7   |  24  |  1.66<H>    Ca    9.2      2023 06:36  Phos  2.6     04-04  Mg     2.0     04-04    TPro  5.8<L>  /  Alb  3.3  /  TBili  0.4  /  DBili  x   /  AST  20  /  ALT  32  /  AlkPhos  64  04-04      Urinalysis Basic - ( 2023 09:36 )    Color: BROWN / Appearance: Turbid / S.018 / pH: x  Gluc: x / Ketone: Negative  / Bili: Negative / Urobili: Negative   Blood: x / Protein: 100 mg/dl / Nitrite: Negative   Leuk Esterase: Moderate / RBC: 3357 /hpf / WBC 42 /HPF   Sq Epi: x / Non Sq Epi: 20 /hpf / Bacteria: Negative        MICROBIOLOGY:            RECENT CULTURES:   @ 12:40  .Blood Blood-Peripheral  --  --  --    No Growth Final  --   @ 12:00  .Blood Blood-Peripheral  --  --  --    No Growth Final  --      RADIOLOGY & ADDITIONAL STUDIES:  < from: [facility] Duplex Lower Ext Vein Scan, Lyric (23 @ 22:18) >  IMPRESSION:  No evidence of deep venous thrombosis in either lower extremity.    < end of copied text >

## 2023-04-04 NOTE — PROGRESS NOTE ADULT - ASSESSMENT
ESRD s/p ddrt  Donor had polymicrobial bacteremia    records from OSH clarified  Donor had stre and klebsiella in blood   Staph aurues (MSSA) was from a respiratory culture  Surveillance blood cultures are negative    Completed Ceftriaxone x 7days  Urine with WBCs and leukocyte esterase but no bacteria seen- started on oral Cipro pending culture results  would decrease the oral Cipro to 250mg q 12hr  Continue prophylaxis with valcyte, Bactrim, nystatin         Henrry Abdi MD  Can be called via Teams  After 5pm/weekends [phone]

## 2023-04-04 NOTE — PROGRESS NOTE ADULT - SUBJECTIVE AND OBJECTIVE BOX
Transplant Surgery - Multidisciplinary Progress Note  --------------------------------------------------------------  DDRT   3/28/23      POD#7    Present:  Patient seen and examined with multidisciplinary team including Transplant Surgeon: Dr. Baer, LORENA Fair, Transplant Nephrologist: Dr. Elenita Tobin and unit RN during am rounds.  Disciplines not in attendance will be notified of the plan.      HPI: 67-year-old [place] female with HTN,  Alport syndrome and CKD 5  (x 15 years), LUE AVF placed in 2017, recently started HD on 3/17/2023 (Tanja, Nephrologist Dr. Jet Horn ) now s/p R  DDRT under Simulect induction on 3/28/23    Interval Events:  -AFebrile, VSS  -c/o dysuria  -good graft function  -no other complaints at this time    Immunosuppression   Induction:  Simulect completed  Maintenance:  Envarsus by level, MMF 1gm BId, SST  Ongoing monitoring for signs of rejection.    Potential Discharge date: pending clinical improvement  Education:  Medications  Plan of care:  See Below                MEDICATIONS  (STANDING):  apixaban 5 milliGRAM(s) Oral every 12 hours  chlorhexidine 2% Cloths 1 Application(s) Topical daily  dextrose 5%. 1000 milliLiter(s) (50 mL/Hr) IV Continuous <Continuous>  dextrose 5%. 1000 milliLiter(s) (100 mL/Hr) IV Continuous <Continuous>  dextrose 50% Injectable 25 Gram(s) IV Push once  dextrose 50% Injectable 12.5 Gram(s) IV Push once  dextrose 50% Injectable 25 Gram(s) IV Push once  famotidine    Tablet 20 milliGRAM(s) Oral daily  glucagon  Injectable 1 milliGRAM(s) IntraMuscular once  insulin lispro (ADMELOG) corrective regimen sliding scale   SubCutaneous three times a day before meals  insulin lispro (ADMELOG) corrective regimen sliding scale   SubCutaneous at bedtime  metoprolol tartrate 12.5 milliGRAM(s) Oral two times a day  mycophenolate mofetil 1 Gram(s) Oral <User Schedule>  NIFEdipine XL 30 milliGRAM(s) Oral daily  nystatin    Suspension 373013 Unit(s) Swish and Swallow four times a day  polyethylene glycol 3350 17 Gram(s) Oral daily  predniSONE   Tablet   Oral   predniSONE   Tablet 5 milliGRAM(s) Oral daily  senna 2 Tablet(s) Oral at bedtime  tacrolimus ER Tablet (ENVARSUS XR) 5 milliGRAM(s) Oral <User Schedule>  trimethoprim   80 mG/sulfamethoxazole 400 mG 1 Tablet(s) Oral daily  valGANciclovir 450 milliGRAM(s) Oral daily    MEDICATIONS  (PRN):  calcium carbonate    500 mG (Tums) Chewable 2 Tablet(s) Chew every 8 hours PRN Heartburn  dextrose Oral Gel 15 Gram(s) Oral once PRN Blood Glucose LESS THAN 70 milliGRAM(s)/deciliter  ondansetron Injectable 4 milliGRAM(s) IV Push every 6 hours PRN Nausea and/or Vomiting  simethicone 80 milliGRAM(s) Chew every 6 hours PRN Gas  traMADol 50 milliGRAM(s) Oral every 4 hours PRN Severe Pain (7 - 10)  traMADol 25 milliGRAM(s) Oral every 4 hours PRN Moderate Pain (4 - 6)      PAST MEDICAL & SURGICAL HISTORY:  Alport's syndrome      HTN (hypertension)      Hard of hearing  uses hearing aids      Anemia      ESRD with anemia  left AV fistula, not in use      Deep vein thrombosis (DVT) of right upper extremity      Deep vein thrombosis (DVT) of right upper extremity      S/P tonsillectomy      A-V fistula  Nov 2017          Vital Signs Last 24 Hrs  T(C): 37 (04 Apr 2023 09:00), Max: 37 (04 Apr 2023 09:00)  T(F): 98.6 (04 Apr 2023 09:00), Max: 98.6 (04 Apr 2023 09:00)  HR: 77 (04 Apr 2023 09:00) (77 - 85)  BP: 132/59 (04 Apr 2023 09:00) (125/80 - 165/82)  BP(mean): --  RR: 18 (04 Apr 2023 09:00) (18 - 18)  SpO2: 99% (04 Apr 2023 09:00) (97% - 99%)    Parameters below as of 04 Apr 2023 09:00  Patient On (Oxygen Delivery Method): room air        I&O's Summary    03 Apr 2023 07:01  -  04 Apr 2023 07:00  --------------------------------------------------------  IN: 820 mL / OUT: 1800 mL / NET: -980 mL    04 Apr 2023 07:01  -  04 Apr 2023 12:32  --------------------------------------------------------  IN: 240 mL / OUT: 320 mL / NET: -80 mL                              9.0    8.12  )-----------( 214      ( 04 Apr 2023 06:36 )             29.0     04-04    142  |  107  |  48<H>  ----------------------------<  141<H>  4.7   |  24  |  1.66<H>    Ca    9.2      04 Apr 2023 06:36  Phos  2.6     04-04  Mg     2.0     04-04    TPro  5.8<L>  /  Alb  3.3  /  TBili  0.4  /  DBili  x   /  AST  20  /  ALT  32  /  AlkPhos  64  04-04    Tacrolimus (), Serum: 17.4 ng/mL (04-04 @ 06:36)        Culture - Blood (collected 03-29-23 @ 12:40)  Source: .Blood Blood-Peripheral  Final Report (04-03-23 @ 18:00):    No Growth Final    Culture - Blood (collected 03-29-23 @ 12:00)  Source: .Blood Blood-Peripheral  Final Report (04-03-23 @ 18:00):    No Growth Final                Review of systems  All other systems were reviewed and are negative, except as noted.      PHYSICAL EXAM:  Constitutional: Well developed / well nourished  Eyes: Anicteric, PERRLA  ENMT: nc/at  Neck: supple  Respiratory: CTA B/L  Cardiovascular: RRR  Gastrointestinal: Soft, non distended, mild tenderness at the incision site; incision c/d/i;    Genitourinary: Voids  Extremities: SCD's in place and working bilaterally, no edema, AVF +Thrill/bruit   Vascular: Palpable dp pulses bilaterally  Neurological: A&O x3  Skin: no rashes, ulcerations or lesions;  Musculoskeletal: Moving all extremities  Psychiatric: Responsive

## 2023-04-04 NOTE — PROGRESS NOTE ADULT - SUBJECTIVE AND OBJECTIVE BOX
[facility] DIVISION OF KIDNEY DISEASES AND HYPERTENSION -- FOLLOW UP NOTE  --------------------------------------------------------------------------------  Chief Complaint:  kidney transplant    24 hour events/subjective:  Pt. seen this AM. Midline removed. Scr. improving. Tacrolimus resumed at reduced dose. Complained of some hematuria and dysuria. Good 24 hour UOP, though thinks she has reduced output recently.          PAST HISTORY  --------------------------------------------------------------------------------  No significant changes to PMH, PSH, FHx, SHx, unless otherwise noted    ALLERGIES & MEDICATIONS  --------------------------------------------------------------------------------  Allergies    No Known Allergies    Intolerances      Standing Inpatient Medications  apixaban 5 milliGRAM(s) Oral every 12 hours  chlorhexidine 2% Cloths 1 Application(s) Topical daily  dextrose 5%. 1000 milliLiter(s) IV Continuous <Continuous>  dextrose 5%. 1000 milliLiter(s) IV Continuous <Continuous>  dextrose 50% Injectable 25 Gram(s) IV Push once  dextrose 50% Injectable 12.5 Gram(s) IV Push once  dextrose 50% Injectable 25 Gram(s) IV Push once  famotidine    Tablet 20 milliGRAM(s) Oral daily  glucagon  Injectable 1 milliGRAM(s) IntraMuscular once  insulin lispro (ADMELOG) corrective regimen sliding scale   SubCutaneous three times a day before meals  insulin lispro (ADMELOG) corrective regimen sliding scale   SubCutaneous at bedtime  metoprolol tartrate 12.5 milliGRAM(s) Oral two times a day  mycophenolate mofetil 1 Gram(s) Oral <User Schedule>  NIFEdipine XL 30 milliGRAM(s) Oral daily  nystatin    Suspension 718818 Unit(s) Swish and Swallow four times a day  polyethylene glycol 3350 17 Gram(s) Oral daily  predniSONE   Tablet   Oral   predniSONE   Tablet 5 milliGRAM(s) Oral daily  senna 2 Tablet(s) Oral at bedtime  tacrolimus ER Tablet (ENVARSUS XR) 5 milliGRAM(s) Oral <User Schedule>  trimethoprim   80 mG/sulfamethoxazole 400 mG 1 Tablet(s) Oral daily  valGANciclovir 450 milliGRAM(s) Oral daily    PRN Inpatient Medications  calcium carbonate    500 mG (Tums) Chewable 2 Tablet(s) Chew every 8 hours PRN  dextrose Oral Gel 15 Gram(s) Oral once PRN  ondansetron Injectable 4 milliGRAM(s) IV Push every 6 hours PRN  simethicone 80 milliGRAM(s) Chew every 6 hours PRN  traMADol 25 milliGRAM(s) Oral every 4 hours PRN  traMADol 50 milliGRAM(s) Oral every 4 hours PRN      REVIEW OF SYSTEMS  --------------------------------------------------------------------------------  As per HPI    VITALS/PHYSICAL EXAM  --------------------------------------------------------------------------------  T(C): 37 (04-04-23 @ 09:00), Max: 37 (04-04-23 @ 09:00)  HR: 77 (04-04-23 @ 09:00) (77 - 85)  BP: 132/59 (04-04-23 @ 09:00) (125/80 - 165/82)  RR: 18 (04-04-23 @ 09:00) (18 - 18)  SpO2: 99% (04-04-23 @ 09:00) (97% - 99%)  Wt(kg): --        04-03-23 @ 07:01  -  04-04-23 @ 07:00  --------------------------------------------------------  IN: 820 mL / OUT: 1800 mL / NET: -980 mL    04-04-23 @ 07:01  -  04-04-23 @ 09:53  --------------------------------------------------------  IN: 240 mL / OUT: 320 mL / NET: -80 mL    Physical Exam:  	Gen: NAD  	HEENT: PERRL, supple neck, clear oropharynx  	Pulm: CTA B/L  	CV: RRR, S1S2; no rub  	Back: No spinal or CVA tenderness; no sacral edema  	Abd: +BS, soft, nontender/nondistended                      Transplant: No significant tenderness, swelling, incision c/d/i  	: No suprapubic tenderness  	UE: RUE bruising.   	LE: no LE edema  	Neuro: No focal deficits  	Psych: Normal affect and mood  	Skin: Warm, without rashes    LABS/STUDIES  --------------------------------------------------------------------------------              9.0    8.12  >-----------<  214      [04-04-23 @ 06:36]              29.0     142  |  107  |  48  ----------------------------<  141      [04-04-23 @ 06:36]  4.7   |  24  |  1.66        Ca     9.2     [04-04-23 @ 06:36]      Mg     2.0     [04-04-23 @ 06:36]      Phos  2.6     [04-04-23 @ 06:36]    TPro  5.8  /  Alb  3.3  /  TBili  0.4  /  DBili  x   /  AST  20  /  ALT  32  /  AlkPhos  64  [04-04-23 @ 06:36]        Creatinine Trend:  SCr 1.66 [04-04 @ 06:36]  SCr 1.99 [04-03 @ 07:34]  SCr 2.08 [04-02 @ 06:53]  SCr 2.17 [04-01 @ 06:28]  SCr 2.79 [03-31 @ 06:09]    Tacrolimus (), Serum: 13.9 ng/mL (04-03 @ 07:34)  Tacrolimus (), Serum: 7.2 ng/mL (04-02 @ 06:52)  Tacrolimus (), Serum: 6.0 ng/mL (04-01 @ 06:28)  Tacrolimus (), Serum: 5.7 ng/mL (03-31 @ 06:06)      Urinalysis - [04-04-23 @ 09:36]      Color BROWN / Appearance Turbid / SG 1.018 / pH 6.0      Gluc Trace / Ketone Negative  / Bili Negative / Urobili Negative       Blood Large / Protein 100 mg/dl / Leuk Est Moderate / Nitrite Negative      RBC 3357 / WBC 42 / Hyaline 2 / Gran  / Sq Epi  / Non Sq Epi 20 / Bacteria Negative      Iron 133, TIBC 274, %sat 48      [04-02-23 @ 12:02]  Ferritin 1145      [04-02-23 @ 12:02]  TSH 0.32      [03-30-23 @ 09:21]    HBsAb <3.0      [03-28-23 @ 13:31]  HBsAg Nonreact      [03-28-23 @ 14:10]  HBcAb Nonreact      [03-28-23 @ 13:31]  HCV 0.09, Nonreact      [03-28-23 @ 13:31]  HIV Nonreact      [03-28-23 @ 14:10]

## 2023-04-04 NOTE — PROGRESS NOTE ADULT - ASSESSMENT
67-year-old [place] female with HTN,  Alport syndrome and CKD 5  (x 15 years), LUE AVF placed in 2017, recently started HD on 3/17/2023 (Tanja, Nephrologist Dr. Jet Horn ) now s/p R DDRT under Simulect.    S/P DDRT-POD #7  - good graft function with good UOP  - strict I&Os   - Tolerating regular diet   - SCDs / spirometry  - bowel regimen  - ID: Donor BCx with Klebsiella, s/p 7d course of Ceftriaxone. Now with dysuria, UA +leuks/WBC, will review with ID  - ureter stented, but no concern for retention (post void bladder scan negative)  - SQH    Immunosuppression:   - ENV daily based on level, MMF 1gm BID, SST, Simulect completed  - PPX: nystatin, bactrim, Valcyte    RUE DVT   - RUE duplex with acute DVT in R brachial vein in the mid upper arm with venous catheter in place, midline removed.  - Vascular consulted  - given high risk for bleed, will keep pt on Eliquis 5BID

## 2023-04-05 LAB
ALBUMIN SERPL ELPH-MCNC: 3.3 G/DL — SIGNIFICANT CHANGE UP (ref 3.3–5)
ALP SERPL-CCNC: 70 U/L — SIGNIFICANT CHANGE UP (ref 40–120)
ALT FLD-CCNC: 45 U/L — SIGNIFICANT CHANGE UP (ref 10–45)
ANION GAP SERPL CALC-SCNC: 11 MMOL/L — SIGNIFICANT CHANGE UP (ref 5–17)
AST SERPL-CCNC: 29 U/L — SIGNIFICANT CHANGE UP (ref 10–40)
BASOPHILS # BLD AUTO: 0.02 K/UL — SIGNIFICANT CHANGE UP (ref 0–0.2)
BASOPHILS NFR BLD AUTO: 0.2 % — SIGNIFICANT CHANGE UP (ref 0–2)
BILIRUB SERPL-MCNC: 0.6 MG/DL — SIGNIFICANT CHANGE UP (ref 0.2–1.2)
BUN SERPL-MCNC: 44 MG/DL — HIGH (ref 7–23)
CALCIUM SERPL-MCNC: 8.7 MG/DL — SIGNIFICANT CHANGE UP (ref 8.4–10.5)
CHLORIDE SERPL-SCNC: 106 MMOL/L — SIGNIFICANT CHANGE UP (ref 96–108)
CO2 SERPL-SCNC: 22 MMOL/L — SIGNIFICANT CHANGE UP (ref 22–31)
CREAT SERPL-MCNC: 1.75 MG/DL — HIGH (ref 0.5–1.3)
EGFR: 31 ML/MIN/1.73M2 — LOW
EOSINOPHIL # BLD AUTO: 0.24 K/UL — SIGNIFICANT CHANGE UP (ref 0–0.5)
EOSINOPHIL NFR BLD AUTO: 2.6 % — SIGNIFICANT CHANGE UP (ref 0–6)
GLUCOSE BLDC GLUCOMTR-MCNC: 171 MG/DL — HIGH (ref 70–99)
GLUCOSE BLDC GLUCOMTR-MCNC: 181 MG/DL — HIGH (ref 70–99)
GLUCOSE BLDC GLUCOMTR-MCNC: 226 MG/DL — HIGH (ref 70–99)
GLUCOSE BLDC GLUCOMTR-MCNC: 234 MG/DL — HIGH (ref 70–99)
GLUCOSE SERPL-MCNC: 137 MG/DL — HIGH (ref 70–99)
HCT VFR BLD CALC: 28.3 % — LOW (ref 34.5–45)
HGB BLD-MCNC: 8.7 G/DL — LOW (ref 11.5–15.5)
IMM GRANULOCYTES NFR BLD AUTO: 5.9 % — HIGH (ref 0–0.9)
LYMPHOCYTES # BLD AUTO: 1.79 K/UL — SIGNIFICANT CHANGE UP (ref 1–3.3)
LYMPHOCYTES # BLD AUTO: 19.7 % — SIGNIFICANT CHANGE UP (ref 13–44)
MAGNESIUM SERPL-MCNC: 1.9 MG/DL — SIGNIFICANT CHANGE UP (ref 1.6–2.6)
MCHC RBC-ENTMCNC: 30.7 GM/DL — LOW (ref 32–36)
MCHC RBC-ENTMCNC: 30.7 PG — SIGNIFICANT CHANGE UP (ref 27–34)
MCV RBC AUTO: 100 FL — SIGNIFICANT CHANGE UP (ref 80–100)
MONOCYTES # BLD AUTO: 0.91 K/UL — HIGH (ref 0–0.9)
MONOCYTES NFR BLD AUTO: 10 % — SIGNIFICANT CHANGE UP (ref 2–14)
NEUTROPHILS # BLD AUTO: 5.58 K/UL — SIGNIFICANT CHANGE UP (ref 1.8–7.4)
NEUTROPHILS NFR BLD AUTO: 61.6 % — SIGNIFICANT CHANGE UP (ref 43–77)
NRBC # BLD: 0 /100 WBCS — SIGNIFICANT CHANGE UP (ref 0–0)
PHOSPHATE SERPL-MCNC: 2.6 MG/DL — SIGNIFICANT CHANGE UP (ref 2.5–4.5)
PLATELET # BLD AUTO: 212 K/UL — SIGNIFICANT CHANGE UP (ref 150–400)
POTASSIUM SERPL-MCNC: 4.5 MMOL/L — SIGNIFICANT CHANGE UP (ref 3.5–5.3)
POTASSIUM SERPL-SCNC: 4.5 MMOL/L — SIGNIFICANT CHANGE UP (ref 3.5–5.3)
PROT SERPL-MCNC: 5.7 G/DL — LOW (ref 6–8.3)
RBC # BLD: 2.83 M/UL — LOW (ref 3.8–5.2)
RBC # FLD: 15.9 % — HIGH (ref 10.3–14.5)
SODIUM SERPL-SCNC: 139 MMOL/L — SIGNIFICANT CHANGE UP (ref 135–145)
TACROLIMUS SERPL-MCNC: 17.3 NG/ML — SIGNIFICANT CHANGE UP
WBC # BLD: 9.08 K/UL — SIGNIFICANT CHANGE UP (ref 3.8–10.5)
WBC # FLD AUTO: 9.08 K/UL — SIGNIFICANT CHANGE UP (ref 3.8–10.5)

## 2023-04-05 PROCEDURE — 99232 SBSQ HOSP IP/OBS MODERATE 35: CPT | Mod: GC

## 2023-04-05 PROCEDURE — 99232 SBSQ HOSP IP/OBS MODERATE 35: CPT

## 2023-04-05 RX ORDER — CIPROFLOXACIN LACTATE 400MG/40ML
250 VIAL (ML) INTRAVENOUS
Refills: 0 | Status: DISCONTINUED | OUTPATIENT
Start: 2023-04-05 | End: 2023-04-06

## 2023-04-05 RX ADMIN — FAMOTIDINE 20 MILLIGRAM(S): 10 INJECTION INTRAVENOUS at 11:22

## 2023-04-05 RX ADMIN — Medication 500 MILLIGRAM(S): at 06:28

## 2023-04-05 RX ADMIN — Medication 1 TABLET(S): at 11:22

## 2023-04-05 RX ADMIN — Medication 30 MILLIGRAM(S): at 06:28

## 2023-04-05 RX ADMIN — POLYETHYLENE GLYCOL 3350 17 GRAM(S): 17 POWDER, FOR SOLUTION ORAL at 11:22

## 2023-04-05 RX ADMIN — APIXABAN 5 MILLIGRAM(S): 2.5 TABLET, FILM COATED ORAL at 06:28

## 2023-04-05 RX ADMIN — VALGANCICLOVIR 450 MILLIGRAM(S): 450 TABLET, FILM COATED ORAL at 11:22

## 2023-04-05 RX ADMIN — Medication 500000 UNIT(S): at 17:37

## 2023-04-05 RX ADMIN — APIXABAN 5 MILLIGRAM(S): 2.5 TABLET, FILM COATED ORAL at 17:38

## 2023-04-05 RX ADMIN — MYCOPHENOLATE MOFETIL 1 GRAM(S): 250 CAPSULE ORAL at 20:04

## 2023-04-05 RX ADMIN — Medication 2: at 12:47

## 2023-04-05 RX ADMIN — Medication 500000 UNIT(S): at 06:28

## 2023-04-05 RX ADMIN — Medication 12.5 MILLIGRAM(S): at 06:28

## 2023-04-05 RX ADMIN — TRAMADOL HYDROCHLORIDE 50 MILLIGRAM(S): 50 TABLET ORAL at 20:06

## 2023-04-05 RX ADMIN — Medication 500000 UNIT(S): at 11:22

## 2023-04-05 RX ADMIN — Medication 12.5 MILLIGRAM(S): at 17:38

## 2023-04-05 RX ADMIN — Medication 2: at 17:38

## 2023-04-05 RX ADMIN — MYCOPHENOLATE MOFETIL 1 GRAM(S): 250 CAPSULE ORAL at 07:31

## 2023-04-05 RX ADMIN — Medication 5 MILLIGRAM(S): at 06:28

## 2023-04-05 RX ADMIN — Medication 1: at 09:04

## 2023-04-05 NOTE — PROGRESS NOTE ADULT - SUBJECTIVE AND OBJECTIVE BOX
[facility] DIVISION OF KIDNEY DISEASES AND HYPERTENSION -- FOLLOW UP NOTE  --------------------------------------------------------------------------------  Chief Complaint:  kidney transplant    24 hour events/subjective:  Pt. seen this AM. Midline removed. Scr. improving. Tacrolimus resumed at reduced dose but level remains elevated. Still with some constipation. PVR negative. Dysuria improving. Has tremors.        PAST HISTORY  --------------------------------------------------------------------------------  No significant changes to PMH, PSH, FHx, SHx, unless otherwise noted    ALLERGIES & MEDICATIONS  --------------------------------------------------------------------------------  Allergies    No Known Allergies    Intolerances      Standing Inpatient Medications  apixaban 5 milliGRAM(s) Oral every 12 hours  chlorhexidine 2% Cloths 1 Application(s) Topical daily  ciprofloxacin     Tablet 250 milliGRAM(s) Oral two times a day  dextrose 5%. 1000 milliLiter(s) IV Continuous <Continuous>  dextrose 5%. 1000 milliLiter(s) IV Continuous <Continuous>  dextrose 50% Injectable 25 Gram(s) IV Push once  dextrose 50% Injectable 12.5 Gram(s) IV Push once  dextrose 50% Injectable 25 Gram(s) IV Push once  famotidine    Tablet 20 milliGRAM(s) Oral daily  glucagon  Injectable 1 milliGRAM(s) IntraMuscular once  insulin lispro (ADMELOG) corrective regimen sliding scale   SubCutaneous three times a day before meals  insulin lispro (ADMELOG) corrective regimen sliding scale   SubCutaneous at bedtime  metoprolol tartrate 12.5 milliGRAM(s) Oral two times a day  mycophenolate mofetil 1 Gram(s) Oral <User Schedule>  NIFEdipine XL 30 milliGRAM(s) Oral daily  nystatin    Suspension 938538 Unit(s) Swish and Swallow four times a day  polyethylene glycol 3350 17 Gram(s) Oral daily  predniSONE   Tablet 5 milliGRAM(s) Oral daily  predniSONE   Tablet   Oral   senna 2 Tablet(s) Oral at bedtime  trimethoprim   80 mG/sulfamethoxazole 400 mG 1 Tablet(s) Oral daily  valGANciclovir 450 milliGRAM(s) Oral daily    PRN Inpatient Medications  calcium carbonate    500 mG (Tums) Chewable 2 Tablet(s) Chew every 8 hours PRN  dextrose Oral Gel 15 Gram(s) Oral once PRN  ondansetron Injectable 4 milliGRAM(s) IV Push every 6 hours PRN  simethicone 80 milliGRAM(s) Chew every 6 hours PRN  traMADol 50 milliGRAM(s) Oral every 4 hours PRN  traMADol 25 milliGRAM(s) Oral every 4 hours PRN      REVIEW OF SYSTEMS  --------------------------------------------------------------------------------  As per HPI    VITALS/PHYSICAL EXAM  --------------------------------------------------------------------------------  T(C): 37.1 (04-05-23 @ 09:00), Max: 37.1 (04-04-23 @ 17:00)  HR: 91 (04-05-23 @ 09:00) (75 - 91)  BP: 124/72 (04-05-23 @ 09:00) (124/72 - 149/83)  RR: 18 (04-05-23 @ 09:00) (18 - 18)  SpO2: 98% (04-05-23 @ 09:00) (97% - 99%)  Wt(kg): --        04-04-23 @ 07:01  -  04-05-23 @ 07:00  --------------------------------------------------------  IN: 960 mL / OUT: 1190 mL / NET: -230 mL    04-05-23 @ 07:01  -  04-05-23 @ 10:10  --------------------------------------------------------  IN: 240 mL / OUT: 120 mL / NET: 120 mL    Physical Exam:  	Gen: NAD  	HEENT: PERRL, supple neck, clear oropharynx  	Pulm: CTA B/L  	CV: RRR, S1S2; no rub  	Back: No spinal or CVA tenderness; no sacral edema  	Abd: +BS, soft, nontender/nondistended                      Transplant: No significant tenderness, swelling, incision c/d/i  	: No suprapubic tenderness  	UE: RUE bruising.   	LE: no LE edema  	Neuro: No focal deficits, tremors +  	Psych: Normal affect and mood  	Skin: Warm, without rashes    LABS/STUDIES  --------------------------------------------------------------------------------              8.7    9.08  >-----------<  212      [04-05-23 @ 06:14]              28.3     139  |  106  |  44  ----------------------------<  137      [04-05-23 @ 06:12]  4.5   |  22  |  1.75        Ca     8.7     [04-05-23 @ 06:12]      Mg     1.9     [04-05-23 @ 06:12]      Phos  2.6     [04-05-23 @ 06:12]    TPro  5.7  /  Alb  3.3  /  TBili  0.6  /  DBili  x   /  AST  29  /  ALT  45  /  AlkPhos  70  [04-05-23 @ 06:12]          Creatinine Trend:  SCr 1.75 [04-05 @ 06:12]  SCr 1.66 [04-04 @ 06:36]  SCr 1.99 [04-03 @ 07:34]  SCr 2.08 [04-02 @ 06:53]  SCr 2.17 [04-01 @ 06:28]    Tacrolimus (), Serum: 17.3 ng/mL (04-05 @ 06:14)  Tacrolimus (), Serum: 17.4 ng/mL (04-04 @ 06:36)  Tacrolimus (), Serum: 13.9 ng/mL (04-03 @ 07:34)  Tacrolimus (), Serum: 7.2 ng/mL (04-02 @ 06:52)        Urinalysis - [04-04-23 @ 09:36]      Color BROWN / Appearance Turbid / SG 1.018 / pH 6.0      Gluc Trace / Ketone Negative  / Bili Negative / Urobili Negative       Blood Large / Protein 100 mg/dl / Leuk Est Moderate / Nitrite Negative      RBC 3357 / WBC 42 / Hyaline 2 / Gran  / Sq Epi  / Non Sq Epi 20 / Bacteria Negative      Iron 133, TIBC 274, %sat 48      [04-02-23 @ 12:02]  Ferritin 1145      [04-02-23 @ 12:02]  TSH 0.32      [03-30-23 @ 09:21]    HBsAb <3.0      [03-28-23 @ 13:31]  HBsAg Nonreact      [03-28-23 @ 14:10]  HBcAb Nonreact      [03-28-23 @ 13:31]  HCV 0.09, Nonreact      [03-28-23 @ 13:31]  HIV Nonreact      [03-28-23 @ 14:10]

## 2023-04-05 NOTE — PROGRESS NOTE ADULT - NS ATTEND OPT1 GEN_ALL_CORE
I attest my time as attending is greater than 50% of the total combined time spent on qualifying patient care activities by the PA/NP and attending.
I independently performed the documented:

## 2023-04-05 NOTE — PROGRESS NOTE ADULT - ASSESSMENT
67-year-old [place] female with HTN,  Alport syndrome and CKD 5 (x 15 years), LUE AVF placed in 2017, recently started HD on 3/17/2023 (Tanja, Nephrologist Dr. Jet Horn ) now s/p R DDRT under Simulect.    1.  Renal transplant - s/p DDRT 3/29.  Scr. now trended to 1.7.  Check UA(showing signs of infection- complete 7 days of Cipro 250mg BID). PVR negative   2.  Immunosuppression - simulect induction, on tacro for target 8-10, MMF 1gm BID and pred taper.  Holding Envarsus for supratherapeutic level.   3.  HTN - improved.   4.  Anemia - s/p transfusion and procrit 10,000 4/1.   5.  Hyperglycemia - no history of diabetes, changed diet to no concentrated carbs. Glucose improved.   6.  Right arm bruising - no palpable hematoma. Line removed. Per Vascular would like Eliquis, will give Eliquis 5mg BID x 3months.    If you have any questions, please feel free to contact me  Leroy Marroquin  Nephrology Fellow  [phone]; Prefer [employer] TEAMS  (After 5pm or on weekends please page the on-call fellow)

## 2023-04-05 NOTE — PROGRESS NOTE ADULT - SUBJECTIVE AND OBJECTIVE BOX
INFECTIOUS DISEASES FOLLOW UP-- Silvia Abdi  [phone]    This is a follow up note for this  67yFemale with  Status post kidney transplant        ROS:  CONSTITUTIONAL:  No fever, good appetite  CARDIOVASCULAR:  No chest pain or palpitations  RESPIRATORY:  No dyspnea  GASTROINTESTINAL:  No nausea, vomiting, diarrhea, or abdominal pain  GENITOURINARY:  No dysuria  NEUROLOGIC:  No headache,     Allergies    No Known Allergies    Intolerances        ANTIBIOTICS/RELEVANT:  antimicrobials  ciprofloxacin     Tablet 250 milliGRAM(s) Oral two times a day  nystatin    Suspension 195238 Unit(s) Swish and Swallow four times a day  trimethoprim   80 mG/sulfamethoxazole 400 mG 1 Tablet(s) Oral daily  valGANciclovir 450 milliGRAM(s) Oral daily    immunologic:  mycophenolate mofetil 1 Gram(s) Oral <User Schedule>    OTHER:  apixaban 5 milliGRAM(s) Oral every 12 hours  calcium carbonate    500 mG (Tums) Chewable 2 Tablet(s) Chew every 8 hours PRN  chlorhexidine 2% Cloths 1 Application(s) Topical daily  dextrose 5%. 1000 milliLiter(s) IV Continuous <Continuous>  dextrose 5%. 1000 milliLiter(s) IV Continuous <Continuous>  dextrose 50% Injectable 25 Gram(s) IV Push once  dextrose 50% Injectable 12.5 Gram(s) IV Push once  dextrose 50% Injectable 25 Gram(s) IV Push once  dextrose Oral Gel 15 Gram(s) Oral once PRN  famotidine    Tablet 20 milliGRAM(s) Oral daily  glucagon  Injectable 1 milliGRAM(s) IntraMuscular once  insulin lispro (ADMELOG) corrective regimen sliding scale   SubCutaneous three times a day before meals  insulin lispro (ADMELOG) corrective regimen sliding scale   SubCutaneous at bedtime  metoprolol tartrate 12.5 milliGRAM(s) Oral two times a day  NIFEdipine XL 30 milliGRAM(s) Oral daily  ondansetron Injectable 4 milliGRAM(s) IV Push every 6 hours PRN  polyethylene glycol 3350 17 Gram(s) Oral daily  predniSONE   Tablet   Oral   predniSONE   Tablet 5 milliGRAM(s) Oral daily  senna 2 Tablet(s) Oral at bedtime  simethicone 80 milliGRAM(s) Chew every 6 hours PRN  traMADol 25 milliGRAM(s) Oral every 4 hours PRN      Objective:  Vital Signs Last 24 Hrs  T(C): 37.1 (2023 17:00), Max: 37.2 (2023 13:00)  T(F): 98.8 (2023 17:00), Max: 98.9 (2023 13:00)  HR: 78 (2023 17:00) (75 - 91)  BP: 118/74 (2023 17:00) (118/74 - 149/83)  BP(mean): 88 (2023 21:05) (88 - 88)  RR: 18 (2023 17:00) (18 - 18)  SpO2: 98% (2023 17:00) (97% - 98%)    Parameters below as of 2023 17:00  Patient On (Oxygen Delivery Method): room air        PHYSICAL EXAM:  Constitutional:no acute distress  Eyes:SAEID, EOMI  Ear/Nose/Throat: no oral lesions, 	  Respiratory: clear BL  Cardiovascular: S1S2  Gastrointestinal:soft, (+) BS, no tenderness  Extremities:no e/e/c  No Lymphadenopathy  IV sites not inflammed.    LABS:                        8.7    9.08  )-----------( 212      ( 2023 06:14 )             28.3     04-05    139  |  106  |  44<H>  ----------------------------<  137<H>  4.5   |  22  |  1.75<H>    Ca    8.7      2023 06:12  Phos  2.6     04-05  Mg     1.9     04-05    TPro  5.7<L>  /  Alb  3.3  /  TBili  0.6  /  DBili  x   /  AST  29  /  ALT  45  /  AlkPhos  70  04-05      Urinalysis Basic - ( 2023 09:36 )    Color: BROWN / Appearance: Turbid / S.018 / pH: x  Gluc: x / Ketone: Negative  / Bili: Negative / Urobili: Negative   Blood: x / Protein: 100 mg/dl / Nitrite: Negative   Leuk Esterase: Moderate / RBC: 3357 /hpf / WBC 42 /HPF   Sq Epi: x / Non Sq Epi: 20 /hpf / Bacteria: Negative        MICROBIOLOGY:            RECENT CULTURES:      RADIOLOGY & ADDITIONAL STUDIES:   INFECTIOUS DISEASES FOLLOW UP-- Silvia Abdi  [phone]    This is a follow up note for this  67yFemale with  Status post kidney transplant  sleeping but sitting in a chair        ROS:  CONSTITUTIONAL:  No fever, good appetite  CARDIOVASCULAR:  No chest pain or palpitations  RESPIRATORY:  No dyspnea  GASTROINTESTINAL:  No nausea, vomiting, diarrhea, or abdominal pain  GENITOURINARY:  No dysuria  NEUROLOGIC:  No headache,     Allergies    No Known Allergies    Intolerances        ANTIBIOTICS/RELEVANT:  antimicrobials  ciprofloxacin     Tablet 250 milliGRAM(s) Oral two times a day  nystatin    Suspension 536992 Unit(s) Swish and Swallow four times a day  trimethoprim   80 mG/sulfamethoxazole 400 mG 1 Tablet(s) Oral daily  valGANciclovir 450 milliGRAM(s) Oral daily    immunologic:  mycophenolate mofetil 1 Gram(s) Oral <User Schedule>    OTHER:  apixaban 5 milliGRAM(s) Oral every 12 hours  calcium carbonate    500 mG (Tums) Chewable 2 Tablet(s) Chew every 8 hours PRN  chlorhexidine 2% Cloths 1 Application(s) Topical daily  dextrose 5%. 1000 milliLiter(s) IV Continuous <Continuous>  dextrose 5%. 1000 milliLiter(s) IV Continuous <Continuous>  dextrose 50% Injectable 25 Gram(s) IV Push once  dextrose 50% Injectable 12.5 Gram(s) IV Push once  dextrose 50% Injectable 25 Gram(s) IV Push once  dextrose Oral Gel 15 Gram(s) Oral once PRN  famotidine    Tablet 20 milliGRAM(s) Oral daily  glucagon  Injectable 1 milliGRAM(s) IntraMuscular once  insulin lispro (ADMELOG) corrective regimen sliding scale   SubCutaneous three times a day before meals  insulin lispro (ADMELOG) corrective regimen sliding scale   SubCutaneous at bedtime  metoprolol tartrate 12.5 milliGRAM(s) Oral two times a day  NIFEdipine XL 30 milliGRAM(s) Oral daily  ondansetron Injectable 4 milliGRAM(s) IV Push every 6 hours PRN  polyethylene glycol 3350 17 Gram(s) Oral daily  predniSONE   Tablet   Oral   predniSONE   Tablet 5 milliGRAM(s) Oral daily  senna 2 Tablet(s) Oral at bedtime  simethicone 80 milliGRAM(s) Chew every 6 hours PRN  traMADol 25 milliGRAM(s) Oral every 4 hours PRN      Objective:  Vital Signs Last 24 Hrs  T(C): 37.1 (2023 17:00), Max: 37.2 (2023 13:00)  T(F): 98.8 (2023 17:00), Max: 98.9 (2023 13:00)  HR: 78 (2023 17:00) (75 - 91)  BP: 118/74 (2023 17:00) (118/74 - 149/83)  BP(mean): 88 (2023 21:05) (88 - 88)  RR: 18 (2023 17:00) (18 - 18)  SpO2: 98% (2023 17:00) (97% - 98%)    Parameters below as of 2023 17:00  Patient On (Oxygen Delivery Method): room air        PHYSICAL EXAM:  Constitutional:no acute distress  Eyes:SAEID, EOMI  Ear/Nose/Throat: no oral lesions, 	  Respiratory: clear BL  Cardiovascular: S1S2  Gastrointestinal:soft, (+) BS, no tenderness  RLQ renal transplant  Extremities:no e/e/c  No Lymphadenopathy  IV sites not inflammed.    LABS:                        8.7    9.08  )-----------( 212      ( 2023 06:14 )             28.3     04-05    139  |  106  |  44<H>  ----------------------------<  137<H>  4.5   |  22  |  1.75<H>    Ca    8.7      2023 06:12  Phos  2.6     04-05  Mg     1.9     04-05    TPro  5.7<L>  /  Alb  3.3  /  TBili  0.6  /  DBili  x   /  AST  29  /  ALT  45  /  AlkPhos  70  04-05      Urinalysis Basic - ( 2023 09:36 )    Color: BROWN / Appearance: Turbid / S.018 / pH: x  Gluc: x / Ketone: Negative  / Bili: Negative / Urobili: Negative   Blood: x / Protein: 100 mg/dl / Nitrite: Negative   Leuk Esterase: Moderate / RBC: 3357 /hpf / WBC 42 /HPF   Sq Epi: x / Non Sq Epi: 20 /hpf / Bacteria: Negative        MICROBIOLOGY:            RECENT CULTURES:      RADIOLOGY & ADDITIONAL STUDIES:

## 2023-04-05 NOTE — PROGRESS NOTE ADULT - SUBJECTIVE AND OBJECTIVE BOX
Transplant Surgery - Multidisciplinary Progress Note  --------------------------------------------------------------  DDRT   3/28/23      POD#8    Present:  Patient seen and examined with multidisciplinary team including Transplant Surgeon: Dr. Baer, LORENA Fair, Transplant Nephrologist: Dr. Elenita Tobin and unit RN during am rounds.  Disciplines not in attendance will be notified of the plan.      HPI: 67-year-old [place] female with HTN,  Alport syndrome and CKD 5  (x 15 years), LUE AVF placed in 2017, recently started HD on 3/17/2023 (Tanja, Nephrologist Dr. Jet Horn ) now s/p R  DDRT under Simulect induction on 3/28/23    Interval Events:  -AFebrile, VSS  -dysuria improving on Cipro  -good graft function  -constipation  -no other complaints at this time    Immunosuppression   Induction:  Simulect completed  Maintenance:  Envarsus by level, MMF 1gm BId, SST  Ongoing monitoring for signs of rejection.    Potential Discharge date: tomorrow  Education:  Medications  Plan of care:  See Below                MEDICATIONS  (STANDING):  apixaban 5 milliGRAM(s) Oral every 12 hours  chlorhexidine 2% Cloths 1 Application(s) Topical daily  ciprofloxacin     Tablet 250 milliGRAM(s) Oral two times a day  dextrose 5%. 1000 milliLiter(s) (100 mL/Hr) IV Continuous <Continuous>  dextrose 5%. 1000 milliLiter(s) (50 mL/Hr) IV Continuous <Continuous>  dextrose 50% Injectable 25 Gram(s) IV Push once  dextrose 50% Injectable 12.5 Gram(s) IV Push once  dextrose 50% Injectable 25 Gram(s) IV Push once  famotidine    Tablet 20 milliGRAM(s) Oral daily  glucagon  Injectable 1 milliGRAM(s) IntraMuscular once  insulin lispro (ADMELOG) corrective regimen sliding scale   SubCutaneous three times a day before meals  insulin lispro (ADMELOG) corrective regimen sliding scale   SubCutaneous at bedtime  metoprolol tartrate 12.5 milliGRAM(s) Oral two times a day  mycophenolate mofetil 1 Gram(s) Oral <User Schedule>  NIFEdipine XL 30 milliGRAM(s) Oral daily  nystatin    Suspension 281572 Unit(s) Swish and Swallow four times a day  polyethylene glycol 3350 17 Gram(s) Oral daily  predniSONE   Tablet   Oral   predniSONE   Tablet 5 milliGRAM(s) Oral daily  senna 2 Tablet(s) Oral at bedtime  trimethoprim   80 mG/sulfamethoxazole 400 mG 1 Tablet(s) Oral daily  valGANciclovir 450 milliGRAM(s) Oral daily    MEDICATIONS  (PRN):  calcium carbonate    500 mG (Tums) Chewable 2 Tablet(s) Chew every 8 hours PRN Heartburn  dextrose Oral Gel 15 Gram(s) Oral once PRN Blood Glucose LESS THAN 70 milliGRAM(s)/deciliter  ondansetron Injectable 4 milliGRAM(s) IV Push every 6 hours PRN Nausea and/or Vomiting  simethicone 80 milliGRAM(s) Chew every 6 hours PRN Gas  traMADol 25 milliGRAM(s) Oral every 4 hours PRN Moderate Pain (4 - 6)  traMADol 50 milliGRAM(s) Oral every 4 hours PRN Severe Pain (7 - 10)      PAST MEDICAL & SURGICAL HISTORY:  Alport's syndrome      HTN (hypertension)      Hard of hearing  uses hearing aids      Anemia      ESRD with anemia  left AV fistula, not in use      Deep vein thrombosis (DVT) of right upper extremity      Deep vein thrombosis (DVT) of right upper extremity      S/P tonsillectomy      A-V fistula  Nov 2017          Vital Signs Last 24 Hrs  T(C): 37.1 (05 Apr 2023 09:00), Max: 37.1 (04 Apr 2023 17:00)  T(F): 98.8 (05 Apr 2023 09:00), Max: 98.8 (04 Apr 2023 17:00)  HR: 91 (05 Apr 2023 09:00) (75 - 91)  BP: 124/72 (05 Apr 2023 09:00) (124/72 - 149/83)  BP(mean): 88 (04 Apr 2023 21:05) (88 - 88)  RR: 18 (05 Apr 2023 09:00) (18 - 18)  SpO2: 98% (05 Apr 2023 09:00) (97% - 99%)    Parameters below as of 05 Apr 2023 09:00  Patient On (Oxygen Delivery Method): room air        I&O's Summary    04 Apr 2023 07:01  -  05 Apr 2023 07:00  --------------------------------------------------------  IN: 960 mL / OUT: 1190 mL / NET: -230 mL    05 Apr 2023 07:01  -  05 Apr 2023 12:34  --------------------------------------------------------  IN: 480 mL / OUT: 220 mL / NET: 260 mL                              8.7    9.08  )-----------( 212      ( 05 Apr 2023 06:14 )             28.3     04-05    139  |  106  |  44<H>  ----------------------------<  137<H>  4.5   |  22  |  1.75<H>    Ca    8.7      05 Apr 2023 06:12  Phos  2.6     04-05  Mg     1.9     04-05    TPro  5.7<L>  /  Alb  3.3  /  TBili  0.6  /  DBili  x   /  AST  29  /  ALT  45  /  AlkPhos  70  04-05    Tacrolimus (), Serum: 17.3 ng/mL (04-05 @ 06:14)        Culture - Blood (collected 03-29-23 @ 12:40)  Source: .Blood Blood-Peripheral  Final Report (04-03-23 @ 18:00):    No Growth Final                    Review of systems  All other systems were reviewed and are negative, except as noted.      PHYSICAL EXAM:  Constitutional: Well developed / well nourished  Eyes: Anicteric, PERRLA  ENMT: nc/at  Neck: supple  Respiratory: CTA B/L  Cardiovascular: RRR  Gastrointestinal: Soft, non distended, mild tenderness at the incision site; incision c/d/i;    Genitourinary: Voids  Extremities: SCD's in place and working bilaterally, no edema, AVF +Thrill/bruit   Vascular: Palpable dp pulses bilaterally  Neurological: A&O x3  Skin: no rashes, ulcerations or lesions;  Musculoskeletal: Moving all extremities  Psychiatric: Responsive

## 2023-04-05 NOTE — PROGRESS NOTE ADULT - ASSESSMENT
67-year-old [place] female with HTN,  Alport syndrome and CKD 5  (x 15 years), LUE AVF placed in 2017, recently started HD on 3/17/2023 (Tanja, Nephrologist Dr. Jet Horn ) now s/p R DDRT under Simulect.    S/P DDRT-POD #7  - good graft function with good UOP  - strict I&Os   - Tolerating regular diet   - SCDs / spirometry  - bowel regimen  - ID: Donor BCx with Klebsiella, s/p 7d course of Ceftriaxone. will continue Cipro for 7 days  - ureter stented, but no concern for retention (post void bladder scan negative)  - SQH    Immunosuppression:   - ENV daily based on level, MMF 1gm BID, SST, Simulect completed  - PPX: nystatin, bactrim, Valcyte    RUE DVT   - RUE duplex with acute DVT in R brachial vein in the mid upper arm with venous catheter in place, midline removed.  - Vascular consulted      - given high risk for bleed, will keep pt on Eliquis 5BID

## 2023-04-05 NOTE — PROGRESS NOTE ADULT - ASSESSMENT
ESRD s/p ddrt  Donor had polymicrobial bacteremia    records from OSH clarified  Donor had stre and klebsiella in blood   Staph aurues (MSSA) was from a respiratory culture  Surveillance blood cultures are negative    Completed Ceftriaxone x 7days  Urine with WBCs and leukocyte esterase but no bacteria seen- started on oral Cipro pending culture results  would decrease the oral Cipro to 250mg q 12hr  Please check ECG for Qtc interval prior to discharge  Continue prophylaxis with valcyte, Bactrim, nystatin         Henrry Abdi MD  Can be called via Teams  After 5pm/weekends [phone]

## 2023-04-06 VITALS
HEART RATE: 87 BPM | DIASTOLIC BLOOD PRESSURE: 71 MMHG | SYSTOLIC BLOOD PRESSURE: 136 MMHG | RESPIRATION RATE: 18 BRPM | OXYGEN SATURATION: 99 % | TEMPERATURE: 98 F

## 2023-04-06 LAB
ALBUMIN SERPL ELPH-MCNC: 3.2 G/DL — LOW (ref 3.3–5)
ALP SERPL-CCNC: 71 U/L — SIGNIFICANT CHANGE UP (ref 40–120)
ALT FLD-CCNC: 45 U/L — SIGNIFICANT CHANGE UP (ref 10–45)
ANION GAP SERPL CALC-SCNC: 10 MMOL/L — SIGNIFICANT CHANGE UP (ref 5–17)
AST SERPL-CCNC: 22 U/L — SIGNIFICANT CHANGE UP (ref 10–40)
BASOPHILS # BLD AUTO: 0.02 K/UL — SIGNIFICANT CHANGE UP (ref 0–0.2)
BASOPHILS NFR BLD AUTO: 0.3 % — SIGNIFICANT CHANGE UP (ref 0–2)
BILIRUB SERPL-MCNC: 0.6 MG/DL — SIGNIFICANT CHANGE UP (ref 0.2–1.2)
BUN SERPL-MCNC: 39 MG/DL — HIGH (ref 7–23)
CALCIUM SERPL-MCNC: 8.6 MG/DL — SIGNIFICANT CHANGE UP (ref 8.4–10.5)
CHLORIDE SERPL-SCNC: 107 MMOL/L — SIGNIFICANT CHANGE UP (ref 96–108)
CO2 SERPL-SCNC: 21 MMOL/L — LOW (ref 22–31)
CREAT SERPL-MCNC: 1.68 MG/DL — HIGH (ref 0.5–1.3)
EGFR: 33 ML/MIN/1.73M2 — LOW
EOSINOPHIL # BLD AUTO: 0.18 K/UL — SIGNIFICANT CHANGE UP (ref 0–0.5)
EOSINOPHIL NFR BLD AUTO: 2.3 % — SIGNIFICANT CHANGE UP (ref 0–6)
GLUCOSE BLDC GLUCOMTR-MCNC: 163 MG/DL — HIGH (ref 70–99)
GLUCOSE BLDC GLUCOMTR-MCNC: 219 MG/DL — HIGH (ref 70–99)
GLUCOSE SERPL-MCNC: 140 MG/DL — HIGH (ref 70–99)
HCT VFR BLD CALC: 27.4 % — LOW (ref 34.5–45)
HGB BLD-MCNC: 8.5 G/DL — LOW (ref 11.5–15.5)
IMM GRANULOCYTES NFR BLD AUTO: 6.2 % — HIGH (ref 0–0.9)
LYMPHOCYTES # BLD AUTO: 1.52 K/UL — SIGNIFICANT CHANGE UP (ref 1–3.3)
LYMPHOCYTES # BLD AUTO: 19.2 % — SIGNIFICANT CHANGE UP (ref 13–44)
MAGNESIUM SERPL-MCNC: 2.1 MG/DL — SIGNIFICANT CHANGE UP (ref 1.6–2.6)
MCHC RBC-ENTMCNC: 31 GM/DL — LOW (ref 32–36)
MCHC RBC-ENTMCNC: 31.4 PG — SIGNIFICANT CHANGE UP (ref 27–34)
MCV RBC AUTO: 101.1 FL — HIGH (ref 80–100)
MONOCYTES # BLD AUTO: 0.77 K/UL — SIGNIFICANT CHANGE UP (ref 0–0.9)
MONOCYTES NFR BLD AUTO: 9.7 % — SIGNIFICANT CHANGE UP (ref 2–14)
NEUTROPHILS # BLD AUTO: 4.95 K/UL — SIGNIFICANT CHANGE UP (ref 1.8–7.4)
NEUTROPHILS NFR BLD AUTO: 62.3 % — SIGNIFICANT CHANGE UP (ref 43–77)
NRBC # BLD: 0 /100 WBCS — SIGNIFICANT CHANGE UP (ref 0–0)
PHOSPHATE SERPL-MCNC: 2.8 MG/DL — SIGNIFICANT CHANGE UP (ref 2.5–4.5)
PLATELET # BLD AUTO: 183 K/UL — SIGNIFICANT CHANGE UP (ref 150–400)
POTASSIUM SERPL-MCNC: 4.3 MMOL/L — SIGNIFICANT CHANGE UP (ref 3.5–5.3)
POTASSIUM SERPL-SCNC: 4.3 MMOL/L — SIGNIFICANT CHANGE UP (ref 3.5–5.3)
PROT SERPL-MCNC: 5.7 G/DL — LOW (ref 6–8.3)
RBC # BLD: 2.71 M/UL — LOW (ref 3.8–5.2)
RBC # FLD: 16.3 % — HIGH (ref 10.3–14.5)
SODIUM SERPL-SCNC: 138 MMOL/L — SIGNIFICANT CHANGE UP (ref 135–145)
TACROLIMUS SERPL-MCNC: 9.7 NG/ML — SIGNIFICANT CHANGE UP
WBC # BLD: 7.93 K/UL — SIGNIFICANT CHANGE UP (ref 3.8–10.5)
WBC # FLD AUTO: 7.93 K/UL — SIGNIFICANT CHANGE UP (ref 3.8–10.5)

## 2023-04-06 PROCEDURE — 97161 PT EVAL LOW COMPLEX 20 MIN: CPT

## 2023-04-06 PROCEDURE — 86706 HEP B SURFACE ANTIBODY: CPT

## 2023-04-06 PROCEDURE — 81001 URINALYSIS AUTO W/SCOPE: CPT

## 2023-04-06 PROCEDURE — 87522 HEPATITIS C REVRS TRNSCRPJ: CPT

## 2023-04-06 PROCEDURE — 86900 BLOOD TYPING SEROLOGIC ABO: CPT

## 2023-04-06 PROCEDURE — 86850 RBC ANTIBODY SCREEN: CPT

## 2023-04-06 PROCEDURE — 97110 THERAPEUTIC EXERCISES: CPT

## 2023-04-06 PROCEDURE — 85025 COMPLETE CBC W/AUTO DIFF WBC: CPT

## 2023-04-06 PROCEDURE — 84480 ASSAY TRIIODOTHYRONINE (T3): CPT

## 2023-04-06 PROCEDURE — U0003: CPT

## 2023-04-06 PROCEDURE — 82962 GLUCOSE BLOOD TEST: CPT

## 2023-04-06 PROCEDURE — 97116 GAIT TRAINING THERAPY: CPT

## 2023-04-06 PROCEDURE — 82746 ASSAY OF FOLIC ACID SERUM: CPT

## 2023-04-06 PROCEDURE — 86923 COMPATIBILITY TEST ELECTRIC: CPT

## 2023-04-06 PROCEDURE — 88313 SPECIAL STAINS GROUP 2: CPT

## 2023-04-06 PROCEDURE — 93971 EXTREMITY STUDY: CPT

## 2023-04-06 PROCEDURE — 86901 BLOOD TYPING SEROLOGIC RH(D): CPT

## 2023-04-06 PROCEDURE — C1751: CPT

## 2023-04-06 PROCEDURE — 84443 ASSAY THYROID STIM HORMONE: CPT

## 2023-04-06 PROCEDURE — 93970 EXTREMITY STUDY: CPT

## 2023-04-06 PROCEDURE — 82607 VITAMIN B-12: CPT

## 2023-04-06 PROCEDURE — 87389 HIV-1 AG W/HIV-1&-2 AB AG IA: CPT

## 2023-04-06 PROCEDURE — 85045 AUTOMATED RETICULOCYTE COUNT: CPT

## 2023-04-06 PROCEDURE — 36569 INSJ PICC 5 YR+ W/O IMAGING: CPT

## 2023-04-06 PROCEDURE — C2617: CPT

## 2023-04-06 PROCEDURE — 83540 ASSAY OF IRON: CPT

## 2023-04-06 PROCEDURE — 86704 HEP B CORE ANTIBODY TOTAL: CPT

## 2023-04-06 PROCEDURE — 93005 ELECTROCARDIOGRAM TRACING: CPT

## 2023-04-06 PROCEDURE — 87340 HEPATITIS B SURFACE AG IA: CPT

## 2023-04-06 PROCEDURE — 76776 US EXAM K TRANSPL W/DOPPLER: CPT

## 2023-04-06 PROCEDURE — 80202 ASSAY OF VANCOMYCIN: CPT

## 2023-04-06 PROCEDURE — 80197 ASSAY OF TACROLIMUS: CPT

## 2023-04-06 PROCEDURE — 83735 ASSAY OF MAGNESIUM: CPT

## 2023-04-06 PROCEDURE — 87040 BLOOD CULTURE FOR BACTERIA: CPT

## 2023-04-06 PROCEDURE — 36430 TRANSFUSION BLD/BLD COMPNT: CPT

## 2023-04-06 PROCEDURE — 84436 ASSAY OF TOTAL THYROXINE: CPT

## 2023-04-06 PROCEDURE — 99232 SBSQ HOSP IP/OBS MODERATE 35: CPT | Mod: GC

## 2023-04-06 PROCEDURE — 84100 ASSAY OF PHOSPHORUS: CPT

## 2023-04-06 PROCEDURE — 82728 ASSAY OF FERRITIN: CPT

## 2023-04-06 PROCEDURE — 97165 OT EVAL LOW COMPLEX 30 MIN: CPT

## 2023-04-06 PROCEDURE — C1769: CPT

## 2023-04-06 PROCEDURE — 88305 TISSUE EXAM BY PATHOLOGIST: CPT

## 2023-04-06 PROCEDURE — C9399: CPT

## 2023-04-06 PROCEDURE — 83550 IRON BINDING TEST: CPT

## 2023-04-06 PROCEDURE — 86803 HEPATITIS C AB TEST: CPT

## 2023-04-06 PROCEDURE — 83036 HEMOGLOBIN GLYCOSYLATED A1C: CPT

## 2023-04-06 PROCEDURE — 80053 COMPREHEN METABOLIC PANEL: CPT

## 2023-04-06 PROCEDURE — P9016: CPT

## 2023-04-06 PROCEDURE — 36415 COLL VENOUS BLD VENIPUNCTURE: CPT

## 2023-04-06 PROCEDURE — 85730 THROMBOPLASTIN TIME PARTIAL: CPT

## 2023-04-06 PROCEDURE — 85610 PROTHROMBIN TIME: CPT

## 2023-04-06 RX ORDER — METOPROLOL TARTRATE 50 MG
1 TABLET ORAL
Qty: 30 | Refills: 11 | DISCHARGE
Start: 2023-04-06 | End: 2024-03-30

## 2023-04-06 RX ORDER — METOPROLOL TARTRATE 50 MG
0.5 TABLET ORAL
Qty: 0 | Refills: 0 | DISCHARGE
Start: 2023-04-06

## 2023-04-06 RX ORDER — CIPROFLOXACIN LACTATE 400MG/40ML
1 VIAL (ML) INTRAVENOUS
Qty: 10 | Refills: 0
Start: 2023-04-06 | End: 2023-04-10

## 2023-04-06 RX ORDER — TACROLIMUS 5 MG/1
2 CAPSULE ORAL
Refills: 0 | Status: DISCONTINUED | OUTPATIENT
Start: 2023-04-07 | End: 2023-04-06

## 2023-04-06 RX ORDER — METOPROLOL TARTRATE 50 MG
0.5 TABLET ORAL
Qty: 30 | Refills: 11
Start: 2023-04-06 | End: 2024-03-30

## 2023-04-06 RX ORDER — APIXABAN 2.5 MG/1
1 TABLET, FILM COATED ORAL
Qty: 0 | Refills: 0 | DISCHARGE
Start: 2023-04-06

## 2023-04-06 RX ORDER — TACROLIMUS 5 MG/1
2 CAPSULE ORAL
Qty: 0 | Refills: 0 | DISCHARGE
Start: 2023-04-06

## 2023-04-06 RX ORDER — TACROLIMUS 5 MG/1
2 CAPSULE ORAL ONCE
Refills: 0 | Status: COMPLETED | OUTPATIENT
Start: 2023-04-06 | End: 2023-04-06

## 2023-04-06 RX ADMIN — FAMOTIDINE 20 MILLIGRAM(S): 10 INJECTION INTRAVENOUS at 11:19

## 2023-04-06 RX ADMIN — APIXABAN 5 MILLIGRAM(S): 2.5 TABLET, FILM COATED ORAL at 06:02

## 2023-04-06 RX ADMIN — Medication 1 TABLET(S): at 11:19

## 2023-04-06 RX ADMIN — Medication 12.5 MILLIGRAM(S): at 06:03

## 2023-04-06 RX ADMIN — Medication 30 MILLIGRAM(S): at 06:02

## 2023-04-06 RX ADMIN — MYCOPHENOLATE MOFETIL 1 GRAM(S): 250 CAPSULE ORAL at 07:50

## 2023-04-06 RX ADMIN — Medication 250 MILLIGRAM(S): at 06:22

## 2023-04-06 RX ADMIN — Medication 500000 UNIT(S): at 06:02

## 2023-04-06 RX ADMIN — VALGANCICLOVIR 450 MILLIGRAM(S): 450 TABLET, FILM COATED ORAL at 11:19

## 2023-04-06 RX ADMIN — Medication 5 MILLIGRAM(S): at 06:02

## 2023-04-06 RX ADMIN — TACROLIMUS 2 MILLIGRAM(S): 5 CAPSULE ORAL at 12:36

## 2023-04-06 RX ADMIN — Medication 500000 UNIT(S): at 00:00

## 2023-04-06 RX ADMIN — Medication 1: at 08:07

## 2023-04-06 RX ADMIN — TRAMADOL HYDROCHLORIDE 50 MILLIGRAM(S): 50 TABLET ORAL at 06:36

## 2023-04-06 RX ADMIN — Medication 500000 UNIT(S): at 11:19

## 2023-04-06 RX ADMIN — Medication 2: at 12:36

## 2023-04-06 NOTE — PROGRESS NOTE ADULT - SUBJECTIVE AND OBJECTIVE BOX
Transplant Surgery - Multidisciplinary Progress Note  --------------------------------------------------------------  DDRT   3/28/23      POD#9    Present:  Patient seen and examined with multidisciplinary team including Transplant Surgeon: Dr. Baer, LORENA Fair, Transplant Nephrologist: Dr. Elenita Tobin, Pharmacy and unit RN during am rounds.  Disciplines not in attendance will be notified of the plan.      HPI: 67-year-old [place] female with HTN,  Alport syndrome and CKD 5  (x 15 years), LUE AVF placed in 2017, recently started HD on 3/17/2023 (Tanja, Nephrologist Dr. Jet Horn ) now s/p R  DDRT under Simulect induction on 3/28/23    Interval Events:  -AFebrile, VSS  -dysuria improving on Cipro  -good graft function  -constipation resolved  -no other complaints at this time    Immunosuppression   Induction:  Simulect completed  Maintenance:  Envarsus by level, MMF 1gm BId, SST  Ongoing monitoring for signs of rejection.    Potential Discharge date: today  Education:  Medications  Plan of care:  See Below            MEDICATIONS  (STANDING):  apixaban 5 milliGRAM(s) Oral every 12 hours  chlorhexidine 2% Cloths 1 Application(s) Topical daily  ciprofloxacin     Tablet 250 milliGRAM(s) Oral two times a day  dextrose 5%. 1000 milliLiter(s) (100 mL/Hr) IV Continuous <Continuous>  dextrose 5%. 1000 milliLiter(s) (50 mL/Hr) IV Continuous <Continuous>  dextrose 50% Injectable 25 Gram(s) IV Push once  dextrose 50% Injectable 12.5 Gram(s) IV Push once  dextrose 50% Injectable 25 Gram(s) IV Push once  famotidine    Tablet 20 milliGRAM(s) Oral daily  glucagon  Injectable 1 milliGRAM(s) IntraMuscular once  insulin lispro (ADMELOG) corrective regimen sliding scale   SubCutaneous three times a day before meals  insulin lispro (ADMELOG) corrective regimen sliding scale   SubCutaneous at bedtime  metoprolol tartrate 12.5 milliGRAM(s) Oral two times a day  mycophenolate mofetil 1 Gram(s) Oral <User Schedule>  NIFEdipine XL 30 milliGRAM(s) Oral daily  nystatin    Suspension 458806 Unit(s) Swish and Swallow four times a day  polyethylene glycol 3350 17 Gram(s) Oral daily  predniSONE   Tablet 5 milliGRAM(s) Oral daily  predniSONE   Tablet   Oral   senna 2 Tablet(s) Oral at bedtime  trimethoprim   80 mG/sulfamethoxazole 400 mG 1 Tablet(s) Oral daily  valGANciclovir 450 milliGRAM(s) Oral daily    MEDICATIONS  (PRN):  calcium carbonate    500 mG (Tums) Chewable 2 Tablet(s) Chew every 8 hours PRN Heartburn  dextrose Oral Gel 15 Gram(s) Oral once PRN Blood Glucose LESS THAN 70 milliGRAM(s)/deciliter  ondansetron Injectable 4 milliGRAM(s) IV Push every 6 hours PRN Nausea and/or Vomiting  simethicone 80 milliGRAM(s) Chew every 6 hours PRN Gas      PAST MEDICAL & SURGICAL HISTORY:  Alport's syndrome      HTN (hypertension)      Hard of hearing  uses hearing aids      Anemia      ESRD with anemia  left AV fistula, not in use      Deep vein thrombosis (DVT) of right upper extremity      Deep vein thrombosis (DVT) of right upper extremity      S/P tonsillectomy      A-V fistula  Nov 2017          Vital Signs Last 24 Hrs  T(C): 36.9 (06 Apr 2023 13:00), Max: 37.1 (05 Apr 2023 17:00)  T(F): 98.5 (06 Apr 2023 13:00), Max: 98.8 (05 Apr 2023 17:00)  HR: 79 (06 Apr 2023 13:00) (74 - 84)  BP: 148/84 (06 Apr 2023 13:00) (118/74 - 155/82)  BP(mean): --  RR: 18 (06 Apr 2023 13:00) (18 - 18)  SpO2: 97% (06 Apr 2023 13:00) (97% - 99%)    Parameters below as of 06 Apr 2023 13:00  Patient On (Oxygen Delivery Method): room air        I&O's Summary    05 Apr 2023 07:01  -  06 Apr 2023 07:00  --------------------------------------------------------  IN: 960 mL / OUT: 1600 mL / NET: -640 mL    06 Apr 2023 07:01  -  06 Apr 2023 13:34  --------------------------------------------------------  IN: 480 mL / OUT: 370 mL / NET: 110 mL                              8.5    7.93  )-----------( 183      ( 06 Apr 2023 06:33 )             27.4     04-06    138  |  107  |  39<H>  ----------------------------<  140<H>  4.3   |  21<L>  |  1.68<H>    Ca    8.6      06 Apr 2023 06:33  Phos  2.8     04-06  Mg     2.1     04-06    TPro  5.7<L>  /  Alb  3.2<L>  /  TBili  0.6  /  DBili  x   /  AST  22  /  ALT  45  /  AlkPhos  71  04-06    Tacrolimus (), Serum: 9.7 ng/mL (04-06 @ 06:33)                    Review of systems  All other systems were reviewed and are negative, except as noted.      PHYSICAL EXAM:  Constitutional: Well developed / well nourished  Eyes: Anicteric, PERRLA  ENMT: nc/at  Neck: supple  Respiratory: CTA B/L  Cardiovascular: RRR  Gastrointestinal: Soft, non distended, mild tenderness at the incision site; incision c/d/i;    Genitourinary: Voiding  Extremities: SCD's in place and working bilaterally, no edema, AVF +Thrill/bruit   Vascular: Palpable dp pulses bilaterally  Neurological: A&O x3  Skin: no rashes, ulcerations or lesions;  Musculoskeletal: Moving all extremities  Psychiatric: Responsive

## 2023-04-06 NOTE — PROGRESS NOTE ADULT - ASSESSMENT
67-year-old [place] female with HTN,  Alport syndrome and CKD 5 (x 15 years), LUE AVF placed in 2017, recently started HD on 3/17/2023 ([facility]ja, Nephrologist Dr. Jet Horn ) now s/p R DDRT under Simulect.    1.  Renal transplant - s/p DDRT 3/29.  Scr. now trended to 1.68.  Check UA(showing signs of infection- complete 7 days of Cipro 250mg BID). PVR negative   2.  Immunosuppression - simulect induction, on tacro for target 8-10, MMF 1gm BID and pred taper.  Holding Envarsus for supratherapeutic level.   3.  HTN - improved.   4.  Anemia - s/p transfusion and procrit 10,000 4/1.   5.  Hyperglycemia - no history of diabetes, changed diet to no concentrated carbs. Glucose improved.   6.  Right arm bruising - no palpable hematoma. Line removed. Per Vascular would like Eliquis, will give Eliquis 5mg BID x 3months.    If you have any questions, please feel free to contact me  Leroy Marroquin  Nephrology Fellow  [phone]; Prefer [employer] TEAMS  (After 5pm or on weekends please page the on-call fellow)

## 2023-04-06 NOTE — CHART NOTE - NSCHARTNOTEFT_GEN_A_CORE
Interventional Radiology    OK to place midline IV at bedside. D/w transplant nephrology team.
NGUYEN GARCIA was provided with the Stent Information Sheet.     The patient was educated that they have a ureteral stent to avoid major complications like blockage or leaking following kidney transplantation. The patient was educated that the ureteral stent is left in typically for 4 – 6 weeks and removed by the surgeon on an outpatient basis. The procedure was briefly explained to the patient and will be explained in further detail during the outpatient visit.     Patient was educated on appointment for stent removal and diet restrictions before scheduled procedure. The patient was also instructed to have someone with them to accompany them home as they will not be allowed to drive.     Contact information was provided to the patient for additional questions or if the stent is not removed by 6 weeks.     The patient was provided with written and verbal education about their ureteral stent. All questions and concerns were addressed appropriately with the patient. The patient demonstrated understanding of the information.     Time spent with patient 15  minutes.
Nutrition Follow Up Note  Patient seen for: Nutrition Department protocol for post kidney transplant recipient follow up   Pt is s/p DDRT  Date: 3/28/23  POD# 3    Chart reviewed, events noted. "67-year-old [place] female with HTN,  Alport syndrome and CKD 5  (x 15 years), LUE AVF placed in , recently started HD on 3/17/2023 ([facility]ja, Nephrologist Dr. Jet Horn ) now s/p R DDRT under Simulect."    Source: [X] Patient       [x] Current Medical Record        [] RN        [] Family at bedside       [] Other:    -If unable to interview patient: [] Trach/Vent/BiPAP  [] Disoriented/confused/inappropriate to interview    Diet Order:   Diet, Regular (23)    - Is current order appropriate/adequate? [] Yes  []  No:     - PO intake :   [] >75%  Adequate    [X] 50-75%  Fair       [] <50%  Poor    - Nutrition-related concerns:      - Ordered for transplant meds: Deltasone, Simulect, Solu-medrol, Tacrolimus, Cellcept       -  BG Management: Ordered for SSI       -  Food Preferences updated with RD. Will honor food preferences as appropriate and available.       - Pt provided with post-transplant education on initial assessment, able to teach back 1-2 points at time of RD visit     GI:  Last BM 3/30/23  Bowel Regimen? [X] Yes   [] No    UOP:  485 ml thus far (3/31), 1,203ml (3/30), 1,855 ml (3/29)    Weights: Daily Weight in k.3 (), Weight in k.4 (), Weight in k.2 ()  -- weight changes likely secondary to  perioperative fluid shifts, will continue to monitor     Nutritionally Pertinent MEDICATIONS  (STANDING):  cefTRIAXone   IVPB  dextrose 5%.  dextrose 5%.  dextrose 50% Injectable  dextrose 50% Injectable  dextrose 50% Injectable  famotidine    Tablet  glucagon  Injectable  insulin lispro (ADMELOG) corrective regimen sliding scale  insulin lispro (ADMELOG) corrective regimen sliding scale  methylPREDNISolone sodium succinate Injectable  methylPREDNISolone sodium succinate Injectable  metoprolol tartrate  nystatin    Suspension  polyethylene glycol 3350  senna  trimethoprim   80 mG/sulfamethoxazole 400 mG  valGANciclovir    Pertinent Labs:  @ 06:09: Na 135, BUN 55<H>, Cr 2.79<H>, <H>, K+ 4.3, Phos 3.6, Mg 2.1, Alk Phos 61, ALT/SGPT 17, AST/SGOT 21, HbA1c --    A1C with Estimated Average Glucose Result: 5.5 % (23 @ 09:21)    Finger Sticks:  POCT Blood Glucose.: 259 mg/dL ( @ 12:25)  POCT Blood Glucose.: 146 mg/dL ( @ 08:51)  POCT Blood Glucose.: 193 mg/dL ( @ 21:31)  POCT Blood Glucose.: 175 mg/dL ( @ 17:16)    Skin per nursing documentation: no pressure injuries noted   Edema:  +1 edema noted to right arm, hand and wrist    Estimated Needs:   [X] no change since previous assessment  [] recalculated:     Previous Nutrition Diagnosis:   1. Increased Nutrient Needs  2. Food and Nutrition related knowledge deficit   Nutrition Diagnosis is: [X] ongoing  [] resolved [] not applicable     New Nutrition Diagnosis: [X] Not applicable    Nutrition Care Plan:  [X] In Progress  [] Achieved  [] Not applicable    Nutrition Interventions:     Education Provided:       [X] Yes: Reviewed post-transplant nutrition therapy and food safety guidelines for transplant recipients.  Reviewed recommendations to avoid grapefruit, pomegranate and star fruit while taking immunosuppressant medication.  Reviewed recommendations for moderate intake of sodium with transplant medications.  Reviewed Consistent Carbohydrate diet, including carbohydrate content of foods and portion sizes. Pt is advised that BG management may be more challenging after transplant; follow up with outpatient MD is recommended.  [] No:        Recommendations:         1. Continue diet as ordered/tolerated upon discharge:     2. Recommended follow up visit with Transplant MD & outpatient RD as warranted      3. Provide encouragement with PO intakes, menu selections and assistance with meals as needed      4. Provided education on post-transplant nutrition therapy & food safety guidelines for transplant recipients with nutrition package before discharge- made aware RD remains available      Monitoring and Evaluation:   Continue to monitor patient's weights, labs, PO intakes, urine output, blood glucose levels, and bowel movements.     RD remains available upon request and will follow up per protocol  Kristyn Barnes, MS,RDN, CDN, Pager # [phone] or TEAMS
Nutrition Follow Up Note  Patient seen for: Nutrition Department protocol for post kidney transplant recipient follow up   Pt is s/p DDRT  Date: 3/28/23  POD# 8    Chart reviewed, events noted. "67-year-old [place] female with HTN,  Alport syndrome and CKD 5  (x 15 years), LUE AVF placed in , recently started HD on 3/17/2023 (Tanja, Nephrologist Dr. Jet Horn ) now s/p R DDRT under Simulect."    Source: [X] Patient       [x] Current Medical Record        [] RN        [] Family at bedside       [] Other:    -If unable to interview patient: [] Trach/Vent/BiPAP  [] Disoriented/confused/inappropriate to interview    Diet Order:   Diet, Regular:   Consistent Carbohydrate {Evening Snack} (CSTCHOSN) (23 @ 13:18) [Active]    - Is current order appropriate/adequate? [X] Yes  []  No:     - PO intake :   [] >75%  Adequate    [X] 50-75%  Fair       [] <50%  Poor    - Nutrition-related concerns:      - Ordered for transplant meds: Deltasone, Simulect, Solu-medrol, Tacrolimus, Cellcept       -  BG Management: Ordered for SSI, consistent carbohydrate restriction added, reviewed therapeutic dietary guidelines with pt during follow up visit       -  Pt reports appetite is improving post transplant       - Pt provided with post-transplant education on initial assessment, able to teach back 3 points at time of RD visit     GI:  Last BM 23  Bowel Regimen? [X] Yes   [] No    UOP:  370 ml thus far (), 1,600ml (), 1,190 ml ()    Weights:   Daily Weight in k kg (-), 70.1 kg (-), 69.8 kg (-), 70.4 kg (-), 71.5 kg (-), 70.9 kg (), 71.3 (-), Weight in k.4 (-30), Weight in k.2 (-29)  -- Weight changes likely secondary to perioperative & edema fluid shifts, will continue to monitor     Nutritionally Pertinent MEDICATIONS  (STANDING):  apixaban 5 milliGRAM(s) Oral every 12 hours  ciprofloxacin     Tablet 250 milliGRAM(s) Oral two times a day  dextrose 5%. 1000 milliLiter(s) (100 mL/Hr) IV Continuous <Continuous>  dextrose 5%. 1000 milliLiter(s) (50 mL/Hr) IV Continuous <Continuous>  dextrose 50% Injectable 25 Gram(s) IV Push once  dextrose 50% Injectable 12.5 Gram(s) IV Push once  dextrose 50% Injectable 25 Gram(s) IV Push once  famotidine    Tablet 20 milliGRAM(s) Oral daily  glucagon  Injectable 1 milliGRAM(s) IntraMuscular once  insulin lispro (ADMELOG) corrective regimen sliding scale   SubCutaneous three times a day before meals  insulin lispro (ADMELOG) corrective regimen sliding scale   SubCutaneous at bedtime  metoprolol tartrate 12.5 milliGRAM(s) Oral two times a day  mycophenolate mofetil 1 Gram(s) Oral <User Schedule>  NIFEdipine XL 30 milliGRAM(s) Oral daily  nystatin    Suspension 850343 Unit(s) Swish and Swallow four times a day  polyethylene glycol 3350 17 Gram(s) Oral daily  predniSONE   Tablet 5 milliGRAM(s) Oral daily  predniSONE   Tablet   Oral   senna 2 Tablet(s) Oral at bedtime  trimethoprim   80 mG/sulfamethoxazole 400 mG 1 Tablet(s) Oral daily  valGANciclovir 450 milliGRAM(s) Oral daily    MEDICATIONS  (PRN):  calcium carbonate    500 mG (Tums) Chewable 2 Tablet(s) Chew every 8 hours PRN Heartburn  dextrose Oral Gel 15 Gram(s) Oral once PRN Blood Glucose LESS THAN 70 milliGRAM(s)/deciliter  ondansetron Injectable 4 milliGRAM(s) IV Push every 6 hours PRN Nausea and/or Vomiting  simethicone 80 milliGRAM(s) Chew every 6 hours PRN Gas    Pertinent Labs:  @ 06:33: Na 138, BUN 39<H>, Cr 1.68<H>, <H>, K+ 4.3, Phos 2.8, Mg 2.1, Alk Phos 71, ALT/SGPT 45, AST/SGOT 22, HbA1c --    A1C with Estimated Average Glucose Result: 5.5 % (23 @ 09:21)    Finger Sticks:  POCT Blood Glucose.: 219 mg/dL (23 @ 12:20)  POCT Blood Glucose.: 163 mg/dL (23 @ 08:02)  POCT Blood Glucose.: 181 mg/dL (23 @ 21:33)  POCT Blood Glucose.: 234 mg/dL (23 @ 17:31)      Skin per nursing documentation: no pressure injuries noted   Edema:  +1 edema noted to right arm    Estimated Needs:   [X] no change since previous assessment  [] recalculated:     Previous Nutrition Diagnosis:   1. Increased Nutrient Needs  2. Food and Nutrition related knowledge deficit   Nutrition Diagnosis is: [X] ongoing  [] resolved [] not applicable     New Nutrition Diagnosis: [X] Not applicable    Nutrition Care Plan:  [X] In Progress  [] Achieved  [] Not applicable    Nutrition Interventions:     Education Provided:       [X] Yes: Reviewed post-transplant nutrition therapy and food safety guidelines for transplant recipients.  Reviewed recommendations to avoid grapefruit, pomegranate and star fruit while taking immunosuppressant medication.  Reviewed recommendations for moderate intake of sodium with transplant medications.  Reviewed Consistent Carbohydrate diet, including carbohydrate content of foods and portion sizes. Pt is advised that BG management may be more challenging after transplant; follow up with outpatient MD is recommended.  [] No:        Recommendations:         1. Continue diet as ordered/tolerated upon discharge: Consistent Carbohydrate     2. Recommended follow up visit with Transplant MD & outpatient RD as warranted      3. Provide encouragement with PO intakes, menu selections and assistance with meals as needed      4. Provided education on post-transplant nutrition therapy & food safety guidelines for transplant recipients with nutrition package before discharge- made aware RD remains available      Monitoring and Evaluation:   Continue to monitor patient's weights, labs, PO intakes, urine output, blood glucose levels, and bowel movements.     RD remains available upon request and will follow up per protocol  Kristyn Barnes, MS,RDN, CDN, Pager # [phone] or TEAMS

## 2023-04-06 NOTE — PROGRESS NOTE ADULT - SUBJECTIVE AND OBJECTIVE BOX
[facility] DIVISION OF KIDNEY DISEASES AND HYPERTENSION -- FOLLOW UP NOTE  --------------------------------------------------------------------------------  Chief Complaint:  kidney transplant    24 hour events/subjective:  Pt. seen this AM. Midline removed. Scr. improving. Tacrolimus level remains elevated. Constipation vresolved.    PAST HISTORY  --------------------------------------------------------------------------------  No significant changes to PMH, PSH, FHx, SHx, unless otherwise noted    ALLERGIES & MEDICATIONS  --------------------------------------------------------------------------------  Allergies    No Known Allergies    Intolerances      Standing Inpatient Medications  apixaban 5 milliGRAM(s) Oral every 12 hours  chlorhexidine 2% Cloths 1 Application(s) Topical daily  ciprofloxacin     Tablet 250 milliGRAM(s) Oral two times a day  dextrose 5%. 1000 milliLiter(s) IV Continuous <Continuous>  dextrose 5%. 1000 milliLiter(s) IV Continuous <Continuous>  dextrose 50% Injectable 25 Gram(s) IV Push once  dextrose 50% Injectable 12.5 Gram(s) IV Push once  dextrose 50% Injectable 25 Gram(s) IV Push once  famotidine    Tablet 20 milliGRAM(s) Oral daily  glucagon  Injectable 1 milliGRAM(s) IntraMuscular once  insulin lispro (ADMELOG) corrective regimen sliding scale   SubCutaneous three times a day before meals  insulin lispro (ADMELOG) corrective regimen sliding scale   SubCutaneous at bedtime  metoprolol tartrate 12.5 milliGRAM(s) Oral two times a day  mycophenolate mofetil 1 Gram(s) Oral <User Schedule>  NIFEdipine XL 30 milliGRAM(s) Oral daily  nystatin    Suspension 814476 Unit(s) Swish and Swallow four times a day  polyethylene glycol 3350 17 Gram(s) Oral daily  predniSONE   Tablet 5 milliGRAM(s) Oral daily  predniSONE   Tablet   Oral   senna 2 Tablet(s) Oral at bedtime  trimethoprim   80 mG/sulfamethoxazole 400 mG 1 Tablet(s) Oral daily  valGANciclovir 450 milliGRAM(s) Oral daily    PRN Inpatient Medications  calcium carbonate    500 mG (Tums) Chewable 2 Tablet(s) Chew every 8 hours PRN  dextrose Oral Gel 15 Gram(s) Oral once PRN  ondansetron Injectable 4 milliGRAM(s) IV Push every 6 hours PRN  simethicone 80 milliGRAM(s) Chew every 6 hours PRN      REVIEW OF SYSTEMS  --------------------------------------------------------------------------------  As per HPI    VITALS/PHYSICAL EXAM  --------------------------------------------------------------------------------  T(C): 36.9 (04-06-23 @ 08:57), Max: 37.2 (04-05-23 @ 13:00)  HR: 74 (04-06-23 @ 08:57) (74 - 84)  BP: 144/77 (04-06-23 @ 08:57) (118/74 - 155/82)  RR: 18 (04-06-23 @ 08:57) (18 - 18)  SpO2: 98% (04-06-23 @ 08:57) (97% - 99%)  Wt(kg): --        04-05-23 @ 07:01 - 04-06-23 @ 07:00  --------------------------------------------------------  IN: 960 mL / OUT: 1600 mL / NET: -640 mL    04-06-23 @ 07:01  -  04-06-23 @ 09:50  --------------------------------------------------------  IN: 240 mL / OUT: 100 mL / NET: 140 mL        Physical Exam:  	Gen: NAD  	HEENT: PERRL, supple neck, clear oropharynx  	Pulm: CTA B/L  	CV: RRR, S1S2; no rub  	Back: No spinal or CVA tenderness; no sacral edema  	Abd: +BS, soft, nontender/nondistended                      Transplant: No significant tenderness, swelling, incision c/d/i  	: No suprapubic tenderness  	UE: RUE bruising.   	LE: trace LE edema  	Neuro: No focal deficits  	Psych: Normal affect and mood  	Skin: Warm  LABS/STUDIES  --------------------------------------------------------------------------------              8.5    7.93  >-----------<  183      [04-06-23 @ 06:33]              27.4     138  |  107  |  39  ----------------------------<  140      [04-06-23 @ 06:33]  4.3   |  21  |  1.68        Ca     8.6     [04-06-23 @ 06:33]      Mg     2.1     [04-06-23 @ 06:33]      Phos  2.8     [04-06-23 @ 06:33]    TPro  5.7  /  Alb  3.2  /  TBili  0.6  /  DBili  x   /  AST  22  /  ALT  45  /  AlkPhos  71  [04-06-23 @ 06:33]          Creatinine Trend:  SCr 1.68 [04-06 @ 06:33]  SCr 1.75 [04-05 @ 06:12]  SCr 1.66 [04-04 @ 06:36]  SCr 1.99 [04-03 @ 07:34]  SCr 2.08 [04-02 @ 06:53]    Tacrolimus (), Serum: 9.7 ng/mL (04-06 @ 06:33)  Tacrolimus (), Serum: 17.3 ng/mL (04-05 @ 06:14)  Tacrolimus (), Serum: 17.4 ng/mL (04-04 @ 06:36)  Tacrolimus (), Serum: 13.9 ng/mL (04-03 @ 07:34)    Urinalysis - [04-04-23 @ 09:36]      Color BROWN / Appearance Turbid / SG 1.018 / pH 6.0      Gluc Trace / Ketone Negative  / Bili Negative / Urobili Negative       Blood Large / Protein 100 mg/dl / Leuk Est Moderate / Nitrite Negative      RBC 3357 / WBC 42 / Hyaline 2 / Gran  / Sq Epi  / Non Sq Epi 20 / Bacteria Negative      Iron 133, TIBC 274, %sat 48      [04-02-23 @ 12:02]  Ferritin 1145      [04-02-23 @ 12:02]  TSH 0.32      [03-30-23 @ 09:21]    HBsAb <3.0      [03-28-23 @ 13:31]  HBsAg Nonreact      [03-28-23 @ 14:10]  HBcAb Nonreact      [03-28-23 @ 13:31]  HCV 0.09, Nonreact      [03-28-23 @ 13:31]  HIV Nonreact      [03-28-23 @ 14:10]

## 2023-04-06 NOTE — PROGRESS NOTE ADULT - REASON FOR ADMISSION
possible  donor renal transplant
 donor renal transplant
 donor renal transplant
possible  donor renal transplant
+ donor blood cultures
S/p DDRT
possible  donor renal transplant

## 2023-04-06 NOTE — PROGRESS NOTE ADULT - ATTENDING COMMENTS
67-year-old [facility] female with HTN,  Alport syndrome and CKD 5  (x 15 years), LUE AVF placed in 2017, recently started HD on 3/17/2023 (Tanja, Nephrologist Dr. Jet Horn ) now s/p R DDRT under Simulect.    1.  s/p R DDRT on 3/28/23 - Allograft function with ATN but improving.   2.  Immunosuppression - Simulect induction, on tacro for target 8-10, MMF 1gm BID and pred taper.    3.  HTN - improved.   4.  Anemia - s/p transfusion on 4/1 . continue  procrit 10,000 weekly.   5.  Hyperglycemia - no history of diabetes, changed diet to no concentrated carbs.  Glucose improved.   6.  Right arm bruising - USG revealed upper extremity deep venous thrombosis and superficial venous thrombosis. plan to start eliquis for 3 months ( provoked DVT)
67-year-old [facility] female with HTN,  Alport syndrome and CKD 5  (x 15 years), LUE AVF placed in 2017, recently started HD on 3/17/2023 (Tanja, Nephrologist Dr. Jet Horn ) now s/p R DDRT under Simulect.    1.  s/p R DDRT on 3/28/23 - Allograft function with ATN but improving.   2.  Immunosuppression - Simulect induction, on tacro for target 8-10. Level is high- tac on hold.  MMF 1gm BID and pred taper.    3.  HTN - BP controlled on Nifedipine 30 mg po daily   4.  Anemia - s/p transfusion on 4/1 . continue  procrit 10,000 weekly.   5.  Hyperglycemia - no history of diabetes, changed diet to no concentrated carbs.  Glucose improved.   6.  Right arm bruising - USG revealed upper extremity deep venous thrombosis and superficial venous thrombosis. Started on eliquis  for 3 months ( provoked DVT)
67-year-old [facility] female with HTN,  Alport syndrome and CKD 5  (x 15 years), LUE AVF placed in 2017, recently started HD on 3/17/2023 (Tanja, Nephrologist Dr. Jet Horn ) now s/p R DDRT under Simulect.    1.  s/p R DDRT on 3/28/23 - Allograft function with ATN but improving.   2.  Immunosuppression - Simulect induction, on tacro for target 8-10, MMF 1gm BID and pred taper.    3.  HTN - improved.   4.  Anemia - s/p transfusion on 4/1 . continue  procrit 10,000 weekly.   5.  Hyperglycemia - no history of diabetes, changed diet to no concentrated carbs.  Glucose improved.   6.  Right arm bruising - USG revealed upper extremity deep venous thrombosis and superficial venous thrombosis. Started on eliquis  for 3 months ( provoked DVT)
67-year-old [facility] female with HTN,  Alport syndrome and CKD 5  (x 15 years), LUE AVF placed in 2017, recently started HD on 3/17/2023 (Tanja, Nephrologist Dr. Jet Horn ) now s/p R DDRT under Simulect.    1.  s/p R DDRT on 3/28/23 - Allograft function with ATN but improving.   2.  Immunosuppression - Simulect induction, on tacro for target 8-10. Level is high- tac on hold.  MMF 1gm BID and pred taper.    3.  HTN -  controlled on Nifedipine 30 mg po daily   4.  Anemia - s/p transfusion on 4/1 . continue  procrit 10,000 weekly.   5.  Hyperglycemia - no history of diabetes, changed diet to no concentrated carbs.  Glucose improved.   6.  Right arm bruising - USG revealed upper extremity deep venous thrombosis and superficial venous thrombosis. Started on eliquis  for 3 months ( provoked DVT)
ESRD s/p ddrt  Donor had polymicrobial bacteremia    records from OSH clarified  Donor had stre and klebsiella in blood   Staph aurgabriella (MSSA) was from a respiratory culture    Can change antibiotics to ceftriaxone  Continue antibiotics for 7 days post transplant    Anticipated duration of abx is 7 days post trx    Check surveillance blood cultures    If blood cultures remain negative, would be reasonable to finish 7 day antibiotic course with oral antibiotics at discharge . At that time, could change ceftriaxone to Vantin
ESRD s/p ddrt  Donor had polymicrobial bacteremia    Continue ertapenem and vancomycin at this time    Please obtain sensitivities from donor blood culture to guide therapy.    Anticipated duration of abx is 7 days post trx    Check surveillance blood cultures

## 2023-04-06 NOTE — PROGRESS NOTE ADULT - ASSESSMENT
67-year-old [place] female with HTN,  Alport syndrome and CKD 5  (x 15 years), LUE AVF placed in 2017, recently started HD on 3/17/2023 (Tanja, Nephrologist Dr. Jet Horn ) now s/p R DDRT under Simulect.    S/P DDRT- POD #8  - good graft function with good UOP  - strict I&Os   - Tolerating regular diet   - SCDs / spirometry  - bowel regimen  - ID: Donor BCx with Klebsiella, s/p 7d course of Ceftriaxone. will continue Cipro for 7 days  - ureter stented, but no concern for retention (post void bladder scan negative)  - SQH    Immunosuppression:   - ENV 2, MMF 1gm BID, SST, Simulect completed  - PPX: nystatin, bactrim, Valcyte    RUE DVT   - RUE duplex with acute DVT in R brachial vein in the mid upper arm with venous catheter in place, midline removed.   - Vascular consulted  - given high risk for bleed, will keep pt on Eliquis 5BID    DISPO  -d/c home today. will f/u in clinic on 4/10  -will f/u in 4-6 weeks for removal of ureteral stent

## 2023-04-07 PROBLEM — I82.621 ACUTE EMBOLISM AND THROMBOSIS OF DEEP VEINS OF RIGHT UPPER EXTREMITY: Chronic | Status: ACTIVE | Noted: 2023-04-03

## 2023-04-10 ENCOUNTER — APPOINTMENT (OUTPATIENT)
Dept: NEPHROLOGY | Facility: CLINIC | Age: 68
End: 2023-04-10
Payer: MEDICARE

## 2023-04-10 ENCOUNTER — LABORATORY RESULT (OUTPATIENT)
Age: 68
End: 2023-04-10

## 2023-04-10 ENCOUNTER — NON-APPOINTMENT (OUTPATIENT)
Age: 68
End: 2023-04-10

## 2023-04-10 VITALS — DIASTOLIC BLOOD PRESSURE: 76 MMHG | HEART RATE: 84 BPM | SYSTOLIC BLOOD PRESSURE: 130 MMHG

## 2023-04-10 VITALS
HEART RATE: 100 BPM | OXYGEN SATURATION: 81 % | WEIGHT: 152 LBS | BODY MASS INDEX: 24.43 KG/M2 | TEMPERATURE: 98 F | RESPIRATION RATE: 15 BRPM | HEIGHT: 66 IN

## 2023-04-10 LAB
ALBUMIN SERPL ELPH-MCNC: 3.7 G/DL
ALP BLD-CCNC: 78 U/L
ALT SERPL-CCNC: 24 U/L
ANION GAP SERPL CALC-SCNC: 14 MMOL/L
AST SERPL-CCNC: 15 U/L
BASOPHILS # BLD AUTO: 0.04 K/UL
BASOPHILS NFR BLD AUTO: 0.5 %
BILIRUB SERPL-MCNC: 0.5 MG/DL
BUN SERPL-MCNC: 22 MG/DL
CALCIUM SERPL-MCNC: 8.7 MG/DL
CHLORIDE SERPL-SCNC: 108 MMOL/L
CO2 SERPL-SCNC: 18 MMOL/L
CREAT SERPL-MCNC: 1.21 MG/DL
CREAT SPEC-SCNC: 82 MG/DL
CREAT/PROT UR: 0.6 RATIO
EGFR: 49 ML/MIN/1.73M2
EOSINOPHIL # BLD AUTO: 0.1 K/UL
EOSINOPHIL NFR BLD AUTO: 1.1 %
GLUCOSE SERPL-MCNC: 204 MG/DL
HCT VFR BLD CALC: 27.7 %
HGB BLD-MCNC: 8.5 G/DL
IMM GRANULOCYTES NFR BLD AUTO: 1.6 %
LDH SERPL-CCNC: 262 U/L
LYMPHOCYTES # BLD AUTO: 0.87 K/UL
LYMPHOCYTES NFR BLD AUTO: 9.9 %
MAGNESIUM SERPL-MCNC: 1.9 MG/DL
MAN DIFF?: NORMAL
MCHC RBC-ENTMCNC: 30.7 GM/DL
MCHC RBC-ENTMCNC: 31.1 PG
MCV RBC AUTO: 101.5 FL
MONOCYTES # BLD AUTO: 0.47 K/UL
MONOCYTES NFR BLD AUTO: 5.3 %
NEUTROPHILS # BLD AUTO: 7.18 K/UL
NEUTROPHILS NFR BLD AUTO: 81.6 %
PHOSPHATE SERPL-MCNC: 2.6 MG/DL
PLATELET # BLD AUTO: 220 K/UL
POTASSIUM SERPL-SCNC: 4.4 MMOL/L
PROT SERPL-MCNC: 5.7 G/DL
PROT UR-MCNC: 46 MG/DL
RBC # BLD: 2.73 M/UL
RBC # FLD: 17 %
SODIUM SERPL-SCNC: 141 MMOL/L
TACROLIMUS SERPL-MCNC: 6.6 NG/ML
URATE SERPL-MCNC: 6.6 MG/DL
WBC # FLD AUTO: 8.8 K/UL

## 2023-04-10 PROCEDURE — 99215 OFFICE O/P EST HI 40 MIN: CPT

## 2023-04-10 RX ORDER — TACROLIMUS 4 MG/1
4 TABLET, EXTENDED RELEASE ORAL
Qty: 90 | Refills: 5 | Status: DISCONTINUED | COMMUNITY
Start: 2023-03-29 | End: 2023-04-10

## 2023-04-10 RX ORDER — CIPROFLOXACIN HYDROCHLORIDE 250 MG/1
250 TABLET, FILM COATED ORAL
Qty: 10 | Refills: 0 | Status: DISCONTINUED | COMMUNITY
Start: 2023-04-06 | End: 2023-04-10

## 2023-04-10 RX ORDER — CALCITRIOL 0.25 UG/1
0.25 CAPSULE, LIQUID FILLED ORAL
Qty: 90 | Refills: 3 | Status: DISCONTINUED | COMMUNITY
Start: 2022-03-03 | End: 2023-04-10

## 2023-04-10 RX ORDER — PREDNISONE 5 MG/1
5 TABLET ORAL
Qty: 42 | Refills: 0 | Status: DISCONTINUED | COMMUNITY
Start: 2023-03-29 | End: 2023-04-10

## 2023-04-10 RX ORDER — LIDOCAINE AND PRILOCAINE 25; 25 MG/G; MG/G
2.5-2.5 CREAM TOPICAL
Qty: 6 | Refills: 3 | Status: DISCONTINUED | COMMUNITY
Start: 2023-03-27 | End: 2023-04-10

## 2023-04-10 NOTE — HISTORY OF PRESENT ILLNESS
[FreeTextEntry1] : Patient is here for post transplant out patient follow up.  POst transplant course complicated by DVT right arm after a midline catheter. She continued antibiotics till 4/10 for donor bacterial infection. She is now on Eliquis. Details of hospitalization as noted below. 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 Her son got DDRT at [place] (Dec 3, 2019). \par She has ankle swelling otherwise ok. Denies any h/o CAD/Neoplasia/major infections.\par \par \par \par \par \par \par \par \par Hospital Course: \par Discharge Date	02-Apr-2023 \par Admission Date	28-Mar-2023 12:30 \par Reason for Admission	DDRT \par \par Hospital Course	 \par 67-year-old [place] female with HTN,  Alport syndrome and CKD 5 (x 15 years), LUE \par AVF placed in 2017, recently started HD on 3/17/2023 (Tanja, \par Nephrologist Dr. Jet Horn). She presented to [facility] on 3/28/23 as renal \par transplant candidate. \par \par Now s/p DDRT 3/28/23 (KDPI 50%, DBD, RCr 0.66, CMV/EBV+), with 1a/1v/1u + stent \par and Simulect induction. Immediate post-op US with patent vasculature. \par Creatinine continued to steadily improve, adequate urine output and ansari was \par removed, patient passed TOV. Tolerating diet, return of bowel function, \par ambulating in hallway. SHEELA x2 removed. Reported some dysuria, UA with some WBC, \par started on Ciprofloxacin with improvement in urinary symptoms. \par \par Donor was noted to have Klebsiella bacteremia, patient treated with course of \par Ertapenem/Vanco intra-op, and was de-escalated to Ceftriaxone for total course \par of 7 days (end date 4/3/23).  Midline placed due to poor PIV access in RUE \par after infiltrated PIV, noted to have increased edema in RUE with duplex \par positive for provoked DVT in basilic vein where the midline catheter was \par placed. Vascular surgery consulted, recommending removal of midline and \par anticoagulation which was started with Eliquis \par \par C/o Dysuria, UA mild UTI, treated with Cipro for 7d course with relief of \par symptoms. \par \par Patient seen by the multidisciplinary renal transplant team and deemed stable \par for discharge today. \par \par Will f/u in clinic with lab check on 4/10 \par Will return for ureteral stent removal in 4-6 weeks \par \par ENVARSUS 2, rest standard \par \par  \par Discharge Medications	\par apixaban 5 mg oral tablet: 1 tab(s) orally every 12 hours \par Cipro 250 mg oral tablet: 1 tab(s) orally 2 times a day \par Envarsus XR 1 mg oral tablet, extended release: 2 tab(s) orally once a day \par famotidine 20 mg oral tablet: 1 tab(s) orally once a day \par Metoprolol Tartrate 25 mg oral tablet: 0.5 tab(s) orally 2 times a day \par mycophenolate mofetil 250 mg oral capsule: 1,000 milligram(s) orally 2 times a \par day \par NIFEdipine 30 mg oral tablet, extended release: 1 tab(s) orally once a day \par nystatin 100,000 units/mL oral suspension: 5 milliliter(s) orally 4 times a day \par polyethylene glycol 3350 oral powder for reconstitution: 17 gram(s) orally once \par a day \par predniSONE: 5 milligram(s) orally once a day \par senna leaf extract oral tablet: 2 tab(s) orally once a day (at bedtime) \par sulfamethoxazole-trimethoprim 400 mg-80 mg oral tablet: 1 tab(s) orally once a \par day \par valGANciclovir 450 mg oral tablet: 1 tab(s) orally once a day \par

## 2023-04-10 NOTE — REASON FOR VISIT
[Follow-Up] : a follow-up visit [Spouse] : spouse [FreeTextEntry1] : Here for first post transplant follow up

## 2023-04-10 NOTE — PHYSICAL EXAM
[General Appearance - Alert] : alert [General Appearance - In No Acute Distress] : in no acute distress [Sclera] : the sclera and conjunctiva were normal [PERRL With Normal Accommodation] : pupils were equal in size, round, and reactive to light [Extraocular Movements] : extraocular movements were intact [Outer Ear] : the ears and nose were normal in appearance [Oropharynx] : the oropharynx was normal [Neck Appearance] : the appearance of the neck was normal [Neck Cervical Mass (___cm)] : no neck mass was observed [Jugular Venous Distention Increased] : there was no jugular-venous distention [Thyroid Diffuse Enlargement] : the thyroid was not enlarged [Thyroid Nodule] : there were no palpable thyroid nodules [Auscultation Breath Sounds / Voice Sounds] : lungs were clear to auscultation bilaterally [Heart Rate And Rhythm] : heart rate was normal and rhythm regular [Heart Sounds] : normal S1 and S2 [Heart Sounds Gallop] : no gallops [Murmurs] : no murmurs [Heart Sounds Pericardial Friction Rub] : no pericardial rub [Bowel Sounds] : normal bowel sounds [Abdomen Soft] : soft [Abdomen Tenderness] : non-tender [Abdomen Mass (___ Cm)] : no abdominal mass palpated [FreeTextEntry1] : healed surgical incision [Cervical Lymph Nodes Enlarged Posterior Bilaterally] : posterior cervical [Cervical Lymph Nodes Enlarged Anterior Bilaterally] : anterior cervical [Supraclavicular Lymph Nodes Enlarged Bilaterally] : supraclavicular [Axillary Lymph Nodes Enlarged Bilaterally] : axillary [Inguinal Lymph Nodes Enlarged Bilaterally] : inguinal [Abnormal Walk] : normal gait [Involuntary Movements] : no involuntary movements were seen [___ (cm) Fistula] : [unfilled] (cm) fistula [] : no rash [No Focal Deficits] : no focal deficits [Oriented To Time, Place, And Person] : oriented to person, place, and time [Impaired Insight] : insight and judgment were intact [Affect] : the affect was normal

## 2023-04-10 NOTE — ASSESSMENT
[FreeTextEntry1] : Renal Transplant recipient: Had immediate allograft function, normal creatinine at discharge. Has urinary frequency and nocturia. No dysuria/hematuria. No fever/chills. Tolerating medications.\par Immunosuppression: reviewed; simulect induction, on tac/MMF/prednisone, last Tac level noted; will recheck. Currently on 2 mg   daily.; full dose MMF and prednisone at 5 mg daily\par DVT on Eliquis, arm swelling has resolved\par Edema ankles: Started on Furosemide 40 mg/d, will stop once edema resolves\par Gave flow sheets to maintain home charts of  , blood pressure, temperature, weight and urine output.\par Hypertension: controlled; Reviewed medications.On nifedipine for blood pressure control.\par Cardiovascular risk reduction discussed. On aspirin.\par Infection prophylaxis: On Bactrim, Valcyte and nystatin as well as GI prophylaxis.\par Discussed ambulation, using incentive spirometer, optimal glucose and blood pressure readings, adherence with medications and follow ups, follow up clinic visit schedule, avoiding dehydration, mosquito bites; prevention of DVT as well as food safety.\par She has met with transplant surgeon and ureteral stent removal were discussed.\par

## 2023-04-11 LAB
APPEARANCE: ABNORMAL
BILIRUBIN URINE: NEGATIVE
BLOOD URINE: ABNORMAL
COLOR: NORMAL
GLUCOSE QUALITATIVE U: NEGATIVE
KETONES URINE: NEGATIVE
LEUKOCYTE ESTERASE URINE: NEGATIVE
NITRITE URINE: NEGATIVE
PH URINE: 5.5
PROTEIN URINE: ABNORMAL
SPECIFIC GRAVITY URINE: >=1.03
UROBILINOGEN URINE: NORMAL

## 2023-04-18 NOTE — PATIENT PROFILE ADULT - NSTRANSFEREYEGLASSESPAIRS_GEN_A_NUR
The patient is calling about a refill. He states the pharmacy requested medications for him. He needs Oxycodone, Lisinopril and Pregabalin to pharmacy on file.   1 pair

## 2023-04-19 ENCOUNTER — APPOINTMENT (OUTPATIENT)
Dept: TRANSPLANT | Facility: CLINIC | Age: 68
End: 2023-04-19
Payer: MEDICARE

## 2023-04-19 ENCOUNTER — LABORATORY RESULT (OUTPATIENT)
Age: 68
End: 2023-04-19

## 2023-04-19 VITALS
SYSTOLIC BLOOD PRESSURE: 152 MMHG | RESPIRATION RATE: 15 BRPM | OXYGEN SATURATION: 100 % | WEIGHT: 148 LBS | HEIGHT: 66 IN | DIASTOLIC BLOOD PRESSURE: 76 MMHG | TEMPERATURE: 95.5 F | BODY MASS INDEX: 23.78 KG/M2 | HEART RATE: 88 BPM

## 2023-04-19 PROCEDURE — 99215 OFFICE O/P EST HI 40 MIN: CPT | Mod: 24

## 2023-04-19 NOTE — PLAN
[FreeTextEntry1] : 3 weeks s/p DDRT with good renal function\par RUE DVT - cont eliquis. swelling resolved\par \par Immunosuppression - Cont Envarsus 3mg daily, Prednisone 5mg daily, Cellcept 1gm bid\par Will check tacrolimus level and adjust dose for goal ~8-10\par Prophylaxis with nystatin, valcyte and bactrim\par HTN controlled with current regimen, cont nifedipine\par Will need ureteral stent removal in few weeks\par \par Patient to follow-up with nephrology next week, then continue with weekly appointments.

## 2023-04-19 NOTE — HISTORY OF PRESENT ILLNESS
[ Donor] :  donor [Basiliximab] : basiliximab [Steroids] : steroids [Positive/Negative] : Donor Positive/Recipient Negative [] : Yes [Terminal Creatinine: ____] : Terminal Creatinine: [unfilled] [KDPI: ____] : Kidney Donor Profile Index: [unfilled] [TextBox_34] : Donor klebsiella bacteremia, treated with ertapenem/vanco and then ceftriaxone\par Had RUE DVT from midline catheter and started on eliquis [de-identified] : Patient has had slow improvement, but definitely feels better now.\par RUE swelling and bruising have improved\par LE edema has improved with lasix.\par \par She denies any fevers, dyspnea, abdominal pain, nausea, diarrhea, hematuria or dysuria.\par \par Last Cr 1.2, and she is currently taking Envarsus 3mg daily\par

## 2023-04-19 NOTE — PHYSICAL EXAM
[No Acute Distress] : no acute distress [Sclera Anicteric] : sclera anicteric [Soft] : soft [Non-tender] : non-tender [] : right dorsalis pedis palpable [Normal] : normal [Site: ___] : Site: [unfilled] [Clean] : clean [Dry] : dry [Healing Well] : healing well [FreeTextEntry1] : No oral thrush [TextBox_34] : trace edema b/l LE [TextBox_86] : No inguinal lymphadenopathy  [Bleeding] : no active bleeding [Foul Odor] : no foul smell [Purulent Drainage] : no purulent drainage [Serosanguinous Drainage] : no serosanguinous drainage [Erythema] : not erythematous [Warm] : not warm [Tender] : not tender

## 2023-04-20 ENCOUNTER — NON-APPOINTMENT (OUTPATIENT)
Age: 68
End: 2023-04-20

## 2023-04-20 LAB
ALBUMIN SERPL ELPH-MCNC: 4.2 G/DL
ALP BLD-CCNC: 78 U/L
ALT SERPL-CCNC: 14 U/L
ANION GAP SERPL CALC-SCNC: 16 MMOL/L
APPEARANCE: CLEAR
AST SERPL-CCNC: 13 U/L
BASOPHILS # BLD AUTO: 0.06 K/UL
BASOPHILS NFR BLD AUTO: 1 %
BILIRUB SERPL-MCNC: 0.5 MG/DL
BILIRUBIN URINE: NEGATIVE
BLOOD URINE: ABNORMAL
BUN SERPL-MCNC: 30 MG/DL
CALCIUM SERPL-MCNC: 9.3 MG/DL
CHLORIDE SERPL-SCNC: 101 MMOL/L
CO2 SERPL-SCNC: 21 MMOL/L
COLOR: YELLOW
CREAT SERPL-MCNC: 1.34 MG/DL
CREAT SPEC-SCNC: 43 MG/DL
CREAT/PROT UR: 0.3 RATIO
EGFR: 43 ML/MIN/1.73M2
EOSINOPHIL # BLD AUTO: 0.03 K/UL
EOSINOPHIL NFR BLD AUTO: 0.5 %
GLUCOSE QUALITATIVE U: NEGATIVE MG/DL
GLUCOSE SERPL-MCNC: 225 MG/DL
HCT VFR BLD CALC: 30.3 %
HGB BLD-MCNC: 9.7 G/DL
IMM GRANULOCYTES NFR BLD AUTO: 0.5 %
KETONES URINE: NEGATIVE MG/DL
LEUKOCYTE ESTERASE URINE: NEGATIVE
LYMPHOCYTES # BLD AUTO: 0.78 K/UL
LYMPHOCYTES NFR BLD AUTO: 13 %
MAGNESIUM SERPL-MCNC: 1.6 MG/DL
MAN DIFF?: NORMAL
MCHC RBC-ENTMCNC: 32 GM/DL
MCHC RBC-ENTMCNC: 32 PG
MCV RBC AUTO: 100 FL
MONOCYTES # BLD AUTO: 0.23 K/UL
MONOCYTES NFR BLD AUTO: 3.8 %
NEUTROPHILS # BLD AUTO: 4.86 K/UL
NEUTROPHILS NFR BLD AUTO: 81.2 %
NITRITE URINE: NEGATIVE
PH URINE: 5.5
PHOSPHATE SERPL-MCNC: 2.5 MG/DL
PLATELET # BLD AUTO: 196 K/UL
POTASSIUM SERPL-SCNC: 4.1 MMOL/L
PROT SERPL-MCNC: 6.5 G/DL
PROT UR-MCNC: 14 MG/DL
PROTEIN URINE: NORMAL MG/DL
RBC # BLD: 3.03 M/UL
RBC # FLD: 16.7 %
SODIUM SERPL-SCNC: 138 MMOL/L
SPECIFIC GRAVITY URINE: 1.01
TACROLIMUS SERPL-MCNC: 10.6 NG/ML
UROBILINOGEN URINE: 0.2 MG/DL
WBC # FLD AUTO: 5.99 K/UL

## 2023-04-26 ENCOUNTER — APPOINTMENT (OUTPATIENT)
Dept: NEPHROLOGY | Facility: CLINIC | Age: 68
End: 2023-04-26
Payer: MEDICARE

## 2023-04-26 VITALS
TEMPERATURE: 97.1 F | OXYGEN SATURATION: 99 % | HEIGHT: 66 IN | HEART RATE: 80 BPM | DIASTOLIC BLOOD PRESSURE: 87 MMHG | RESPIRATION RATE: 16 BRPM | WEIGHT: 148 LBS | BODY MASS INDEX: 23.78 KG/M2 | SYSTOLIC BLOOD PRESSURE: 144 MMHG

## 2023-04-26 PROCEDURE — 99214 OFFICE O/P EST MOD 30 MIN: CPT

## 2023-04-26 RX ORDER — FUROSEMIDE 40 MG/1
40 TABLET ORAL DAILY
Qty: 90 | Refills: 3 | Status: DISCONTINUED | COMMUNITY
Start: 2023-04-10 | End: 2023-04-26

## 2023-04-26 NOTE — ASSESSMENT
[FreeTextEntry1] : Renal Transplant recipient: Had immediate allograft function, normal creatinine at discharge. Has urinary frequency and nocturia. No dysuria/hematuria. No fever/chills. Tolerating medications.\par Immunosuppression: reviewed; simulect induction, on tac/MMF/prednisone, last Tac level noted; will recheck. Currently on 3 mg   daily.; full dose MMF and prednisone at 5 mg daily\par DVT on Eliquis, arm swelling has resolved\par Edema ankles:Resolved, off Lasix.\par Gave flow sheets to maintain home charts of  , blood pressure, temperature, weight and urine output.\par Hypertension: controlled; Reviewed medications. \par Infection prophylaxis: On Bactrim, Valcyte and nystatin as well as GI prophylaxis.\par Discussed ambulation, using incentive spirometer, optimal glucose and blood pressure readings, adherence with medications and follow ups, follow up clinic visit schedule, avoiding dehydration, mosquito bites; prevention of DVT as well as food safety.\par She has met with transplant surgeon and ureteral stent removal were discussed.\par

## 2023-04-26 NOTE — HISTORY OF PRESENT ILLNESS
[FreeTextEntry1] : Patient is here for post transplant out patient follow up.  Post transplant course complicated by DVT right arm after a midline catheter. She continued antibiotics till 4/10 for donor bacterial infection. She is now on Eliquis.   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 Her son got DDRT at [place] (Dec 3, 2019). \par She has ankle swelling otherwise ok. Denies any h/o CAD/Neoplasia/major infections.\par \par Overall doing well. Has urinary frequency. She is off Furosemide, no edema.\par \par Has ureter stent\par \par

## 2023-04-27 LAB
ALBUMIN SERPL ELPH-MCNC: 4.2 G/DL
ALP BLD-CCNC: 71 U/L
ALT SERPL-CCNC: 15 U/L
ANION GAP SERPL CALC-SCNC: 16 MMOL/L
APPEARANCE: ABNORMAL
AST SERPL-CCNC: 13 U/L
BACTERIA: NEGATIVE /HPF
BASOPHILS # BLD AUTO: 0.05 K/UL
BASOPHILS NFR BLD AUTO: 0.8 %
BILIRUB SERPL-MCNC: 0.3 MG/DL
BILIRUBIN URINE: NEGATIVE
BLOOD URINE: ABNORMAL
BUN SERPL-MCNC: 26 MG/DL
CALCIUM SERPL-MCNC: 9.3 MG/DL
CAST: 0 /LPF
CHLORIDE SERPL-SCNC: 107 MMOL/L
CO2 SERPL-SCNC: 19 MMOL/L
COLOR: ABNORMAL
CREAT SERPL-MCNC: 1.15 MG/DL
CREAT SPEC-SCNC: 170 MG/DL
CREAT/PROT UR: 0.4 RATIO
EGFR: 52 ML/MIN/1.73M2
EOSINOPHIL # BLD AUTO: 0.04 K/UL
EOSINOPHIL NFR BLD AUTO: 0.7 %
EPITHELIAL CELLS: 14 /HPF
GLUCOSE QUALITATIVE U: NEGATIVE MG/DL
GLUCOSE SERPL-MCNC: 210 MG/DL
HBV SURFACE AG SER QL: NONREACTIVE
HCT VFR BLD CALC: 31 %
HCV AB SER QL: NONREACTIVE
HCV RNA SERPL NAA+PROBE-LOG IU: NOT DETECTED LOGIU/ML
HCV S/CO RATIO: 0.06 S/CO
HEPB DNA PCR INT: NOT DETECTED
HEPB DNA PCR LOG: NOT DETECTED LOGIU/ML
HEPC RNA INTERP: NOT DETECTED
HGB BLD-MCNC: 9.8 G/DL
HIV1 RNA # SERPL NAA+PROBE: NORMAL
HIV1 RNA # SERPL NAA+PROBE: NORMAL COPIES/ML
HIV1+2 AB SPEC QL IA.RAPID: NONREACTIVE
IMM GRANULOCYTES NFR BLD AUTO: 1.2 %
KETONES URINE: NEGATIVE MG/DL
LEUKOCYTE ESTERASE URINE: ABNORMAL
LYMPHOCYTES # BLD AUTO: 0.92 K/UL
LYMPHOCYTES NFR BLD AUTO: 15.6 %
MAGNESIUM SERPL-MCNC: 1.4 MG/DL
MAN DIFF?: NORMAL
MCHC RBC-ENTMCNC: 31.6 GM/DL
MCHC RBC-ENTMCNC: 32.3 PG
MCV RBC AUTO: 102.3 FL
MICROSCOPIC-UA: NORMAL
MONOCYTES # BLD AUTO: 0.31 K/UL
MONOCYTES NFR BLD AUTO: 5.3 %
NEUTROPHILS # BLD AUTO: 4.5 K/UL
NEUTROPHILS NFR BLD AUTO: 76.4 %
NITRITE URINE: NEGATIVE
PH URINE: 5
PHOSPHATE SERPL-MCNC: 3 MG/DL
PLATELET # BLD AUTO: 240 K/UL
POTASSIUM SERPL-SCNC: 4.8 MMOL/L
PROT SERPL-MCNC: 6.1 G/DL
PROT UR-MCNC: 75 MG/DL
PROTEIN URINE: 100 MG/DL
RBC # BLD: 3.03 M/UL
RBC # FLD: 16.8 %
RED BLOOD CELLS URINE: 71 /HPF
REVIEW: NORMAL
SODIUM SERPL-SCNC: 143 MMOL/L
SPECIFIC GRAVITY URINE: 1.02
TACROLIMUS SERPL-MCNC: 10.5 NG/ML
UROBILINOGEN URINE: 0.2 MG/DL
VIRAL LOAD INTERP: NORMAL
VIRAL LOAD LOG: NORMAL LG COP/ML
WBC # FLD AUTO: 5.89 K/UL
WHITE BLOOD CELLS URINE: 6 /HPF
YEAST-LIKE CELLS: PRESENT

## 2023-05-03 ENCOUNTER — NON-APPOINTMENT (OUTPATIENT)
Age: 68
End: 2023-05-03

## 2023-05-03 ENCOUNTER — APPOINTMENT (OUTPATIENT)
Dept: TRANSPLANT | Facility: CLINIC | Age: 68
End: 2023-05-03
Payer: MEDICARE

## 2023-05-03 VITALS
WEIGHT: 148 LBS | RESPIRATION RATE: 16 BRPM | HEIGHT: 66 IN | TEMPERATURE: 98 F | HEART RATE: 79 BPM | OXYGEN SATURATION: 100 % | BODY MASS INDEX: 23.78 KG/M2

## 2023-05-03 LAB
ALBUMIN SERPL ELPH-MCNC: 4.3 G/DL
ALP BLD-CCNC: 71 U/L
ALT SERPL-CCNC: 16 U/L
ANION GAP SERPL CALC-SCNC: 14 MMOL/L
AST SERPL-CCNC: 12 U/L
BASOPHILS # BLD AUTO: 0.07 K/UL
BASOPHILS NFR BLD AUTO: 0.9 %
BILIRUB SERPL-MCNC: 0.4 MG/DL
BUN SERPL-MCNC: 22 MG/DL
CALCIUM SERPL-MCNC: 9.3 MG/DL
CHLORIDE SERPL-SCNC: 108 MMOL/L
CO2 SERPL-SCNC: 19 MMOL/L
CREAT SERPL-MCNC: 1.03 MG/DL
CREAT SPEC-SCNC: 124 MG/DL
CREAT/PROT UR: 0.4 RATIO
EGFR: 59 ML/MIN/1.73M2
EOSINOPHIL # BLD AUTO: 0.04 K/UL
EOSINOPHIL NFR BLD AUTO: 0.5 %
GLUCOSE SERPL-MCNC: 168 MG/DL
HCT VFR BLD CALC: 31.1 %
HGB BLD-MCNC: 9.9 G/DL
IMM GRANULOCYTES NFR BLD AUTO: 0.8 %
LDH SERPL-CCNC: 167 U/L
LYMPHOCYTES # BLD AUTO: 1.53 K/UL
LYMPHOCYTES NFR BLD AUTO: 20 %
MAGNESIUM SERPL-MCNC: 1.6 MG/DL
MAN DIFF?: NORMAL
MCHC RBC-ENTMCNC: 31.8 GM/DL
MCHC RBC-ENTMCNC: 32.7 PG
MCV RBC AUTO: 102.6 FL
MONOCYTES # BLD AUTO: 0.55 K/UL
MONOCYTES NFR BLD AUTO: 7.2 %
NEUTROPHILS # BLD AUTO: 5.41 K/UL
NEUTROPHILS NFR BLD AUTO: 70.6 %
PHOSPHATE SERPL-MCNC: 3.2 MG/DL
PLATELET # BLD AUTO: 185 K/UL
POTASSIUM SERPL-SCNC: 4.5 MMOL/L
PROT SERPL-MCNC: 6.3 G/DL
PROT UR-MCNC: 53 MG/DL
RBC # BLD: 3.03 M/UL
RBC # FLD: 16.9 %
SODIUM SERPL-SCNC: 141 MMOL/L
TACROLIMUS SERPL-MCNC: 11.6 NG/ML
URATE SERPL-MCNC: 6.4 MG/DL
WBC # FLD AUTO: 7.66 K/UL

## 2023-05-03 PROCEDURE — 99213 OFFICE O/P EST LOW 20 MIN: CPT | Mod: 24

## 2023-05-03 NOTE — ASSESSMENT
[FreeTextEntry1] : IMMUNOSUPPRESSION:\par ENVARSUS: 3 mg daily by mouth\par CELLCEPT: 1 g twice daily by mouth\par PREDNISONE: 5 mg daily by mouth\par \par \par

## 2023-05-03 NOTE — HISTORY OF PRESENT ILLNESS
[FreeTextEntry4] : Patient presents for an immunosuppression management encounter after having undergone kidney transplantation \par \par Ray () also present\par No major complaints\par \par IMMUNOSUPPRESSION:\par ENVARSUS: 3 mg daily by mouth\par CELLCEPT: 1 g twice daily by mouth\par PREDNISONE: 5 mg daily by mouth\par \par \par

## 2023-05-03 NOTE — PLAN
[FreeTextEntry1] : IMMUNOSUPPRESSION:\par Aim for envarsus level of 8-10\par Will adjust dose of envarsus if necessary once level is available

## 2023-05-03 NOTE — PHYSICAL EXAM
[Well Developed] : well developed [Well Nourished] : well nourished [No Acute Distress] : no acute distress [Normocephalic] : normocephalic [Atraumatic] : atraumatic [Sclera Anicteric] : sclera anicteric [Neck Supple] : neck supple [Clear to Auscultation] : clear to auscultation [Breathing Comfortably on RA] : breathing comfortably on room air [Normal Rate] : normal rate [Regular Rhythm] : regular rhythm [Soft] : soft [Non-tender] : non-tender [] : right dorsalis pedis palpable [Alert] : alert [Responds to Questions Appropriately] : responds to questions appropriately [Oriented] : oriented [Appropriate] : appropriate [TextBox_34] : No ulcers or edema [FreeTextEntry1] : No carotid bruits.  No thrush [TextBox_86] : No adenopathy [Clean] : clean [Dry] : dry [Healing Well] : healing well [Bleeding] : no active bleeding [Foul Odor] : no foul smell [Purulent Drainage] : no purulent drainage [Serosanguinous Drainage] : no serosanguinous drainage [Erythema] : not erythematous [Warm] : not warm [Tender] : not tender

## 2023-05-04 LAB
APPEARANCE: ABNORMAL
BACTERIA: NEGATIVE /HPF
BILIRUBIN URINE: NEGATIVE
BKV DNA SPEC QL NAA+PROBE: NOT DETECTED IU/ML
BLOOD URINE: ABNORMAL
CAST: 0 /LPF
COLOR: YELLOW
EPITHELIAL CELLS: 13 /HPF
GLUCOSE QUALITATIVE U: NEGATIVE MG/DL
KETONES URINE: NEGATIVE MG/DL
LEUKOCYTE ESTERASE URINE: NEGATIVE
MICROSCOPIC-UA: NORMAL
NITRITE URINE: NEGATIVE
PH URINE: 5
PROTEIN URINE: 100 MG/DL
RED BLOOD CELLS URINE: 58 /HPF
REVIEW: NORMAL
SPECIFIC GRAVITY URINE: 1.02
UROBILINOGEN URINE: 0.2 MG/DL
WHITE BLOOD CELLS URINE: 3 /HPF

## 2023-05-11 ENCOUNTER — LABORATORY RESULT (OUTPATIENT)
Age: 68
End: 2023-05-11

## 2023-05-11 ENCOUNTER — APPOINTMENT (OUTPATIENT)
Dept: NEPHROLOGY | Facility: CLINIC | Age: 68
End: 2023-05-11
Payer: MEDICARE

## 2023-05-11 VITALS
BODY MASS INDEX: 23.3 KG/M2 | OXYGEN SATURATION: 100 % | TEMPERATURE: 97 F | HEIGHT: 66 IN | RESPIRATION RATE: 16 BRPM | DIASTOLIC BLOOD PRESSURE: 71 MMHG | WEIGHT: 145 LBS | SYSTOLIC BLOOD PRESSURE: 137 MMHG | HEART RATE: 78 BPM

## 2023-05-11 PROCEDURE — 99214 OFFICE O/P EST MOD 30 MIN: CPT

## 2023-05-11 NOTE — HISTORY OF PRESENT ILLNESS
[FreeTextEntry1] : Patient is here for post transplant out patient follow up.  Post transplant course complicated by DVT right arm after a midline catheter. She continued antibiotics till 4/10 for donor bacterial infection. She is now on Eliquis.   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 Her son got DDRT at [place] (Dec 3, 2019). \par She has ankle swelling otherwise ok. Denies any h/o CAD/Neoplasia/major infections.\par \par Overall doing well. Has urinary frequency. Reports hematuria after she walked a lot with her son.\par Now clearing.\par \par Has ureter stent.\par \par \par \par

## 2023-05-11 NOTE — PHYSICAL EXAM
[General Appearance - Alert] : alert [General Appearance - In No Acute Distress] : in no acute distress [Sclera] : the sclera and conjunctiva were normal [PERRL With Normal Accommodation] : pupils were equal in size, round, and reactive to light [Extraocular Movements] : extraocular movements were intact [Outer Ear] : the ears and nose were normal in appearance [Oropharynx] : the oropharynx was normal [Neck Appearance] : the appearance of the neck was normal [Neck Cervical Mass (___cm)] : no neck mass was observed [Jugular Venous Distention Increased] : there was no jugular-venous distention [Thyroid Diffuse Enlargement] : the thyroid was not enlarged [Thyroid Nodule] : there were no palpable thyroid nodules [Auscultation Breath Sounds / Voice Sounds] : lungs were clear to auscultation bilaterally [Heart Rate And Rhythm] : heart rate was normal and rhythm regular [Heart Sounds] : normal S1 and S2 [Heart Sounds Gallop] : no gallops [Murmurs] : no murmurs [Heart Sounds Pericardial Friction Rub] : no pericardial rub [Bowel Sounds] : normal bowel sounds [Abdomen Soft] : soft [Abdomen Tenderness] : non-tender [Abdomen Mass (___ Cm)] : no abdominal mass palpated [FreeTextEntry1] : healed surgical incision [Cervical Lymph Nodes Enlarged Posterior Bilaterally] : posterior cervical [Cervical Lymph Nodes Enlarged Anterior Bilaterally] : anterior cervical [Supraclavicular Lymph Nodes Enlarged Bilaterally] : supraclavicular [Axillary Lymph Nodes Enlarged Bilaterally] : axillary [Inguinal Lymph Nodes Enlarged Bilaterally] : inguinal [Abnormal Walk] : normal gait [Involuntary Movements] : no involuntary movements were seen [___ (cm) Fistula] : [unfilled] (cm) fistula [] : no rash [No Focal Deficits] : no focal deficits [Impaired Insight] : insight and judgment were intact [Oriented To Time, Place, And Person] : oriented to person, place, and time [Affect] : the affect was normal

## 2023-05-11 NOTE — ASSESSMENT
[FreeTextEntry1] : Renal Transplant recipient: Had immediate allograft function, normal creatinine at discharge. Has urinary frequency and nocturia. No dysuria/hematuria. No fever/chills. Tolerating medications.\par Immunosuppression: reviewed; simulect induction, on tac/MMF/prednisone, last Tac level noted; will recheck. Currently on 3 mg   daily.; full dose MMF and prednisone at 5 mg daily\par DVT on Eliquis, arm swelling has resolved\par Edema ankles:Resolved, off Lasix.\par Hematuria: After exertion. Has stent. If persists/recurs or if hemoglobin further evaluation and may need to hold Eliquis.\par Check anti GBM antibody.\par Gave flow sheets to maintain home charts of  , blood pressure, temperature, weight and urine output.\par Hypertension: controlled; Reviewed medications. \par Infection prophylaxis: On Bactrim, Valcyte and nystatin as well as GI prophylaxis.\par Discussed ambulation, using incentive spirometer, optimal glucose and blood pressure readings, adherence with medications and follow ups, follow up clinic visit schedule, avoiding dehydration, mosquito bites; prevention of DVT as well as food safety.\par She has met with transplant surgeon and ureteral stent removal were discussed.\par

## 2023-05-12 LAB
ALBUMIN SERPL ELPH-MCNC: 4 G/DL
ALP BLD-CCNC: 67 U/L
ALT SERPL-CCNC: 10 U/L
ANION GAP SERPL CALC-SCNC: 14 MMOL/L
APPEARANCE: ABNORMAL
AST SERPL-CCNC: 8 U/L
BACTERIA: NEGATIVE /HPF
BASOPHILS # BLD AUTO: 0.04 K/UL
BASOPHILS NFR BLD AUTO: 0.6 %
BILIRUB SERPL-MCNC: 0.2 MG/DL
BILIRUBIN URINE: ABNORMAL
BLOOD URINE: ABNORMAL
BUN SERPL-MCNC: 31 MG/DL
CALCIUM SERPL-MCNC: 9.5 MG/DL
CAST: 5 /LPF
CHLORIDE SERPL-SCNC: 108 MMOL/L
CMV DNA SPEC QL NAA+PROBE: NOT DETECTED IU/ML
CMVPCR LOG: NOT DETECTED LOG10IU/ML
CO2 SERPL-SCNC: 18 MMOL/L
COLOR: ABNORMAL
CREAT SERPL-MCNC: 1.4 MG/DL
CREAT SPEC-SCNC: 123 MG/DL
CREAT/PROT UR: 5.3 RATIO
EGFR: 41 ML/MIN/1.73M2
EOSINOPHIL # BLD AUTO: 0.01 K/UL
EOSINOPHIL NFR BLD AUTO: 0.2 %
EPITHELIAL CELLS: 6 /HPF
FINE GRANULAR CASTS: PRESENT
GLUCOSE QUALITATIVE U: 100 MG/DL
GLUCOSE SERPL-MCNC: 226 MG/DL
HCT VFR BLD CALC: 31.5 %
HGB BLD-MCNC: 9.6 G/DL
HYALINE CASTS: PRESENT
IMM GRANULOCYTES NFR BLD AUTO: 0.8 %
KETONES URINE: NEGATIVE MG/DL
LDH SERPL-CCNC: 143 U/L
LEUKOCYTE ESTERASE URINE: ABNORMAL
LYMPHOCYTES # BLD AUTO: 0.79 K/UL
LYMPHOCYTES NFR BLD AUTO: 12.2 %
MAGNESIUM SERPL-MCNC: 1.6 MG/DL
MAN DIFF?: NORMAL
MCHC RBC-ENTMCNC: 30.5 GM/DL
MCHC RBC-ENTMCNC: 32.1 PG
MCV RBC AUTO: 105.4 FL
MICROSCOPIC-UA: NORMAL
MONOCYTES # BLD AUTO: 0.42 K/UL
MONOCYTES NFR BLD AUTO: 6.5 %
NEUTROPHILS # BLD AUTO: 5.17 K/UL
NEUTROPHILS NFR BLD AUTO: 79.7 %
NITRITE URINE: POSITIVE
PH URINE: 5.5
PHOSPHATE SERPL-MCNC: 3.2 MG/DL
PLATELET # BLD AUTO: 193 K/UL
POTASSIUM SERPL-SCNC: 5.2 MMOL/L
PROT SERPL-MCNC: 6.1 G/DL
PROT UR-MCNC: 649 MG/DL
PROTEIN URINE: 300 MG/DL
RBC # BLD: 2.99 M/UL
RBC # FLD: 16.4 %
RED BLOOD CELLS URINE: 2708 /HPF
REVIEW: NORMAL
SODIUM SERPL-SCNC: 140 MMOL/L
SPECIFIC GRAVITY URINE: 1.02
TACROLIMUS SERPL-MCNC: 12 NG/ML
URATE SERPL-MCNC: 7.7 MG/DL
UROBILINOGEN URINE: 0.2 MG/DL
WBC # FLD AUTO: 6.48 K/UL
WHITE BLOOD CELLS URINE: 18 /HPF

## 2023-05-13 LAB — BKV DNA SPEC QL NAA+PROBE: NOT DETECTED IU/ML

## 2023-05-15 ENCOUNTER — NON-APPOINTMENT (OUTPATIENT)
Age: 68
End: 2023-05-15

## 2023-05-16 ENCOUNTER — NON-APPOINTMENT (OUTPATIENT)
Age: 68
End: 2023-05-16

## 2023-05-16 LAB
ALBUMIN SERPL ELPH-MCNC: 4.2 G/DL
ALP BLD-CCNC: 70 U/L
ALT SERPL-CCNC: 10 U/L
ANION GAP SERPL CALC-SCNC: 13 MMOL/L
APPEARANCE: ABNORMAL
AST SERPL-CCNC: 10 U/L
BACTERIA: NEGATIVE /HPF
BASOPHILS # BLD AUTO: 0.04 K/UL
BASOPHILS NFR BLD AUTO: 0.6 %
BILIRUB SERPL-MCNC: 0.3 MG/DL
BILIRUBIN URINE: NEGATIVE
BLOOD URINE: ABNORMAL
BUN SERPL-MCNC: 29 MG/DL
CALCIUM SERPL-MCNC: 9.8 MG/DL
CAST: NORMAL /LPF
CHLORIDE SERPL-SCNC: 110 MMOL/L
CO2 SERPL-SCNC: 20 MMOL/L
COLOR: ABNORMAL
CREAT SERPL-MCNC: 1.32 MG/DL
CREAT SPEC-SCNC: 119 MG/DL
CREAT/PROT UR: 2.4 RATIO
EGFR: 44 ML/MIN/1.73M2
EOSINOPHIL # BLD AUTO: 0.03 K/UL
EOSINOPHIL NFR BLD AUTO: 0.5 %
EPITHELIAL CELLS: 6 /HPF
GBM AB TITR SER IF: <0.2
GLUCOSE QUALITATIVE U: 100 MG/DL
GLUCOSE SERPL-MCNC: 173 MG/DL
HCT VFR BLD CALC: 30.9 %
HGB BLD-MCNC: 9.9 G/DL
IMM GRANULOCYTES NFR BLD AUTO: 0.6 %
KETONES URINE: NEGATIVE MG/DL
LDH SERPL-CCNC: 195 U/L
LEUKOCYTE ESTERASE URINE: ABNORMAL
LYMPHOCYTES # BLD AUTO: 1.19 K/UL
LYMPHOCYTES NFR BLD AUTO: 19 %
MAGNESIUM SERPL-MCNC: 1.7 MG/DL
MAN DIFF?: NORMAL
MCHC RBC-ENTMCNC: 32 GM/DL
MCHC RBC-ENTMCNC: 32.7 PG
MCV RBC AUTO: 102 FL
MICROSCOPIC-UA: NORMAL
MONOCYTES # BLD AUTO: 0.49 K/UL
MONOCYTES NFR BLD AUTO: 7.8 %
NEUTROPHILS # BLD AUTO: 4.47 K/UL
NEUTROPHILS NFR BLD AUTO: 71.5 %
NITRITE URINE: NEGATIVE
PH URINE: 5.5
PHOSPHATE SERPL-MCNC: 3.2 MG/DL
PLATELET # BLD AUTO: 215 K/UL
POTASSIUM SERPL-SCNC: 4.9 MMOL/L
PROT SERPL-MCNC: 6.3 G/DL
PROT UR-MCNC: 291 MG/DL
PROTEIN URINE: 300 MG/DL
RBC # BLD: 3.03 M/UL
RBC # FLD: 15.8 %
RED BLOOD CELLS URINE: 3434 /HPF
REVIEW: NORMAL
SODIUM SERPL-SCNC: 143 MMOL/L
SPECIFIC GRAVITY URINE: 1.02
TACROLIMUS SERPL-MCNC: 12.2 NG/ML
URATE SERPL-MCNC: 7.6 MG/DL
UROBILINOGEN URINE: 0.2 MG/DL
WBC # FLD AUTO: 6.26 K/UL
WHITE BLOOD CELLS URINE: 18 /HPF

## 2023-05-18 ENCOUNTER — TRANSCRIPTION ENCOUNTER (OUTPATIENT)
Age: 68
End: 2023-05-18

## 2023-05-19 ENCOUNTER — APPOINTMENT (OUTPATIENT)
Dept: TRANSPLANT | Facility: HOSPITAL | Age: 68
End: 2023-05-19

## 2023-05-19 ENCOUNTER — TRANSCRIPTION ENCOUNTER (OUTPATIENT)
Age: 68
End: 2023-05-19

## 2023-05-19 ENCOUNTER — OUTPATIENT (OUTPATIENT)
Dept: OUTPATIENT SERVICES | Facility: HOSPITAL | Age: 68
LOS: 1 days | End: 2023-05-19
Payer: MEDICARE

## 2023-05-19 VITALS
RESPIRATION RATE: 16 BRPM | OXYGEN SATURATION: 100 % | DIASTOLIC BLOOD PRESSURE: 70 MMHG | SYSTOLIC BLOOD PRESSURE: 156 MMHG | HEART RATE: 82 BPM | TEMPERATURE: 97 F

## 2023-05-19 VITALS
RESPIRATION RATE: 16 BRPM | DIASTOLIC BLOOD PRESSURE: 71 MMHG | OXYGEN SATURATION: 100 % | WEIGHT: 149.03 LBS | SYSTOLIC BLOOD PRESSURE: 170 MMHG | TEMPERATURE: 98 F | HEIGHT: 65 IN | HEART RATE: 90 BPM

## 2023-05-19 DIAGNOSIS — Z90.89 ACQUIRED ABSENCE OF OTHER ORGANS: Chronic | ICD-10-CM

## 2023-05-19 DIAGNOSIS — Z94.0 KIDNEY TRANSPLANT STATUS: ICD-10-CM

## 2023-05-19 DIAGNOSIS — I77.0 ARTERIOVENOUS FISTULA, ACQUIRED: Chronic | ICD-10-CM

## 2023-05-19 PROCEDURE — 52310 CYSTOSCOPY AND TREATMENT: CPT | Mod: GC

## 2023-05-19 PROCEDURE — 52310 CYSTOSCOPY AND TREATMENT: CPT

## 2023-05-19 RX ORDER — HYDROMORPHONE HYDROCHLORIDE 2 MG/ML
0.25 INJECTION INTRAMUSCULAR; INTRAVENOUS; SUBCUTANEOUS
Refills: 0 | Status: DISCONTINUED | OUTPATIENT
Start: 2023-05-19 | End: 2023-05-19

## 2023-05-19 NOTE — PRE-ANESTHESIA EVALUATION ADULT - NSANTHPMHFT_GEN_ALL_CORE
Kidney Transplant - 3/28  DVT RUE from kidney transplant, was on Eliquis but had hematuria  PST bypass given recent renal transplant Kidney Transplant - 3/28  DVT RUE from kidney transplant, was on Eliquis but had hematuria and stopped.  Discussed IV placement with the patient and surgeon, and it was felt that the best place to place the IV is her right upper extremity given the AV fistula in her left arm.  If for some reason medication has little to no effect, indicating a complete clot, we discussed IV placement on the left above the AV fistula or in the foot    PST bypass given recent renal transplant

## 2023-05-19 NOTE — H&P ADULT - ASSESSMENT
67-year-old [place] female with HTN,  Alport syndrome and CKD 5  (x 15 years), LUE AVF placed in 2017, recently started HD on 3/17/2023 (Tanja, Nephrologist Dr. Jet Horn ) now s/p R  DDRT under Simulect induction on 3/28/23. Patient now here for ureteral stent removal.    -NPO for ureteral stent removal today

## 2023-05-19 NOTE — H&P ADULT - NSICDXPASTMEDICALHX_GEN_ALL_CORE_FT
PAST MEDICAL HISTORY:  Alport's syndrome     Anemia     Deep vein thrombosis (DVT) of right upper extremity     Deep vein thrombosis (DVT) of right upper extremity     ESRD with anemia left AV fistula, not in use    Hard of hearing uses hearing aids    HTN (hypertension)

## 2023-05-19 NOTE — ASU PREOP CHECKLIST - AS TEMP SITE
Presents for generalized weakness, also found to be hypotensive  - Afebrile, BP low but improved s/p IVF, VS stable otherwise    - Labs: no elevated wbc   - trop 46, BNP 3633     - EKG: afib  rate controlled   - CXR: b/l pleural effusion, hazy bibasilar opacities  - ED: 500ml NS bolus & ceftriaxone 1g   - Will give additional 250ml NS bolus give low norm BP  - IV lasix when BP permits      - I&O, daily wt   - telemetry   - Reviewed echo from Aug 2022: Norm LVSF, decreased RVSF, Severe TR & Pulm HTN  - check repeat echo  - cardio consult to be called in AM
oral

## 2023-05-19 NOTE — PRE-ANESTHESIA EVALUATION ADULT - NSANTHLABRESULTSFT_GEN_ALL_CORE
5/15/2023:  WBC: 6.26  Hb: 9.9  Hct: 30.9  Plt: 215    Na: 143  K: 4.9  Cl: 110  CO2: 20  BUN: 29  Cr: 1.32  Gluc: 173

## 2023-05-19 NOTE — H&P ADULT - NSHPPHYSICALEXAM_GEN_ALL_CORE
Constitutional: Well developed / well nourished  Eyes: Anicteric, PERRLA  ENMT: nc/at  Neck: Supple  Respiratory: CTA B/L  Cardiovascular: RRR  Gastrointestinal: Soft abdomen, NT, ND  Genitourinary:  Voiding spontaneously  Extremities: SCD's in place and working bilaterally  Vascular: Palpable dp pulses bilaterally  Neurological: A&O x3  Musculoskeletal: Moving all extremities  Psychiatric: Responsive

## 2023-05-19 NOTE — PRE-ANESTHESIA EVALUATION ADULT - NSANTHPEFT_GEN_ALL_CORE
GENERAL: NAD  HEAD:  Atraumatic, Normocephalic  CHEST/LUNG: Clear to auscultation bilaterally  HEART: Normal S1/S2  PSYCH: AAOx3  NEUROLOGY: non-focal  SKIN: No obvious rashes or lesions; L AVF

## 2023-05-24 ENCOUNTER — APPOINTMENT (OUTPATIENT)
Dept: NEPHROLOGY | Facility: CLINIC | Age: 68
End: 2023-05-24
Payer: MEDICARE

## 2023-05-24 VITALS
DIASTOLIC BLOOD PRESSURE: 78 MMHG | OXYGEN SATURATION: 100 % | HEART RATE: 69 BPM | TEMPERATURE: 97.3 F | WEIGHT: 145 LBS | BODY MASS INDEX: 23.3 KG/M2 | RESPIRATION RATE: 14 BRPM | SYSTOLIC BLOOD PRESSURE: 144 MMHG | HEIGHT: 66 IN

## 2023-05-24 VITALS — DIASTOLIC BLOOD PRESSURE: 70 MMHG | SYSTOLIC BLOOD PRESSURE: 130 MMHG

## 2023-05-24 DIAGNOSIS — D64.9 ANEMIA, UNSPECIFIED: ICD-10-CM

## 2023-05-24 LAB
ALBUMIN SERPL ELPH-MCNC: 4.2 G/DL
ALP BLD-CCNC: 65 U/L
ALT SERPL-CCNC: 13 U/L
ANION GAP SERPL CALC-SCNC: 13 MMOL/L
AST SERPL-CCNC: 10 U/L
BILIRUB SERPL-MCNC: 0.3 MG/DL
BUN SERPL-MCNC: 28 MG/DL
CALCIUM SERPL-MCNC: 9.6 MG/DL
CHLORIDE SERPL-SCNC: 110 MMOL/L
CO2 SERPL-SCNC: 19 MMOL/L
CREAT SERPL-MCNC: 1.38 MG/DL
CREAT SPEC-SCNC: 109 MG/DL
CREAT/PROT UR: 2.2 RATIO
EGFR: 42 ML/MIN/1.73M2
GLUCOSE SERPL-MCNC: 197 MG/DL
LDH SERPL-CCNC: 148 U/L
MAGNESIUM SERPL-MCNC: 1.7 MG/DL
PHOSPHATE SERPL-MCNC: 3.2 MG/DL
POTASSIUM SERPL-SCNC: 5.4 MMOL/L
PROT SERPL-MCNC: 6.3 G/DL
PROT UR-MCNC: 240 MG/DL
SODIUM SERPL-SCNC: 142 MMOL/L
TACROLIMUS SERPL-MCNC: 10.3 NG/ML
URATE SERPL-MCNC: 7.9 MG/DL

## 2023-05-24 PROCEDURE — 99214 OFFICE O/P EST MOD 30 MIN: CPT

## 2023-05-24 RX ORDER — TACROLIMUS 1 MG/1
1 TABLET, EXTENDED RELEASE ORAL DAILY
Qty: 60 | Refills: 11 | Status: DISCONTINUED | COMMUNITY
Start: 2023-03-29 | End: 2023-05-24

## 2023-05-24 NOTE — ASSESSMENT
[FreeTextEntry1] : Renal Transplant recipient: Had immediate allograft function, normal creatinine at discharge. Has urinary frequency and nocturia. No dysuria/hematuria. No fever/chills. Tolerating medications.\par Immunosuppression: reviewed; simulect induction, on tac/MMF/prednisone, last Tac level noted; will recheck. Currently on 3 mg   daily.; full dose MMF and prednisone at 5 mg daily\par DVT was on Eliquis, arm swelling has resolved completely, now off Eliquis after 2 months, staying off it due to hematuria.\par Edema ankles:Resolved, off Lasix.\par Hematuria: After exertion. Has stent. If persists/recurs or if hemoglobin further evaluation and  Eliquis is on hold.\par Checked anti GBM antibody- negative\par Anemia: will follow Hb, normal WBC count\par Gave flow sheets to maintain home charts of  , blood pressure, temperature, weight and urine output.\par Hypertension: controlled; Reviewed medications. \par Infection prophylaxis: On Bactrim, Valcyte and nystatin as well as GI prophylaxis.\par Discussed ambulation,   optimal  blood pressure readings, adherence with medications and follow ups, follow up clinic visit schedule, avoiding dehydration, mosquito bites; prevention of DVT as well as food safety.\par  \par

## 2023-05-24 NOTE — HISTORY OF PRESENT ILLNESS
[FreeTextEntry1] : Patient is here for post transplant out patient follow up.  Post transplant course complicated by DVT right arm after a midline catheter. She continued antibiotics till 4/10 for donor bacterial infection. She is no longer on on Eliquis. Since she had hematuria.  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 Her son got DDRT at [place] (Dec 3, 2019). \par She has ankle swelling otherwise ok. Denies any h/o CAD/Neoplasia/major infections.\par \par Overall doing well. Has urinary frequency. Had ureter stent removed last Friday (5/19/23)\par Now hematuria is clearing.\par \par Recently had Envarsus dose decreased to 2 mg/d\par \par \par \par

## 2023-05-31 ENCOUNTER — APPOINTMENT (OUTPATIENT)
Dept: DERMATOLOGY | Facility: CLINIC | Age: 68
End: 2023-05-31
Payer: MEDICARE

## 2023-05-31 DIAGNOSIS — D22.9 MELANOCYTIC NEVI, UNSPECIFIED: ICD-10-CM

## 2023-05-31 DIAGNOSIS — L82.1 OTHER SEBORRHEIC KERATOSIS: ICD-10-CM

## 2023-05-31 DIAGNOSIS — Z12.83 ENCOUNTER FOR SCREENING FOR MALIGNANT NEOPLASM OF SKIN: ICD-10-CM

## 2023-05-31 LAB
APPEARANCE: ABNORMAL
BACTERIA: ABNORMAL /HPF
BILIRUBIN URINE: ABNORMAL
BKV DNA SPEC QL NAA+PROBE: NOT DETECTED IU/ML
BLOOD URINE: ABNORMAL
COLOR: ABNORMAL
GLUCOSE QUALITATIVE U: ABNORMAL
KETONES URINE: NEGATIVE
LEUKOCYTE ESTERASE URINE: NEGATIVE
MICROSCOPIC-UA: NORMAL
NITRITE URINE: NEGATIVE
PH URINE: 5.5
PROTEIN URINE: ABNORMAL
RED BLOOD CELLS URINE: ABNORMAL /HPF
SPECIFIC GRAVITY URINE: 1.02
SQUAMOUS EPITHELIAL CELLS: 9
URINE COMMENTS: NORMAL
UROBILINOGEN URINE: NORMAL
WHITE BLOOD CELLS URINE: 7 /HPF

## 2023-05-31 PROCEDURE — 99203 OFFICE O/P NEW LOW 30 MIN: CPT

## 2023-05-31 NOTE — HISTORY OF PRESENT ILLNESS
[FreeTextEntry1] : skin check  [de-identified] : kidney transplant 3/28/23\par \par IMMUNOSUPPRESSION:\par ENVARSUS: 3 mg daily by mouth\par CELLCEPT: 1 g twice daily by mouth\par PREDNISONE: 5 mg daily by mouth\par \par Personal hx: no history of skin cancer \par Family Hx: no family history of skin cancer\par \par inquiring about rough growths on back  Yes

## 2023-05-31 NOTE — PHYSICAL EXAM
[FreeTextEntry3] : AAOx3, NAD, well-appearing / pleasant\par Total body skin exam performed within normal limits with the exception of: \par limited genital/groin evaluation\par \par - stuck on brown papules on back, chest\par - few brown macules and papules on face

## 2023-05-31 NOTE — ASSESSMENT
[FreeTextEntry1] : Benign appearing nevi\par Seborrheic keratoses\par - benign, reassurance, no intervention needed unless irritated\par \par - TBSE performed today - no concerning findings \par - TBSE every year with dermatologist\par - Recommend regular gynecologic and dental evaluations  \par - Photoprotection reviewed including sun-protective behaviors, protective clothing, and the use of OTC broad-spectrum SPF 30+ sunscreens was advised\par - RTC if develops lesions that are new, symptomatic (bleeding/itching), changing in size/color/shape\par \par

## 2023-06-01 ENCOUNTER — APPOINTMENT (OUTPATIENT)
Dept: TRANSPLANT | Facility: CLINIC | Age: 68
End: 2023-06-01

## 2023-06-01 LAB
ALBUMIN SERPL ELPH-MCNC: 4.2 G/DL
ALP BLD-CCNC: 61 U/L
ALT SERPL-CCNC: 11 U/L
AMORPHOUS URATE CRYSTALS: PRESENT
ANION GAP SERPL CALC-SCNC: 14 MMOL/L
APPEARANCE: ABNORMAL
AST SERPL-CCNC: 10 U/L
BACTERIA: NEGATIVE /HPF
BILIRUB SERPL-MCNC: 0.4 MG/DL
BILIRUBIN URINE: NEGATIVE
BLOOD URINE: ABNORMAL
BUN SERPL-MCNC: 27 MG/DL
CALCIUM SERPL-MCNC: 9.2 MG/DL
CAST: 9 /LPF
CHLORIDE SERPL-SCNC: 107 MMOL/L
CO2 SERPL-SCNC: 20 MMOL/L
COLOR: YELLOW
CREAT SERPL-MCNC: 1.29 MG/DL
CREAT SPEC-SCNC: 179 MG/DL
CREAT/PROT UR: 0.6 RATIO
EGFR: 45 ML/MIN/1.73M2
EPITHELIAL CELLS: 11 /HPF
GLUCOSE QUALITATIVE U: NEGATIVE MG/DL
GLUCOSE SERPL-MCNC: 166 MG/DL
KETONES URINE: NEGATIVE MG/DL
LDH SERPL-CCNC: 185 U/L
LEUKOCYTE ESTERASE URINE: NEGATIVE
MAGNESIUM SERPL-MCNC: 1.3 MG/DL
MICROSCOPIC-UA: NORMAL
NITRITE URINE: NEGATIVE
PH URINE: 5.5
PHOSPHATE SERPL-MCNC: 3.5 MG/DL
POTASSIUM SERPL-SCNC: 4.4 MMOL/L
PROT SERPL-MCNC: 6.2 G/DL
PROT UR-MCNC: 112 MG/DL
PROTEIN URINE: 100 MG/DL
RED BLOOD CELLS URINE: 22 /HPF
REVIEW: NORMAL
SODIUM SERPL-SCNC: 141 MMOL/L
SPECIFIC GRAVITY URINE: 1.02
TACROLIMUS SERPL-MCNC: 7.4 NG/ML
URATE SERPL-MCNC: 7.9 MG/DL
UROBILINOGEN URINE: 0.2 MG/DL
WHITE BLOOD CELLS URINE: 11 /HPF

## 2023-06-01 RX ORDER — APIXABAN 5 MG/1
5 TABLET, FILM COATED ORAL
Qty: 60 | Refills: 1 | Status: DISCONTINUED | COMMUNITY
Start: 2023-04-10 | End: 2023-06-01

## 2023-06-01 RX ORDER — SENNOSIDES 8.6 MG
8.6 TABLET ORAL DAILY
Qty: 90 | Refills: 3 | Status: DISCONTINUED | COMMUNITY
Start: 2023-03-29 | End: 2023-06-01

## 2023-06-03 LAB — BKV DNA SPEC QL NAA+PROBE: NOT DETECTED IU/ML

## 2023-06-08 ENCOUNTER — NON-APPOINTMENT (OUTPATIENT)
Age: 68
End: 2023-06-08

## 2023-06-08 ENCOUNTER — APPOINTMENT (OUTPATIENT)
Dept: NEPHROLOGY | Facility: CLINIC | Age: 68
End: 2023-06-08
Payer: MEDICARE

## 2023-06-08 VITALS
HEIGHT: 66 IN | WEIGHT: 145 LBS | SYSTOLIC BLOOD PRESSURE: 131 MMHG | BODY MASS INDEX: 23.3 KG/M2 | HEART RATE: 77 BPM | OXYGEN SATURATION: 100 % | RESPIRATION RATE: 14 BRPM | DIASTOLIC BLOOD PRESSURE: 68 MMHG

## 2023-06-08 LAB
ALBUMIN SERPL ELPH-MCNC: 4.3 G/DL
ALP BLD-CCNC: 62 U/L
ALT SERPL-CCNC: 12 U/L
ANION GAP SERPL CALC-SCNC: 12 MMOL/L
AST SERPL-CCNC: 15 U/L
BILIRUB SERPL-MCNC: 0.4 MG/DL
BUN SERPL-MCNC: 23 MG/DL
CALCIUM SERPL-MCNC: 9.2 MG/DL
CHLORIDE SERPL-SCNC: 106 MMOL/L
CO2 SERPL-SCNC: 22 MMOL/L
CREAT SERPL-MCNC: 1.08 MG/DL
CREAT SPEC-SCNC: 236 MG/DL
CREAT/PROT UR: 0.6 RATIO
EGFR: 56 ML/MIN/1.73M2
GLUCOSE SERPL-MCNC: 172 MG/DL
MAGNESIUM SERPL-MCNC: 1.2 MG/DL
PHOSPHATE SERPL-MCNC: 3.2 MG/DL
POTASSIUM SERPL-SCNC: 4.3 MMOL/L
PROT SERPL-MCNC: 6.2 G/DL
PROT UR-MCNC: 134 MG/DL
SODIUM SERPL-SCNC: 140 MMOL/L
TACROLIMUS SERPL-MCNC: 7.9 NG/ML
URATE SERPL-MCNC: 6.9 MG/DL

## 2023-06-08 PROCEDURE — 99214 OFFICE O/P EST MOD 30 MIN: CPT

## 2023-06-08 NOTE — ASSESSMENT
[FreeTextEntry1] : Renal Transplant recipient: Had immediate allograft function, normal creatinine at discharge. Has urinary frequency and nocturia. No dysuria/hematuria. No fever/chills. Tolerating medications.\par Immunosuppression: reviewed; simulect induction, on tac/MMF/prednisone, last Tac level noted; will recheck. Currently on 2.75 mg   daily.; full dose MMF and prednisone at 5 mg daily.\par Will decrease MMF to 500 mg three times/day in view of diarrhea, decreasing WBC count and tongue lesion.\par Hematuria: Resolved; After exertion. Has stent. If persists/recurs or if hemoglobin further evaluation and  Eliquis is on hold.\par Checked anti GBM antibody- negative\par Hypertension: controlled; Reviewed medications. \par Infection prophylaxis: On Bactrim, Valcyte and nystatin as well as GI prophylaxis.\par Discussed ambulation,   optimal  blood pressure readings, adherence with medications and follow ups, follow up clinic visit schedule, avoiding dehydration, mosquito bites; prevention of DVT as well as food safety.\par Has been seen by Eye/Derm teams.\par  \par

## 2023-06-08 NOTE — HISTORY OF PRESENT ILLNESS
[FreeTextEntry1] : Patient is here for post transplant out patient follow up.  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 Her son got DDRT at [place] (Dec 3, 2019). \par She has a soreness at the tip of her tongue and loose BM,  otherwise ok. Denies any h/o CAD/Neoplasia/major infections.\par Overall doing well. Had ureter stent removed  (5/19/23)\par

## 2023-06-08 NOTE — PHYSICAL EXAM
[General Appearance - Alert] : alert [General Appearance - In No Acute Distress] : in no acute distress [Sclera] : the sclera and conjunctiva were normal [PERRL With Normal Accommodation] : pupils were equal in size, round, and reactive to light [Extraocular Movements] : extraocular movements were intact [Outer Ear] : the ears and nose were normal in appearance [Neck Appearance] : the appearance of the neck was normal [Jugular Venous Distention Increased] : there was no jugular-venous distention [Neck Cervical Mass (___cm)] : no neck mass was observed [Thyroid Diffuse Enlargement] : the thyroid was not enlarged [Thyroid Nodule] : there were no palpable thyroid nodules [Auscultation Breath Sounds / Voice Sounds] : lungs were clear to auscultation bilaterally [Heart Rate And Rhythm] : heart rate was normal and rhythm regular [Heart Sounds] : normal S1 and S2 [Heart Sounds Gallop] : no gallops [Murmurs] : no murmurs [Heart Sounds Pericardial Friction Rub] : no pericardial rub [Bowel Sounds] : normal bowel sounds [Abdomen Soft] : soft [Abdomen Tenderness] : non-tender [Abdomen Mass (___ Cm)] : no abdominal mass palpated [FreeTextEntry1] : healed surgical incision [Cervical Lymph Nodes Enlarged Posterior Bilaterally] : posterior cervical [Cervical Lymph Nodes Enlarged Anterior Bilaterally] : anterior cervical [Supraclavicular Lymph Nodes Enlarged Bilaterally] : supraclavicular [Axillary Lymph Nodes Enlarged Bilaterally] : axillary [Inguinal Lymph Nodes Enlarged Bilaterally] : inguinal [Abnormal Walk] : normal gait [Involuntary Movements] : no involuntary movements were seen [___ (cm) Fistula] : [unfilled] (cm) fistula [] : no rash [No Focal Deficits] : no focal deficits [Oriented To Time, Place, And Person] : oriented to person, place, and time [Impaired Insight] : insight and judgment were intact [Affect] : the affect was normal

## 2023-06-12 LAB
APPEARANCE: ABNORMAL
BACTERIA: ABNORMAL /HPF
BILIRUBIN URINE: ABNORMAL
BKV DNA SPEC QL NAA+PROBE: NOT DETECTED IU/ML
BLOOD URINE: ABNORMAL
COLOR: ABNORMAL
GLUCOSE QUALITATIVE U: NEGATIVE
HYALINE CASTS: NORMAL
KETONES URINE: NEGATIVE
LEUKOCYTE ESTERASE URINE: NEGATIVE
MICROSCOPIC-UA: NORMAL
NITRITE URINE: NEGATIVE
PH URINE: 5.5
PROTEIN URINE: ABNORMAL
RED BLOOD CELLS URINE: 166 /HPF
SPECIFIC GRAVITY URINE: >=1.03
SQUAMOUS EPITHELIAL CELLS: 2
UROBILINOGEN URINE: NORMAL
WHITE BLOOD CELLS URINE: 5 /HPF

## 2023-06-14 ENCOUNTER — LABORATORY RESULT (OUTPATIENT)
Age: 68
End: 2023-06-14

## 2023-06-14 ENCOUNTER — APPOINTMENT (OUTPATIENT)
Dept: TRANSPLANT | Facility: CLINIC | Age: 68
End: 2023-06-14

## 2023-06-15 LAB
ALBUMIN SERPL ELPH-MCNC: 4.1 G/DL
ALP BLD-CCNC: 62 U/L
ALT SERPL-CCNC: 15 U/L
ANION GAP SERPL CALC-SCNC: 16 MMOL/L
APPEARANCE: CLEAR
AST SERPL-CCNC: 16 U/L
BACTERIA: NEGATIVE /HPF
BILIRUB SERPL-MCNC: 0.4 MG/DL
BILIRUBIN URINE: NEGATIVE
BKV DNA SPEC QL NAA+PROBE: NOT DETECTED IU/ML
BLOOD URINE: NEGATIVE
BUN SERPL-MCNC: 16 MG/DL
CALCIUM SERPL-MCNC: 9.4 MG/DL
CAST: 1 /LPF
CHLORIDE SERPL-SCNC: 104 MMOL/L
CO2 SERPL-SCNC: 22 MMOL/L
COLOR: YELLOW
CREAT SERPL-MCNC: 0.96 MG/DL
CREAT SPEC-SCNC: 128 MG/DL
CREAT/PROT UR: 0.1 RATIO
EGFR: 64 ML/MIN/1.73M2
EPITHELIAL CELLS: 6 /HPF
GLUCOSE QUALITATIVE U: NEGATIVE MG/DL
GLUCOSE SERPL-MCNC: 185 MG/DL
KETONES URINE: NEGATIVE MG/DL
LEUKOCYTE ESTERASE URINE: NEGATIVE
MAGNESIUM SERPL-MCNC: 1.5 MG/DL
MICROSCOPIC-UA: NORMAL
NITRITE URINE: NEGATIVE
PH URINE: 5.5
PHOSPHATE SERPL-MCNC: 3.2 MG/DL
POTASSIUM SERPL-SCNC: 4.2 MMOL/L
PROT SERPL-MCNC: 6.1 G/DL
PROT UR-MCNC: 15 MG/DL
PROTEIN URINE: NORMAL MG/DL
RED BLOOD CELLS URINE: 1 /HPF
SODIUM SERPL-SCNC: 142 MMOL/L
SPECIFIC GRAVITY URINE: 1.02
TACROLIMUS SERPL-MCNC: 6.9 NG/ML
UROBILINOGEN URINE: 0.2 MG/DL
WHITE BLOOD CELLS URINE: 2 /HPF

## 2023-06-22 ENCOUNTER — LABORATORY RESULT (OUTPATIENT)
Age: 68
End: 2023-06-22

## 2023-06-22 ENCOUNTER — APPOINTMENT (OUTPATIENT)
Dept: NEPHROLOGY | Facility: CLINIC | Age: 68
End: 2023-06-22
Payer: MEDICARE

## 2023-06-22 VITALS
OXYGEN SATURATION: 99 % | DIASTOLIC BLOOD PRESSURE: 71 MMHG | SYSTOLIC BLOOD PRESSURE: 156 MMHG | HEIGHT: 66 IN | BODY MASS INDEX: 23.95 KG/M2 | HEART RATE: 69 BPM | WEIGHT: 149 LBS | RESPIRATION RATE: 14 BRPM

## 2023-06-22 VITALS — DIASTOLIC BLOOD PRESSURE: 70 MMHG | SYSTOLIC BLOOD PRESSURE: 140 MMHG

## 2023-06-22 LAB
ALBUMIN SERPL ELPH-MCNC: 4.2 G/DL
ALP BLD-CCNC: 63 U/L
ALT SERPL-CCNC: 18 U/L
ANION GAP SERPL CALC-SCNC: 10 MMOL/L
APPEARANCE: CLEAR
AST SERPL-CCNC: 14 U/L
BACTERIA: NEGATIVE /HPF
BILIRUB SERPL-MCNC: 0.3 MG/DL
BILIRUBIN URINE: NEGATIVE
BLOOD URINE: NEGATIVE
BUN SERPL-MCNC: 18 MG/DL
CALCIUM SERPL-MCNC: 9.8 MG/DL
CAST: 0 /LPF
CHLORIDE SERPL-SCNC: 107 MMOL/L
CO2 SERPL-SCNC: 25 MMOL/L
COLOR: YELLOW
CREAT SERPL-MCNC: 1.07 MG/DL
CREAT SPEC-SCNC: 179 MG/DL
CREAT/PROT UR: 0.1 RATIO
EGFR: 57 ML/MIN/1.73M2
EPITHELIAL CELLS: 4 /HPF
GLUCOSE QUALITATIVE U: NEGATIVE MG/DL
GLUCOSE SERPL-MCNC: 154 MG/DL
KETONES URINE: NEGATIVE MG/DL
LDH SERPL-CCNC: 199 U/L
LEUKOCYTE ESTERASE URINE: NEGATIVE
MAGNESIUM SERPL-MCNC: 2.3 MG/DL
MICROSCOPIC-UA: NORMAL
NITRITE URINE: NEGATIVE
PH URINE: 5.5
PHOSPHATE SERPL-MCNC: 2.8 MG/DL
POTASSIUM SERPL-SCNC: 5 MMOL/L
PROT SERPL-MCNC: 6.2 G/DL
PROT UR-MCNC: 18 MG/DL
PROTEIN URINE: NORMAL MG/DL
RED BLOOD CELLS URINE: 2 /HPF
SODIUM SERPL-SCNC: 142 MMOL/L
SPECIFIC GRAVITY URINE: 1.02
TACROLIMUS SERPL-MCNC: 7.8 NG/ML
URATE SERPL-MCNC: 6.6 MG/DL
UROBILINOGEN URINE: 0.2 MG/DL
WHITE BLOOD CELLS URINE: 1 /HPF

## 2023-06-22 PROCEDURE — 99214 OFFICE O/P EST MOD 30 MIN: CPT

## 2023-06-22 RX ORDER — NYSTATIN 100000 [USP'U]/ML
100000 SUSPENSION ORAL 4 TIMES DAILY
Qty: 30 | Refills: 1 | Status: DISCONTINUED | COMMUNITY
Start: 2023-03-29 | End: 2023-06-22

## 2023-06-22 NOTE — HISTORY OF PRESENT ILLNESS
[FreeTextEntry1] : Patient is here for post transplant out patient follow up.  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 Her son got DDRT at [place] (Dec 3, 2019). \par On 500 mg/dose of MMF since she had a soreness   of her tongue and loose BM,  which have since resolved. Denies any h/o CAD/Neoplasia/major infections.\par Overall doing well. Had ureter stent removed  (5/19/23)\par

## 2023-06-22 NOTE — PHYSICAL EXAM
[General Appearance - Alert] : alert [General Appearance - In No Acute Distress] : in no acute distress [Sclera] : the sclera and conjunctiva were normal [PERRL With Normal Accommodation] : pupils were equal in size, round, and reactive to light [Extraocular Movements] : extraocular movements were intact [Outer Ear] : the ears and nose were normal in appearance [Neck Appearance] : the appearance of the neck was normal [Neck Cervical Mass (___cm)] : no neck mass was observed [Jugular Venous Distention Increased] : there was no jugular-venous distention [Thyroid Diffuse Enlargement] : the thyroid was not enlarged [Thyroid Nodule] : there were no palpable thyroid nodules [Auscultation Breath Sounds / Voice Sounds] : lungs were clear to auscultation bilaterally [Heart Rate And Rhythm] : heart rate was normal and rhythm regular [Heart Sounds] : normal S1 and S2 [Heart Sounds Gallop] : no gallops [Murmurs] : no murmurs [Heart Sounds Pericardial Friction Rub] : no pericardial rub [Bowel Sounds] : normal bowel sounds [Abdomen Soft] : soft [Abdomen Tenderness] : non-tender [Abdomen Mass (___ Cm)] : no abdominal mass palpated [FreeTextEntry1] : healed surgical incision [Cervical Lymph Nodes Enlarged Posterior Bilaterally] : posterior cervical [Cervical Lymph Nodes Enlarged Anterior Bilaterally] : anterior cervical [Supraclavicular Lymph Nodes Enlarged Bilaterally] : supraclavicular [Axillary Lymph Nodes Enlarged Bilaterally] : axillary [Inguinal Lymph Nodes Enlarged Bilaterally] : inguinal [Abnormal Walk] : normal gait [Involuntary Movements] : no involuntary movements were seen [___ (cm) Fistula] : [unfilled] (cm) fistula [] : no rash [No Focal Deficits] : no focal deficits [Oriented To Time, Place, And Person] : oriented to person, place, and time [Impaired Insight] : insight and judgment were intact [Affect] : the affect was normal

## 2023-06-22 NOTE — ASSESSMENT
[FreeTextEntry1] : Renal Transplant recipient: Had immediate allograft function, normal creatinine at discharge. Has urinary frequency and nocturia. No dysuria/hematuria. No fever/chills. Tolerating medications.\par Immunosuppression: reviewed; simulect induction, on tac/MMF/prednisone, last Tac level noted; will recheck. Currently on 1.5 mg   daily.; 500 mg/ dose MMF (because tongue soreness/ severe diarrhea) and prednisone at 5 mg daily.\par Will decrease MMF to 500 mg three times/day in view of diarrhea, decreasing WBC count and tongue lesion.\par Hematuria: Resolved; after stent removal. \par Checked anti GBM antibody post transplant- negative\par Hypertension: controlled; Reviewed medications. \par Infection prophylaxis: On Bactrim, Valcyte  as well as GI prophylaxis.\par Discussed ambulation,   optimal  blood pressure readings, adherence with medications and follow ups, follow up clinic visit schedule, avoiding dehydration, mosquito bites; prevention of DVT as well as food safety.\par Has been seen by Eye/Derm teams.\par  \par F/u monthly if labs are stable\par \par Advised to make appointment with primary nephrologist Dr. Horn by 6 months post transplant.

## 2023-06-23 LAB — BKV DNA SPEC QL NAA+PROBE: NOT DETECTED IU/ML

## 2023-07-06 ENCOUNTER — APPOINTMENT (OUTPATIENT)
Dept: NEPHROLOGY | Facility: CLINIC | Age: 68
End: 2023-07-06
Payer: MEDICARE

## 2023-07-06 VITALS
SYSTOLIC BLOOD PRESSURE: 197 MMHG | WEIGHT: 148 LBS | RESPIRATION RATE: 14 BRPM | BODY MASS INDEX: 23.78 KG/M2 | HEIGHT: 66 IN | OXYGEN SATURATION: 100 % | DIASTOLIC BLOOD PRESSURE: 66 MMHG | HEART RATE: 71 BPM

## 2023-07-06 VITALS — DIASTOLIC BLOOD PRESSURE: 80 MMHG | SYSTOLIC BLOOD PRESSURE: 160 MMHG

## 2023-07-06 LAB
ALBUMIN SERPL ELPH-MCNC: 4.3 G/DL
ALP BLD-CCNC: 69 U/L
ALT SERPL-CCNC: 19 U/L
ANION GAP SERPL CALC-SCNC: 13 MMOL/L
APPEARANCE: CLEAR
AST SERPL-CCNC: 18 U/L
BACTERIA: ABNORMAL /HPF
BILIRUB SERPL-MCNC: 0.4 MG/DL
BILIRUBIN URINE: NEGATIVE
BLOOD URINE: NEGATIVE
BUN SERPL-MCNC: 16 MG/DL
CALCIUM SERPL-MCNC: 10.1 MG/DL
CALCIUM SERPL-MCNC: 10.1 MG/DL
CAST: 1 /LPF
CHLORIDE SERPL-SCNC: 106 MMOL/L
CO2 SERPL-SCNC: 23 MMOL/L
COLOR: NORMAL
CREAT SERPL-MCNC: 1.13 MG/DL
CREAT SPEC-SCNC: 193 MG/DL
CREAT/PROT UR: 0.1 RATIO
EGFR: 53 ML/MIN/1.73M2
EPITHELIAL CELLS: 3 /HPF
GLUCOSE QUALITATIVE U: NEGATIVE
GLUCOSE SERPL-MCNC: 163 MG/DL
KETONES URINE: NEGATIVE
LEUKOCYTE ESTERASE URINE: NEGATIVE
MAGNESIUM SERPL-MCNC: 2 MG/DL
MICROSCOPIC-UA: NORMAL
NITRITE URINE: NEGATIVE
PARATHYROID HORMONE INTACT: 76 PG/ML
PH URINE: 6
PHOSPHATE SERPL-MCNC: 3.1 MG/DL
POTASSIUM SERPL-SCNC: 5 MMOL/L
PROT SERPL-MCNC: 6.2 G/DL
PROT UR-MCNC: 22 MG/DL
PROTEIN URINE: ABNORMAL
RED BLOOD CELLS URINE: NORMAL /HPF
REVIEW: NORMAL
SODIUM SERPL-SCNC: 142 MMOL/L
SPECIFIC GRAVITY URINE: >=1.03
TACROLIMUS SERPL-MCNC: 6.8 NG/ML
URATE SERPL-MCNC: 5.8 MG/DL
UROBILINOGEN URINE: NORMAL
WHITE BLOOD CELLS URINE: 2 /HPF

## 2023-07-06 PROCEDURE — 99214 OFFICE O/P EST MOD 30 MIN: CPT

## 2023-07-06 RX ORDER — TRAMADOL HYDROCHLORIDE 50 MG/1
50 TABLET, COATED ORAL
Qty: 12 | Refills: 0 | Status: DISCONTINUED | COMMUNITY
Start: 2023-04-02 | End: 2023-07-06

## 2023-07-06 NOTE — HISTORY OF PRESENT ILLNESS
[FreeTextEntry1] : Patient is here for post transplant out patient follow up.  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 Her son got DDRT at [place] (Dec 3, 2019). On 500 mg/dose of MMF since she had a soreness  of her tongue and loose BM,  which have since resolved. Denies any h/o CAD/Neoplasia/major infections.\par Overall doing well. Had ureter stent removed  (5/19/23)\par \par She has generalized pruritus, partial relief with Benadryl.

## 2023-07-06 NOTE — ASSESSMENT
[FreeTextEntry1] : Renal Transplant recipient: Had immediate allograft function, normal creatinine at discharge. Has urinary frequency and nocturia. No dysuria/hematuria. No fever/chills. Tolerating medications.\par Immunosuppression: reviewed; simulect induction, on tac/MMF/prednisone, last Tac level noted; will recheck. Currently on 1.5 mg   daily.; 500 mg/ dose MMF (because tongue soreness/ severe diarrhea) and prednisone at 5 mg daily.\par Pruritus: Started on Gabapentin 100 mg at night. If no relief will switch from Bactrim\par Will decrease MMF to 500 mg three times/day in view of diarrhea, decreasing WBC count and tongue lesion.\par Hematuria: Resolved; after stent removal. \par Checked anti GBM antibody post transplant- negative\par Hypertension: controlled; Reviewed medications. \par Infection prophylaxis: On Bactrim, Valcyte  as well as GI prophylaxis.\par Discussed ambulation,   optimal  blood pressure readings, adherence with medications and follow ups, follow up clinic visit schedule, avoiding dehydration, mosquito bites; prevention of DVT as well as food safety.\par Has been seen by Eye/Derm teams.\par  \par F/u monthly if labs are stable\par \par Advised to make appointment with primary nephrologist Dr. Horn by 6 months post transplant.

## 2023-07-10 LAB — BKV DNA SPEC QL NAA+PROBE: NOT DETECTED IU/ML

## 2023-07-20 ENCOUNTER — LABORATORY RESULT (OUTPATIENT)
Age: 68
End: 2023-07-20

## 2023-07-20 ENCOUNTER — APPOINTMENT (OUTPATIENT)
Dept: NEPHROLOGY | Facility: CLINIC | Age: 68
End: 2023-07-20
Payer: MEDICARE

## 2023-07-20 ENCOUNTER — NON-APPOINTMENT (OUTPATIENT)
Age: 68
End: 2023-07-20

## 2023-07-20 VITALS
RESPIRATION RATE: 14 BRPM | SYSTOLIC BLOOD PRESSURE: 170 MMHG | WEIGHT: 144 LBS | DIASTOLIC BLOOD PRESSURE: 66 MMHG | BODY MASS INDEX: 23.14 KG/M2 | OXYGEN SATURATION: 100 % | HEART RATE: 66 BPM | HEIGHT: 66 IN

## 2023-07-20 LAB
ALBUMIN SERPL ELPH-MCNC: 4.3 G/DL
ALP BLD-CCNC: 64 U/L
ALT SERPL-CCNC: 22 U/L
ANION GAP SERPL CALC-SCNC: 12 MMOL/L
AST SERPL-CCNC: 16 U/L
BILIRUB SERPL-MCNC: 0.4 MG/DL
BUN SERPL-MCNC: 18 MG/DL
CALCIUM SERPL-MCNC: 9.8 MG/DL
CHLORIDE SERPL-SCNC: 105 MMOL/L
CO2 SERPL-SCNC: 22 MMOL/L
CREAT SERPL-MCNC: 1.16 MG/DL
CREAT SPEC-SCNC: 190 MG/DL
CREAT/PROT UR: 0.1 RATIO
EGFR: 51 ML/MIN/1.73M2
ESTIMATED AVERAGE GLUCOSE: 126 MG/DL
GLUCOSE SERPL-MCNC: 164 MG/DL
HBA1C MFR BLD HPLC: 6 %
MAGNESIUM SERPL-MCNC: 2.1 MG/DL
PHOSPHATE SERPL-MCNC: 3.2 MG/DL
POTASSIUM SERPL-SCNC: 4.7 MMOL/L
PROT SERPL-MCNC: 6.2 G/DL
PROT UR-MCNC: 23 MG/DL
SODIUM SERPL-SCNC: 139 MMOL/L
TACROLIMUS SERPL-MCNC: 7 NG/ML
URATE SERPL-MCNC: 7.3 MG/DL

## 2023-07-20 PROCEDURE — 99214 OFFICE O/P EST MOD 30 MIN: CPT

## 2023-07-20 NOTE — ASSESSMENT
[FreeTextEntry1] : Renal Transplant recipient: Had immediate allograft function, normal creatinine at discharge. Has urinary frequency and nocturia. No dysuria/hematuria. No fever/chills. Tolerating medications.\par Immunosuppression: reviewed; simulect induction, on tac/MMF/prednisone, last Tac level noted; will recheck. Currently on 1.5 mg/   daily.; 500 mg/ dose MMF (because tongue soreness/ severe diarrhea) and prednisone at 5 mg daily.\par Pruritus: Started on Gabapentin 100 mg at night. Hydroxyzine 3 times daily prn. If no relief will switch from Bactrim\par Will decrease MMF to 500 mg three times/day in view of diarrhea, decreasing WBC count and tongue lesion.\par Hematuria: Resolved; after stent removal. \par Checked anti GBM antibody post transplant- negative\par Hypertension: suboptimally controlled; Reviewed medications. Increased Nifedipine to 30 mg twice daily.\par Infection prophylaxis: On Bactrim, Valcyte  as well as GI prophylaxis.\par Discussed ambulation,   optimal  blood pressure readings, adherence with medications and follow ups, follow up clinic visit schedule, avoiding dehydration, mosquito bites; prevention of DVT as well as food safety.\par Has been seen by Eye/Derm teams.\par  \par F/u monthly if labs are stable\par \par Advised to make appointment with primary nephrologist Dr. Horn by 6 months post transplant.

## 2023-07-20 NOTE — PHYSICAL EXAM
[General Appearance - Alert] : alert [General Appearance - In No Acute Distress] : in no acute distress [Sclera] : the sclera and conjunctiva were normal [PERRL With Normal Accommodation] : pupils were equal in size, round, and reactive to light [Extraocular Movements] : extraocular movements were intact [Outer Ear] : the ears and nose were normal in appearance [Neck Appearance] : the appearance of the neck was normal [Neck Cervical Mass (___cm)] : no neck mass was observed [Jugular Venous Distention Increased] : there was no jugular-venous distention [Thyroid Diffuse Enlargement] : the thyroid was not enlarged [Thyroid Nodule] : there were no palpable thyroid nodules [Auscultation Breath Sounds / Voice Sounds] : lungs were clear to auscultation bilaterally [Heart Rate And Rhythm] : heart rate was normal and rhythm regular [Heart Sounds] : normal S1 and S2 [Heart Sounds Gallop] : no gallops [Murmurs] : no murmurs [Heart Sounds Pericardial Friction Rub] : no pericardial rub [Bowel Sounds] : normal bowel sounds [Abdomen Soft] : soft [Abdomen Tenderness] : non-tender [Abdomen Mass (___ Cm)] : no abdominal mass palpated [Cervical Lymph Nodes Enlarged Posterior Bilaterally] : posterior cervical [Cervical Lymph Nodes Enlarged Anterior Bilaterally] : anterior cervical [Supraclavicular Lymph Nodes Enlarged Bilaterally] : supraclavicular [Axillary Lymph Nodes Enlarged Bilaterally] : axillary [Inguinal Lymph Nodes Enlarged Bilaterally] : inguinal [Abnormal Walk] : normal gait [Involuntary Movements] : no involuntary movements were seen [___ (cm) Fistula] : [unfilled] (cm) fistula [] : no rash [No Focal Deficits] : no focal deficits [Oriented To Time, Place, And Person] : oriented to person, place, and time [Impaired Insight] : insight and judgment were intact [Affect] : the affect was normal [FreeTextEntry1] : healed surgical incision

## 2023-07-20 NOTE — HISTORY OF PRESENT ILLNESS
[FreeTextEntry1] : Patient is here for post transplant out patient follow up.  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 Her son got DDRT at [place] (Dec 3, 2019). On 500 mg/dose of MMF since she had a soreness  of her tongue and loose BM,  which have since resolved. Denies any h/o CAD/Neoplasia/major infections.\par Overall doing well. Had ureter stent removed  (5/19/23)\par \par She has generalized pruritus, on Gabapentin, partial relief. Will start on Hydroxyzine.

## 2023-07-21 DIAGNOSIS — N18.6 END STAGE RENAL DISEASE: ICD-10-CM

## 2023-07-21 DIAGNOSIS — Z99.2 END STAGE RENAL DISEASE: ICD-10-CM

## 2023-07-21 LAB
APPEARANCE: CLEAR
BACTERIA: ABNORMAL /HPF
BILIRUBIN URINE: NEGATIVE
BKV DNA SPEC QL NAA+PROBE: NOT DETECTED IU/ML
BLOOD URINE: NEGATIVE
CAST: 2 /LPF
COLOR: YELLOW
EPITHELIAL CELLS: 9 /HPF
GLUCOSE QUALITATIVE U: NEGATIVE MG/DL
KETONES URINE: ABNORMAL MG/DL
LEUKOCYTE ESTERASE URINE: ABNORMAL
MICROSCOPIC-UA: NORMAL
NITRITE URINE: POSITIVE
PH URINE: 5
PROTEIN URINE: NORMAL MG/DL
RED BLOOD CELLS URINE: 1 /HPF
SPECIFIC GRAVITY URINE: 1.03
UROBILINOGEN URINE: 0.2 MG/DL
WHITE BLOOD CELLS URINE: 3 /HPF

## 2023-08-10 ENCOUNTER — APPOINTMENT (OUTPATIENT)
Dept: NEPHROLOGY | Facility: CLINIC | Age: 68
End: 2023-08-10
Payer: MEDICARE

## 2023-08-10 ENCOUNTER — LABORATORY RESULT (OUTPATIENT)
Age: 68
End: 2023-08-10

## 2023-08-10 VITALS
DIASTOLIC BLOOD PRESSURE: 70 MMHG | HEART RATE: 65 BPM | WEIGHT: 146 LBS | TEMPERATURE: 98 F | BODY MASS INDEX: 23.46 KG/M2 | RESPIRATION RATE: 14 BRPM | OXYGEN SATURATION: 100 % | HEIGHT: 66 IN | SYSTOLIC BLOOD PRESSURE: 144 MMHG

## 2023-08-10 PROCEDURE — 99214 OFFICE O/P EST MOD 30 MIN: CPT

## 2023-08-10 NOTE — ASSESSMENT
[FreeTextEntry1] : Renal Transplant recipient: Had immediate allograft function, normal creatinine at discharge. Has urinary frequency and nocturia. No dysuria/hematuria. No fever/chills. Tolerating medications. Immunosuppression: reviewed; simulect induction, on tac/MMF/prednisone, last Tac level noted; will recheck. Currently on 1.5 mg/   daily.; 500 mg/ dose MMF (because tongue soreness/ severe diarrhea) and prednisone at 5 mg daily. Pruritus: Started on Gabapentin 100 mg at night. Hydroxyzine 3 times daily prn. If no relief will switch from Bactrim. On MMF to 500 mg two times/day in view of diarrhea, decreasing WBC count and tongue lesion. Hematuria: Resolved; after stent removal.  Checked anti GBM antibody post transplant- negative Hypertension: suboptimally controlled; Reviewed medications. Increased Nifedipine to 30 mg twice daily. Infection prophylaxis: On Bactrim, Valcyte  as well as GI prophylaxis. Discussed ambulation,   optimal  blood pressure readings, adherence with medications and follow ups, follow up clinic visit schedule, avoiding dehydration, mosquito bites; prevention of DVT as well as food safety. Has been seen by Eye/Derm teams.   F/u monthly if labs are stable  Advised to make appointment with primary nephrologist Dr. Horn by 6 months post transplant.

## 2023-08-10 NOTE — HISTORY OF PRESENT ILLNESS
[FreeTextEntry1] : Patient is here for post transplant out patient follow up.  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 Her son got DDRT at [place] (Dec 3, 2019). On 500 mg/dose of MMF since she had a soreness  of her tongue and loose BM,  which have since resolved. Denies any h/o CAD/Neoplasia/major infections. Overall doing well.   She has generalized pruritus, on Gabapentin, partial relief and Hydroxyzine. Advised to increase Hydroxyzine to twice daily

## 2023-08-11 LAB
ALBUMIN SERPL ELPH-MCNC: 4.4 G/DL
ALP BLD-CCNC: 66 U/L
ALT SERPL-CCNC: 21 U/L
ANION GAP SERPL CALC-SCNC: 12 MMOL/L
APPEARANCE: CLEAR
AST SERPL-CCNC: 17 U/L
BACTERIA: NEGATIVE /HPF
BILIRUB SERPL-MCNC: 0.4 MG/DL
BILIRUBIN URINE: NEGATIVE
BKV DNA SPEC QL NAA+PROBE: NOT DETECTED IU/ML
BLOOD URINE: NEGATIVE
BUN SERPL-MCNC: 19 MG/DL
CALCIUM SERPL-MCNC: 9.5 MG/DL
CAST: 0 /LPF
CHLORIDE SERPL-SCNC: 108 MMOL/L
CO2 SERPL-SCNC: 22 MMOL/L
COLOR: YELLOW
CREAT SERPL-MCNC: 0.97 MG/DL
CREAT SPEC-SCNC: 112 MG/DL
CREAT/PROT UR: 0.1 RATIO
EGFR: 63 ML/MIN/1.73M2
EPITHELIAL CELLS: 5 /HPF
GLUCOSE QUALITATIVE U: NEGATIVE MG/DL
GLUCOSE SERPL-MCNC: 197 MG/DL
KETONES URINE: NEGATIVE MG/DL
LEUKOCYTE ESTERASE URINE: NEGATIVE
MAGNESIUM SERPL-MCNC: 1.6 MG/DL
MICROSCOPIC-UA: NORMAL
NITRITE URINE: NEGATIVE
PH URINE: 5.5
PHOSPHATE SERPL-MCNC: 3.3 MG/DL
POTASSIUM SERPL-SCNC: 4.8 MMOL/L
PROT SERPL-MCNC: 6.1 G/DL
PROT UR-MCNC: 11 MG/DL
PROTEIN URINE: NEGATIVE MG/DL
RED BLOOD CELLS URINE: 0 /HPF
SODIUM SERPL-SCNC: 142 MMOL/L
SPECIFIC GRAVITY URINE: 1.02
TACROLIMUS SERPL-MCNC: 6.3 NG/ML
URATE SERPL-MCNC: 5.8 MG/DL
UROBILINOGEN URINE: 0.2 MG/DL
WHITE BLOOD CELLS URINE: 1 /HPF

## 2023-09-10 ENCOUNTER — NON-APPOINTMENT (OUTPATIENT)
Age: 68
End: 2023-09-10

## 2023-09-11 ENCOUNTER — APPOINTMENT (OUTPATIENT)
Dept: NEPHROLOGY | Facility: CLINIC | Age: 68
End: 2023-09-11
Payer: MEDICARE

## 2023-09-11 VITALS
SYSTOLIC BLOOD PRESSURE: 144 MMHG | OXYGEN SATURATION: 99 % | HEIGHT: 66 IN | HEART RATE: 71 BPM | TEMPERATURE: 97.2 F | BODY MASS INDEX: 22.18 KG/M2 | WEIGHT: 138 LBS | DIASTOLIC BLOOD PRESSURE: 65 MMHG

## 2023-09-11 LAB
ALBUMIN SERPL ELPH-MCNC: 4.5 G/DL
ALP BLD-CCNC: 73 U/L
ALT SERPL-CCNC: 14 U/L
ANION GAP SERPL CALC-SCNC: 15 MMOL/L
APPEARANCE: CLEAR
AST SERPL-CCNC: 14 U/L
BACTERIA: NEGATIVE /HPF
BILIRUB SERPL-MCNC: 0.6 MG/DL
BILIRUBIN URINE: NEGATIVE
BLOOD URINE: NEGATIVE
BUN SERPL-MCNC: 16 MG/DL
CALCIUM SERPL-MCNC: 9.8 MG/DL
CAST: 0 /LPF
CHLORIDE SERPL-SCNC: 103 MMOL/L
CO2 SERPL-SCNC: 22 MMOL/L
COLOR: YELLOW
CREAT SERPL-MCNC: 1.03 MG/DL
CREAT SPEC-SCNC: 143 MG/DL
CREAT/PROT UR: 0.1 RATIO
EGFR: 59 ML/MIN/1.73M2
EPITHELIAL CELLS: 4 /HPF
GLUCOSE QUALITATIVE U: 100 MG/DL
GLUCOSE SERPL-MCNC: 191 MG/DL
HCT VFR BLD CALC: 39.9 %
HGB BLD-MCNC: 12.9 G/DL
KETONES URINE: NEGATIVE MG/DL
LDH SERPL-CCNC: 247 U/L
LEUKOCYTE ESTERASE URINE: NEGATIVE
MAGNESIUM SERPL-MCNC: 1.8 MG/DL
MCHC RBC-ENTMCNC: 31 PG
MCHC RBC-ENTMCNC: 32.3 GM/DL
MCV RBC AUTO: 95.9 FL
MICROSCOPIC-UA: NORMAL
NITRITE URINE: NEGATIVE
PH URINE: 6
PHOSPHATE SERPL-MCNC: 3 MG/DL
PLATELET # BLD AUTO: 186 K/UL
POTASSIUM SERPL-SCNC: 4.5 MMOL/L
PROT SERPL-MCNC: 6.5 G/DL
PROT UR-MCNC: 15 MG/DL
PROTEIN URINE: NORMAL MG/DL
RBC # BLD: 4.16 M/UL
RBC # FLD: 13.2 %
RED BLOOD CELLS URINE: 1 /HPF
SODIUM SERPL-SCNC: 141 MMOL/L
SPECIFIC GRAVITY URINE: 1.02
TACROLIMUS SERPL-MCNC: 7.7 NG/ML
URATE SERPL-MCNC: 6.2 MG/DL
UROBILINOGEN URINE: 0.2 MG/DL
WBC # FLD AUTO: 4.01 K/UL
WHITE BLOOD CELLS URINE: 1 /HPF

## 2023-09-11 PROCEDURE — 99214 OFFICE O/P EST MOD 30 MIN: CPT

## 2023-09-13 LAB — BKV DNA SPEC QL NAA+PROBE: NOT DETECTED IU/ML

## 2023-09-22 RX ORDER — VALGANCICLOVIR HYDROCHLORIDE 450 MG/1
450 TABLET ORAL
Qty: 30 | Refills: 4 | Status: DISCONTINUED | COMMUNITY
Start: 2023-03-29 | End: 2023-09-22

## 2023-09-22 RX ORDER — ATOVAQUONE 750 MG/5ML
750 SUSPENSION ORAL DAILY
Qty: 300 | Refills: 2 | Status: DISCONTINUED | COMMUNITY
Start: 2023-09-18 | End: 2023-09-22

## 2023-09-29 ENCOUNTER — APPOINTMENT (OUTPATIENT)
Dept: DERMATOLOGY | Facility: CLINIC | Age: 68
End: 2023-09-29
Payer: MEDICARE

## 2023-09-29 DIAGNOSIS — R21 RASH AND OTHER NONSPECIFIC SKIN ERUPTION: ICD-10-CM

## 2023-09-29 DIAGNOSIS — L29.9 PRURITUS, UNSPECIFIED: ICD-10-CM

## 2023-09-29 DIAGNOSIS — Q87.81 ALPORT SYNDROME: ICD-10-CM

## 2023-09-29 DIAGNOSIS — Z87.2 PERSONAL HISTORY OF DISEASES OF THE SKIN AND SUBCUTANEOUS TISSUE: ICD-10-CM

## 2023-09-29 PROCEDURE — 99214 OFFICE O/P EST MOD 30 MIN: CPT

## 2023-10-02 ENCOUNTER — APPOINTMENT (OUTPATIENT)
Dept: TRANSPLANT | Facility: CLINIC | Age: 68
End: 2023-10-02

## 2023-10-02 LAB
ALBUMIN SERPL ELPH-MCNC: 3.8 G/DL
ALP BLD-CCNC: 72 U/L
ALT SERPL-CCNC: 17 U/L
ANION GAP SERPL CALC-SCNC: 16 MMOL/L
AST SERPL-CCNC: 14 U/L
BILIRUB SERPL-MCNC: 0.6 MG/DL
BUN SERPL-MCNC: 17 MG/DL
CALCIUM SERPL-MCNC: 10.1 MG/DL
CHLORIDE SERPL-SCNC: 99 MMOL/L
CO2 SERPL-SCNC: 24 MMOL/L
CREAT SERPL-MCNC: 0.99 MG/DL
EGFR: 62 ML/MIN/1.73M2
GLUCOSE SERPL-MCNC: 230 MG/DL
HCT VFR BLD CALC: 35.7 %
HGB BLD-MCNC: 11.5 G/DL
LDH SERPL-CCNC: 165 U/L
MAGNESIUM SERPL-MCNC: 1.7 MG/DL
MCHC RBC-ENTMCNC: 30.3 PG
MCHC RBC-ENTMCNC: 32.2 GM/DL
MCV RBC AUTO: 93.9 FL
PHOSPHATE SERPL-MCNC: 2.6 MG/DL
PLATELET # BLD AUTO: 264 K/UL
POTASSIUM SERPL-SCNC: 3.6 MMOL/L
PROT SERPL-MCNC: 6.5 G/DL
RBC # BLD: 3.8 M/UL
RBC # FLD: 13.2 %
SODIUM SERPL-SCNC: 139 MMOL/L
TACROLIMUS SERPL-MCNC: 10.4 NG/ML
URATE SERPL-MCNC: 5.7 MG/DL
WBC # FLD AUTO: 1.16 K/UL

## 2023-10-06 ENCOUNTER — APPOINTMENT (OUTPATIENT)
Dept: NEPHROLOGY | Facility: CLINIC | Age: 68
End: 2023-10-06
Payer: MEDICARE

## 2023-10-06 VITALS
BODY MASS INDEX: 22.9 KG/M2 | SYSTOLIC BLOOD PRESSURE: 136 MMHG | RESPIRATION RATE: 15 BRPM | HEIGHT: 66 IN | WEIGHT: 142.5 LBS | HEART RATE: 93 BPM | DIASTOLIC BLOOD PRESSURE: 70 MMHG | OXYGEN SATURATION: 98 % | TEMPERATURE: 98.2 F

## 2023-10-06 PROCEDURE — 99214 OFFICE O/P EST MOD 30 MIN: CPT

## 2023-10-06 RX ORDER — MYCOPHENOLATE MOFETIL 500 MG/1
500 TABLET ORAL
Qty: 60 | Refills: 11 | Status: DISCONTINUED | COMMUNITY
Start: 2023-03-29 | End: 2023-10-06

## 2023-10-06 RX ORDER — AMOXICILLIN 500 MG/1
500 CAPSULE ORAL
Qty: 4 | Refills: 5 | Status: DISCONTINUED | COMMUNITY
Start: 2023-05-24 | End: 2023-10-06

## 2023-10-06 RX ORDER — SULFAMETHOXAZOLE AND TRIMETHOPRIM 400; 80 MG/1; MG/1
400-80 TABLET ORAL
Qty: 30 | Refills: 5 | Status: DISCONTINUED | COMMUNITY
Start: 2023-03-29 | End: 2023-10-06

## 2023-10-06 RX ORDER — GABAPENTIN 100 MG/1
100 CAPSULE ORAL
Qty: 90 | Refills: 3 | Status: DISCONTINUED | COMMUNITY
Start: 2023-07-06 | End: 2023-10-06

## 2023-10-08 LAB
BKV DNA SPEC QL NAA+PROBE: NOT DETECTED IU/ML
CMV DNA SPEC QL NAA+PROBE: NOT DETECTED IU/ML
CMVPCR LOG: NOT DETECTED LOG10IU/ML

## 2023-10-10 ENCOUNTER — APPOINTMENT (OUTPATIENT)
Dept: TRANSPLANT | Facility: CLINIC | Age: 68
End: 2023-10-10

## 2023-10-11 LAB
ALBUMIN SERPL ELPH-MCNC: 3.6 G/DL
ALP BLD-CCNC: 79 U/L
ALT SERPL-CCNC: 13 U/L
ANION GAP SERPL CALC-SCNC: 16 MMOL/L
AST SERPL-CCNC: 12 U/L
BILIRUB SERPL-MCNC: 0.4 MG/DL
BUN SERPL-MCNC: 18 MG/DL
CALCIUM SERPL-MCNC: 9.6 MG/DL
CHLORIDE SERPL-SCNC: 101 MMOL/L
CMV DNA SPEC QL NAA+PROBE: NOT DETECTED IU/ML
CMVPCR LOG: NOT DETECTED LOG10IU/ML
CO2 SERPL-SCNC: 23 MMOL/L
CREAT SERPL-MCNC: 0.87 MG/DL
EGFR: 72 ML/MIN/1.73M2
GLUCOSE SERPL-MCNC: 239 MG/DL
HCT VFR BLD CALC: 34.5 %
HGB BLD-MCNC: 11.1 G/DL
MAGNESIUM SERPL-MCNC: 1.6 MG/DL
MCHC RBC-ENTMCNC: 30.2 PG
MCHC RBC-ENTMCNC: 32.2 GM/DL
MCV RBC AUTO: 94 FL
PHOSPHATE SERPL-MCNC: 3 MG/DL
PLATELET # BLD AUTO: 294 K/UL
POTASSIUM SERPL-SCNC: 4.4 MMOL/L
PROT SERPL-MCNC: 6.2 G/DL
RBC # BLD: 3.67 M/UL
RBC # FLD: 13.2 %
SODIUM SERPL-SCNC: 140 MMOL/L
TACROLIMUS SERPL-MCNC: 5.5 NG/ML
WBC # FLD AUTO: 9.07 K/UL

## 2023-11-01 ENCOUNTER — INPATIENT (INPATIENT)
Facility: HOSPITAL | Age: 68
LOS: 6 days | Discharge: HOME HEALTH | DRG: 637 | End: 2023-11-08
Attending: SURGERY | Admitting: SURGERY
Payer: MEDICARE

## 2023-11-01 ENCOUNTER — APPOINTMENT (OUTPATIENT)
Dept: NEPHROLOGY | Facility: CLINIC | Age: 68
End: 2023-11-01
Payer: MEDICARE

## 2023-11-01 ENCOUNTER — NON-APPOINTMENT (OUTPATIENT)
Age: 68
End: 2023-11-01

## 2023-11-01 VITALS
HEART RATE: 83 BPM | WEIGHT: 138.01 LBS | RESPIRATION RATE: 18 BRPM | TEMPERATURE: 98 F | SYSTOLIC BLOOD PRESSURE: 166 MMHG | HEIGHT: 66 IN | DIASTOLIC BLOOD PRESSURE: 74 MMHG | OXYGEN SATURATION: 100 %

## 2023-11-01 VITALS
BODY MASS INDEX: 22.18 KG/M2 | TEMPERATURE: 96 F | SYSTOLIC BLOOD PRESSURE: 153 MMHG | HEIGHT: 66 IN | HEART RATE: 93 BPM | WEIGHT: 138 LBS | DIASTOLIC BLOOD PRESSURE: 90 MMHG | OXYGEN SATURATION: 100 %

## 2023-11-01 DIAGNOSIS — Z90.89 ACQUIRED ABSENCE OF OTHER ORGANS: Chronic | ICD-10-CM

## 2023-11-01 DIAGNOSIS — I77.0 ARTERIOVENOUS FISTULA, ACQUIRED: Chronic | ICD-10-CM

## 2023-11-01 DIAGNOSIS — Z94.0 KIDNEY TRANSPLANT STATUS: Chronic | ICD-10-CM

## 2023-11-01 DIAGNOSIS — R73.9 HYPERGLYCEMIA, UNSPECIFIED: ICD-10-CM

## 2023-11-01 LAB
ALBUMIN SERPL ELPH-MCNC: 4 G/DL — SIGNIFICANT CHANGE UP (ref 3.3–5)
ALBUMIN SERPL ELPH-MCNC: 4 G/DL — SIGNIFICANT CHANGE UP (ref 3.3–5)
ALP SERPL-CCNC: 99 U/L — SIGNIFICANT CHANGE UP (ref 40–120)
ALP SERPL-CCNC: 99 U/L — SIGNIFICANT CHANGE UP (ref 40–120)
ALT FLD-CCNC: 34 U/L — SIGNIFICANT CHANGE UP (ref 10–45)
ALT FLD-CCNC: 34 U/L — SIGNIFICANT CHANGE UP (ref 10–45)
ANION GAP SERPL CALC-SCNC: 15 MMOL/L — SIGNIFICANT CHANGE UP (ref 5–17)
ANION GAP SERPL CALC-SCNC: 15 MMOL/L — SIGNIFICANT CHANGE UP (ref 5–17)
ANISOCYTOSIS BLD QL: SLIGHT — SIGNIFICANT CHANGE UP
ANISOCYTOSIS BLD QL: SLIGHT — SIGNIFICANT CHANGE UP
APPEARANCE UR: CLEAR — SIGNIFICANT CHANGE UP
APPEARANCE UR: CLEAR — SIGNIFICANT CHANGE UP
AST SERPL-CCNC: 19 U/L — SIGNIFICANT CHANGE UP (ref 10–40)
AST SERPL-CCNC: 19 U/L — SIGNIFICANT CHANGE UP (ref 10–40)
B-OH-BUTYR SERPL-SCNC: 0.1 MMOL/L — SIGNIFICANT CHANGE UP
B-OH-BUTYR SERPL-SCNC: 0.1 MMOL/L — SIGNIFICANT CHANGE UP
BACTERIA # UR AUTO: ABNORMAL /HPF
BACTERIA # UR AUTO: ABNORMAL /HPF
BASE EXCESS BLDV CALC-SCNC: -1.4 MMOL/L — SIGNIFICANT CHANGE UP (ref -2–3)
BASE EXCESS BLDV CALC-SCNC: -1.4 MMOL/L — SIGNIFICANT CHANGE UP (ref -2–3)
BASOPHILS # BLD AUTO: 0.12 K/UL — SIGNIFICANT CHANGE UP (ref 0–0.2)
BASOPHILS # BLD AUTO: 0.12 K/UL — SIGNIFICANT CHANGE UP (ref 0–0.2)
BASOPHILS NFR BLD AUTO: 0.9 % — SIGNIFICANT CHANGE UP (ref 0–2)
BASOPHILS NFR BLD AUTO: 0.9 % — SIGNIFICANT CHANGE UP (ref 0–2)
BILIRUB SERPL-MCNC: 0.4 MG/DL — SIGNIFICANT CHANGE UP (ref 0.2–1.2)
BILIRUB SERPL-MCNC: 0.4 MG/DL — SIGNIFICANT CHANGE UP (ref 0.2–1.2)
BILIRUB UR-MCNC: NEGATIVE — SIGNIFICANT CHANGE UP
BILIRUB UR-MCNC: NEGATIVE — SIGNIFICANT CHANGE UP
BUN SERPL-MCNC: 27 MG/DL — HIGH (ref 7–23)
BUN SERPL-MCNC: 27 MG/DL — HIGH (ref 7–23)
CA-I SERPL-SCNC: 1.29 MMOL/L — SIGNIFICANT CHANGE UP (ref 1.15–1.33)
CA-I SERPL-SCNC: 1.29 MMOL/L — SIGNIFICANT CHANGE UP (ref 1.15–1.33)
CALCIUM SERPL-MCNC: 10 MG/DL — SIGNIFICANT CHANGE UP (ref 8.4–10.5)
CALCIUM SERPL-MCNC: 10 MG/DL — SIGNIFICANT CHANGE UP (ref 8.4–10.5)
CAST: 0 /LPF — SIGNIFICANT CHANGE UP (ref 0–4)
CAST: 0 /LPF — SIGNIFICANT CHANGE UP (ref 0–4)
CHLORIDE BLDV-SCNC: 91 MMOL/L — LOW (ref 96–108)
CHLORIDE BLDV-SCNC: 91 MMOL/L — LOW (ref 96–108)
CHLORIDE SERPL-SCNC: 89 MMOL/L — LOW (ref 96–108)
CHLORIDE SERPL-SCNC: 89 MMOL/L — LOW (ref 96–108)
CO2 BLDV-SCNC: 28 MMOL/L — HIGH (ref 22–26)
CO2 BLDV-SCNC: 28 MMOL/L — HIGH (ref 22–26)
CO2 SERPL-SCNC: 23 MMOL/L — SIGNIFICANT CHANGE UP (ref 22–31)
CO2 SERPL-SCNC: 23 MMOL/L — SIGNIFICANT CHANGE UP (ref 22–31)
COLOR SPEC: YELLOW — SIGNIFICANT CHANGE UP
COLOR SPEC: YELLOW — SIGNIFICANT CHANGE UP
CREAT SERPL-MCNC: 0.94 MG/DL — SIGNIFICANT CHANGE UP (ref 0.5–1.3)
CREAT SERPL-MCNC: 0.94 MG/DL — SIGNIFICANT CHANGE UP (ref 0.5–1.3)
DIFF PNL FLD: ABNORMAL
DIFF PNL FLD: ABNORMAL
EGFR: 66 ML/MIN/1.73M2 — SIGNIFICANT CHANGE UP
EGFR: 66 ML/MIN/1.73M2 — SIGNIFICANT CHANGE UP
EOSINOPHIL # BLD AUTO: 0.12 K/UL — SIGNIFICANT CHANGE UP (ref 0–0.5)
EOSINOPHIL # BLD AUTO: 0.12 K/UL — SIGNIFICANT CHANGE UP (ref 0–0.5)
EOSINOPHIL NFR BLD AUTO: 0.9 % — SIGNIFICANT CHANGE UP (ref 0–6)
EOSINOPHIL NFR BLD AUTO: 0.9 % — SIGNIFICANT CHANGE UP (ref 0–6)
GAS PNL BLDV: 127 MMOL/L — LOW (ref 136–145)
GAS PNL BLDV: 127 MMOL/L — LOW (ref 136–145)
GAS PNL BLDV: SIGNIFICANT CHANGE UP
GLUCOSE BLDC GLUCOMTR-MCNC: 537 MG/DL — CRITICAL HIGH (ref 70–99)
GLUCOSE BLDC GLUCOMTR-MCNC: 537 MG/DL — CRITICAL HIGH (ref 70–99)
GLUCOSE BLDV-MCNC: >660 MG/DL — CRITICAL HIGH (ref 70–99)
GLUCOSE BLDV-MCNC: >660 MG/DL — CRITICAL HIGH (ref 70–99)
GLUCOSE SERPL-MCNC: 771 MG/DL — CRITICAL HIGH (ref 70–99)
GLUCOSE SERPL-MCNC: 771 MG/DL — CRITICAL HIGH (ref 70–99)
GLUCOSE UR QL: >=1000 MG/DL
GLUCOSE UR QL: >=1000 MG/DL
HCO3 BLDV-SCNC: 26 MMOL/L — SIGNIFICANT CHANGE UP (ref 22–29)
HCO3 BLDV-SCNC: 26 MMOL/L — SIGNIFICANT CHANGE UP (ref 22–29)
HCT VFR BLD CALC: 35.3 % — SIGNIFICANT CHANGE UP (ref 34.5–45)
HCT VFR BLD CALC: 35.3 % — SIGNIFICANT CHANGE UP (ref 34.5–45)
HCT VFR BLDA CALC: 34 % — LOW (ref 34.5–46.5)
HCT VFR BLDA CALC: 34 % — LOW (ref 34.5–46.5)
HGB BLD CALC-MCNC: 11.2 G/DL — LOW (ref 11.7–16.1)
HGB BLD CALC-MCNC: 11.2 G/DL — LOW (ref 11.7–16.1)
HGB BLD-MCNC: 11.2 G/DL — LOW (ref 11.5–15.5)
HGB BLD-MCNC: 11.2 G/DL — LOW (ref 11.5–15.5)
KETONES UR-MCNC: NEGATIVE MG/DL — SIGNIFICANT CHANGE UP
KETONES UR-MCNC: NEGATIVE MG/DL — SIGNIFICANT CHANGE UP
LACTATE BLDV-MCNC: 4.6 MMOL/L — CRITICAL HIGH (ref 0.5–2)
LACTATE BLDV-MCNC: 4.6 MMOL/L — CRITICAL HIGH (ref 0.5–2)
LEUKOCYTE ESTERASE UR-ACNC: ABNORMAL
LEUKOCYTE ESTERASE UR-ACNC: ABNORMAL
LYMPHOCYTES # BLD AUTO: 1.51 K/UL — SIGNIFICANT CHANGE UP (ref 1–3.3)
LYMPHOCYTES # BLD AUTO: 1.51 K/UL — SIGNIFICANT CHANGE UP (ref 1–3.3)
LYMPHOCYTES # BLD AUTO: 11.5 % — LOW (ref 13–44)
LYMPHOCYTES # BLD AUTO: 11.5 % — LOW (ref 13–44)
MANUAL SMEAR VERIFICATION: SIGNIFICANT CHANGE UP
MANUAL SMEAR VERIFICATION: SIGNIFICANT CHANGE UP
MCHC RBC-ENTMCNC: 29.4 PG — SIGNIFICANT CHANGE UP (ref 27–34)
MCHC RBC-ENTMCNC: 29.4 PG — SIGNIFICANT CHANGE UP (ref 27–34)
MCHC RBC-ENTMCNC: 31.7 GM/DL — LOW (ref 32–36)
MCHC RBC-ENTMCNC: 31.7 GM/DL — LOW (ref 32–36)
MCV RBC AUTO: 92.7 FL — SIGNIFICANT CHANGE UP (ref 80–100)
MCV RBC AUTO: 92.7 FL — SIGNIFICANT CHANGE UP (ref 80–100)
MONOCYTES # BLD AUTO: 0.46 K/UL — SIGNIFICANT CHANGE UP (ref 0–0.9)
MONOCYTES # BLD AUTO: 0.46 K/UL — SIGNIFICANT CHANGE UP (ref 0–0.9)
MONOCYTES NFR BLD AUTO: 3.5 % — SIGNIFICANT CHANGE UP (ref 2–14)
MONOCYTES NFR BLD AUTO: 3.5 % — SIGNIFICANT CHANGE UP (ref 2–14)
MYELOCYTES NFR BLD: 0.9 % — HIGH (ref 0–0)
MYELOCYTES NFR BLD: 0.9 % — HIGH (ref 0–0)
NEUTROPHILS # BLD AUTO: 10.77 K/UL — HIGH (ref 1.8–7.4)
NEUTROPHILS # BLD AUTO: 10.77 K/UL — HIGH (ref 1.8–7.4)
NEUTROPHILS NFR BLD AUTO: 82.3 % — HIGH (ref 43–77)
NEUTROPHILS NFR BLD AUTO: 82.3 % — HIGH (ref 43–77)
NITRITE UR-MCNC: NEGATIVE — SIGNIFICANT CHANGE UP
NITRITE UR-MCNC: NEGATIVE — SIGNIFICANT CHANGE UP
OTHER CELLS CSF MANUAL: 6 ML/DL — LOW (ref 18–22)
OTHER CELLS CSF MANUAL: 6 ML/DL — LOW (ref 18–22)
PCO2 BLDV: 57 MMHG — HIGH (ref 39–42)
PCO2 BLDV: 57 MMHG — HIGH (ref 39–42)
PH BLDV: 7.27 — LOW (ref 7.32–7.43)
PH BLDV: 7.27 — LOW (ref 7.32–7.43)
PH UR: 6 — SIGNIFICANT CHANGE UP (ref 5–8)
PH UR: 6 — SIGNIFICANT CHANGE UP (ref 5–8)
PLAT MORPH BLD: ABNORMAL
PLAT MORPH BLD: ABNORMAL
PLATELET # BLD AUTO: 307 K/UL — SIGNIFICANT CHANGE UP (ref 150–400)
PLATELET # BLD AUTO: 307 K/UL — SIGNIFICANT CHANGE UP (ref 150–400)
PO2 BLDV: 30 MMHG — SIGNIFICANT CHANGE UP (ref 25–45)
PO2 BLDV: 30 MMHG — SIGNIFICANT CHANGE UP (ref 25–45)
POLYCHROMASIA BLD QL SMEAR: SLIGHT — SIGNIFICANT CHANGE UP
POLYCHROMASIA BLD QL SMEAR: SLIGHT — SIGNIFICANT CHANGE UP
POTASSIUM BLDV-SCNC: 4.9 MMOL/L — SIGNIFICANT CHANGE UP (ref 3.5–5.1)
POTASSIUM BLDV-SCNC: 4.9 MMOL/L — SIGNIFICANT CHANGE UP (ref 3.5–5.1)
POTASSIUM SERPL-MCNC: 5.4 MMOL/L — HIGH (ref 3.5–5.3)
POTASSIUM SERPL-MCNC: 5.4 MMOL/L — HIGH (ref 3.5–5.3)
POTASSIUM SERPL-SCNC: 5.4 MMOL/L — HIGH (ref 3.5–5.3)
POTASSIUM SERPL-SCNC: 5.4 MMOL/L — HIGH (ref 3.5–5.3)
PROT SERPL-MCNC: 7.2 G/DL — SIGNIFICANT CHANGE UP (ref 6–8.3)
PROT SERPL-MCNC: 7.2 G/DL — SIGNIFICANT CHANGE UP (ref 6–8.3)
PROT UR-MCNC: NEGATIVE MG/DL — SIGNIFICANT CHANGE UP
PROT UR-MCNC: NEGATIVE MG/DL — SIGNIFICANT CHANGE UP
RBC # BLD: 3.81 M/UL — SIGNIFICANT CHANGE UP (ref 3.8–5.2)
RBC # BLD: 3.81 M/UL — SIGNIFICANT CHANGE UP (ref 3.8–5.2)
RBC # FLD: 15.1 % — HIGH (ref 10.3–14.5)
RBC # FLD: 15.1 % — HIGH (ref 10.3–14.5)
RBC BLD AUTO: ABNORMAL
RBC BLD AUTO: ABNORMAL
RBC CASTS # UR COMP ASSIST: 0 /HPF — SIGNIFICANT CHANGE UP (ref 0–4)
RBC CASTS # UR COMP ASSIST: 0 /HPF — SIGNIFICANT CHANGE UP (ref 0–4)
SAO2 % BLDV: 34.5 % — LOW (ref 67–88)
SAO2 % BLDV: 34.5 % — LOW (ref 67–88)
SODIUM SERPL-SCNC: 127 MMOL/L — LOW (ref 135–145)
SODIUM SERPL-SCNC: 127 MMOL/L — LOW (ref 135–145)
SP GR SPEC: >1.03 — HIGH (ref 1–1.03)
SP GR SPEC: >1.03 — HIGH (ref 1–1.03)
SQUAMOUS # UR AUTO: 2 /HPF — SIGNIFICANT CHANGE UP (ref 0–5)
SQUAMOUS # UR AUTO: 2 /HPF — SIGNIFICANT CHANGE UP (ref 0–5)
UROBILINOGEN FLD QL: 0.2 MG/DL — SIGNIFICANT CHANGE UP (ref 0.2–1)
UROBILINOGEN FLD QL: 0.2 MG/DL — SIGNIFICANT CHANGE UP (ref 0.2–1)
WBC # BLD: 13.09 K/UL — HIGH (ref 3.8–10.5)
WBC # BLD: 13.09 K/UL — HIGH (ref 3.8–10.5)
WBC # FLD AUTO: 13.09 K/UL — HIGH (ref 3.8–10.5)
WBC # FLD AUTO: 13.09 K/UL — HIGH (ref 3.8–10.5)
WBC UR QL: 60 /HPF — HIGH (ref 0–5)
WBC UR QL: 60 /HPF — HIGH (ref 0–5)

## 2023-11-01 PROCEDURE — 99214 OFFICE O/P EST MOD 30 MIN: CPT

## 2023-11-01 PROCEDURE — 71045 X-RAY EXAM CHEST 1 VIEW: CPT | Mod: 26

## 2023-11-01 PROCEDURE — 99285 EMERGENCY DEPT VISIT HI MDM: CPT | Mod: FS

## 2023-11-01 RX ORDER — BLOOD-GLUCOSE METER
W/DEVICE KIT MISCELLANEOUS
Qty: 1 | Refills: 0 | Status: ACTIVE | COMMUNITY
Start: 2023-11-01 | End: 1900-01-01

## 2023-11-01 RX ORDER — BLOOD SUGAR DIAGNOSTIC
STRIP MISCELLANEOUS
Qty: 2 | Refills: 5 | Status: ACTIVE | COMMUNITY
Start: 2023-11-01 | End: 1900-01-01

## 2023-11-01 RX ORDER — ISOPROPYL ALCOHOL 70 ML/100ML
SWAB TOPICAL
Qty: 1 | Refills: 11 | Status: ACTIVE | COMMUNITY
Start: 2023-11-01 | End: 1900-01-01

## 2023-11-01 RX ORDER — PIPERACILLIN AND TAZOBACTAM 4; .5 G/20ML; G/20ML
3.38 INJECTION, POWDER, LYOPHILIZED, FOR SOLUTION INTRAVENOUS ONCE
Refills: 0 | Status: COMPLETED | OUTPATIENT
Start: 2023-11-01 | End: 2023-11-01

## 2023-11-01 RX ORDER — SODIUM CHLORIDE 9 MG/ML
1000 INJECTION INTRAMUSCULAR; INTRAVENOUS; SUBCUTANEOUS ONCE
Refills: 0 | Status: COMPLETED | OUTPATIENT
Start: 2023-11-01 | End: 2023-11-01

## 2023-11-01 RX ADMIN — PIPERACILLIN AND TAZOBACTAM 200 GRAM(S): 4; .5 INJECTION, POWDER, LYOPHILIZED, FOR SOLUTION INTRAVENOUS at 23:41

## 2023-11-01 RX ADMIN — SODIUM CHLORIDE 1000 MILLILITER(S): 9 INJECTION INTRAMUSCULAR; INTRAVENOUS; SUBCUTANEOUS at 22:33

## 2023-11-01 NOTE — ED PROVIDER NOTE - ATTENDING APP SHARED VISIT CONTRIBUTION OF CARE
I, Dr. Dulce Mcgraw, have personally performed a face to face medical and diagnostic evaluation of the patient. I have discussed with and reviewed the Resident's and/or ACP's and/or Medical/PA/NP student's note and agree with the History, ROS, Physical Exam and MDM unless otherwise indicated. A brief summary of my personal evaluation and impression can be found below.    MDM: Patient is a 68-year-old male with history of hypertension, CKD, Alport syndrome s/p transplant in March 2023 presenting with elevated blood glucose on outpatient labs.  Patient reports feeling increased severe fatigue for the last week.  Denies fevers, chills, nausea, vomiting, urinary symptoms.  No history of diabetes.  Patient well-appearing, dry mucous membranes, clear lungs, abdomen soft/nontender.  On daily prednisone so this is possibly contributing to her hyperglycemia.  Will rule out DKA.  Transplant evaluated patient and so we will follow-up on their recommendations as well.  Plan for labs, IV hydration, and anticipate admission given she is high risk.

## 2023-11-01 NOTE — H&P ADULT - NS ATTEND AMEND GEN_ALL_CORE FT
67F sp DDRT 3/2023 with excellent renal function, now admitted with hyperglycemia glucose >600 and lactic acidosis.  Started in insulin drip with improvement  no evidence of DKA  +positive for Covid.  also had it a couple of weeks ago    Immunosuppression - Cont envarsus 1.5 mg daily; pred 5mg daily; MMF/imuran held due to covid  f/u ID re treatment for Covid  Endocrinology consult for hyperglycemia. Previously not on insulin, no DM. check Hgb A1c and cont insulin gtt for now.

## 2023-11-01 NOTE — H&P ADULT - HISTORY OF PRESENT ILLNESS
68F with Hx of Alport Syndrome and CKD 5  (x 15 years), LUE AVF placed in 2017, recently started HD on 3/17/2023 ([facility], Nephrologist Dr. Jet Horn ) now s/p R  DDRT under Simulect induction on 3/28/23 presents from outpatient clinic after she was found to have elevated blood glucose to 600's and was informed to go to the ER. Denies any hx diabetes or dietary changes.    Patient also endorses 3 month history of itching around her face that is most prevalent at night. Was given medication for symptoms however felt very drowsy afterwards, later tried benadryl with worsening drowsiness side effects.    She also endorses weakness in her lower extremities that has been ongoing for 2 months that occurs after walking. She endorses she feels she need to stop and rest after taking "a few hundred steps" which is atypical for her. Denies any numbness/tingling of feet, but also endorses feeling more tired than usual the past several months.

## 2023-11-01 NOTE — ED PROVIDER NOTE - OBJECTIVE STATEMENT
68-year-old female with past medical history of hypertension, CKD status post R DDRT in March of this year presents ED complaining of elevated glucose from outpatient labs taken this morning with glucose greater than 600.  Patient states that for the past few months she has had increased generalized weakness and fatigue compared to her baseline.  Her glucose levels have been monitored from her transplant team with sugars ranging near 200.  Last blood work drawn approximate 1 month ago and patient reports that today with routine follow-up.  Her nephrologist is going to start her on Repaglinide after today's visit but this evening patient received a call after blood work resulted and was advised to come to the emergency department instead.  She has been on prednisone 5 mg daily as well as CellCept and tacrolimus for antirejection.  She has never been on any medications for diabetes in the past.  Denies headaches, dizziness, chest pain, shortness of breath, abdominal pain, nausea, vomiting, urinary frequency, dysuria.

## 2023-11-01 NOTE — ED ADULT TRIAGE NOTE - CHIEF COMPLAINT QUOTE
serum glucose over 600 on blood taken this morning outpatient; hx kidney transplant in march 2023; denies hx DM

## 2023-11-01 NOTE — ED ADULT NURSE NOTE - OBJECTIVE STATEMENT
Break Coverage RN. Pt is a 69yo f coming from home c/o abnormal lab result. Pt states that she was seen by  this AM for repeat labwork, s/p kidney transplant in March 2023. Pt states that she was then called approx 20 mins ago telling her to come to ED BG was >600. Pt states that she never had hx of DM, medical complications began this summer from kidney transplant meds, previous fistula to left arm. PMH DVT RUE, ESRD, anemia, HTN. PT A,Ox4, ambulatory at baseline. Respirations even and unlabored, abd soft, nondistended and nontender, skin warm, dry and intact, ROBERT. Denies HA, CP, SOB, n/v/d, fever, chills and urinary symptoms. Stretcher locked in lowest position, appropriate side rails up for safety, pt instructed to call for RN if anything needed.

## 2023-11-01 NOTE — ED PROVIDER NOTE - PROGRESS NOTE DETAILS
Patient seen and evaluated by transplant service who recommended admission to the SICU for further management.  Requested that patient have blood and urine cultures drawn and RVP sent and 1 dose of Zosyn ordered.  Patient can be admitted to Dr. Bourgeois service and hyperglycemia will be managed on the floor  Christa Bell PA-C

## 2023-11-01 NOTE — H&P ADULT - NSHPREVIEWOFSYSTEMS_GEN_ALL_CORE
REVIEW OF SYSTEMS:    CONSTITUTIONAL: +weakness, fevers or chills  EYES/ENT: No visual changes;  No vertigo or throat pain   NECK: No pain or stiffness  RESPIRATORY: No cough, wheezing, hemoptysis; No shortness of breath  CARDIOVASCULAR: No chest pain or palpitations  GASTROINTESTINAL: No abdominal or epigastric pain. No nausea, vomiting, or hematemesis; No diarrhea or constipation. No melena or hematochezia.  GENITOURINARY: No dysuria, frequency or hematuria  NEUROLOGICAL: No numbness or weakness  SKIN: No itching currently, no rashes

## 2023-11-01 NOTE — H&P ADULT - NSICDXPASTSURGICALHX_GEN_ALL_CORE_FT
PAST SURGICAL HISTORY:  A-V fistula Nov 2017    Renal transplant recipient     S/P tonsillectomy

## 2023-11-01 NOTE — H&P ADULT - NSHPLABSRESULTS_GEN_ALL_CORE
LABS:                          11.2   13.09 )-----------( 307      ( 01 Nov 2023 21:16 )             35.3     11-01    127<L>  |  89<L>  |  27<H>  ----------------------------<  771<HH>  5.4<H>   |  23  |  0.94    Ca    10.0      01 Nov 2023 21:16    TPro  7.2  /  Alb  4.0  /  TBili  0.4  /  DBili  x   /  AST  19  /  ALT  34  /  AlkPhos  99  11-01

## 2023-11-01 NOTE — H&P ADULT - ASSESSMENT
67-year-old [facility] female with HTN,  Alport syndrome and CKD 5  (x 15 years), LUE AVF placed in 2017, recently started HD on 3/17/2023 (Tanja, Nephrologist Dr. Jet Horn ) now s/p R  DDRT under Simulect induction on 3/28/23. Admitted to [facility] for hyperglycemia noted on outpatient labs with LE weakness after walking. 67-year-old [place] female with HTN,  Alport syndrome and CKD 5  (x 15 years), LUE AVF placed in 2017, recently started HD on 3/17/2023 (Tanja, Nephrologist Dr. Jet Horn ) now s/p R  DDRT under Simulect induction on 3/28/23. Admitted to [facility] for hyperglycemia noted on outpatient labs with LE weakness after walking.    [ ] Hyperglycemia.   - FS>700.   - Beta Hydroxy: 0.1.   - Insulin gtt @3. Titrate per protocol.   - IVF  - Endo consult   - HbA1C in am.    [ ] Lactic acidosis.  - WBC: 9.91, Lactate: 4.6  - F/U UA, UCX, BCX X2.   - Procal: 0.1.   - RVP: + COVID PCR.  - S/P Zosyn X1 in ED  - ID c/s.  - Airborne precautions.     [ ] S/P DDRT 3/17/23  - good graft function with good UOP  - strict I&Os   - Tolerating regular diet   - SCDs.  - bowel regimen  - ureter stented removed.   - SQH    [ ] Immunosuppression:   - ENV on hold, MMF 1gm BID, Prednisone 5  - PPX: nystatin, bactrim, Valcyte    # [id number]  Transplant PA     67-year-old [place] female with HTN,  Alport syndrome and CKD 5  (x 15 years), LUE AVF placed in 2017, recently started HD on 3/17/2023 (Tanja, Nephrologist Dr. Jet Horn ) now s/p R  DDRT under Simulect induction on 3/28/23. Admitted to [facility] for hyperglycemia noted on outpatient labs with LE weakness after walking.    [ ] Hyperglycemia.   - FS>700 in ED.   - Beta Hydroxy: 0.1.   - Admit to SICU.   - Insulin gtt @3. Titrate per protocol.   - IVF  - Endo consult   - HbA1C in am.    [ ] Lactic acidosis.  - WBC: 9.91, Lactate: 4.6  - F/U UA, UCX, BCX X2.   - Procal: 0.1.   - RVP: + COVID PCR.  - S/P Zosyn X1 in ED  - ID c/s.  - Airborne precautions.     [ ] S/P DDRT 3/17/23  - good graft function with good UOP  - strict I&Os   - Tolerating regular diet   - SCDs.  - bowel regimen  - ureter stented removed.   - SQH    [ ] Immunosuppression:   - ENV on hold, MMF 250gm BID, Prednisone 5  - PPX: nystatin, bactrim, Valcyte    # [id number]  Transplant PA

## 2023-11-01 NOTE — ED ADULT NURSE NOTE - NS ED NURSE TRANSPORT WITH
Cardiac Monitor/Defib/ACLS/Rescue Kit/O2/BVM Resident/RN/EDT/Cardiac Monitor/Defib/ACLS/Rescue Kit/O2/BVM

## 2023-11-01 NOTE — H&P ADULT - NSHPPHYSICALEXAM_GEN_ALL_CORE
VITALS:   T(C): 36.8 (11-01-23 @ 20:54), Max: 36.8 (11-01-23 @ 20:17)  HR: 67 (11-01-23 @ 20:54) (67 - 83)  BP: 132/68 (11-01-23 @ 20:54) (132/68 - 166/74)  RR: 18 (11-01-23 @ 20:54) (18 - 18)  SpO2: 97% (11-01-23 @ 20:54) (97% - 100%)    GENERAL: NAD, lying in bed comfortably  HEAD:  Atraumatic, normocephalic  EYES: EOMI, PERRLA, conjunctiva and sclera clear  ENT: Moist mucous membranes  NECK: Supple, no JVD  HEART: Regular rate and rhythm, no murmurs, rubs, or gallops  LUNGS: Unlabored respirations.  Clear to auscultation bilaterally, no crackles, wheezing, or rhonchi  ABDOMEN: Soft, nontender, nondistended, +BS  EXTREMITIES: 2+ peripheral pulses bilaterally. No clubbing, cyanosis, or edema  NERVOUS SYSTEM:  A&Ox3, no focal deficits, Motor/sensory intact in all extremities  SKIN: No rashes or lesions

## 2023-11-02 DIAGNOSIS — I10 ESSENTIAL (PRIMARY) HYPERTENSION: ICD-10-CM

## 2023-11-02 DIAGNOSIS — E13.9 OTHER SPECIFIED DIABETES MELLITUS WITHOUT COMPLICATIONS: ICD-10-CM

## 2023-11-02 DIAGNOSIS — E78.5 HYPERLIPIDEMIA, UNSPECIFIED: ICD-10-CM

## 2023-11-02 LAB
A1C WITH ESTIMATED AVERAGE GLUCOSE RESULT: 10.4 % — HIGH (ref 4–5.6)
A1C WITH ESTIMATED AVERAGE GLUCOSE RESULT: 10.4 % — HIGH (ref 4–5.6)
ALBUMIN SERPL ELPH-MCNC: 3.6 G/DL — SIGNIFICANT CHANGE UP (ref 3.3–5)
ALBUMIN SERPL ELPH-MCNC: 3.6 G/DL — SIGNIFICANT CHANGE UP (ref 3.3–5)
ALBUMIN SERPL ELPH-MCNC: 3.7 G/DL
ALP BLD-CCNC: 97 U/L
ALP SERPL-CCNC: 89 U/L — SIGNIFICANT CHANGE UP (ref 40–120)
ALP SERPL-CCNC: 89 U/L — SIGNIFICANT CHANGE UP (ref 40–120)
ALT FLD-CCNC: 29 U/L — SIGNIFICANT CHANGE UP (ref 10–45)
ALT FLD-CCNC: 29 U/L — SIGNIFICANT CHANGE UP (ref 10–45)
ALT SERPL-CCNC: 34 U/L
ANION GAP SERPL CALC-SCNC: 12 MMOL/L
ANION GAP SERPL CALC-SCNC: 16 MMOL/L — SIGNIFICANT CHANGE UP (ref 5–17)
ANION GAP SERPL CALC-SCNC: 16 MMOL/L — SIGNIFICANT CHANGE UP (ref 5–17)
APPEARANCE: ABNORMAL
APTT BLD: 24.8 SEC — SIGNIFICANT CHANGE UP (ref 24.5–35.6)
APTT BLD: 24.8 SEC — SIGNIFICANT CHANGE UP (ref 24.5–35.6)
AST SERPL-CCNC: 16 U/L — SIGNIFICANT CHANGE UP (ref 10–40)
AST SERPL-CCNC: 16 U/L — SIGNIFICANT CHANGE UP (ref 10–40)
AST SERPL-CCNC: 18 U/L
BACTERIA: ABNORMAL /HPF
BASE EXCESS BLDV CALC-SCNC: 0.6 MMOL/L — SIGNIFICANT CHANGE UP (ref -2–3)
BASE EXCESS BLDV CALC-SCNC: 0.6 MMOL/L — SIGNIFICANT CHANGE UP (ref -2–3)
BASE EXCESS BLDV CALC-SCNC: 0.9 MMOL/L — SIGNIFICANT CHANGE UP (ref -2–3)
BASE EXCESS BLDV CALC-SCNC: 0.9 MMOL/L — SIGNIFICANT CHANGE UP (ref -2–3)
BILIRUB SERPL-MCNC: 0.4 MG/DL
BILIRUB SERPL-MCNC: 0.4 MG/DL — SIGNIFICANT CHANGE UP (ref 0.2–1.2)
BILIRUB SERPL-MCNC: 0.4 MG/DL — SIGNIFICANT CHANGE UP (ref 0.2–1.2)
BILIRUBIN URINE: NEGATIVE
BLD GP AB SCN SERPL QL: NEGATIVE — SIGNIFICANT CHANGE UP
BLD GP AB SCN SERPL QL: NEGATIVE — SIGNIFICANT CHANGE UP
BLOOD URINE: ABNORMAL
BUN SERPL-MCNC: 20 MG/DL — SIGNIFICANT CHANGE UP (ref 7–23)
BUN SERPL-MCNC: 20 MG/DL — SIGNIFICANT CHANGE UP (ref 7–23)
BUN SERPL-MCNC: 23 MG/DL
CA-I SERPL-SCNC: 1.24 MMOL/L — SIGNIFICANT CHANGE UP (ref 1.15–1.33)
CA-I SERPL-SCNC: 1.24 MMOL/L — SIGNIFICANT CHANGE UP (ref 1.15–1.33)
CA-I SERPL-SCNC: 1.28 MMOL/L — SIGNIFICANT CHANGE UP (ref 1.15–1.33)
CA-I SERPL-SCNC: 1.28 MMOL/L — SIGNIFICANT CHANGE UP (ref 1.15–1.33)
CALCIUM SERPL-MCNC: 9.2 MG/DL — SIGNIFICANT CHANGE UP (ref 8.4–10.5)
CALCIUM SERPL-MCNC: 9.2 MG/DL — SIGNIFICANT CHANGE UP (ref 8.4–10.5)
CALCIUM SERPL-MCNC: 9.8 MG/DL
CAST: 0 /LPF
CHLORIDE BLDV-SCNC: 95 MMOL/L — LOW (ref 96–108)
CHLORIDE BLDV-SCNC: 95 MMOL/L — LOW (ref 96–108)
CHLORIDE BLDV-SCNC: 99 MMOL/L — SIGNIFICANT CHANGE UP (ref 96–108)
CHLORIDE BLDV-SCNC: 99 MMOL/L — SIGNIFICANT CHANGE UP (ref 96–108)
CHLORIDE SERPL-SCNC: 94 MMOL/L
CHLORIDE SERPL-SCNC: 97 MMOL/L — SIGNIFICANT CHANGE UP (ref 96–108)
CHLORIDE SERPL-SCNC: 97 MMOL/L — SIGNIFICANT CHANGE UP (ref 96–108)
CMV DNA SPEC QL NAA+PROBE: NOT DETECTED IU/ML
CMVPCR LOG: NOT DETECTED LOG10IU/ML
CO2 BLDV-SCNC: 27 MMOL/L — HIGH (ref 22–26)
CO2 BLDV-SCNC: 27 MMOL/L — HIGH (ref 22–26)
CO2 BLDV-SCNC: 29 MMOL/L — HIGH (ref 22–26)
CO2 BLDV-SCNC: 29 MMOL/L — HIGH (ref 22–26)
CO2 SERPL-SCNC: 19 MMOL/L — LOW (ref 22–31)
CO2 SERPL-SCNC: 19 MMOL/L — LOW (ref 22–31)
CO2 SERPL-SCNC: 26 MMOL/L
COLOR: YELLOW
CREAT SERPL-MCNC: 0.8 MG/DL — SIGNIFICANT CHANGE UP (ref 0.5–1.3)
CREAT SERPL-MCNC: 0.8 MG/DL — SIGNIFICANT CHANGE UP (ref 0.5–1.3)
CREAT SERPL-MCNC: 1.01 MG/DL
CREAT SPEC-SCNC: 63 MG/DL
CREAT/PROT UR: 0.7 RATIO
EGFR: 60 ML/MIN/1.73M2
EGFR: 80 ML/MIN/1.73M2 — SIGNIFICANT CHANGE UP
EGFR: 80 ML/MIN/1.73M2 — SIGNIFICANT CHANGE UP
EPITHELIAL CELLS: 4 /HPF
ESTIMATED AVERAGE GLUCOSE: 243 MG/DL
ESTIMATED AVERAGE GLUCOSE: 252 MG/DL — HIGH (ref 68–114)
ESTIMATED AVERAGE GLUCOSE: 252 MG/DL — HIGH (ref 68–114)
GAS PNL BLDV: 128 MMOL/L — LOW (ref 136–145)
GAS PNL BLDV: 128 MMOL/L — LOW (ref 136–145)
GAS PNL BLDV: 129 MMOL/L — LOW (ref 136–145)
GAS PNL BLDV: 129 MMOL/L — LOW (ref 136–145)
GAS PNL BLDV: SIGNIFICANT CHANGE UP
GLUCOSE BLDC GLUCOMTR-MCNC: 112 MG/DL — HIGH (ref 70–99)
GLUCOSE BLDC GLUCOMTR-MCNC: 112 MG/DL — HIGH (ref 70–99)
GLUCOSE BLDC GLUCOMTR-MCNC: 132 MG/DL — HIGH (ref 70–99)
GLUCOSE BLDC GLUCOMTR-MCNC: 132 MG/DL — HIGH (ref 70–99)
GLUCOSE BLDC GLUCOMTR-MCNC: 166 MG/DL — HIGH (ref 70–99)
GLUCOSE BLDC GLUCOMTR-MCNC: 166 MG/DL — HIGH (ref 70–99)
GLUCOSE BLDC GLUCOMTR-MCNC: 191 MG/DL — HIGH (ref 70–99)
GLUCOSE BLDC GLUCOMTR-MCNC: 191 MG/DL — HIGH (ref 70–99)
GLUCOSE BLDC GLUCOMTR-MCNC: 208 MG/DL — HIGH (ref 70–99)
GLUCOSE BLDC GLUCOMTR-MCNC: 208 MG/DL — HIGH (ref 70–99)
GLUCOSE BLDC GLUCOMTR-MCNC: 242 MG/DL — HIGH (ref 70–99)
GLUCOSE BLDC GLUCOMTR-MCNC: 242 MG/DL — HIGH (ref 70–99)
GLUCOSE BLDC GLUCOMTR-MCNC: 247 MG/DL — HIGH (ref 70–99)
GLUCOSE BLDC GLUCOMTR-MCNC: 247 MG/DL — HIGH (ref 70–99)
GLUCOSE BLDC GLUCOMTR-MCNC: 264 MG/DL — HIGH (ref 70–99)
GLUCOSE BLDC GLUCOMTR-MCNC: 264 MG/DL — HIGH (ref 70–99)
GLUCOSE BLDC GLUCOMTR-MCNC: 265 MG/DL — HIGH (ref 70–99)
GLUCOSE BLDC GLUCOMTR-MCNC: 265 MG/DL — HIGH (ref 70–99)
GLUCOSE BLDC GLUCOMTR-MCNC: 266 MG/DL — HIGH (ref 70–99)
GLUCOSE BLDC GLUCOMTR-MCNC: 266 MG/DL — HIGH (ref 70–99)
GLUCOSE BLDC GLUCOMTR-MCNC: 267 MG/DL — HIGH (ref 70–99)
GLUCOSE BLDC GLUCOMTR-MCNC: 267 MG/DL — HIGH (ref 70–99)
GLUCOSE BLDC GLUCOMTR-MCNC: 268 MG/DL — HIGH (ref 70–99)
GLUCOSE BLDC GLUCOMTR-MCNC: 268 MG/DL — HIGH (ref 70–99)
GLUCOSE BLDC GLUCOMTR-MCNC: 285 MG/DL — HIGH (ref 70–99)
GLUCOSE BLDC GLUCOMTR-MCNC: 285 MG/DL — HIGH (ref 70–99)
GLUCOSE BLDC GLUCOMTR-MCNC: 288 MG/DL — HIGH (ref 70–99)
GLUCOSE BLDC GLUCOMTR-MCNC: 288 MG/DL — HIGH (ref 70–99)
GLUCOSE BLDC GLUCOMTR-MCNC: 295 MG/DL — HIGH (ref 70–99)
GLUCOSE BLDC GLUCOMTR-MCNC: 295 MG/DL — HIGH (ref 70–99)
GLUCOSE BLDC GLUCOMTR-MCNC: 319 MG/DL — HIGH (ref 70–99)
GLUCOSE BLDC GLUCOMTR-MCNC: 319 MG/DL — HIGH (ref 70–99)
GLUCOSE BLDC GLUCOMTR-MCNC: 325 MG/DL — HIGH (ref 70–99)
GLUCOSE BLDC GLUCOMTR-MCNC: 325 MG/DL — HIGH (ref 70–99)
GLUCOSE BLDC GLUCOMTR-MCNC: 385 MG/DL — HIGH (ref 70–99)
GLUCOSE BLDC GLUCOMTR-MCNC: 385 MG/DL — HIGH (ref 70–99)
GLUCOSE BLDC GLUCOMTR-MCNC: 394 MG/DL — HIGH (ref 70–99)
GLUCOSE BLDC GLUCOMTR-MCNC: 442 MG/DL — HIGH (ref 70–99)
GLUCOSE BLDC GLUCOMTR-MCNC: 442 MG/DL — HIGH (ref 70–99)
GLUCOSE BLDC GLUCOMTR-MCNC: 491 MG/DL — CRITICAL HIGH (ref 70–99)
GLUCOSE BLDC GLUCOMTR-MCNC: 491 MG/DL — CRITICAL HIGH (ref 70–99)
GLUCOSE BLDV-MCNC: 465 MG/DL — CRITICAL HIGH (ref 70–99)
GLUCOSE BLDV-MCNC: 465 MG/DL — CRITICAL HIGH (ref 70–99)
GLUCOSE BLDV-MCNC: >660 MG/DL — CRITICAL HIGH (ref 70–99)
GLUCOSE BLDV-MCNC: >660 MG/DL — CRITICAL HIGH (ref 70–99)
GLUCOSE QUALITATIVE U: >=1000 MG/DL
GLUCOSE SERPL-MCNC: 425 MG/DL — HIGH (ref 70–99)
GLUCOSE SERPL-MCNC: 425 MG/DL — HIGH (ref 70–99)
GLUCOSE SERPL-MCNC: 611 MG/DL
HBA1C MFR BLD HPLC: 10.1 %
HCO3 BLDV-SCNC: 26 MMOL/L — SIGNIFICANT CHANGE UP (ref 22–29)
HCO3 BLDV-SCNC: 26 MMOL/L — SIGNIFICANT CHANGE UP (ref 22–29)
HCO3 BLDV-SCNC: 27 MMOL/L — SIGNIFICANT CHANGE UP (ref 22–29)
HCO3 BLDV-SCNC: 27 MMOL/L — SIGNIFICANT CHANGE UP (ref 22–29)
HCT VFR BLD CALC: 32.9 % — LOW (ref 34.5–45)
HCT VFR BLD CALC: 32.9 % — LOW (ref 34.5–45)
HCT VFR BLD CALC: 35.2 %
HCT VFR BLDA CALC: 30 % — LOW (ref 34.5–46.5)
HCT VFR BLDA CALC: 30 % — LOW (ref 34.5–46.5)
HCT VFR BLDA CALC: 32 % — LOW (ref 34.5–46.5)
HCT VFR BLDA CALC: 32 % — LOW (ref 34.5–46.5)
HGB BLD CALC-MCNC: 10.1 G/DL — LOW (ref 11.7–16.1)
HGB BLD CALC-MCNC: 10.1 G/DL — LOW (ref 11.7–16.1)
HGB BLD CALC-MCNC: 10.8 G/DL — LOW (ref 11.7–16.1)
HGB BLD CALC-MCNC: 10.8 G/DL — LOW (ref 11.7–16.1)
HGB BLD-MCNC: 10.7 G/DL — LOW (ref 11.5–15.5)
HGB BLD-MCNC: 10.7 G/DL — LOW (ref 11.5–15.5)
HGB BLD-MCNC: 11.3 G/DL
INR BLD: 1.21 RATIO — HIGH (ref 0.85–1.18)
INR BLD: 1.21 RATIO — HIGH (ref 0.85–1.18)
KETONES URINE: NEGATIVE MG/DL
LACTATE BLDV-MCNC: 1.9 MMOL/L — SIGNIFICANT CHANGE UP (ref 0.5–2)
LACTATE BLDV-MCNC: 1.9 MMOL/L — SIGNIFICANT CHANGE UP (ref 0.5–2)
LACTATE BLDV-MCNC: 2.7 MMOL/L — HIGH (ref 0.5–2)
LACTATE BLDV-MCNC: 2.7 MMOL/L — HIGH (ref 0.5–2)
LDH SERPL-CCNC: 182 U/L
LEUKOCYTE ESTERASE URINE: ABNORMAL
MAGNESIUM SERPL-MCNC: 1.7 MG/DL
MAGNESIUM SERPL-MCNC: 1.7 MG/DL — SIGNIFICANT CHANGE UP (ref 1.6–2.6)
MAGNESIUM SERPL-MCNC: 1.7 MG/DL — SIGNIFICANT CHANGE UP (ref 1.6–2.6)
MCHC RBC-ENTMCNC: 29 PG — SIGNIFICANT CHANGE UP (ref 27–34)
MCHC RBC-ENTMCNC: 29 PG — SIGNIFICANT CHANGE UP (ref 27–34)
MCHC RBC-ENTMCNC: 29.6 PG
MCHC RBC-ENTMCNC: 32.1 GM/DL
MCHC RBC-ENTMCNC: 32.5 GM/DL — SIGNIFICANT CHANGE UP (ref 32–36)
MCHC RBC-ENTMCNC: 32.5 GM/DL — SIGNIFICANT CHANGE UP (ref 32–36)
MCV RBC AUTO: 89.2 FL — SIGNIFICANT CHANGE UP (ref 80–100)
MCV RBC AUTO: 89.2 FL — SIGNIFICANT CHANGE UP (ref 80–100)
MCV RBC AUTO: 92.1 FL
MICROSCOPIC-UA: NORMAL
MRSA PCR RESULT.: SIGNIFICANT CHANGE UP
MRSA PCR RESULT.: SIGNIFICANT CHANGE UP
NITRITE URINE: POSITIVE
NRBC # BLD: 0 /100 WBCS — SIGNIFICANT CHANGE UP (ref 0–0)
NRBC # BLD: 0 /100 WBCS — SIGNIFICANT CHANGE UP (ref 0–0)
NT-PROBNP SERPL-SCNC: 441 PG/ML — HIGH (ref 0–300)
NT-PROBNP SERPL-SCNC: 441 PG/ML — HIGH (ref 0–300)
PCO2 BLDV: 44 MMHG — HIGH (ref 39–42)
PCO2 BLDV: 44 MMHG — HIGH (ref 39–42)
PCO2 BLDV: 52 MMHG — HIGH (ref 39–42)
PCO2 BLDV: 52 MMHG — HIGH (ref 39–42)
PH BLDV: 7.33 — SIGNIFICANT CHANGE UP (ref 7.32–7.43)
PH BLDV: 7.33 — SIGNIFICANT CHANGE UP (ref 7.32–7.43)
PH BLDV: 7.38 — SIGNIFICANT CHANGE UP (ref 7.32–7.43)
PH BLDV: 7.38 — SIGNIFICANT CHANGE UP (ref 7.32–7.43)
PH URINE: 5.5
PHOSPHATE SERPL-MCNC: 2.5 MG/DL — SIGNIFICANT CHANGE UP (ref 2.5–4.5)
PHOSPHATE SERPL-MCNC: 2.5 MG/DL — SIGNIFICANT CHANGE UP (ref 2.5–4.5)
PHOSPHATE SERPL-MCNC: 3.4 MG/DL
PLATELET # BLD AUTO: 230 K/UL — SIGNIFICANT CHANGE UP (ref 150–400)
PLATELET # BLD AUTO: 230 K/UL — SIGNIFICANT CHANGE UP (ref 150–400)
PLATELET # BLD AUTO: 279 K/UL
PO2 BLDV: 31 MMHG — SIGNIFICANT CHANGE UP (ref 25–45)
PO2 BLDV: 31 MMHG — SIGNIFICANT CHANGE UP (ref 25–45)
PO2 BLDV: 49 MMHG — HIGH (ref 25–45)
PO2 BLDV: 49 MMHG — HIGH (ref 25–45)
POTASSIUM BLDV-SCNC: 3.8 MMOL/L — SIGNIFICANT CHANGE UP (ref 3.5–5.1)
POTASSIUM BLDV-SCNC: 3.8 MMOL/L — SIGNIFICANT CHANGE UP (ref 3.5–5.1)
POTASSIUM BLDV-SCNC: 4.5 MMOL/L — SIGNIFICANT CHANGE UP (ref 3.5–5.1)
POTASSIUM BLDV-SCNC: 4.5 MMOL/L — SIGNIFICANT CHANGE UP (ref 3.5–5.1)
POTASSIUM SERPL-MCNC: 4 MMOL/L — SIGNIFICANT CHANGE UP (ref 3.5–5.3)
POTASSIUM SERPL-MCNC: 4 MMOL/L — SIGNIFICANT CHANGE UP (ref 3.5–5.3)
POTASSIUM SERPL-SCNC: 4 MMOL/L — SIGNIFICANT CHANGE UP (ref 3.5–5.3)
POTASSIUM SERPL-SCNC: 4 MMOL/L — SIGNIFICANT CHANGE UP (ref 3.5–5.3)
POTASSIUM SERPL-SCNC: 4.7 MMOL/L
PROCALCITONIN SERPL-MCNC: 0.19 NG/ML — HIGH (ref 0.02–0.1)
PROCALCITONIN SERPL-MCNC: 0.19 NG/ML — HIGH (ref 0.02–0.1)
PROT SERPL-MCNC: 6.5 G/DL
PROT SERPL-MCNC: 6.7 G/DL — SIGNIFICANT CHANGE UP (ref 6–8.3)
PROT SERPL-MCNC: 6.7 G/DL — SIGNIFICANT CHANGE UP (ref 6–8.3)
PROT UR-MCNC: 45 MG/DL
PROTEIN URINE: 30 MG/DL
PROTHROM AB SERPL-ACNC: 13.2 SEC — HIGH (ref 9.5–13)
PROTHROM AB SERPL-ACNC: 13.2 SEC — HIGH (ref 9.5–13)
RAPID RVP RESULT: DETECTED
RAPID RVP RESULT: DETECTED
RBC # BLD: 3.69 M/UL — LOW (ref 3.8–5.2)
RBC # BLD: 3.69 M/UL — LOW (ref 3.8–5.2)
RBC # BLD: 3.82 M/UL
RBC # FLD: 15 %
RBC # FLD: 15 % — HIGH (ref 10.3–14.5)
RBC # FLD: 15 % — HIGH (ref 10.3–14.5)
RED BLOOD CELLS URINE: 4 /HPF
REVIEW: NORMAL
RH IG SCN BLD-IMP: NEGATIVE — SIGNIFICANT CHANGE UP
RH IG SCN BLD-IMP: NEGATIVE — SIGNIFICANT CHANGE UP
S AUREUS DNA NOSE QL NAA+PROBE: SIGNIFICANT CHANGE UP
S AUREUS DNA NOSE QL NAA+PROBE: SIGNIFICANT CHANGE UP
SAO2 % BLDV: 42.9 % — LOW (ref 67–88)
SAO2 % BLDV: 42.9 % — LOW (ref 67–88)
SAO2 % BLDV: 75.5 % — SIGNIFICANT CHANGE UP (ref 67–88)
SAO2 % BLDV: 75.5 % — SIGNIFICANT CHANGE UP (ref 67–88)
SARS-COV-2 RNA SPEC QL NAA+PROBE: DETECTED
SARS-COV-2 RNA SPEC QL NAA+PROBE: DETECTED
SODIUM SERPL-SCNC: 132 MMOL/L
SODIUM SERPL-SCNC: 132 MMOL/L — LOW (ref 135–145)
SODIUM SERPL-SCNC: 132 MMOL/L — LOW (ref 135–145)
SPECIFIC GRAVITY URINE: >1.03
TACROLIMUS SERPL-MCNC: 6.3 NG/ML
TACROLIMUS SERPL-MCNC: 7.4 NG/ML — SIGNIFICANT CHANGE UP
TACROLIMUS SERPL-MCNC: 7.4 NG/ML — SIGNIFICANT CHANGE UP
TROPONIN T, HIGH SENSITIVITY RESULT: 13 NG/L — SIGNIFICANT CHANGE UP (ref 0–51)
TROPONIN T, HIGH SENSITIVITY RESULT: 13 NG/L — SIGNIFICANT CHANGE UP (ref 0–51)
URATE SERPL-MCNC: 5.5 MG/DL
UROBILINOGEN URINE: 0.2 MG/DL
WBC # BLD: 9.91 K/UL — SIGNIFICANT CHANGE UP (ref 3.8–10.5)
WBC # BLD: 9.91 K/UL — SIGNIFICANT CHANGE UP (ref 3.8–10.5)
WBC # FLD AUTO: 12.57 K/UL
WBC # FLD AUTO: 9.91 K/UL — SIGNIFICANT CHANGE UP (ref 3.8–10.5)
WBC # FLD AUTO: 9.91 K/UL — SIGNIFICANT CHANGE UP (ref 3.8–10.5)
WBC CLUMPS: PRESENT
WHITE BLOOD CELLS URINE: 314 /HPF

## 2023-11-02 PROCEDURE — 99223 1ST HOSP IP/OBS HIGH 75: CPT

## 2023-11-02 PROCEDURE — 99222 1ST HOSP IP/OBS MODERATE 55: CPT | Mod: FS

## 2023-11-02 PROCEDURE — 99291 CRITICAL CARE FIRST HOUR: CPT

## 2023-11-02 RX ORDER — SODIUM,POTASSIUM PHOSPHATES 278-250MG
2 POWDER IN PACKET (EA) ORAL ONCE
Refills: 0 | Status: COMPLETED | OUTPATIENT
Start: 2023-11-02 | End: 2023-11-02

## 2023-11-02 RX ORDER — SENNA PLUS 8.6 MG/1
2 TABLET ORAL AT BEDTIME
Refills: 0 | Status: DISCONTINUED | OUTPATIENT
Start: 2023-11-02 | End: 2023-11-08

## 2023-11-02 RX ORDER — INFLUENZA VIRUS VACCINE 15; 15; 15; 15 UG/.5ML; UG/.5ML; UG/.5ML; UG/.5ML
0.7 SUSPENSION INTRAMUSCULAR ONCE
Refills: 0 | Status: COMPLETED | OUTPATIENT
Start: 2023-11-02 | End: 2023-11-08

## 2023-11-02 RX ORDER — POLYETHYLENE GLYCOL 3350 17 G/17G
17 POWDER, FOR SOLUTION ORAL EVERY 24 HOURS
Refills: 0 | Status: DISCONTINUED | OUTPATIENT
Start: 2023-11-02 | End: 2023-11-08

## 2023-11-02 RX ORDER — INSULIN LISPRO 100/ML
VIAL (ML) SUBCUTANEOUS EVERY 4 HOURS
Refills: 0 | Status: DISCONTINUED | OUTPATIENT
Start: 2023-11-02 | End: 2023-11-04

## 2023-11-02 RX ORDER — FAMOTIDINE 10 MG/ML
20 INJECTION INTRAVENOUS DAILY
Refills: 0 | Status: DISCONTINUED | OUTPATIENT
Start: 2023-11-02 | End: 2023-11-08

## 2023-11-02 RX ORDER — PIPERACILLIN AND TAZOBACTAM 4; .5 G/20ML; G/20ML
3.38 INJECTION, POWDER, LYOPHILIZED, FOR SOLUTION INTRAVENOUS EVERY 8 HOURS
Refills: 0 | Status: DISCONTINUED | OUTPATIENT
Start: 2023-11-02 | End: 2023-11-04

## 2023-11-02 RX ORDER — CHLORHEXIDINE GLUCONATE 213 G/1000ML
1 SOLUTION TOPICAL
Refills: 0 | Status: DISCONTINUED | OUTPATIENT
Start: 2023-11-02 | End: 2023-11-08

## 2023-11-02 RX ORDER — TACROLIMUS 5 MG/1
1.5 CAPSULE ORAL
Refills: 0 | Status: DISCONTINUED | OUTPATIENT
Start: 2023-11-03 | End: 2023-11-04

## 2023-11-02 RX ORDER — INSULIN HUMAN 100 [IU]/ML
3 INJECTION, SOLUTION SUBCUTANEOUS
Qty: 100 | Refills: 0 | Status: DISCONTINUED | OUTPATIENT
Start: 2023-11-02 | End: 2023-11-02

## 2023-11-02 RX ORDER — NIFEDIPINE 30 MG
30 TABLET, EXTENDED RELEASE 24 HR ORAL EVERY 24 HOURS
Refills: 0 | Status: DISCONTINUED | OUTPATIENT
Start: 2023-11-02 | End: 2023-11-08

## 2023-11-02 RX ORDER — INSULIN LISPRO 100/ML
8 VIAL (ML) SUBCUTANEOUS
Refills: 0 | Status: DISCONTINUED | OUTPATIENT
Start: 2023-11-02 | End: 2023-11-04

## 2023-11-02 RX ORDER — MAGNESIUM SULFATE 500 MG/ML
2 VIAL (ML) INJECTION ONCE
Refills: 0 | Status: COMPLETED | OUTPATIENT
Start: 2023-11-02 | End: 2023-11-02

## 2023-11-02 RX ORDER — INSULIN GLARGINE 100 [IU]/ML
24 INJECTION, SOLUTION SUBCUTANEOUS
Refills: 0 | Status: DISCONTINUED | OUTPATIENT
Start: 2023-11-02 | End: 2023-11-04

## 2023-11-02 RX ORDER — SODIUM CHLORIDE 9 MG/ML
1000 INJECTION INTRAMUSCULAR; INTRAVENOUS; SUBCUTANEOUS
Refills: 0 | Status: DISCONTINUED | OUTPATIENT
Start: 2023-11-02 | End: 2023-11-02

## 2023-11-02 RX ORDER — ACETAMINOPHEN 500 MG
650 TABLET ORAL EVERY 6 HOURS
Refills: 0 | Status: DISCONTINUED | OUTPATIENT
Start: 2023-11-02 | End: 2023-11-08

## 2023-11-02 RX ORDER — METOPROLOL TARTRATE 50 MG
25 TABLET ORAL
Refills: 0 | Status: DISCONTINUED | OUTPATIENT
Start: 2023-11-02 | End: 2023-11-08

## 2023-11-02 RX ORDER — TACROLIMUS 5 MG/1
1.5 CAPSULE ORAL ONCE
Refills: 0 | Status: COMPLETED | OUTPATIENT
Start: 2023-11-02 | End: 2023-11-02

## 2023-11-02 RX ORDER — ENOXAPARIN SODIUM 100 MG/ML
40 INJECTION SUBCUTANEOUS EVERY 24 HOURS
Refills: 0 | Status: DISCONTINUED | OUTPATIENT
Start: 2023-11-02 | End: 2023-11-08

## 2023-11-02 RX ADMIN — PIPERACILLIN AND TAZOBACTAM 25 GRAM(S): 4; .5 INJECTION, POWDER, LYOPHILIZED, FOR SOLUTION INTRAVENOUS at 07:13

## 2023-11-02 RX ADMIN — INSULIN HUMAN 3 UNIT(S)/HR: 100 INJECTION, SOLUTION SUBCUTANEOUS at 18:19

## 2023-11-02 RX ADMIN — Medication 2 PACKET(S): at 06:11

## 2023-11-02 RX ADMIN — Medication 25 GRAM(S): at 06:10

## 2023-11-02 RX ADMIN — CHLORHEXIDINE GLUCONATE 1 APPLICATION(S): 213 SOLUTION TOPICAL at 06:11

## 2023-11-02 RX ADMIN — TACROLIMUS 1.5 MILLIGRAM(S): 5 CAPSULE ORAL at 18:19

## 2023-11-02 RX ADMIN — ENOXAPARIN SODIUM 40 MILLIGRAM(S): 100 INJECTION SUBCUTANEOUS at 14:19

## 2023-11-02 RX ADMIN — Medication 30 MILLIGRAM(S): at 06:10

## 2023-11-02 RX ADMIN — INSULIN HUMAN 3 UNIT(S)/HR: 100 INJECTION, SOLUTION SUBCUTANEOUS at 14:19

## 2023-11-02 RX ADMIN — FAMOTIDINE 20 MILLIGRAM(S): 10 INJECTION INTRAVENOUS at 14:19

## 2023-11-02 RX ADMIN — INSULIN HUMAN 3 UNIT(S)/HR: 100 INJECTION, SOLUTION SUBCUTANEOUS at 02:17

## 2023-11-02 RX ADMIN — PIPERACILLIN AND TAZOBACTAM 25 GRAM(S): 4; .5 INJECTION, POWDER, LYOPHILIZED, FOR SOLUTION INTRAVENOUS at 23:04

## 2023-11-02 RX ADMIN — Medication 5 MILLIGRAM(S): at 06:11

## 2023-11-02 RX ADMIN — SODIUM CHLORIDE 75 MILLILITER(S): 9 INJECTION INTRAMUSCULAR; INTRAVENOUS; SUBCUTANEOUS at 04:44

## 2023-11-02 RX ADMIN — Medication 25 MILLIGRAM(S): at 18:19

## 2023-11-02 RX ADMIN — INSULIN GLARGINE 24 UNIT(S): 100 INJECTION, SOLUTION SUBCUTANEOUS at 20:03

## 2023-11-02 RX ADMIN — PIPERACILLIN AND TAZOBACTAM 25 GRAM(S): 4; .5 INJECTION, POWDER, LYOPHILIZED, FOR SOLUTION INTRAVENOUS at 14:20

## 2023-11-02 RX ADMIN — Medication 25 GRAM(S): at 04:47

## 2023-11-02 NOTE — CONSULT NOTE ADULT - ASSESSMENT
69 y/o F w/ NODAT s/p renal tx admitted with hyperglycemia 69 y/o F w/ NODAT s/p renal tx admitted with hyperglycemia found to have newly diagnosed diabetes with A1c of 10.4%, HTN, HLD (high risk patient with severely uncontrolled diabetes with A1c of 10.4% at high risk of CAD and CVA with high level decision-making).

## 2023-11-02 NOTE — CONSULT NOTE ADULT - SUBJECTIVE AND OBJECTIVE BOX
Patient is a 68y old  Female who presents with a chief complaint of Hyperglycemia     HPI:  68F with Hx of Alport Syndrome and CKD 5 (x 15 years), LUE AVF placed in 2017, RUE DVT in April 2023 recently started HD on 3/17/2023 (Tanja, Nephrologist Dr. Jet Horn ) now s/p R DDRT under Simulect induction on 3/28/23 and at that time donor with polymicrobial positive blood cultures presents from outpatient clinic after she was found to have elevated blood glucose to 600's and was informed to go to the ER. Denies any hx diabetes or dietary changes. Patient also endorses 3 month history of itching around her face that is most prevalent at night. Was given medication for symptoms however felt very drowsy afterwards, later tried benadryl with worsening drowsiness side effects. She also endorses weakness in her lower extremities that has been ongoing for 2 months that occurs after walking. She endorses she feels she need to stop and rest after taking "a few hundred steps" which is atypical for her. Denies any numbness/tingling of feet, but also endorses feeling more tired than usual the past several months. Upon work up patients blood culture 771 with bicarb of 23, A1c 10.4, COVID positive and UA positive for trace leukocyte esterase, 60 WBC, mod bacteria. CXR negative.        REVIEW OF SYSTEMS  pending full examination      prior hospital charts reviewed [V]  primary team notes reviewed [V]  other consultant notes reviewed [V]    PAST MEDICAL & SURGICAL HISTORY:  Alport's syndrome      HTN (hypertension)      Hard of hearing  uses hearing aids      Anemia      ESRD with anemia  left AV fistula, not in use      Deep vein thrombosis (DVT) of right upper extremity      Deep vein thrombosis (DVT) of right upper extremity      S/P tonsillectomy      A-V fistula  Nov 2017      Renal transplant recipient          SOCIAL HISTORY:      FAMILY HISTORY:  FH: kidney disease (Child)        Allergies  Bactrim (Pruritus)        ANTIMICROBIALS:  piperacillin/tazobactam IVPB.. 3.375 every 8 hours      ANTIMICROBIALS (past 90 days):  MEDICATIONS  (STANDING):  piperacillin/tazobactam IVPB..   25 mL/Hr IV Intermittent (11-02-23 @ 07:13)    piperacillin/tazobactam IVPB...   200 mL/Hr IV Intermittent (11-01-23 @ 23:41)        OTHER MEDS:   MEDICATIONS  (STANDING):  acetaminophen     Tablet .. 650 every 6 hours PRN  enoxaparin Injectable 40 every 24 hours  famotidine    Tablet 20 daily  influenza  Vaccine (HIGH DOSE) 0.7 once  insulin regular Infusion 3 <Continuous>  metoprolol tartrate 25 two times a day  NIFEdipine XL 30 every 24 hours  polyethylene glycol 3350 17 every 24 hours  predniSONE   Tablet 5 daily  senna 2 at bedtime      VITALS:  Vital Signs Last 24 Hrs  T(F): 97.8 (11-02-23 @ 11:00), Max: 98.3 (11-01-23 @ 20:17)    Vital Signs Last 24 Hrs  HR: 74 (11-02-23 @ 11:00) (63 - 83)  BP: 121/64 (11-02-23 @ 11:00) (117/56 - 166/74)  RR: 25 (11-02-23 @ 11:00)  SpO2: 95% (11-02-23 @ 11:00) (95% - 100%)  Wt(kg): --    EXAM:  pending full examination      Labs:                        10.7   9.91  )-----------( 230      ( 02 Nov 2023 03:07 )             32.9     11-02    132<L>  |  97  |  20  ----------------------------<  425<H>  4.0   |  19<L>  |  0.80    Ca    9.2      02 Nov 2023 03:07  Phos  2.5     11-02  Mg     1.7     11-02    TPro  6.7  /  Alb  3.6  /  TBili  0.4  /  DBili  x   /  AST  16  /  ALT  29  /  AlkPhos  89  11-02      WBC Trend:  WBC Count: 9.91 (11-02-23 @ 03:07)  WBC Count: 13.09 (11-01-23 @ 21:16)      Auto Neutrophil #: 10.77 K/uL (11-01-23 @ 21:16)  Auto Neutrophil #: 4.95 K/uL (04-06-23 @ 06:33)  Auto Neutrophil #: 5.58 K/uL (04-05-23 @ 06:14)  Auto Neutrophil #: 5.11 K/uL (04-04-23 @ 06:36)  Auto Neutrophil #: 4.72 K/uL (04-03-23 @ 07:34)      Creatine Trend:  Creatinine: 0.80 (11-02)  Creatinine: 0.94 (11-01)      Liver Biochemical Testing Trend:  Alanine Aminotransferase (ALT/SGPT): 29 (11-02)  Alanine Aminotransferase (ALT/SGPT): 34 (11-01)  Alanine Aminotransferase (ALT/SGPT): 45 (04-06)  Alanine Aminotransferase (ALT/SGPT): 45 (04-05)  Alanine Aminotransferase (ALT/SGPT): 32 (04-04)  Aspartate Aminotransferase (AST/SGOT): 16 (11-02-23 @ 03:07)  Aspartate Aminotransferase (AST/SGOT): 19 (11-01-23 @ 21:16)  Aspartate Aminotransferase (AST/SGOT): 22 (04-06-23 @ 06:33)  Aspartate Aminotransferase (AST/SGOT): 29 (04-05-23 @ 06:12)  Aspartate Aminotransferase (AST/SGOT): 20 (04-04-23 @ 06:36)  Bilirubin Total: 0.4 (11-02)  Bilirubin Total: 0.4 (11-01)  Bilirubin Total, Serum: 0.6 (04-06)  Bilirubin Total, Serum: 0.6 (04-05)  Bilirubin Total, Serum: 0.4 (04-04)      Trend LDH      Auto Eosinophil %: 0.9 % (11-01-23 @ 21:16)      Urinalysis Basic - ( 02 Nov 2023 03:07 )    Color: x / Appearance: x / SG: x / pH: x  Gluc: 425 mg/dL / Ketone: x  / Bili: x / Urobili: x   Blood: x / Protein: x / Nitrite: x   Leuk Esterase: x / RBC: x / WBC x   Sq Epi: x / Non Sq Epi: x / Bacteria: x        MICROBIOLOGY:  Culture - Blood (collected 29 Mar 2023 12:40)  Source: .Blood Blood-Peripheral  Final Report:    No Growth Final    Culture - Blood (collected 29 Mar 2023 12:00)  Source: .Blood Blood-Peripheral  Final Report:    No Growth Final      HIV-1/2 Combo Result: Nonreact (03-28-23 @ 14:10)      Rapid RVP Result: Detected (11-02 @ 00:07)    Procalcitonin, Serum: 0.19 (11-02)    Troponin T, High Sensitivity Result: 13 (11-02)    Blood Gas Venous - Lactate: 1.9 (11-02 @ 03:00)  Blood Gas Venous - Lactate: 2.7 (11-02 @ 00:13)  Blood Gas Venous - Lactate: 4.6 (11-01 @ 21:15)    A1C with Estimated Average Glucose Result: 10.4 % (11-01-23 @ 21:16)      RADIOLOGY:  < from: Xray Chest 1 View AP/PA (11.01.23 @ 21:46) >  IMPRESSION:    No focal consolidation.      < from: VA Duplex Upper Ext Vein Scan, Right (04.02.23 @ 11:30) >  IMPRESSION:  Acute deep venous thrombosis in the right brachial vein, in the mid upper   arm, with a venous catheter in place.  Superficial venous thrombosis/thrombophlebitis of the right cephalic and   basilic veins.    < from: US Trans Kidney w/ Doppler, Right (03.28.23 @ 22:27) >  IMPRESSION: Normal range renal resistive indices.    Nonspecific elevated peak systolic velocities at the anastomosis and   along the proximal transplant renal artery. There are also borderline   elevated velocities of the iliac artery. Continued surveillance advised.     Patient is a 68y old  Female who presents with a chief complaint of Hyperglycemia     HPI:  68F with Hx of Alport Syndrome and CKD 5 (x 15 years), LUE AVF placed in 2017, RUE DVT in April 2023 recently started HD on 3/17/2023 (Tanja, Nephrologist Dr. Jet Horn ) now s/p R DDRT under Simulect induction on 3/28/23 and at that time donor with polymicrobial positive blood cultures presents from outpatient clinic after she was found to have elevated blood glucose to 600's and was informed to go to the ER. Denies any hx diabetes or dietary changes. Patient also endorses 3 month history of itching around her face that is most prevalent at night. Was given medication for symptoms however felt very drowsy afterwards, later tried benadryl with worsening drowsiness side effects. She also endorses weakness in her lower extremities that has been ongoing for 2 months that occurs after walking. She endorses she feels she need to stop and rest after taking "a few hundred steps" which is atypical for her. Denies any numbness/tingling of feet, but also endorses feeling more tired than usual the past several months. Upon work up patients blood culture 771 with bicarb of 23, A1c 10.4, COVID positive and UA positive for trace leukocyte esterase, 60 WBC, mod bacteria. CXR negative. Patient states that she feels well overall. She admits to some urinary frequency but states that she has been drinking more water (approx 8 cups) because she was told to keep her hydration up.       REVIEW OF SYSTEMS  pending full examination      prior hospital charts reviewed [V]  primary team notes reviewed [V]  other consultant notes reviewed [V]    PAST MEDICAL & SURGICAL HISTORY:  Alport's syndrome      HTN (hypertension)      Hard of hearing  uses hearing aids      Anemia      ESRD with anemia  left AV fistula, not in use      Deep vein thrombosis (DVT) of right upper extremity      Deep vein thrombosis (DVT) of right upper extremity      S/P tonsillectomy      A-V fistula  Nov 2017      Renal transplant recipient          SOCIAL HISTORY:  lives with , denies etoh/smoking/drugs, last travel to [place] in Oct 2023,  had COVID in oct 2023, has been vaccinated against COVID x4      FAMILY HISTORY:  FH: kidney disease (Child)        Allergies  Bactrim (Pruritus)        ANTIMICROBIALS:  piperacillin/tazobactam IVPB.. 3.375 every 8 hours      ANTIMICROBIALS (past 90 days):  MEDICATIONS  (STANDING):  piperacillin/tazobactam IVPB..   25 mL/Hr IV Intermittent (11-02-23 @ 07:13)    piperacillin/tazobactam IVPB...   200 mL/Hr IV Intermittent (11-01-23 @ 23:41)        OTHER MEDS:   MEDICATIONS  (STANDING):  acetaminophen     Tablet .. 650 every 6 hours PRN  enoxaparin Injectable 40 every 24 hours  famotidine    Tablet 20 daily  influenza  Vaccine (HIGH DOSE) 0.7 once  insulin regular Infusion 3 <Continuous>  metoprolol tartrate 25 two times a day  NIFEdipine XL 30 every 24 hours  polyethylene glycol 3350 17 every 24 hours  predniSONE   Tablet 5 daily  senna 2 at bedtime      VITALS:  Vital Signs Last 24 Hrs  T(F): 97.8 (11-02-23 @ 11:00), Max: 98.3 (11-01-23 @ 20:17)    Vital Signs Last 24 Hrs  HR: 74 (11-02-23 @ 11:00) (63 - 83)  BP: 121/64 (11-02-23 @ 11:00) (117/56 - 166/74)  RR: 25 (11-02-23 @ 11:00)  SpO2: 95% (11-02-23 @ 11:00) (95% - 100%)  Wt(kg): --    EXAM:  General: Patient appears comfortable, no acute distress  HEENT: NCAT, PERRL, anicteric sclera, dentures present  Neck: Supple, No lymphadenopathy  CV: +S1/S2, RRR, no M/R/G  Lungs: No respiratory distress, CTA b/l, no wheezing, rales or rhonchi  Abd:  BS4+, Soft, NTND, no guarding  : No suprapubic tenderness  Neuro: AAOx3. No focal deficits noted.   Ext: No cyanosis, no edema, 2+ pulses in upper and lower extremities   Msk: freely moving upper and lower extremities  Skin: No rash, no phlebitis, erythema or edema       Labs:                        10.7   9.91  )-----------( 230      ( 02 Nov 2023 03:07 )             32.9     11-02    132<L>  |  97  |  20  ----------------------------<  425<H>  4.0   |  19<L>  |  0.80    Ca    9.2      02 Nov 2023 03:07  Phos  2.5     11-02  Mg     1.7     11-02    TPro  6.7  /  Alb  3.6  /  TBili  0.4  /  DBili  x   /  AST  16  /  ALT  29  /  AlkPhos  89  11-02      WBC Trend:  WBC Count: 9.91 (11-02-23 @ 03:07)  WBC Count: 13.09 (11-01-23 @ 21:16)      Auto Neutrophil #: 10.77 K/uL (11-01-23 @ 21:16)  Auto Neutrophil #: 4.95 K/uL (04-06-23 @ 06:33)  Auto Neutrophil #: 5.58 K/uL (04-05-23 @ 06:14)  Auto Neutrophil #: 5.11 K/uL (04-04-23 @ 06:36)  Auto Neutrophil #: 4.72 K/uL (04-03-23 @ 07:34)      Creatine Trend:  Creatinine: 0.80 (11-02)  Creatinine: 0.94 (11-01)      Liver Biochemical Testing Trend:  Alanine Aminotransferase (ALT/SGPT): 29 (11-02)  Alanine Aminotransferase (ALT/SGPT): 34 (11-01)  Alanine Aminotransferase (ALT/SGPT): 45 (04-06)  Alanine Aminotransferase (ALT/SGPT): 45 (04-05)  Alanine Aminotransferase (ALT/SGPT): 32 (04-04)  Aspartate Aminotransferase (AST/SGOT): 16 (11-02-23 @ 03:07)  Aspartate Aminotransferase (AST/SGOT): 19 (11-01-23 @ 21:16)  Aspartate Aminotransferase (AST/SGOT): 22 (04-06-23 @ 06:33)  Aspartate Aminotransferase (AST/SGOT): 29 (04-05-23 @ 06:12)  Aspartate Aminotransferase (AST/SGOT): 20 (04-04-23 @ 06:36)  Bilirubin Total: 0.4 (11-02)  Bilirubin Total: 0.4 (11-01)  Bilirubin Total, Serum: 0.6 (04-06)  Bilirubin Total, Serum: 0.6 (04-05)  Bilirubin Total, Serum: 0.4 (04-04)      Trend LDH      Auto Eosinophil %: 0.9 % (11-01-23 @ 21:16)      Urinalysis Basic - ( 02 Nov 2023 03:07 )    Color: x / Appearance: x / SG: x / pH: x  Gluc: 425 mg/dL / Ketone: x  / Bili: x / Urobili: x   Blood: x / Protein: x / Nitrite: x   Leuk Esterase: x / RBC: x / WBC x   Sq Epi: x / Non Sq Epi: x / Bacteria: x        MICROBIOLOGY:  Culture - Blood (collected 29 Mar 2023 12:40)  Source: .Blood Blood-Peripheral  Final Report:    No Growth Final    Culture - Blood (collected 29 Mar 2023 12:00)  Source: .Blood Blood-Peripheral  Final Report:    No Growth Final      HIV-1/2 Combo Result: Nonreact (03-28-23 @ 14:10)      Rapid RVP Result: Detected (11-02 @ 00:07)    Procalcitonin, Serum: 0.19 (11-02)    Troponin T, High Sensitivity Result: 13 (11-02)    Blood Gas Venous - Lactate: 1.9 (11-02 @ 03:00)  Blood Gas Venous - Lactate: 2.7 (11-02 @ 00:13)  Blood Gas Venous - Lactate: 4.6 (11-01 @ 21:15)    A1C with Estimated Average Glucose Result: 10.4 % (11-01-23 @ 21:16)      RADIOLOGY:  < from: Xray Chest 1 View AP/PA (11.01.23 @ 21:46) >  IMPRESSION:    No focal consolidation.      < from: VA Duplex Upper Ext Vein Scan, Right (04.02.23 @ 11:30) >  IMPRESSION:  Acute deep venous thrombosis in the right brachial vein, in the mid upper   arm, with a venous catheter in place.  Superficial venous thrombosis/thrombophlebitis of the right cephalic and   basilic veins.    < from: US Trans Kidney w/ Doppler, Right (03.28.23 @ 22:27) >  IMPRESSION: Normal range renal resistive indices.    Nonspecific elevated peak systolic velocities at the anastomosis and   along the proximal transplant renal artery. There are also borderline   elevated velocities of the iliac artery. Continued surveillance advised.

## 2023-11-02 NOTE — PROGRESS NOTE ADULT - SUBJECTIVE AND OBJECTIVE BOX
Transplant Surgery - Multidisciplinary Progress Note  --------------------------------------------------------------  DDRT 3/28/23    Present:   Patient seen and examined with multidisciplinary Transplant team including  Surgeon: Dr. Bourgeois, Dr. Montenegro.  Nephrologist: Dr. Elenita Tobin,   Pharmacist: LORENA Jones, SICU team and unit RN during am rounds.  Disciplines not in attendance will be notified of the plan.     68F with Hx of Alport Syndrome and CKD 5  (x 15 years), LUE AVF placed in 2017, recently started HD on 3/17/2023 (Tanja, Nephrologist Dr. Jet Horn ) now s/p R  DDRT under Simulect induction on 3/28/23 presents from outpatient clinic for new onset   Interval Events:     Immunosuppression:      Potential Discharge date:    Education:  Medications    Plan of care:  See Below    MEDICATIONS  (STANDING):  chlorhexidine 2% Cloths 1 Application(s) Topical <User Schedule>  enoxaparin Injectable 40 milliGRAM(s) SubCutaneous every 24 hours  famotidine    Tablet 20 milliGRAM(s) Oral daily  influenza  Vaccine (HIGH DOSE) 0.7 milliLiter(s) IntraMuscular once  insulin regular Infusion 3 Unit(s)/Hr (3 mL/Hr) IV Continuous <Continuous>  metoprolol tartrate 25 milliGRAM(s) Oral two times a day  NIFEdipine XL 30 milliGRAM(s) Oral every 24 hours  piperacillin/tazobactam IVPB.. 3.375 Gram(s) IV Intermittent every 8 hours  polyethylene glycol 3350 17 Gram(s) Oral every 24 hours  predniSONE   Tablet 5 milliGRAM(s) Oral daily  senna 2 Tablet(s) Oral at bedtime    MEDICATIONS  (PRN):  acetaminophen     Tablet .. 650 milliGRAM(s) Oral every 6 hours PRN Mild Pain (1 - 3)      PAST MEDICAL & SURGICAL HISTORY:  Alport's syndrome      HTN (hypertension)      Hard of hearing  uses hearing aids      Anemia      ESRD with anemia  left AV fistula, not in use      Deep vein thrombosis (DVT) of right upper extremity      Deep vein thrombosis (DVT) of right upper extremity      S/P tonsillectomy      A-V fistula  Nov 2017      Renal transplant recipient          Vital Signs Last 24 Hrs  T(C): 36.6 (02 Nov 2023 11:00), Max: 36.8 (01 Nov 2023 20:17)  T(F): 97.8 (02 Nov 2023 11:00), Max: 98.3 (01 Nov 2023 20:17)  HR: 91 (02 Nov 2023 12:00) (63 - 91)  BP: 126/63 (02 Nov 2023 12:00) (117/56 - 166/74)  BP(mean): 88 (02 Nov 2023 12:00) (81 - 97)  RR: 31 (02 Nov 2023 12:00) (16 - 31)  SpO2: 99% (02 Nov 2023 12:00) (95% - 100%)    Parameters below as of 02 Nov 2023 11:00  Patient On (Oxygen Delivery Method): room air        I&O's Summary    01 Nov 2023 07:01  -  02 Nov 2023 07:00  --------------------------------------------------------  IN: 405.5 mL / OUT: 675 mL / NET: -269.5 mL    02 Nov 2023 07:01  -  02 Nov 2023 12:36  --------------------------------------------------------  IN: 338.5 mL / OUT: 600 mL / NET: -261.5 mL                              10.7   9.91  )-----------( 230      ( 02 Nov 2023 03:07 )             32.9     11-02    132<L>  |  97  |  20  ----------------------------<  425<H>  4.0   |  19<L>  |  0.80    Ca    9.2      02 Nov 2023 03:07  Phos  2.5     11-02  Mg     1.7     11-02    TPro  6.7  /  Alb  3.6  /  TBili  0.4  /  DBili  x   /  AST  16  /  ALT  29  /  AlkPhos  89  11-02    Tacrolimus (), Serum: 7.4 ng/mL (11-02 @ 00:09)          Review of systems  Gen: No weight changes, fatigue, fevers/chills, weakness  Skin: No rashes  Head/Eyes/Ears/Mouth: No headache; Normal hearing; Normal vision w/o blurriness; No sinus pain/discomfort, sore throat  Respiratory: No dyspnea, cough, wheezing, hemoptysis  CV: No chest pain, PND, orthopnea  GI: Mild abdominal pain at surgical incision site; denies diarrhea, constipation, nausea, vomiting, melena, hematochezia  : No increased frequency, dysuria, hematuria, nocturia  MSK: No joint pain/swelling; no back pain; no edema  Neuro: No dizziness/lightheadedness, weakness, seizures, numbness, tingling  Heme: No easy bruising or bleeding  Endo: No heat/cold intolerance  Psych: No significant nervousness, anxiety, stress, depression  All other systems were reviewed and are negative, except as noted.      PHYSICAL EXAM:  Constitutional: Well developed / well nourished  Eyes: Anicteric, PERRLA  ENMT: nc/at  Neck: central line *****************  Respiratory: CTA B/L  Cardiovascular: RRR  Gastrointestinal: Soft, non distended, mild tenderness at the incision site; incision c/d/i; SHEELA .....   Genitourinary: Urinary catheter in place*****Voiding spontaneously  Extremities: SCD's in place and working bilaterally, AVF.....  Vascular: Palpable dp pulses bilaterally  Neurological: A&O x3  Skin: no rashes, ulcerations or lesions;  Musculoskeletal: Moving all extremities  Psychiatric: Responsive     Transplant Surgery - Multidisciplinary Progress Note  --------------------------------------------------------------  DDRT 3/28/23    Present:   Patient seen and examined with multidisciplinary Transplant team including  Surgeon: Dr. Bourgeois, Dr. Montenegro.  Nephrologist: Dr. Elenita Tobin,   Pharmacist: LORENA Jones, SICU team and unit RN during am rounds.  Disciplines not in attendance will be notified of the plan.     68F with Hx of Alport Syndrome and CKD 5  (x 15 years), LUE AVF placed in 2017, recently started HD on 3/17/2023 (Tanja, Nephrologist Dr. Jet Horn ) now s/p R  DDRT under Simulect induction on 3/28/23 presents from outpatient clinic for new onset hyperglycemia    Interval Events:   Afebrile, VSS  found to be COVID+, comfortable on RA  hyperglycemia: on insulin gtt, asymptomatic  good graft function    Immunosuppression:  Envarsus per level, MMF HELD, Pred 5  Ongoing monitoring for signs of rejection      Potential Discharge date: TBD    Education:  Medications    Plan of care:  See Below    MEDICATIONS  (STANDING):  chlorhexidine 2% Cloths 1 Application(s) Topical <User Schedule>  enoxaparin Injectable 40 milliGRAM(s) SubCutaneous every 24 hours  famotidine    Tablet 20 milliGRAM(s) Oral daily  influenza  Vaccine (HIGH DOSE) 0.7 milliLiter(s) IntraMuscular once  insulin regular Infusion 3 Unit(s)/Hr (3 mL/Hr) IV Continuous <Continuous>  metoprolol tartrate 25 milliGRAM(s) Oral two times a day  NIFEdipine XL 30 milliGRAM(s) Oral every 24 hours  piperacillin/tazobactam IVPB.. 3.375 Gram(s) IV Intermittent every 8 hours  polyethylene glycol 3350 17 Gram(s) Oral every 24 hours  predniSONE   Tablet 5 milliGRAM(s) Oral daily  senna 2 Tablet(s) Oral at bedtime    MEDICATIONS  (PRN):  acetaminophen     Tablet .. 650 milliGRAM(s) Oral every 6 hours PRN Mild Pain (1 - 3)      PAST MEDICAL & SURGICAL HISTORY:  Alport's syndrome      HTN (hypertension)      Hard of hearing  uses hearing aids      Anemia      ESRD with anemia  left AV fistula, not in use      Deep vein thrombosis (DVT) of right upper extremity      Deep vein thrombosis (DVT) of right upper extremity      S/P tonsillectomy      A-V fistula  Nov 2017      Renal transplant recipient          Vital Signs Last 24 Hrs  T(C): 36.6 (02 Nov 2023 11:00), Max: 36.8 (01 Nov 2023 20:17)  T(F): 97.8 (02 Nov 2023 11:00), Max: 98.3 (01 Nov 2023 20:17)  HR: 91 (02 Nov 2023 12:00) (63 - 91)  BP: 126/63 (02 Nov 2023 12:00) (117/56 - 166/74)  BP(mean): 88 (02 Nov 2023 12:00) (81 - 97)  RR: 31 (02 Nov 2023 12:00) (16 - 31)  SpO2: 99% (02 Nov 2023 12:00) (95% - 100%)    Parameters below as of 02 Nov 2023 11:00  Patient On (Oxygen Delivery Method): room air        I&O's Summary    01 Nov 2023 07:01  -  02 Nov 2023 07:00  --------------------------------------------------------  IN: 405.5 mL / OUT: 675 mL / NET: -269.5 mL    02 Nov 2023 07:01  -  02 Nov 2023 12:36  --------------------------------------------------------  IN: 338.5 mL / OUT: 600 mL / NET: -261.5 mL                              10.7   9.91  )-----------( 230      ( 02 Nov 2023 03:07 )             32.9     11-02    132<L>  |  97  |  20  ----------------------------<  425<H>  4.0   |  19<L>  |  0.80    Ca    9.2      02 Nov 2023 03:07  Phos  2.5     11-02  Mg     1.7     11-02    TPro  6.7  /  Alb  3.6  /  TBili  0.4  /  DBili  x   /  AST  16  /  ALT  29  /  AlkPhos  89  11-02    Tacrolimus (), Serum: 7.4 ng/mL (11-02 @ 00:09)          Review of systems  Gen: No weight changes, fatigue, fevers/chills, weakness  Skin: No rashes  Head/Eyes/Ears/Mouth: No headache; Normal hearing; Normal vision w/o blurriness; No sinus pain/discomfort, sore throat  Respiratory: No dyspnea, cough, wheezing, hemoptysis  CV: No chest pain, PND, orthopnea  GI: Mild abdominal pain at surgical incision site; denies diarrhea, constipation, nausea, vomiting, melena, hematochezia  : No increased frequency, dysuria, hematuria, nocturia  MSK: No joint pain/swelling; no back pain; no edema  Neuro: No dizziness/lightheadedness, weakness, seizures, numbness, tingling  Heme: No easy bruising or bleeding  Endo: No heat/cold intolerance  Psych: No significant nervousness, anxiety, stress, depression  All other systems were reviewed and are negative, except as noted.      PHYSICAL EXAM:  Constitutional: Well developed / well nourished  Eyes: Anicteric, PERRLA  ENMT: nc/at  Neck: supple  Respiratory: CTA B/L  Cardiovascular: RRR  Gastrointestinal: Soft, ND/NT, well healed incisional scar  Genitourinary: Voiding spontaneously  Extremities: SCD's in place and working bilaterally  Vascular: Palpable dp pulses bilaterally  Neurological: A&O x3  Skin: no rashes, ulcerations or lesions;  Musculoskeletal: Moving all extremities  Psychiatric: Responsive

## 2023-11-02 NOTE — CONSULT NOTE ADULT - SUBJECTIVE AND OBJECTIVE BOX
HPI:  68F with Hx of Alport Syndrome and CKD 5  (x 15 years), LUE AVF placed in 2017, recently started HD on 3/17/2023 (Tanja, Nephrologist Dr. Jet Horn ) now s/p R  DDRT under Simulect induction on 3/28/23 presents from outpatient clinic after she was found to have elevated blood glucose to 600's and was informed to go to the ER. Denies any hx diabetes or dietary changes.    Patient also endorses 3 month history of itching around her face that is most prevalent at night. Was given medication for symptoms however felt very drowsy afterwards, later tried benadryl with worsening drowsiness side effects.    She also endorses weakness in her lower extremities that has been ongoing for 2 months that occurs after walking. She endorses she feels she need to stop and rest after taking "a few hundred steps" which is atypical for her. Denies any numbness/tingling of feet, but also endorses feeling more tired than usual the past several months. (01 Nov 2023 21:56)      PAST MEDICAL & SURGICAL HISTORY:  Alport's syndrome      HTN (hypertension)      Hard of hearing  uses hearing aids      Anemia      ESRD with anemia  left AV fistula, not in use      Deep vein thrombosis (DVT) of right upper extremity      Deep vein thrombosis (DVT) of right upper extremity      S/P tonsillectomy      A-V fistula  Nov 2017      Renal transplant recipient          FAMILY HISTORY:  FH: kidney disease (Child)        Social History:    Outpatient Medications:    MEDICATIONS  (STANDING):  chlorhexidine 2% Cloths 1 Application(s) Topical <User Schedule>  enoxaparin Injectable 40 milliGRAM(s) SubCutaneous every 24 hours  famotidine    Tablet 20 milliGRAM(s) Oral daily  influenza  Vaccine (HIGH DOSE) 0.7 milliLiter(s) IntraMuscular once  insulin regular Infusion 3 Unit(s)/Hr (3 mL/Hr) IV Continuous <Continuous>  metoprolol tartrate 25 milliGRAM(s) Oral two times a day  NIFEdipine XL 30 milliGRAM(s) Oral every 24 hours  piperacillin/tazobactam IVPB.. 3.375 Gram(s) IV Intermittent every 8 hours  polyethylene glycol 3350 17 Gram(s) Oral every 24 hours  predniSONE   Tablet 5 milliGRAM(s) Oral daily  senna 2 Tablet(s) Oral at bedtime    MEDICATIONS  (PRN):  acetaminophen     Tablet .. 650 milliGRAM(s) Oral every 6 hours PRN Mild Pain (1 - 3)      Allergies    Bactrim (Pruritus)    Intolerances      Review of Systems:  Constitutional: No fever, No change in weight  Eyes: No blurry vision  Neuro: No headache, No paresthesias  HEENT: No throat pain  Cardiovascular: No chest pain  Respiratory: No SOB  GI: No nausea or vomiting  : No polyuria  Skin: no rash  Psych: no depression  Endocrine: No polydipsia, No heat or cold intolerance, rest as noted in HPI  Hem/lymph: no swelling    All other review of systems negative      PHYSICAL EXAM:  VITALS: T(C): 36.6 (11-02-23 @ 11:00)  T(F): 97.8 (11-02-23 @ 11:00), Max: 98.3 (11-01-23 @ 20:17)  HR: 77 (11-02-23 @ 13:00) (63 - 91)  BP: 137/59 (11-02-23 @ 13:00) (117/56 - 166/74)  RR:  (16 - 31)  SpO2:  (95% - 100%)  Wt(kg): --  GENERAL: NAD at this time  EYES: No proptosis, EOMI  HEENT:  Atraumatic, Normocephalic,   THYROID: Normal size, no palpable nodules  RESPIRATORY: Clear to auscultation bilaterally, full excursion, non-labored  CARDIOVASCULAR: Regular rhythm; No murmurs; no peripheral edema  GI: Soft, nontender, non distended, normal bowel sounds  SKIN: Dry, intact, No rashes or lesions  MUSCULOSKELETAL: normal strength  NEURO: follows commands, no tremor, normal reflexes  PSYCH: Alert and oriented x 3, normal affect, normal mood  CUSHING'S SIGNS: no striae      POCT Blood Glucose.: 325 mg/dL (11-02-23 @ 13:16)  POCT Blood Glucose.: 285 mg/dL (11-02-23 @ 12:09)  POCT Blood Glucose.: 385 mg/dL (11-02-23 @ 11:03)  POCT Blood Glucose.: 394 mg/dL (11-02-23 @ 10:09)  POCT Blood Glucose.: 288 mg/dL (11-02-23 @ 09:02)  POCT Blood Glucose.: 247 mg/dL (11-02-23 @ 08:09)  POCT Blood Glucose.: 268 mg/dL (11-02-23 @ 07:04)  POCT Blood Glucose.: 264 mg/dL (11-02-23 @ 06:05)  POCT Blood Glucose.: 266 mg/dL (11-02-23 @ 05:01)  POCT Blood Glucose.: 319 mg/dL (11-02-23 @ 04:00)  POCT Blood Glucose.: 394 mg/dL (11-02-23 @ 03:04)  POCT Blood Glucose.: 442 mg/dL (11-02-23 @ 02:16)  POCT Blood Glucose.: 491 mg/dL (11-02-23 @ 00:50)  POCT Blood Glucose.: 537 mg/dL (11-01-23 @ 23:42)  POCT Blood Glucose.: >600 mg/dL (11-01-23 @ 20:19)                              10.7   9.91  )-----------( 230      ( 02 Nov 2023 03:07 )             32.9       11-02    132<L>  |  97  |  20  ----------------------------<  425<H>  4.0   |  19<L>  |  0.80    eGFR: 80    Ca    9.2      11-02  Mg     1.7     11-02  Phos  2.5     11-02    TPro  6.7  /  Alb  3.6  /  TBili  0.4  /  DBili  x   /  AST  16  /  ALT  29  /  AlkPhos  89  11-02    Thyroid Function Tests:            Radiology:                  HPI:  68F with Hx of Alport Syndrome and CKD 5  (x 15 years), LUE AVF placed in 2017, recently started HD on 3/17/2023 (Tanja, Nephrologist Dr. Jet Horn ) now s/p R  DDRT under Simulect induction on 3/28/23 presents from outpatient clinic after she was found to have elevated blood glucose to 600's and was informed to go to the ER. Denies any hx diabetes or dietary changes.    Patient also endorses 3 month history of itching around her face that is most prevalent at night. Was given medication for symptoms however felt very drowsy afterwards, later tried benadryl with worsening drowsiness side effects.    She also endorses weakness in her lower extremities that has been ongoing for 2 months that occurs after walking. She endorses she feels she need to stop and rest after taking "a few hundred steps" which is atypical for her. Denies any numbness/tingling of feet, but also endorses feeling more tired than usual the past several months.    Endocrine consulted for newly diagnosed diabetes with A1c of 10.4% Pt. denies hx of diabetes prior to transplant. No family hx of diabetes. Does not check glucose. No reported hypoglycemia or symptoms of hypoglycemia. Was given a script recently for a CGM but found it to be overwhelming. Has noticed polyuria, polydipsia and weight loss. Eats a lot of pasta and regular soda.       PAST MEDICAL & SURGICAL HISTORY:  Alport's syndrome      HTN (hypertension)      Hard of hearing  uses hearing aids      Anemia      ESRD with anemia  left AV fistula, not in use      Deep vein thrombosis (DVT) of right upper extremity      Deep vein thrombosis (DVT) of right upper extremity      S/P tonsillectomy      A-V fistula  Nov 2017      Renal transplant recipient          FAMILY HISTORY:  FH: kidney disease (Child)        Social History: No tobacco or alcohol use    Outpatient Medications:  · 	Metoprolol Tartrate 25 mg oral tablet: Last Dose Taken:  , 1 tab(s) orally 2 times a day  · 	predniSONE: Last Dose Taken:  , 5 milligram(s) orally once a day  · 	mycophenolate mofetil 250 mg oral capsule: Last Dose Taken:  , 250 milligram(s) orally 2 times a day  · 	famotidine 20 mg oral tablet: Last Dose Taken:  , 1 tab(s) orally once a day  · 	NIFEdipine 30 mg oral tablet, extended release: Last Dose Taken:  , 1 tab(s) orally once a day  · 	Envarsus XR 0.75 mg oral tablet, extended release: Last Dose Taken:  , 2 tab(s) orally once a day    MEDICATIONS  (STANDING):  chlorhexidine 2% Cloths 1 Application(s) Topical <User Schedule>  enoxaparin Injectable 40 milliGRAM(s) SubCutaneous every 24 hours  famotidine    Tablet 20 milliGRAM(s) Oral daily  influenza  Vaccine (HIGH DOSE) 0.7 milliLiter(s) IntraMuscular once  insulin regular Infusion 3 Unit(s)/Hr (3 mL/Hr) IV Continuous <Continuous>  metoprolol tartrate 25 milliGRAM(s) Oral two times a day  NIFEdipine XL 30 milliGRAM(s) Oral every 24 hours  piperacillin/tazobactam IVPB.. 3.375 Gram(s) IV Intermittent every 8 hours  polyethylene glycol 3350 17 Gram(s) Oral every 24 hours  predniSONE   Tablet 5 milliGRAM(s) Oral daily  senna 2 Tablet(s) Oral at bedtime    MEDICATIONS  (PRN):  acetaminophen     Tablet .. 650 milliGRAM(s) Oral every 6 hours PRN Mild Pain (1 - 3)      Allergies    Bactrim (Pruritus)    Intolerances      Review of Systems:  Constitutional: No fever, +weight loss  Eyes: No blurry vision  Neuro: No headache, No paresthesias  HEENT: No throat pain  Cardiovascular: No chest pain  Respiratory: No SOB  GI: No nausea or vomiting  : + polyuria  Skin: no rash  Psych: no depression  Endocrine: + polydipsia, No heat or cold intolerance, rest as noted in HPI  Hem/lymph: no swelling    All other review of systems negative      PHYSICAL EXAM:  VITALS: T(C): 36.6 (11-02-23 @ 11:00)  T(F): 97.8 (11-02-23 @ 11:00), Max: 98.3 (11-01-23 @ 20:17)  HR: 77 (11-02-23 @ 13:00) (63 - 91)  BP: 137/59 (11-02-23 @ 13:00) (117/56 - 166/74)  RR:  (16 - 31)  SpO2:  (95% - 100%)  Wt(kg): --  GENERAL: NAD at this time  EYES: No proptosis, EOMI  HEENT:  Atraumatic, Normocephalic,   THYROID: Normal size, no palpable nodules  RESPIRATORY: Clear to auscultation bilaterally, full excursion, non-labored  CARDIOVASCULAR: Regular rhythm; No murmurs; no peripheral edema  GI: Soft, nontender, non distended, normal bowel sounds  SKIN: Dry, intact, No rashes or lesions  MUSCULOSKELETAL: normal strength  NEURO: follows commands  PSYCH: Alert and oriented x 3, normal affect, normal mood  CUSHING'S SIGNS: no striae      POCT Blood Glucose.: 325 mg/dL (11-02-23 @ 13:16)  POCT Blood Glucose.: 285 mg/dL (11-02-23 @ 12:09)  POCT Blood Glucose.: 385 mg/dL (11-02-23 @ 11:03)  POCT Blood Glucose.: 394 mg/dL (11-02-23 @ 10:09)  POCT Blood Glucose.: 288 mg/dL (11-02-23 @ 09:02)  POCT Blood Glucose.: 247 mg/dL (11-02-23 @ 08:09)  POCT Blood Glucose.: 268 mg/dL (11-02-23 @ 07:04)  POCT Blood Glucose.: 264 mg/dL (11-02-23 @ 06:05)  POCT Blood Glucose.: 266 mg/dL (11-02-23 @ 05:01)  POCT Blood Glucose.: 319 mg/dL (11-02-23 @ 04:00)  POCT Blood Glucose.: 394 mg/dL (11-02-23 @ 03:04)  POCT Blood Glucose.: 442 mg/dL (11-02-23 @ 02:16)  POCT Blood Glucose.: 491 mg/dL (11-02-23 @ 00:50)  POCT Blood Glucose.: 537 mg/dL (11-01-23 @ 23:42)  POCT Blood Glucose.: >600 mg/dL (11-01-23 @ 20:19)                              10.7   9.91  )-----------( 230      ( 02 Nov 2023 03:07 )             32.9       11-02    132<L>  |  97  |  20  ----------------------------<  425<H>  4.0   |  19<L>  |  0.80    eGFR: 80    Ca    9.2      11-02  Mg     1.7     11-02  Phos  2.5     11-02    TPro  6.7  /  Alb  3.6  /  TBili  0.4  /  DBili  x   /  AST  16  /  ALT  29  /  AlkPhos  89  11-02    Thyroid Function Tests:            Radiology:

## 2023-11-02 NOTE — PHYSICAL THERAPY INITIAL EVALUATION ADULT - PERTINENT HX OF CURRENT PROBLEM, REHAB EVAL
Pt is a 69 y/o female admitted with hyperglycemia. PMH:  Alport Syndrome and CKD 5  (x 15 years), LUE AVF placed in 2017, recently started HD on 3/17/2023. Now s/p DDRT (3/28/2023). Pt presents from Outpatient clinic after she was found to have elevated blood glucose to 600's and was informed to go to the ER. Brought to SICU for insulin gtt and close monitoring. Pt also +COVID19.

## 2023-11-02 NOTE — CONSULT NOTE ADULT - NS ATTEND AMEND GEN_ALL_CORE FT
seen the patient in the ED and discussed early empiric antibiotics given concern for pos UA, kidney function appears normal  lactate elevation, will recheck now pt received bolus  pt reports feeling weak for long time. reviewed EKG. will check procal, and trop and BNP  will monitor in ICU for response to treatment.

## 2023-11-02 NOTE — OCCUPATIONAL THERAPY INITIAL EVALUATION ADULT - PERTINENT HX OF CURRENT PROBLEM, REHAB EVAL
68-year-old [facility] female with HTN,  Alport syndrome and CKD 5  (x 15 years), LUE AVF placed in 2017, recently started HD on 3/17/2023 (Tanja, Nephrologist Dr. Jet Horn ) now s/p R  DDRT under Simulect induction on 3/28/23. Admitted to [facility] for hyperglycemia noted on outpatient labs with LE weakness after walking.  Brought to SICU for insulin gtt and close monitoring. Pt also +COVID19. X Ray chest (-)

## 2023-11-02 NOTE — CONSULT NOTE ADULT - ASSESSMENT
Impression:  68F with Hx of Alport Syndrome and CKD 5 (x 15 years), LUE AVF placed in 2017, RUE DVT in April 2023 recently started HD on 3/17/2023 (Tanja, Nephrologist Dr. Jet Horn ) now s/p R DDRT under Simulect induction on 3/28/23 and at that time donor with polymicrobial positive blood cultures presents from outpatient clinic after she was found to have elevated blood glucose to 600's and was informed to go to the ER. UPon work up patient found to have BC of 771 with A1c of 10.4, COVID positive with negative CXR and UA showing trace leukocyte esterase, 60 wbc and mod bacteria. Patient admitted to the ICU for insulin gtt manage of HHS.    Antimicrobials:  piperacillin/tazobactam 10/1 - current    Assessment:  *Sepsis as evidenced by leukocytosis and tachypnea secondary to COVID vs UTI, UA may represent asymptomatic bacteruria but due to patients immunocompromised status and level of pyruria active infection is likely and should be treated (Procal 0.19)  *s/p DDRT under simulect induction on 3/28/23 immunosuppressed on prednisone and tacrolimus with tacrocurrently held  *HHS without acidosis or ketones in urine with admission BMP glucose of 771, poorly controlled DM with A1c of 10.4  *Pseudohyponatremia in the setting of hyperglycemic state  *Hx of RUE DVT in 4/2023  *Hyperkalemia now resolved    Recommendations: PLEASE DEFER ALL CHANGES IN PLAN UNTIL SIGNED BY ATTENDING. All recommendations are tentative pending Attending Attestation.  - continue piperacillin/tazobactam   - blood and urine cultures received by lab with results pending   - will adjust antimicrobial therapy based off of culture sensitivity   - HHS management and immunosuppression management as per primary team   -  on DM management     Joe Zambrano DO, PGY-4   Infectious Disease Fellow  [employer] Teams Preferred  After 5pm/weekends call [phone]  Impression:  68F with Hx of Alport Syndrome and CKD 5 (x 15 years), LUE AVF placed in 2017, RUE DVT in April 2023 recently started HD on 3/17/2023 (Tanja, Nephrologist Dr. Jet Horn ) now s/p R DDRT under Simulect induction on 3/28/23 and at that time donor with polymicrobial positive blood cultures presents from outpatient clinic after she was found to have elevated blood glucose to 600's and was informed to go to the ER. UPon work up patient found to have BC of 771 with A1c of 10.4, COVID positive with negative CXR and UA showing trace leukocyte esterase, 60 wbc and mod bacteria. Patient admitted to the ICU for insulin gtt manage of HHS.    Antimicrobials:  piperacillin/tazobactam 10/1 - current    Assessment:  *Sepsis as evidenced by leukocytosis and tachypnea secondary to COVID vs UTI, UA may represent asymptomatic bacteruria but due to patients immunocompromised status and level of pyruria active infection is likely and should be treated (Procal 0.19)  *COVID 19 infection w/ hx of  having COVID 19 in October 2023 after trip to [place], patient vaccinated x4 against covid  *s/p DDRT under simulect induction on 3/28/23 immunosuppressed on prednisone and tacrolimus with tacrocurrently held  *HHS without acidosis or ketones in urine with admission BMP glucose of 771, poorly controlled DM with A1c of 10.4  *Pseudohyponatremia in the setting of hyperglycemic state  *Hx of RUE DVT in 4/2023  *Hyperkalemia now resolved    Recommendations:   - continue piperacillin/tazobactam   - blood and urine cultures received by lab with results pending   - will adjust antimicrobial therapy based off of culture sensitivity   - obtain cycle threshold for COVID patient likely exposed 3 weeks ago and is still PCR positive, if cycle threshold is low would give 3 days of Remdesivir if cycle threshold is high would hold off on treatment   - HHS management and immunosuppression management as per primary team   -  on DM management     Joe Zambrano DO, PGY-4   Infectious Disease Fellow  [employer] Teams Preferred  After 5pm/weekends call [phone]  Impression:  68F with Hx of Alport Syndrome and CKD 5 (x 15 years), LUE AVF placed in 2017, RUE DVT in April 2023 recently started HD on 3/17/2023 (Tanja, Nephrologist Dr. Jet Horn ) now s/p R DDRT under Simulect induction on 3/28/23 and at that time donor with polymicrobial positive blood cultures (Klebsiella spp, strep) presents from outpatient clinic after she was found to have elevated blood glucose to 600's and was informed to go to the ER. UPon work up patient found to have BC of 771 with A1c of 10.4, COVID positive with negative CXR and UA showing trace leukocyte esterase, 60 wbc and mod bacteria. Patient admitted to the ICU for insulin gtt manage of HHS.    Antimicrobials:  piperacillin/tazobactam 10/1 - current    Assessment:  *Sepsis as evidenced by leukocytosis and tachypnea secondary to COVID vs UTI, UA may represent asymptomatic bacteruria but due to patients immunocompromised status and level of pyruria active infection is likely and should be treated (Procal 0.19)  *COVID 19 infection w/ hx of  having COVID 19 in October 2023 after trip to [place], patient vaccinated x4 against covid  *s/p DDRT under simulect induction on 3/28/23 immunosuppressed on prednisone and tacrolimus with tacrocurrently held  *HHS without acidosis or ketones in urine with admission BMP glucose of 771, poorly controlled DM with A1c of 10.4  *Pseudohyponatremia in the setting of hyperglycemic state  *Hx of RUE DVT in 4/2023  *Hyperkalemia now resolved    Recommendations:   - continue piperacillin/tazobactam   - blood and urine cultures received by lab with results pending   - will adjust antimicrobial therapy based off of culture sensitivity   - obtain cycle threshold for COVID patient likely exposed 3 weeks ago and is still PCR positive, if cycle threshold is low would give 3 days of Remdesivir if cycle threshold is high would hold off on treatment   - HHS management and immunosuppression management as per primary team   -  on DM management     Joe Zambrano DO, PGY-4   Infectious Disease Fellow  [employer] Teams Preferred  After 5pm/weekends call [phone]

## 2023-11-02 NOTE — PHYSICAL THERAPY INITIAL EVALUATION ADULT - ADDITIONAL COMMENTS
Pt lives with her  in a ranch style home +4 steps to enter +HR and first floor set up. Pt was independent with all mobility and ADLs, with reported limited endurance. Pt owns a RW from her previous surgery.

## 2023-11-02 NOTE — PATIENT PROFILE ADULT - FUNCTIONAL ASSESSMENT - DAILY ACTIVITY SCORE.
89 
24

## 2023-11-02 NOTE — PATIENT PROFILE ADULT - PATIENT REPRESENTATIVE: ( YOU CAN CHOOSE ANY PERSON THAT CAN ASSIST YOU WITH YOUR HEALTH CARE PREFERENCES, DOES NOT HAVE TO BE A SPOUSE, IMMEDIATE FAMILY OR SIGNIFICANT OTHER/PARTNER)
[facility]    Reason for Contact:  Insurance - transition from [place] to [place].     Action Taken:  Writer made numerous phone calls made in both [place] and [place] to determine steps to transition patients insurance to [place].  Patient will need to close out his health care coverage in [place] before he can apply for benefits in [place].  Patient will need to change his address through [place] enrollment services and the required form is being mailed to patient for completion.  Patient will receive form in the next 10 days.  Writer also asked that patient change his address with the [facility].      Writer has referred patient to [facility] to assist patient with housing search.  CHW tasks entered into Epic.      Plan: Writer will follow up with patient in the next 2 weeks to confirm receipt and completion of form for [place] enrollment services.    [facility] Interventions    Authentic Engagement:   Assessment:   Conduct focused/targeted assessment, Perform EHR data review and synthesis  Care Planning:   Facilitation of Learning / Promotion of Self-Efficacy:    client/family to navigate social service delivery systems  Care Coordination:   [place] [employer]:   Collaboration:   Facilitate inter-agency communication             
declines

## 2023-11-02 NOTE — PATIENT PROFILE ADULT - FALL HARM RISK - RISK INTERVENTIONS

## 2023-11-02 NOTE — CONSULT NOTE ADULT - SUBJECTIVE AND OBJECTIVE BOX
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 Denies any hx diabetes or dietary changes. Brought to SICU for insulin gtt and close monitoring.     SUBJECTIVE/ROS:  [ ] A ten-point review of systems was otherwise negative except as noted.  [ ] Due to altered mental status/intubation, subjective information were not able to be obtained from the patient. History was obtained, to the extent possible, from review of the chart and collateral sources of information.      NEURO  Exam: awake, alert, oriented  Meds: acetaminophen     Tablet .. 650 milliGRAM(s) Oral every 6 hours PRN Mild Pain (1 - 3)  [x] Adequacy of sedation and pain control has been assessed and adjusted      RESPIRATORY  RR: 20 (11-02-23 @ 04:00) (16 - 24)  SpO2: 100% (11-02-23 @ 04:00) (97% - 100%)  Exam: unlabored, clear to auscultation bilaterally      CARDIOVASCULAR  HR: 67 (11-02-23 @ 04:00) (65 - 83)  BP: 151/64 (11-02-23 @ 04:00) (117/56 - 166/74)  BP(mean): 92 (11-02-23 @ 04:00) (81 - 97)  VBG - ( 02 Nov 2023 03:00 )  pH: 7.38  /  pCO2: 44    /  pO2: 49    / HCO3: 26    / Base Excess: 0.6   /  SaO2: 75.5   Lactate: 1.9      Exam: regular rate and rhythm  Cardiac Rhythm: sinus  Perfusion     [x]Adequate   [ ]Inadequate  Mentation   [x]Normal       [ ]Reduced  Extremities  [x]Warm         [ ]Cool  Volume Status [ ]Hypervolemic [x]Euvolemic [ ]Hypovolemic  Meds: metoprolol tartrate 25 milliGRAM(s) Oral two times a day  NIFEdipine XL 30 milliGRAM(s) Oral every 24 hours      GI/NUTRITION  Exam: soft, nontender, nondistended  Diet: regular  Meds: famotidine    Tablet 20 milliGRAM(s) Oral daily      GENITOURINARY  I&O's Detail    11-01 @ 07:01  -  11-02 @ 05:18  --------------------------------------------------------  IN:  Total IN: 0 mL    OUT:    Voided (mL): 500 mL  Total OUT: 500 mL    Total NET: -500 mL        Weight (kg): 62.6 (11-01 @ 20:17)  11-02    132<L>  |  97  |  20  ----------------------------<  425<H>  4.0   |  19<L>  |  0.80    Ca    9.2      02 Nov 2023 03:07  Phos  2.5     11-02  Mg     1.7     11-02    TPro  6.7  /  Alb  3.6  /  TBili  0.4  /  DBili  x   /  AST  16  /  ALT  29  /  AlkPhos  89  11-02    [ ] Harper catheter, indication: N/A  Meds: magnesium sulfate  IVPB 2 Gram(s) IV Intermittent once  potassium phosphate / sodium phosphate Powder (PHOS-NaK) 2 Packet(s) Oral once  sodium chloride 0.9%. 1000 milliLiter(s) IV Continuous <Continuous>      HEMATOLOGIC  Meds:   [x] VTE Prophylaxis                        10.7   9.91  )-----------( 230      ( 02 Nov 2023 03:07 )             32.9     PT/INR - ( 02 Nov 2023 03:07 )   PT: 13.2 sec;   INR: 1.21 ratio         PTT - ( 02 Nov 2023 03:07 )  PTT:24.8 sec  Transfusion     [ ] PRBC   [ ] Platelets   [ ] FFP   [ ] Cryoprecipitate      INFECTIOUS DISEASES  WBC Count: 9.91 K/uL (11-02 @ 03:07)  WBC Count: 13.09 K/uL (11-01 @ 21:16)    RECENT CULTURES:  Meds: influenza  Vaccine (HIGH DOSE) 0.7 milliLiter(s) IntraMuscular once  piperacillin/tazobactam IVPB.. 3.375 Gram(s) IV Intermittent every 8 hours      ENDOCRINE  CAPILLARY BLOOD GLUCOSE  POCT Blood Glucose.: 266 mg/dL (02 Nov 2023 05:01)  POCT Blood Glucose.: 319 mg/dL (02 Nov 2023 04:00)  POCT Blood Glucose.: 394 mg/dL (02 Nov 2023 03:04)  POCT Blood Glucose.: 442 mg/dL (02 Nov 2023 02:16)  POCT Blood Glucose.: 491 mg/dL (02 Nov 2023 00:50)  POCT Blood Glucose.: 537 mg/dL (01 Nov 2023 23:42)  POCT Blood Glucose.: >600 mg/dL (01 Nov 2023 20:19)    Meds: insulin regular Infusion 3 Unit(s)/Hr IV Continuous <Continuous>  predniSONE   Tablet 5 milliGRAM(s) Oral daily      OTHER MEDICATIONS:  chlorhexidine 2% Cloths 1 Application(s) Topical <User Schedule>    CODE STATUS: full code

## 2023-11-02 NOTE — PHYSICAL THERAPY INITIAL EVALUATION ADULT - PATIENT PROFILE REVIEW, REHAB EVAL
yes Eat healthy foods you enjoy. Rivaroxaban/Xarelto DOES NOT have a special diet. Limit your alcohol intake.

## 2023-11-02 NOTE — CONSULT NOTE ADULT - SUBJECTIVE AND OBJECTIVE BOX
[facility] DIVISION OF KIDNEY DISEASES AND HYPERTENSION -- INITIAL CONSULT NOTE  --------------------------------------------------------------------------------  HPI:  68F with Hx of Alport Syndrome and CKD 5  (x 15 years), LUE AVF placed in 2017, recently started HD on 3/17/2023 (Tanja, Nephrologist Dr. Jet Horn ) now s/p R  DDRT under Simulect induction on 3/28/23 presents from outpatient clinic after she was found to have elevated blood glucose to 600's and was informed to go to the ER. Denies any hx diabetes or dietary changes.    Patient also endorses 3 month history of itching around her face that is most prevalent at night. Was given medication for symptoms however felt very drowsy afterwards, later tried benadryl with worsening drowsiness side effects.    She also endorses weakness in her lower extremities that has been ongoing for 2 months that occurs after walking. She endorses she feels she need to stop and rest after taking "a few hundred steps" which is atypical for her. Denies any numbness/tingling of feet, but also endorses feeling more tired than usual the past several months.        PAST HISTORY  --------------------------------------------------------------------------------  PAST MEDICAL & SURGICAL HISTORY:  Alport's syndrome      HTN (hypertension)      Hard of hearing  uses hearing aids      Anemia      ESRD with anemia  left AV fistula, not in use      Deep vein thrombosis (DVT) of right upper extremity      Deep vein thrombosis (DVT) of right upper extremity      S/P tonsillectomy      A-V fistula  Nov 2017      Renal transplant recipient        FAMILY HISTORY:  FH: kidney disease (Child)      PAST SOCIAL HISTORY:    ALLERGIES & MEDICATIONS  --------------------------------------------------------------------------------  Allergies    Bactrim (Pruritus)    Intolerances      Standing Inpatient Medications  chlorhexidine 2% Cloths 1 Application(s) Topical <User Schedule>  enoxaparin Injectable 40 milliGRAM(s) SubCutaneous every 24 hours  famotidine    Tablet 20 milliGRAM(s) Oral daily  influenza  Vaccine (HIGH DOSE) 0.7 milliLiter(s) IntraMuscular once  insulin regular Infusion 3 Unit(s)/Hr IV Continuous <Continuous>  metoprolol tartrate 25 milliGRAM(s) Oral two times a day  NIFEdipine XL 30 milliGRAM(s) Oral every 24 hours  piperacillin/tazobactam IVPB.. 3.375 Gram(s) IV Intermittent every 8 hours  polyethylene glycol 3350 17 Gram(s) Oral every 24 hours  predniSONE   Tablet 5 milliGRAM(s) Oral daily  senna 2 Tablet(s) Oral at bedtime    PRN Inpatient Medications  acetaminophen     Tablet .. 650 milliGRAM(s) Oral every 6 hours PRN      REVIEW OF SYSTEMS  --------------------------------------------------------------------------------  Gen: No weight changes, fatigue, fevers/chills, weakness  Skin: No rashes  Head/Eyes/Ears/Mouth: No headache; Normal hearing; Normal vision w/o blurriness; No sinus pain/discomfort, sore throat  Respiratory: No dyspnea, cough, wheezing, hemoptysis  CV: No chest pain, PND, orthopnea  GI: No abdominal pain, diarrhea, constipation, nausea, vomiting, melena, hematochezia  : No increased frequency, dysuria, hematuria, nocturia  MSK: No joint pain/swelling; no back pain; no edema  Neuro: No dizziness/lightheadedness, weakness, seizures, numbness, tingling  Heme: No easy bruising or bleeding  Endo: No heat/cold intolerance  Psych: No significant nervousness, anxiety, stress, depression    All other systems were reviewed and are negative, except as noted.    VITALS/PHYSICAL EXAM  --------------------------------------------------------------------------------  T(C): 36.6 (11-02-23 @ 11:00), Max: 36.8 (11-01-23 @ 20:17)  HR: 82 (11-02-23 @ 15:10) (63 - 91)  BP: 119/85 (11-02-23 @ 14:00) (117/56 - 166/74)  RR: 23 (11-02-23 @ 15:10) (16 - 31)  SpO2: 100% (11-02-23 @ 15:10) (95% - 100%)  Wt(kg): --  Height (cm): 167.6 (11-01-23 @ 20:17)  Weight (kg): 62.6 (11-01-23 @ 20:17)  BMI (kg/m2): 22.3 (11-01-23 @ 20:17)  BSA (m2): 1.71 (11-01-23 @ 20:17)      11-01-23 @ 07:01  -  11-02-23 @ 07:00  --------------------------------------------------------  IN: 405.5 mL / OUT: 675 mL / NET: -269.5 mL    11-02-23 @ 07:01  -  11-02-23 @ 15:13  --------------------------------------------------------  IN: 595.5 mL / OUT: 600 mL / NET: -4.5 mL      Physical Exam:  	Gen: NAD, well-appearing  	HEENT: PERRL, supple neck, clear oropharynx  	Pulm: CTA B/L  	CV: RRR, S1S2; no rub  	Back: No spinal or CVA tenderness; no sacral edema  	Abd: +BS, soft, nontender/nondistended                      Transplant:  	: No suprapubic tenderness  	UE: Warm, FROM, no clubbing, intact strength; no edema; no asterixis  	LE: Warm, FROM, no clubbing, intact strength; no edema  	Neuro: No focal deficits, intact gait  	Psych: Normal affect and mood  	Skin: Warm, without rashes  	Vascular access:    LABS/STUDIES  --------------------------------------------------------------------------------              10.7   9.91  >-----------<  230      [11-02-23 @ 03:07]              32.9     132  |  97  |  20  ----------------------------<  425      [11-02-23 @ 03:07]  4.0   |  19  |  0.80        Ca     9.2     [11-02-23 @ 03:07]      Mg     1.7     [11-02-23 @ 03:07]      Phos  2.5     [11-02-23 @ 03:07]    TPro  6.7  /  Alb  3.6  /  TBili  0.4  /  DBili  x   /  AST  16  /  ALT  29  /  AlkPhos  89  [11-02-23 @ 03:07]    PT/INR: PT 13.2 , INR 1.21       [11-02-23 @ 03:07]  PTT: 24.8       [11-02-23 @ 03:07]      Creatinine Trend:  SCr 0.80 [11-02 @ 03:07]  SCr 0.94 [11-01 @ 21:16]    Urinalysis - [11-02-23 @ 03:07]      Color  / Appearance  / SG  / pH       Gluc 425 / Ketone   / Bili  / Urobili        Blood  / Protein  / Leuk Est  / Nitrite       RBC  / WBC  / Hyaline  / Gran  / Sq Epi  / Non Sq Epi  / Bacteria       Iron 133, TIBC 274, %sat 48      [04-02-23 @ 12:02]  Ferritin 1145      [04-02-23 @ 12:02]  TSH 0.32      [03-30-23 @ 09:21]    HBsAb <3.0      [03-28-23 @ 13:31]  HBsAg Nonreact      [03-28-23 @ 14:10]  HBcAb Nonreact      [03-28-23 @ 13:31]  HCV 0.09, Nonreact      [03-28-23 @ 13:31]  HIV Nonreact      [03-28-23 @ 14:10]      TacrolimusTacrolimus (), Serum: 7.4 ng/mL (11-02 @ 00:09)    Cyclosporine  Sirolimus  Mycophenolate  BK PCR  CMV PCR  Parvo PCR  EBV PCR

## 2023-11-02 NOTE — CONSULT NOTE ADULT - ATTENDING COMMENTS
68F with Hx of Alport Syndrome and CKD 5 (x 15 years), LUE AVF placed in 2017, RUE DVT in April 2023 recently started HD on 3/17/2023 (Tanja, Nephrologist Dr. Jet Horn ) now s/p R DDRT under Simulect induction on 3/28/23 and at that time donor with polymicrobial positive blood cultures (Klebsiella spp, strep) presents from outpatient clinic after she was found to have elevated blood glucose to 600's and was informed to go to the ER. UPon work up patient found to have BC of 771 with A1c of 10.4, COVID positive with negative CXR and UA showing trace leukocyte esterase, 60 wbc and mod bacteria. Patient admitted to the ICU for insulin gtt manage of HHS.    COVID 19 cycle threshold 36.5 and exposure ~ 3 weeks ago indicating shedding at this time,  thus will not treat    Hyperglycemia, pyuria and bacteruria- will treat shortly for cystitis in context of significant pyuria. ~ 7 days - hopefully will be able to deescalate and change to po antibiotics        Thank you for involving us in the care of this patient  Transplant ID will continue to follow  Please call or page with additional questions  Pager; #[phone]  Teams: from 8 am to 5 pm  Zeenat Burrows MD

## 2023-11-02 NOTE — CONSULT NOTE ADULT - PROBLEM SELECTOR RECOMMENDATION 9
Diabetes Education and Nutrition Eval  Can transition off insulin gtt  Start Lantus 16 units can give 1st dose and then d/c insulin gtt 2 hours later. Please dose Lantus daily.   Start Admelog 8 units qac  Low correction scale qac + bedtime  Goal glucose 100-180  Outpt. endo follow-up  Outpt. optho, podiatry, nephrology  Plan to d/c on basal bolus vs. basal + orals depending on insulin requirements. Needs education and teaching.      Darius Garcia D.O  [phone] Diabetes Education and Nutrition Eval  Can transition off insulin gtt  Start Lantus 16 units can give 1st dose and then d/c insulin gtt 2 hours later. Please dose Lantus daily.   Start Admelog 8 units qac  Low correction scale qac + bedtime  Goal glucose 100-180  Outpt. endo follow-up  Outpt. optho, podiatry, nephrology  Plan to d/c on basal bolus vs. basal + orals depending on insulin requirements. Needs education and teaching.

## 2023-11-02 NOTE — CONSULT NOTE ADULT - ASSESSMENT
68 year old  female with HTN,  Alport syndrome and CKD 5  (x 15 years), LUE AVF placed in 2017, recently started HD on 3/17/2023 (Tanja, Nephrologist Dr. Jet Horn )  s/p R  DDRT under Simulect induction on 3/28/23. Admitted to [facility] for hyperglycemia noted on outpatient labs .    1. s/p  DDRT on 3/28/23- Allograft function is good   2. IS meds- on Envarsus with goal level 6-8 and Prednisone 5 mg po daily. hold cellcept  3. Hyperglycemia- on insulin gtt. endocrinology  on board.   4. UTI- UA with pyuria. f/u UC and BC. on Zosyn.

## 2023-11-02 NOTE — OCCUPATIONAL THERAPY INITIAL EVALUATION ADULT - LIVES WITH, PROFILE
Pt lives in a house +4STE +HR with her , 1 flight of stairs inside the house. Pt was previously independent with ADLs and mobility with no equipment. Pt owns walker./spouse

## 2023-11-02 NOTE — PHYSICAL THERAPY INITIAL EVALUATION ADULT - STANDING BALANCE: DYNAMIC, REHAB EVAL
Plan: Will get labs from pcp to review. Discussed we will start with ILK if Pt doesn’t see improvement we can discuss more aggressive treatment.\\n\\n\\nRecommended to have PCP draw labs for Vit D, zinc, iron, ferritin , and TSH . Detail Level: Zone good balance

## 2023-11-03 LAB
ALBUMIN SERPL ELPH-MCNC: 3.3 G/DL — SIGNIFICANT CHANGE UP (ref 3.3–5)
ALBUMIN SERPL ELPH-MCNC: 3.3 G/DL — SIGNIFICANT CHANGE UP (ref 3.3–5)
ALP SERPL-CCNC: 74 U/L — SIGNIFICANT CHANGE UP (ref 40–120)
ALP SERPL-CCNC: 74 U/L — SIGNIFICANT CHANGE UP (ref 40–120)
ALT FLD-CCNC: 22 U/L — SIGNIFICANT CHANGE UP (ref 10–45)
ALT FLD-CCNC: 22 U/L — SIGNIFICANT CHANGE UP (ref 10–45)
ANION GAP SERPL CALC-SCNC: 14 MMOL/L — SIGNIFICANT CHANGE UP (ref 5–17)
ANION GAP SERPL CALC-SCNC: 14 MMOL/L — SIGNIFICANT CHANGE UP (ref 5–17)
APTT BLD: 26 SEC — SIGNIFICANT CHANGE UP (ref 24.5–35.6)
APTT BLD: 26 SEC — SIGNIFICANT CHANGE UP (ref 24.5–35.6)
AST SERPL-CCNC: 17 U/L — SIGNIFICANT CHANGE UP (ref 10–40)
AST SERPL-CCNC: 17 U/L — SIGNIFICANT CHANGE UP (ref 10–40)
BILIRUB SERPL-MCNC: 0.4 MG/DL — SIGNIFICANT CHANGE UP (ref 0.2–1.2)
BILIRUB SERPL-MCNC: 0.4 MG/DL — SIGNIFICANT CHANGE UP (ref 0.2–1.2)
BUN SERPL-MCNC: 13 MG/DL — SIGNIFICANT CHANGE UP (ref 7–23)
BUN SERPL-MCNC: 13 MG/DL — SIGNIFICANT CHANGE UP (ref 7–23)
CALCIUM SERPL-MCNC: 8.7 MG/DL — SIGNIFICANT CHANGE UP (ref 8.4–10.5)
CALCIUM SERPL-MCNC: 8.7 MG/DL — SIGNIFICANT CHANGE UP (ref 8.4–10.5)
CHLORIDE SERPL-SCNC: 104 MMOL/L — SIGNIFICANT CHANGE UP (ref 96–108)
CHLORIDE SERPL-SCNC: 104 MMOL/L — SIGNIFICANT CHANGE UP (ref 96–108)
CO2 SERPL-SCNC: 21 MMOL/L — LOW (ref 22–31)
CO2 SERPL-SCNC: 21 MMOL/L — LOW (ref 22–31)
CREAT SERPL-MCNC: 0.79 MG/DL — SIGNIFICANT CHANGE UP (ref 0.5–1.3)
CREAT SERPL-MCNC: 0.79 MG/DL — SIGNIFICANT CHANGE UP (ref 0.5–1.3)
EGFR: 81 ML/MIN/1.73M2 — SIGNIFICANT CHANGE UP
EGFR: 81 ML/MIN/1.73M2 — SIGNIFICANT CHANGE UP
GLUCOSE BLDC GLUCOMTR-MCNC: 113 MG/DL — HIGH (ref 70–99)
GLUCOSE BLDC GLUCOMTR-MCNC: 113 MG/DL — HIGH (ref 70–99)
GLUCOSE BLDC GLUCOMTR-MCNC: 119 MG/DL — HIGH (ref 70–99)
GLUCOSE BLDC GLUCOMTR-MCNC: 119 MG/DL — HIGH (ref 70–99)
GLUCOSE BLDC GLUCOMTR-MCNC: 133 MG/DL — HIGH (ref 70–99)
GLUCOSE BLDC GLUCOMTR-MCNC: 133 MG/DL — HIGH (ref 70–99)
GLUCOSE BLDC GLUCOMTR-MCNC: 140 MG/DL — HIGH (ref 70–99)
GLUCOSE BLDC GLUCOMTR-MCNC: 140 MG/DL — HIGH (ref 70–99)
GLUCOSE BLDC GLUCOMTR-MCNC: 158 MG/DL — HIGH (ref 70–99)
GLUCOSE BLDC GLUCOMTR-MCNC: 158 MG/DL — HIGH (ref 70–99)
GLUCOSE BLDC GLUCOMTR-MCNC: 245 MG/DL — HIGH (ref 70–99)
GLUCOSE BLDC GLUCOMTR-MCNC: 245 MG/DL — HIGH (ref 70–99)
GLUCOSE BLDC GLUCOMTR-MCNC: 254 MG/DL — HIGH (ref 70–99)
GLUCOSE BLDC GLUCOMTR-MCNC: 254 MG/DL — HIGH (ref 70–99)
GLUCOSE SERPL-MCNC: 131 MG/DL — HIGH (ref 70–99)
GLUCOSE SERPL-MCNC: 131 MG/DL — HIGH (ref 70–99)
HCT VFR BLD CALC: 32.4 % — LOW (ref 34.5–45)
HCT VFR BLD CALC: 32.4 % — LOW (ref 34.5–45)
HGB BLD-MCNC: 10.2 G/DL — LOW (ref 11.5–15.5)
HGB BLD-MCNC: 10.2 G/DL — LOW (ref 11.5–15.5)
INR BLD: 1.21 RATIO — HIGH (ref 0.85–1.18)
INR BLD: 1.21 RATIO — HIGH (ref 0.85–1.18)
MAGNESIUM SERPL-MCNC: 2.4 MG/DL — SIGNIFICANT CHANGE UP (ref 1.6–2.6)
MAGNESIUM SERPL-MCNC: 2.4 MG/DL — SIGNIFICANT CHANGE UP (ref 1.6–2.6)
MCHC RBC-ENTMCNC: 29.1 PG — SIGNIFICANT CHANGE UP (ref 27–34)
MCHC RBC-ENTMCNC: 29.1 PG — SIGNIFICANT CHANGE UP (ref 27–34)
MCHC RBC-ENTMCNC: 31.5 GM/DL — LOW (ref 32–36)
MCHC RBC-ENTMCNC: 31.5 GM/DL — LOW (ref 32–36)
MCV RBC AUTO: 92.6 FL — SIGNIFICANT CHANGE UP (ref 80–100)
MCV RBC AUTO: 92.6 FL — SIGNIFICANT CHANGE UP (ref 80–100)
MYCOPHENOLATE SERPL-MCNC: 2.2 UG/ML — SIGNIFICANT CHANGE UP (ref 1–3.5)
MYCOPHENOLATE SERPL-MCNC: 2.2 UG/ML — SIGNIFICANT CHANGE UP (ref 1–3.5)
MYCOPHENOLIC ACID GLUCURONIDE: 40 UG/ML — SIGNIFICANT CHANGE UP (ref 15–125)
MYCOPHENOLIC ACID GLUCURONIDE: 40 UG/ML — SIGNIFICANT CHANGE UP (ref 15–125)
NRBC # BLD: 0 /100 WBCS — SIGNIFICANT CHANGE UP (ref 0–0)
NRBC # BLD: 0 /100 WBCS — SIGNIFICANT CHANGE UP (ref 0–0)
PHOSPHATE SERPL-MCNC: 2.5 MG/DL — SIGNIFICANT CHANGE UP (ref 2.5–4.5)
PHOSPHATE SERPL-MCNC: 2.5 MG/DL — SIGNIFICANT CHANGE UP (ref 2.5–4.5)
PLATELET # BLD AUTO: 209 K/UL — SIGNIFICANT CHANGE UP (ref 150–400)
PLATELET # BLD AUTO: 209 K/UL — SIGNIFICANT CHANGE UP (ref 150–400)
POTASSIUM SERPL-MCNC: 3.4 MMOL/L — LOW (ref 3.5–5.3)
POTASSIUM SERPL-MCNC: 3.4 MMOL/L — LOW (ref 3.5–5.3)
POTASSIUM SERPL-SCNC: 3.4 MMOL/L — LOW (ref 3.5–5.3)
POTASSIUM SERPL-SCNC: 3.4 MMOL/L — LOW (ref 3.5–5.3)
PROT SERPL-MCNC: 6.1 G/DL — SIGNIFICANT CHANGE UP (ref 6–8.3)
PROT SERPL-MCNC: 6.1 G/DL — SIGNIFICANT CHANGE UP (ref 6–8.3)
PROTHROM AB SERPL-ACNC: 12.6 SEC — SIGNIFICANT CHANGE UP (ref 9.5–13)
PROTHROM AB SERPL-ACNC: 12.6 SEC — SIGNIFICANT CHANGE UP (ref 9.5–13)
RBC # BLD: 3.5 M/UL — LOW (ref 3.8–5.2)
RBC # BLD: 3.5 M/UL — LOW (ref 3.8–5.2)
RBC # FLD: 15.3 % — HIGH (ref 10.3–14.5)
RBC # FLD: 15.3 % — HIGH (ref 10.3–14.5)
SARS-COV-2 RNA SPEC QL NAA+PROBE: SIGNIFICANT CHANGE UP
SODIUM SERPL-SCNC: 139 MMOL/L — SIGNIFICANT CHANGE UP (ref 135–145)
SODIUM SERPL-SCNC: 139 MMOL/L — SIGNIFICANT CHANGE UP (ref 135–145)
TACROLIMUS SERPL-MCNC: 5.4 NG/ML — SIGNIFICANT CHANGE UP
TACROLIMUS SERPL-MCNC: 5.4 NG/ML — SIGNIFICANT CHANGE UP
WBC # BLD: 7.38 K/UL — SIGNIFICANT CHANGE UP (ref 3.8–10.5)
WBC # BLD: 7.38 K/UL — SIGNIFICANT CHANGE UP (ref 3.8–10.5)
WBC # FLD AUTO: 7.38 K/UL — SIGNIFICANT CHANGE UP (ref 3.8–10.5)
WBC # FLD AUTO: 7.38 K/UL — SIGNIFICANT CHANGE UP (ref 3.8–10.5)

## 2023-11-03 PROCEDURE — 99232 SBSQ HOSP IP/OBS MODERATE 35: CPT | Mod: GC

## 2023-11-03 PROCEDURE — ZZZZZ: CPT

## 2023-11-03 PROCEDURE — 99233 SBSQ HOSP IP/OBS HIGH 50: CPT

## 2023-11-03 RX ORDER — SODIUM,POTASSIUM PHOSPHATES 278-250MG
2 POWDER IN PACKET (EA) ORAL ONCE
Refills: 0 | Status: COMPLETED | OUTPATIENT
Start: 2023-11-03 | End: 2023-11-03

## 2023-11-03 RX ORDER — DIPHENHYDRAMINE HCL 50 MG
25 CAPSULE ORAL ONCE
Refills: 0 | Status: COMPLETED | OUTPATIENT
Start: 2023-11-03 | End: 2023-11-03

## 2023-11-03 RX ORDER — POTASSIUM CHLORIDE 20 MEQ
40 PACKET (EA) ORAL EVERY 4 HOURS
Refills: 0 | Status: COMPLETED | OUTPATIENT
Start: 2023-11-03 | End: 2023-11-03

## 2023-11-03 RX ADMIN — Medication 8 UNIT(S): at 17:01

## 2023-11-03 RX ADMIN — Medication 2 PACKET(S): at 08:33

## 2023-11-03 RX ADMIN — Medication 6: at 01:50

## 2023-11-03 RX ADMIN — Medication 8 UNIT(S): at 12:27

## 2023-11-03 RX ADMIN — TACROLIMUS 1.5 MILLIGRAM(S): 5 CAPSULE ORAL at 08:33

## 2023-11-03 RX ADMIN — CHLORHEXIDINE GLUCONATE 1 APPLICATION(S): 213 SOLUTION TOPICAL at 06:42

## 2023-11-03 RX ADMIN — Medication 25 MILLIGRAM(S): at 00:49

## 2023-11-03 RX ADMIN — Medication 25 MILLIGRAM(S): at 06:10

## 2023-11-03 RX ADMIN — Medication 25 MILLIGRAM(S): at 17:01

## 2023-11-03 RX ADMIN — Medication 8 UNIT(S): at 08:32

## 2023-11-03 RX ADMIN — Medication 40 MILLIEQUIVALENT(S): at 13:36

## 2023-11-03 RX ADMIN — Medication 4: at 12:27

## 2023-11-03 RX ADMIN — PIPERACILLIN AND TAZOBACTAM 25 GRAM(S): 4; .5 INJECTION, POWDER, LYOPHILIZED, FOR SOLUTION INTRAVENOUS at 15:26

## 2023-11-03 RX ADMIN — Medication 30 MILLIGRAM(S): at 06:10

## 2023-11-03 RX ADMIN — PIPERACILLIN AND TAZOBACTAM 25 GRAM(S): 4; .5 INJECTION, POWDER, LYOPHILIZED, FOR SOLUTION INTRAVENOUS at 08:34

## 2023-11-03 RX ADMIN — Medication 40 MILLIEQUIVALENT(S): at 11:04

## 2023-11-03 RX ADMIN — Medication 5 MILLIGRAM(S): at 06:10

## 2023-11-03 RX ADMIN — FAMOTIDINE 20 MILLIGRAM(S): 10 INJECTION INTRAVENOUS at 11:04

## 2023-11-03 RX ADMIN — INSULIN GLARGINE 24 UNIT(S): 100 INJECTION, SOLUTION SUBCUTANEOUS at 20:53

## 2023-11-03 RX ADMIN — ENOXAPARIN SODIUM 40 MILLIGRAM(S): 100 INJECTION SUBCUTANEOUS at 13:37

## 2023-11-03 NOTE — PROGRESS NOTE ADULT - SUBJECTIVE AND OBJECTIVE BOX
[facility] DIVISION OF KIDNEY DISEASES AND HYPERTENSION   FOLLOW UP NOTE    --------------------------------------------------------------------------------    SUBJECTIVE / ROS / INTERVAL EVENTS:  -Patient seen and examined at bedside.  -Urine cultures + for gram negative rods  -stopped insulin gtt to long acting  -stable, on room air. Transfer to floor today      PAST HISTORY  --------------------------------------------------------------------------------  No significant changes to PMH, PSH, FHx, SHx, unless otherwise noted    ALLERGIES & MEDICATIONS  --------------------------------------------------------------------------------  Allergies    Bactrim (Pruritus)    Intolerances      Standing Inpatient Medications  chlorhexidine 2% Cloths 1 Application(s) Topical <User Schedule>  enoxaparin Injectable 40 milliGRAM(s) SubCutaneous every 24 hours  famotidine    Tablet 20 milliGRAM(s) Oral daily  influenza  Vaccine (HIGH DOSE) 0.7 milliLiter(s) IntraMuscular once  insulin glargine Injectable (LANTUS) 24 Unit(s) SubCutaneous <User Schedule>  insulin lispro (ADMELOG) corrective regimen sliding scale   SubCutaneous every 4 hours  insulin lispro Injectable (ADMELOG) 8 Unit(s) SubCutaneous three times a day before meals  metoprolol tartrate 25 milliGRAM(s) Oral two times a day  NIFEdipine XL 30 milliGRAM(s) Oral every 24 hours  piperacillin/tazobactam IVPB.. 3.375 Gram(s) IV Intermittent every 8 hours  polyethylene glycol 3350 17 Gram(s) Oral every 24 hours  potassium chloride    Tablet ER 40 milliEquivalent(s) Oral every 4 hours  predniSONE   Tablet 5 milliGRAM(s) Oral daily  senna 2 Tablet(s) Oral at bedtime  tacrolimus ER Tablet (ENVARSUS XR) 1.5 milliGRAM(s) Oral <User Schedule>    PRN Inpatient Medications  acetaminophen     Tablet .. 650 milliGRAM(s) Oral every 6 hours PRN      VITALS  --------------------------------------------------------------------------------  T(C): 36.5 (11-03-23 @ 07:00), Max: 36.7 (11-02-23 @ 15:00)  HR: 60 (11-03-23 @ 08:00) (60 - 96)  BP: 122/67 (11-03-23 @ 08:00) (117/58 - 154/67)  RR: 25 (11-03-23 @ 08:00) (19 - 35)  SpO2: 99% (11-03-23 @ 08:00) (94% - 100%)  Wt(kg): --  Height (cm): 167.6 (11-01-23 @ 20:17)  Weight (kg): 62.6 (11-01-23 @ 20:17)  BMI (kg/m2): 22.3 (11-01-23 @ 20:17)  BSA (m2): 1.71 (11-01-23 @ 20:17)    11-02-23 @ 07:01  -  11-03-23 @ 07:00  --------------------------------------------------------  IN: 1287 mL / OUT: 1525 mL / NET: -238 mL    11-03-23 @ 07:01  -  11-03-23 @ 10:43  --------------------------------------------------------  IN: 0 mL / OUT: 450 mL / NET: -450 mL      PHYSICAL EXAM:  General: no acute distress  Neuro: no focal deficits  HEENT: anicteric, no JVD  Pulmonary: lungs CTA B/L  Cardiovascular/Chest: +S1S2  GI/Abdomen: soft, +bowel sounds. Transplant site non-tender  Extremities: no LE pitting edema  Skin: Warm and dry    LABS/STUDIES  --------------------------------------------------------------------------------              10.2   7.38  >-----------<  209      [11-03-23 @ 06:15]              32.4     139  |  104  |  13  ----------------------------<  131      [11-03-23 @ 06:15]  3.4   |  21  |  0.79        Ca     8.7     [11-03-23 @ 06:15]      Mg     2.4     [11-03-23 @ 06:15]      Phos  2.5     [11-03-23 @ 06:15]    TPro  6.1  /  Alb  3.3  /  TBili  0.4  /  DBili  x   /  AST  17  /  ALT  22  /  AlkPhos  74  [11-03-23 @ 06:15]    PT/INR: PT 12.6 , INR 1.21       [11-03-23 @ 06:15]  PTT: 26.0       [11-03-23 @ 06:15]      Creatinine Trend:  SCr 0.79 [11-03 @ 06:15]  SCr 0.80 [11-02 @ 03:07]  SCr 0.94 [11-01 @ 21:16]    Urinalysis - [11-03-23 @ 06:15]      Color  / Appearance  / SG  / pH       Gluc 131 / Ketone   / Bili  / Urobili        Blood  / Protein  / Leuk Est  / Nitrite       RBC  / WBC  / Hyaline  / Gran  / Sq Epi  / Non Sq Epi  / Bacteria           TacrolimusTacrolimus (), Serum: 5.4 ng/mL (11-03 @ 06:15)  Tacrolimus (), Serum: 7.4 ng/mL (11-02 @ 00:09)

## 2023-11-03 NOTE — PROGRESS NOTE ADULT - SUBJECTIVE AND OBJECTIVE BOX
Transplant Surgery - Multidisciplinary Progress Note  --------------------------------------------------------------  DDRT 3/28/23    Present:   Patient seen and examined with multidisciplinary Transplant team including  Surgeon: Dr. Bourgeois, Dr. Montenegro, Dr. Franklin, PGY3.  Nephrologist: Dr. Vinod Tobin,   Pharmacist: LORENA Pinto, SICU team and unit RN during am rounds.  Disciplines not in attendance will be notified of the plan.     68F with Hx of Alport Syndrome and CKD 5  (x 15 years), LUE AVF placed in 2017, recently started HD on 3/17/2023 (Tanja, Nephrologist Dr. Jet Horn ) now s/p R  DDRT under Simulect induction on 3/28/23 presents from outpatient clinic for new onset hyperglycemia    Interval Events:   Afebrile, VSS  found to be COVID+, comfortable on RA  asymptomatic hyperglycemia, off insulin GTT  good graft function    Immunosuppression:  Envarsus 1.5 per level , MMF HELD, Pred 5  Ongoing monitoring for signs of rejection      Potential Discharge date: TBD, begin dc planning when gets to floor    Education:  Medications    Plan of care:  See Below      MEDICATIONS  (STANDING):  chlorhexidine 2% Cloths 1 Application(s) Topical <User Schedule>  enoxaparin Injectable 40 milliGRAM(s) SubCutaneous every 24 hours  famotidine    Tablet 20 milliGRAM(s) Oral daily  influenza  Vaccine (HIGH DOSE) 0.7 milliLiter(s) IntraMuscular once  insulin glargine Injectable (LANTUS) 24 Unit(s) SubCutaneous <User Schedule>  insulin lispro (ADMELOG) corrective regimen sliding scale   SubCutaneous every 4 hours  insulin lispro Injectable (ADMELOG) 8 Unit(s) SubCutaneous three times a day before meals  metoprolol tartrate 25 milliGRAM(s) Oral two times a day  NIFEdipine XL 30 milliGRAM(s) Oral every 24 hours  piperacillin/tazobactam IVPB.. 3.375 Gram(s) IV Intermittent every 8 hours  polyethylene glycol 3350 17 Gram(s) Oral every 24 hours  predniSONE   Tablet 5 milliGRAM(s) Oral daily  senna 2 Tablet(s) Oral at bedtime  tacrolimus ER Tablet (ENVARSUS XR) 1.5 milliGRAM(s) Oral <User Schedule>    MEDICATIONS  (PRN):  acetaminophen     Tablet .. 650 milliGRAM(s) Oral every 6 hours PRN Mild Pain (1 - 3)      PAST MEDICAL & SURGICAL HISTORY:  Alport's syndrome  HTN (hypertension)  Hard of hearing  uses hearing aids  Anemia  ESRD with anemia  left AV fistula, not in use  Deep vein thrombosis (DVT) of right upper extremity  S/P tonsillectomy  A-V fistula  Nov 2017  Renal transplant recipient      Vital Signs Last 24 Hrs  T(C): 36.6 (03 Nov 2023 12:00), Max: 36.7 (02 Nov 2023 15:00)  T(F): 97.8 (03 Nov 2023 12:00), Max: 98.1 (02 Nov 2023 19:00)  HR: 69 (03 Nov 2023 12:00) (60 - 96)  BP: 161/70 (03 Nov 2023 12:00) (117/58 - 161/70)  BP(mean): 101 (03 Nov 2023 12:00) (83 - 111)  RR: 22 (03 Nov 2023 12:00) (19 - 35)  SpO2: 99% (03 Nov 2023 12:00) (94% - 100%)    Parameters below as of 03 Nov 2023 07:00  Patient On (Oxygen Delivery Method): room air      I&O's Summary  02 Nov 2023 07:01  -  03 Nov 2023 07:00  --------------------------------------------------------  IN: 1287 mL / OUT: 1525 mL / NET: -238 mL    03 Nov 2023 07:01  -  03 Nov 2023 14:09  --------------------------------------------------------  IN: 100 mL / OUT: 675 mL / NET: -575 mL                          10.2   7.38  )-----------( 209      ( 03 Nov 2023 06:15 )             32.4     11-03  139  |  104  |  13  ----------------------------<  131<H>  3.4<L>   |  21<L>  |  0.79    Ca    8.7      03 Nov 2023 06:15  Phos  2.5     11-03  Mg     2.4     11-03    TPro  6.1  /  Alb  3.3  /  TBili  0.4  /  DBili  x   /  AST  17  /  ALT  22  /  AlkPhos  74  11-03    Tacrolimus (), Serum: 5.4 ng/mL (11-03 @ 06:15)      Culture - Urine (collected 11-01-23 @ 21:15)  Source: Clean Catch Clean Catch (Midstream)  Preliminary Report (11-02-23 @ 23:40):    >100,000 CFU/ml Gram Negative Rods    Culture - Blood (collected 11-01-23 @ 11:40)  Source: .Blood Blood-Venous  Preliminary Report (11-03-23 @ 03:01):    No growth at 24 hours    Culture - Blood (collected 11-01-23 @ 11:37)  Source: .Blood Blood-Peripheral  Preliminary Report (11-03-23 @ 03:01):    No growth at 24 hours      Review of systems  Gen: No weight changes, fatigue, fevers/chills, weakness  Skin: No rashes  Head/Eyes/Ears/Mouth: No headache; Normal hearing; Normal vision w/o blurriness; No sinus pain/discomfort, sore throat  Respiratory: No dyspnea, cough, wheezing, hemoptysis  CV: No chest pain, PND, orthopnea  GI: Mild abdominal pain at surgical incision site; denies diarrhea, constipation, nausea, vomiting, melena, hematochezia  : No increased frequency, dysuria, hematuria, nocturia  MSK: No joint pain/swelling; no back pain; no edema  Neuro: No dizziness/lightheadedness, weakness, seizures, numbness, tingling  Heme: No easy bruising or bleeding  Endo: No heat/cold intolerance  Psych: No significant nervousness, anxiety, stress, depression  All other systems were reviewed and are negative, except as noted.      PHYSICAL EXAM:  Constitutional: Well developed / well nourished  Eyes: Anicteric, PERRLA  ENMT: nc/at  Neck: supple  Respiratory: CTA B/L  Cardiovascular: RRR  Gastrointestinal: Soft, ND/NT, well healed incisional scar  Genitourinary: Voiding spontaneously  Extremities: SCD's in place and working bilaterally  Vascular: Palpable dp pulses bilaterally  Neurological: A&O x3  Skin: no rashes, ulcerations or lesions;  Musculoskeletal: Moving all extremities  Psychiatric: Responsive

## 2023-11-03 NOTE — PROGRESS NOTE ADULT - SUBJECTIVE AND OBJECTIVE BOX
SICU Daily Progress Note  =====================================================  Interval/Overnight Events:      - Urine cultures + for gram negative rods  - stopped insulin gtt to long acting      68F with Hx of Alport Syndrome and CKD5 s/p R  DDRT under Simulect induction on 3/28/23 presents from outpatient clinic after she was found to have elevated blood glucose to 600's and was informed to go to the ER. Denies any hx diabetes or dietary changes. Brought to SICU for insulin gtt and close monitoring.     PAST MEDICAL & SURGICAL HISTORY:  Alport's syndrome  HTN (hypertension)  Hard of hearing uses hearing aids  Anemia  ESRD with anemia left AV fistula, not in use  Deep vein thrombosis (DVT) of right upper extremity  Deep vein thrombosis (DVT) of right upper extremity  S/P tonsillectomy  A-V fistula Nov 2017  Renal transplant recipient      ALLERGIES:  Bactrim (Pruritus)      --------------------------------------------------------------------------------------    MEDICATIONS:    Neurologic Medications  acetaminophen     Tablet .. 650 milliGRAM(s) Oral every 6 hours PRN Mild Pain (1 - 3)    Respiratory Medications    Cardiovascular Medications  metoprolol tartrate 25 milliGRAM(s) Oral two times a day  NIFEdipine XL 30 milliGRAM(s) Oral every 24 hours    Gastrointestinal Medications  famotidine    Tablet 20 milliGRAM(s) Oral daily  polyethylene glycol 3350 17 Gram(s) Oral every 24 hours  senna 2 Tablet(s) Oral at bedtime    Genitourinary Medications    Hematologic/Oncologic Medications  enoxaparin Injectable 40 milliGRAM(s) SubCutaneous every 24 hours  influenza  Vaccine (HIGH DOSE) 0.7 milliLiter(s) IntraMuscular once  tacrolimus ER Tablet (ENVARSUS XR) 1.5 milliGRAM(s) Oral <User Schedule>    Antimicrobial/Immunologic Medications  piperacillin/tazobactam IVPB.. 3.375 Gram(s) IV Intermittent every 8 hours    Endocrine/Metabolic Medications  insulin glargine Injectable (LANTUS) 24 Unit(s) SubCutaneous <User Schedule>  insulin lispro (ADMELOG) corrective regimen sliding scale   SubCutaneous every 4 hours  insulin lispro Injectable (ADMELOG) 8 Unit(s) SubCutaneous three times a day before meals  predniSONE   Tablet 5 milliGRAM(s) Oral daily    Topical/Other Medications  chlorhexidine 2% Cloths 1 Application(s) Topical <User Schedule>    --------------------------------------------------------------------------------------    ICU Vital Signs Last 24 Hrs  T(C): 36.7 (02 Nov 2023 23:00), Max: 36.8 (02 Nov 2023 00:37)  T(F): 98.1 (02 Nov 2023 23:00), Max: 98.2 (02 Nov 2023 00:37)  HR: 96 (02 Nov 2023 23:00) (63 - 96)  BP: 152/95 (02 Nov 2023 23:00) (117/56 - 166/67)  BP(mean): 111 (02 Nov 2023 23:00) (81 - 111)  ABP: --  ABP(mean): --  RR: 24 (02 Nov 2023 23:00) (16 - 35)  SpO2: 100% (02 Nov 2023 23:00) (95% - 100%)    O2 Parameters below as of 02 Nov 2023 23:00  Patient On (Oxygen Delivery Method): room air      --------------------------------------------------------------------------------------    I&O's Summary    01 Nov 2023 07:01  -  02 Nov 2023 07:00  --------------------------------------------------------  IN: 405.5 mL / OUT: 675 mL / NET: -269.5 mL    02 Nov 2023 07:01  -  03 Nov 2023 00:08  --------------------------------------------------------  IN: 1022 mL / OUT: 1025 mL / NET: -3 mL      --------------------------------------------------------------------------------------    EXAM    GENERAL: Not in acute distress, non-toxic appearing  HEAD: normocephalic, atraumatic  HEENT:  normal conjunctiva, oral mucosa moist, neck supple  CARDIAC: appears well perfused  PULM: normal work of breathing  GI: abdomen nondistended  NEURO: alert and oriented x 3, normal speech, no gross neurologic deficit  MSK: No visible deformities, no peripheral edema,   SKIN: No visible rashes, dry, well-perfused  PSYCH: appropriate mood and affect      --------------------------------------------------------------------------------------      LABS:  cret                        10.7   9.91  )-----------( 230      ( 02 Nov 2023 03:07 )             32.9     11-02    132<L>  |  97  |  20  ----------------------------<  425<H>  4.0   |  19<L>  |  0.80    Ca    9.2      02 Nov 2023 03:07  Phos  2.5     11-02  Mg     1.7     11-02    TPro  6.7  /  Alb  3.6  /  TBili  0.4  /  DBili  x   /  AST  16  /  ALT  29  /  AlkPhos  89  11-02    PT/INR - ( 02 Nov 2023 03:07 )   PT: 13.2 sec;   INR: 1.21 ratio         PTT - ( 02 Nov 2023 03:07 )  PTT:24.8 sec  --------------------------------------------------------------------------------------

## 2023-11-03 NOTE — PROGRESS NOTE ADULT - SUBJECTIVE AND OBJECTIVE BOX
Follow Up:      Interval History/ROS: Patient overnight had episodes of tachypnea but patient remains afebrile. Patient still admits to occasional fatigue and SOB on exertion but overall feels well. She is concerned about possibly having to go home on insulin.    REVIEW OF SYSTEMS  Constitutional: No fevers, chills, weight loss. Positive fatigue   Skin: No rash, no phlebitis	  Eyes: No discharge	  ENMT: No sore throat, oral thrush, ulcers or exudate  Respiratory: No cough, positive SOB on exertion  Cardiovascular:  No chest pain, palpitations or edema   Gastrointestinal: No pain, nausea, vomiting, diarrhea or constipation	  Genitourinary: No dysuria, discharge or flank pain  MSK: No arthralgias or back pain   Neurological: No HA, no weakness, no seizures, no AMS       Allergies  Bactrim (Pruritus)    ANTIMICROBIALS:    piperacillin/tazobactam IVPB.. 3.375 every 8 hours      OTHER MEDS: MEDICATIONS  (STANDING):  acetaminophen     Tablet .. 650 every 6 hours PRN  enoxaparin Injectable 40 every 24 hours  famotidine    Tablet 20 daily  influenza  Vaccine (HIGH DOSE) 0.7 once  insulin glargine Injectable (LANTUS) 24 <User Schedule>  insulin lispro (ADMELOG) corrective regimen sliding scale  every 4 hours  insulin lispro Injectable (ADMELOG) 8 three times a day before meals  metoprolol tartrate 25 two times a day  NIFEdipine XL 30 every 24 hours  polyethylene glycol 3350 17 every 24 hours  predniSONE   Tablet 5 daily  senna 2 at bedtime  tacrolimus ER Tablet (ENVARSUS XR) 1.5 <User Schedule>      Vital Signs Last 24 Hrs  T(F): 97.7 (11-03-23 @ 07:00), Max: 98.3 (11-01-23 @ 20:17)    Vital Signs Last 24 Hrs  HR: 60 (11-03-23 @ 08:00) (60 - 96)  BP: 122/67 (11-03-23 @ 08:00) (117/58 - 154/67)  RR: 25 (11-03-23 @ 08:00)  SpO2: 99% (11-03-23 @ 08:00) (94% - 100%)  Wt(kg): --    EXAM:  General: Patient appears comfortable, no acute distress  HEENT: NCAT, PERRL, anicteric sclera, dentures present  Neck: Supple, No lymphadenopathy  CV: +S1/S2, RRR, no M/R/G  Lungs: No respiratory distress, CTA b/l, no wheezing, rales or rhonchi  Abd:  BS4+, Soft, NTND, no guarding  : No suprapubic tenderness  Neuro: AAOx3. No focal deficits noted.   Ext: No cyanosis, no edema, 2+ pulses in upper and lower extremities   Msk: freely moving upper and lower extremities  Skin: No rash, no phlebitis, erythema or edema       Labs:                        10.2   7.38  )-----------( 209      ( 03 Nov 2023 06:15 )             32.4     11-03    139  |  104  |  13  ----------------------------<  131<H>  3.4<L>   |  21<L>  |  0.79    Ca    8.7      03 Nov 2023 06:15  Phos  2.5     11-03  Mg     2.4     11-03    TPro  6.1  /  Alb  3.3  /  TBili  0.4  /  DBili  x   /  AST  17  /  ALT  22  /  AlkPhos  74  11-03      WBC Trend:  WBC Count: 7.38 (11-03-23 @ 06:15)  WBC Count: 9.91 (11-02-23 @ 03:07)  WBC Count: 13.09 (11-01-23 @ 21:16)      Creatine Trend:  Creatinine: 0.79 (11-03)  Creatinine: 0.80 (11-02)  Creatinine: 0.94 (11-01)      Liver Biochemical Testing Trend:  Alanine Aminotransferase (ALT/SGPT): 22 (11-03)  Alanine Aminotransferase (ALT/SGPT): 29 (11-02)  Alanine Aminotransferase (ALT/SGPT): 34 (11-01)  Alanine Aminotransferase (ALT/SGPT): 45 (04-06)  Alanine Aminotransferase (ALT/SGPT): 45 (04-05)  Aspartate Aminotransferase (AST/SGOT): 17 (11-03-23 @ 06:15)  Aspartate Aminotransferase (AST/SGOT): 16 (11-02-23 @ 03:07)  Aspartate Aminotransferase (AST/SGOT): 19 (11-01-23 @ 21:16)  Aspartate Aminotransferase (AST/SGOT): 22 (04-06-23 @ 06:33)  Aspartate Aminotransferase (AST/SGOT): 29 (04-05-23 @ 06:12)  Bilirubin Total: 0.4 (11-03)  Bilirubin Total: 0.4 (11-02)  Bilirubin Total: 0.4 (11-01)  Bilirubin Total, Serum: 0.6 (04-06)  Bilirubin Total, Serum: 0.6 (04-05)      Trend LDH      Urinalysis Basic - ( 03 Nov 2023 06:15 )    Color: x / Appearance: x / SG: x / pH: x  Gluc: 131 mg/dL / Ketone: x  / Bili: x / Urobili: x   Blood: x / Protein: x / Nitrite: x   Leuk Esterase: x / RBC: x / WBC x   Sq Epi: x / Non Sq Epi: x / Bacteria: x        MICROBIOLOGY:    MRSA PCR Result.: NotDetec (11-02-23 @ 14:34)      Culture - Urine (collected 01 Nov 2023 21:15)  Source: Clean Catch Clean Catch (Midstream)  Preliminary Report:    >100,000 CFU/ml Gram Negative Rods    Culture - Blood (collected 01 Nov 2023 11:40)  Source: .Blood Blood-Venous  Preliminary Report:    No growth at 24 hours    Culture - Blood (collected 01 Nov 2023 11:37)  Source: .Blood Blood-Peripheral  Preliminary Report:    No growth at 24 hours    Culture - Blood (collected 29 Mar 2023 12:40)  Source: .Blood Blood-Peripheral  Final Report:    No Growth Final    Culture - Blood (collected 29 Mar 2023 12:00)  Source: .Blood Blood-Peripheral  Final Report:    No Growth Final      HIV-1/2 Combo Result: Nonreact (03-28-23 @ 14:10)    Rapid RVP Result: Detected (11-02 @ 00:07)    COVID-19 PCR: NotDetec (11-03-23 @ 06:17)      Procalcitonin, Serum: 0.19 (11-02)    Troponin T, High Sensitivity Result: 13 (11-02)    Blood Gas Venous - Lactate: 1.9 (11-02 @ 03:00)  Blood Gas Venous - Lactate: 2.7 (11-02 @ 00:13)  Blood Gas Venous - Lactate: 4.6 (11-01 @ 21:15)      RADIOLOGY:  < from: Xray Chest 1 View AP/PA (11.01.23 @ 21:46) >  IMPRESSION:    No focal consolidation.

## 2023-11-04 LAB
-  AMIKACIN: SIGNIFICANT CHANGE UP
-  AMIKACIN: SIGNIFICANT CHANGE UP
-  AMOXICILLIN/CLAVULANIC ACID: SIGNIFICANT CHANGE UP
-  AMOXICILLIN/CLAVULANIC ACID: SIGNIFICANT CHANGE UP
-  AMPICILLIN/SULBACTAM: SIGNIFICANT CHANGE UP
-  AMPICILLIN/SULBACTAM: SIGNIFICANT CHANGE UP
-  AMPICILLIN: SIGNIFICANT CHANGE UP
-  AMPICILLIN: SIGNIFICANT CHANGE UP
-  AZTREONAM: SIGNIFICANT CHANGE UP
-  AZTREONAM: SIGNIFICANT CHANGE UP
-  CEFAZOLIN: SIGNIFICANT CHANGE UP
-  CEFAZOLIN: SIGNIFICANT CHANGE UP
-  CEFEPIME: SIGNIFICANT CHANGE UP
-  CEFEPIME: SIGNIFICANT CHANGE UP
-  CEFOXITIN: SIGNIFICANT CHANGE UP
-  CEFOXITIN: SIGNIFICANT CHANGE UP
-  CEFTRIAXONE: SIGNIFICANT CHANGE UP
-  CEFTRIAXONE: SIGNIFICANT CHANGE UP
-  CIPROFLOXACIN: SIGNIFICANT CHANGE UP
-  CIPROFLOXACIN: SIGNIFICANT CHANGE UP
-  ERTAPENEM: SIGNIFICANT CHANGE UP
-  ERTAPENEM: SIGNIFICANT CHANGE UP
-  GENTAMICIN: SIGNIFICANT CHANGE UP
-  GENTAMICIN: SIGNIFICANT CHANGE UP
-  IMIPENEM: SIGNIFICANT CHANGE UP
-  IMIPENEM: SIGNIFICANT CHANGE UP
-  LEVOFLOXACIN: SIGNIFICANT CHANGE UP
-  LEVOFLOXACIN: SIGNIFICANT CHANGE UP
-  MEROPENEM: SIGNIFICANT CHANGE UP
-  MEROPENEM: SIGNIFICANT CHANGE UP
-  NITROFURANTOIN: SIGNIFICANT CHANGE UP
-  NITROFURANTOIN: SIGNIFICANT CHANGE UP
-  PIPERACILLIN/TAZOBACTAM: SIGNIFICANT CHANGE UP
-  PIPERACILLIN/TAZOBACTAM: SIGNIFICANT CHANGE UP
-  TOBRAMYCIN: SIGNIFICANT CHANGE UP
-  TOBRAMYCIN: SIGNIFICANT CHANGE UP
-  TRIMETHOPRIM/SULFAMETHOXAZOLE: SIGNIFICANT CHANGE UP
-  TRIMETHOPRIM/SULFAMETHOXAZOLE: SIGNIFICANT CHANGE UP
A1C WITH ESTIMATED AVERAGE GLUCOSE RESULT: 10.1 % — HIGH (ref 4–5.6)
A1C WITH ESTIMATED AVERAGE GLUCOSE RESULT: 10.1 % — HIGH (ref 4–5.6)
ALBUMIN SERPL ELPH-MCNC: 3.3 G/DL — SIGNIFICANT CHANGE UP (ref 3.3–5)
ALBUMIN SERPL ELPH-MCNC: 3.3 G/DL — SIGNIFICANT CHANGE UP (ref 3.3–5)
ALP SERPL-CCNC: 66 U/L — SIGNIFICANT CHANGE UP (ref 40–120)
ALP SERPL-CCNC: 66 U/L — SIGNIFICANT CHANGE UP (ref 40–120)
ALT FLD-CCNC: 19 U/L — SIGNIFICANT CHANGE UP (ref 10–45)
ALT FLD-CCNC: 19 U/L — SIGNIFICANT CHANGE UP (ref 10–45)
ANION GAP SERPL CALC-SCNC: 13 MMOL/L — SIGNIFICANT CHANGE UP (ref 5–17)
ANION GAP SERPL CALC-SCNC: 13 MMOL/L — SIGNIFICANT CHANGE UP (ref 5–17)
AST SERPL-CCNC: 13 U/L — SIGNIFICANT CHANGE UP (ref 10–40)
AST SERPL-CCNC: 13 U/L — SIGNIFICANT CHANGE UP (ref 10–40)
BILIRUB SERPL-MCNC: 0.4 MG/DL — SIGNIFICANT CHANGE UP (ref 0.2–1.2)
BILIRUB SERPL-MCNC: 0.4 MG/DL — SIGNIFICANT CHANGE UP (ref 0.2–1.2)
BUN SERPL-MCNC: 11 MG/DL — SIGNIFICANT CHANGE UP (ref 7–23)
BUN SERPL-MCNC: 11 MG/DL — SIGNIFICANT CHANGE UP (ref 7–23)
CALCIUM SERPL-MCNC: 8.4 MG/DL — SIGNIFICANT CHANGE UP (ref 8.4–10.5)
CALCIUM SERPL-MCNC: 8.4 MG/DL — SIGNIFICANT CHANGE UP (ref 8.4–10.5)
CHLORIDE SERPL-SCNC: 105 MMOL/L — SIGNIFICANT CHANGE UP (ref 96–108)
CHLORIDE SERPL-SCNC: 105 MMOL/L — SIGNIFICANT CHANGE UP (ref 96–108)
CO2 SERPL-SCNC: 21 MMOL/L — LOW (ref 22–31)
CO2 SERPL-SCNC: 21 MMOL/L — LOW (ref 22–31)
CREAT SERPL-MCNC: 0.81 MG/DL — SIGNIFICANT CHANGE UP (ref 0.5–1.3)
CREAT SERPL-MCNC: 0.81 MG/DL — SIGNIFICANT CHANGE UP (ref 0.5–1.3)
CULTURE RESULTS: ABNORMAL
CULTURE RESULTS: ABNORMAL
EGFR: 79 ML/MIN/1.73M2 — SIGNIFICANT CHANGE UP
EGFR: 79 ML/MIN/1.73M2 — SIGNIFICANT CHANGE UP
ESTIMATED AVERAGE GLUCOSE: 243 MG/DL — HIGH (ref 68–114)
ESTIMATED AVERAGE GLUCOSE: 243 MG/DL — HIGH (ref 68–114)
FERRITIN SERPL-MCNC: 628 NG/ML — HIGH (ref 13–330)
FERRITIN SERPL-MCNC: 628 NG/ML — HIGH (ref 13–330)
FOLATE SERPL-MCNC: 6.7 NG/ML — SIGNIFICANT CHANGE UP
FOLATE SERPL-MCNC: 6.7 NG/ML — SIGNIFICANT CHANGE UP
GLUCOSE BLDC GLUCOMTR-MCNC: 163 MG/DL — HIGH (ref 70–99)
GLUCOSE BLDC GLUCOMTR-MCNC: 163 MG/DL — HIGH (ref 70–99)
GLUCOSE BLDC GLUCOMTR-MCNC: 180 MG/DL — HIGH (ref 70–99)
GLUCOSE BLDC GLUCOMTR-MCNC: 180 MG/DL — HIGH (ref 70–99)
GLUCOSE BLDC GLUCOMTR-MCNC: 196 MG/DL — HIGH (ref 70–99)
GLUCOSE BLDC GLUCOMTR-MCNC: 196 MG/DL — HIGH (ref 70–99)
GLUCOSE BLDC GLUCOMTR-MCNC: 221 MG/DL — HIGH (ref 70–99)
GLUCOSE BLDC GLUCOMTR-MCNC: 221 MG/DL — HIGH (ref 70–99)
GLUCOSE BLDC GLUCOMTR-MCNC: 238 MG/DL — HIGH (ref 70–99)
GLUCOSE BLDC GLUCOMTR-MCNC: 238 MG/DL — HIGH (ref 70–99)
GLUCOSE SERPL-MCNC: 186 MG/DL — HIGH (ref 70–99)
GLUCOSE SERPL-MCNC: 186 MG/DL — HIGH (ref 70–99)
HCT VFR BLD CALC: 30.5 % — LOW (ref 34.5–45)
HCT VFR BLD CALC: 30.5 % — LOW (ref 34.5–45)
HGB BLD-MCNC: 9.3 G/DL — LOW (ref 11.5–15.5)
HGB BLD-MCNC: 9.3 G/DL — LOW (ref 11.5–15.5)
IRON SATN MFR SERPL: 65 UG/DL — SIGNIFICANT CHANGE UP (ref 30–160)
IRON SATN MFR SERPL: 65 UG/DL — SIGNIFICANT CHANGE UP (ref 30–160)
MAGNESIUM SERPL-MCNC: 2.2 MG/DL — SIGNIFICANT CHANGE UP (ref 1.6–2.6)
MAGNESIUM SERPL-MCNC: 2.2 MG/DL — SIGNIFICANT CHANGE UP (ref 1.6–2.6)
MCHC RBC-ENTMCNC: 29.2 PG — SIGNIFICANT CHANGE UP (ref 27–34)
MCHC RBC-ENTMCNC: 29.2 PG — SIGNIFICANT CHANGE UP (ref 27–34)
MCHC RBC-ENTMCNC: 30.5 GM/DL — LOW (ref 32–36)
MCHC RBC-ENTMCNC: 30.5 GM/DL — LOW (ref 32–36)
MCV RBC AUTO: 95.6 FL — SIGNIFICANT CHANGE UP (ref 80–100)
MCV RBC AUTO: 95.6 FL — SIGNIFICANT CHANGE UP (ref 80–100)
METHOD TYPE: SIGNIFICANT CHANGE UP
METHOD TYPE: SIGNIFICANT CHANGE UP
NRBC # BLD: 0 /100 WBCS — SIGNIFICANT CHANGE UP (ref 0–0)
NRBC # BLD: 0 /100 WBCS — SIGNIFICANT CHANGE UP (ref 0–0)
ORGANISM # SPEC MICROSCOPIC CNT: ABNORMAL
PHOSPHATE SERPL-MCNC: 2.2 MG/DL — LOW (ref 2.5–4.5)
PHOSPHATE SERPL-MCNC: 2.2 MG/DL — LOW (ref 2.5–4.5)
PLATELET # BLD AUTO: 182 K/UL — SIGNIFICANT CHANGE UP (ref 150–400)
PLATELET # BLD AUTO: 182 K/UL — SIGNIFICANT CHANGE UP (ref 150–400)
POTASSIUM SERPL-MCNC: 4.4 MMOL/L — SIGNIFICANT CHANGE UP (ref 3.5–5.3)
POTASSIUM SERPL-MCNC: 4.4 MMOL/L — SIGNIFICANT CHANGE UP (ref 3.5–5.3)
POTASSIUM SERPL-SCNC: 4.4 MMOL/L — SIGNIFICANT CHANGE UP (ref 3.5–5.3)
POTASSIUM SERPL-SCNC: 4.4 MMOL/L — SIGNIFICANT CHANGE UP (ref 3.5–5.3)
PROT SERPL-MCNC: 5.9 G/DL — LOW (ref 6–8.3)
PROT SERPL-MCNC: 5.9 G/DL — LOW (ref 6–8.3)
RBC # BLD: 3.12 M/UL — LOW (ref 3.8–5.2)
RBC # BLD: 3.12 M/UL — LOW (ref 3.8–5.2)
RBC # BLD: 3.19 M/UL — LOW (ref 3.8–5.2)
RBC # BLD: 3.19 M/UL — LOW (ref 3.8–5.2)
RBC # FLD: 15.4 % — HIGH (ref 10.3–14.5)
RBC # FLD: 15.4 % — HIGH (ref 10.3–14.5)
RETICS #: 144.8 K/UL — HIGH (ref 25–125)
RETICS #: 144.8 K/UL — HIGH (ref 25–125)
RETICS/RBC NFR: 4.6 % — HIGH (ref 0.5–2.5)
RETICS/RBC NFR: 4.6 % — HIGH (ref 0.5–2.5)
SODIUM SERPL-SCNC: 139 MMOL/L — SIGNIFICANT CHANGE UP (ref 135–145)
SODIUM SERPL-SCNC: 139 MMOL/L — SIGNIFICANT CHANGE UP (ref 135–145)
SPECIMEN SOURCE: SIGNIFICANT CHANGE UP
SPECIMEN SOURCE: SIGNIFICANT CHANGE UP
TACROLIMUS SERPL-MCNC: 4 NG/ML — SIGNIFICANT CHANGE UP
TACROLIMUS SERPL-MCNC: 4 NG/ML — SIGNIFICANT CHANGE UP
VIT B12 SERPL-MCNC: 900 PG/ML — SIGNIFICANT CHANGE UP (ref 232–1245)
VIT B12 SERPL-MCNC: 900 PG/ML — SIGNIFICANT CHANGE UP (ref 232–1245)
WBC # BLD: 6.7 K/UL — SIGNIFICANT CHANGE UP (ref 3.8–10.5)
WBC # BLD: 6.7 K/UL — SIGNIFICANT CHANGE UP (ref 3.8–10.5)
WBC # FLD AUTO: 6.7 K/UL — SIGNIFICANT CHANGE UP (ref 3.8–10.5)
WBC # FLD AUTO: 6.7 K/UL — SIGNIFICANT CHANGE UP (ref 3.8–10.5)

## 2023-11-04 PROCEDURE — 99232 SBSQ HOSP IP/OBS MODERATE 35: CPT

## 2023-11-04 PROCEDURE — 99233 SBSQ HOSP IP/OBS HIGH 50: CPT

## 2023-11-04 RX ORDER — PIPERACILLIN AND TAZOBACTAM 4; .5 G/20ML; G/20ML
3.38 INJECTION, POWDER, LYOPHILIZED, FOR SOLUTION INTRAVENOUS EVERY 8 HOURS
Refills: 0 | Status: DISCONTINUED | OUTPATIENT
Start: 2023-11-04 | End: 2023-11-04

## 2023-11-04 RX ORDER — INSULIN GLARGINE 100 [IU]/ML
24 INJECTION, SOLUTION SUBCUTANEOUS AT BEDTIME
Refills: 0 | Status: DISCONTINUED | OUTPATIENT
Start: 2023-11-04 | End: 2023-11-05

## 2023-11-04 RX ORDER — INSULIN LISPRO 100/ML
VIAL (ML) SUBCUTANEOUS
Refills: 0 | Status: DISCONTINUED | OUTPATIENT
Start: 2023-11-04 | End: 2023-11-04

## 2023-11-04 RX ORDER — INSULIN LISPRO 100/ML
VIAL (ML) SUBCUTANEOUS
Refills: 0 | Status: DISCONTINUED | OUTPATIENT
Start: 2023-11-04 | End: 2023-11-08

## 2023-11-04 RX ORDER — TACROLIMUS 5 MG/1
0.5 CAPSULE ORAL ONCE
Refills: 0 | Status: COMPLETED | OUTPATIENT
Start: 2023-11-04 | End: 2023-11-04

## 2023-11-04 RX ORDER — INSULIN LISPRO 100/ML
9 VIAL (ML) SUBCUTANEOUS
Refills: 0 | Status: DISCONTINUED | OUTPATIENT
Start: 2023-11-04 | End: 2023-11-05

## 2023-11-04 RX ORDER — INSULIN LISPRO 100/ML
VIAL (ML) SUBCUTANEOUS AT BEDTIME
Refills: 0 | Status: DISCONTINUED | OUTPATIENT
Start: 2023-11-04 | End: 2023-11-08

## 2023-11-04 RX ORDER — TACROLIMUS 5 MG/1
2 CAPSULE ORAL
Refills: 0 | Status: DISCONTINUED | OUTPATIENT
Start: 2023-11-04 | End: 2023-11-05

## 2023-11-04 RX ORDER — CEFUROXIME AXETIL 250 MG
500 TABLET ORAL EVERY 12 HOURS
Refills: 0 | Status: COMPLETED | OUTPATIENT
Start: 2023-11-04 | End: 2023-11-07

## 2023-11-04 RX ORDER — INSULIN LISPRO 100/ML
VIAL (ML) SUBCUTANEOUS AT BEDTIME
Refills: 0 | Status: DISCONTINUED | OUTPATIENT
Start: 2023-11-04 | End: 2023-11-04

## 2023-11-04 RX ORDER — MYCOPHENOLATE MOFETIL 250 MG/1
250 CAPSULE ORAL
Refills: 0 | Status: DISCONTINUED | OUTPATIENT
Start: 2023-11-04 | End: 2023-11-08

## 2023-11-04 RX ORDER — HYDROXYZINE HCL 10 MG
25 TABLET ORAL
Refills: 0 | Status: DISCONTINUED | OUTPATIENT
Start: 2023-11-04 | End: 2023-11-08

## 2023-11-04 RX ADMIN — Medication 1: at 12:54

## 2023-11-04 RX ADMIN — TACROLIMUS 0.5 MILLIGRAM(S): 5 CAPSULE ORAL at 14:42

## 2023-11-04 RX ADMIN — FAMOTIDINE 20 MILLIGRAM(S): 10 INJECTION INTRAVENOUS at 14:42

## 2023-11-04 RX ADMIN — Medication 8 UNIT(S): at 08:35

## 2023-11-04 RX ADMIN — Medication 25 MILLIGRAM(S): at 05:22

## 2023-11-04 RX ADMIN — TACROLIMUS 1.5 MILLIGRAM(S): 5 CAPSULE ORAL at 08:34

## 2023-11-04 RX ADMIN — Medication 9 UNIT(S): at 18:10

## 2023-11-04 RX ADMIN — PIPERACILLIN AND TAZOBACTAM 25 GRAM(S): 4; .5 INJECTION, POWDER, LYOPHILIZED, FOR SOLUTION INTRAVENOUS at 00:34

## 2023-11-04 RX ADMIN — MYCOPHENOLATE MOFETIL 250 MILLIGRAM(S): 250 CAPSULE ORAL at 12:49

## 2023-11-04 RX ADMIN — Medication 25 MILLIGRAM(S): at 18:05

## 2023-11-04 RX ADMIN — MYCOPHENOLATE MOFETIL 250 MILLIGRAM(S): 250 CAPSULE ORAL at 18:05

## 2023-11-04 RX ADMIN — CHLORHEXIDINE GLUCONATE 1 APPLICATION(S): 213 SOLUTION TOPICAL at 07:13

## 2023-11-04 RX ADMIN — ENOXAPARIN SODIUM 40 MILLIGRAM(S): 100 INJECTION SUBCUTANEOUS at 14:42

## 2023-11-04 RX ADMIN — INSULIN GLARGINE 24 UNIT(S): 100 INJECTION, SOLUTION SUBCUTANEOUS at 22:23

## 2023-11-04 RX ADMIN — PIPERACILLIN AND TAZOBACTAM 25 GRAM(S): 4; .5 INJECTION, POWDER, LYOPHILIZED, FOR SOLUTION INTRAVENOUS at 07:29

## 2023-11-04 RX ADMIN — Medication 1: at 18:10

## 2023-11-04 RX ADMIN — Medication 5 MILLIGRAM(S): at 05:22

## 2023-11-04 RX ADMIN — Medication 30 MILLIGRAM(S): at 05:23

## 2023-11-04 RX ADMIN — Medication 9 UNIT(S): at 12:54

## 2023-11-04 RX ADMIN — PIPERACILLIN AND TAZOBACTAM 25 GRAM(S): 4; .5 INJECTION, POWDER, LYOPHILIZED, FOR SOLUTION INTRAVENOUS at 14:42

## 2023-11-04 RX ADMIN — Medication 500 MILLIGRAM(S): at 22:23

## 2023-11-04 RX ADMIN — Medication 4: at 08:35

## 2023-11-04 NOTE — CHART NOTE - NSCHARTNOTEFT_GEN_A_CORE
Endocrinology following Ms. Soto for new onset DM. Patient had been receiving 24 units lantus, this morning hyperglycemic in low 200s, yesterday morning in target range.    Recommend  - continue lantus 24 units qhs, do not hold if npo  - start admelog 9 units tid premeal, hold if npo or not eating  - start LOW admelog correction scale tid premeal instead of MODERATE  - start LOW admelog correction at bedtime instead of MODERATE  - target bg 100-180  - hypoglycemia protocol prn  -carb consistent diet    Endocrinology will continue to follow.    Joe Martin MD  Endocrinology Fellow Endocrinology following Ms. Soto for new onset DM. Patient had been receiving 24 units lantus, this morning hyperglycemic in low 200s, yesterday morning in target range.    Recommend  - continue lantus 24 units qhs, do not hold if npo  - start admelog 11 units tid premeal, hold if npo or not eating  - start LOW admelog correction scale tid premeal instead of MODERATE  - start LOW admelog correction at bedtime instead of MODERATE  - target bg 100-180  - hypoglycemia protocol prn  -carb consistent diet    Endocrinology will continue to follow.    Joe Martin MD  Endocrinology Fellow

## 2023-11-04 NOTE — PROGRESS NOTE ADULT - TIME BILLING
face-to-face encounter, review of extensive medical records in this and prior charts, laboratory findings, radiographic and microbiology results; documentation as noted above and discussion of diagnostic impressions and plan with the patient and team
face-to-face encounter, review of extensive medical records in this and prior charts, laboratory findings, radiographic and microbiology results; documentation as noted above and discussion of diagnostic impressions and plan with the patient and team

## 2023-11-04 NOTE — PROGRESS NOTE ADULT - SUBJECTIVE AND OBJECTIVE BOX
Follow Up:      Interval History/ROS:     Transferred to the floor,   AFVSS    REVIEW OF SYSTEMS  Constitutional: No fevers, chills, weight loss. Positive fatigue   Skin: No rash, no phlebitis	  Eyes: No discharge	  ENMT: No sore throat, oral thrush, ulcers or exudate  Respiratory: No cough, positive SOB on exertion  Cardiovascular:  No chest pain, palpitations or edema   Gastrointestinal: No pain, nausea, vomiting, diarrhea or constipation	  Genitourinary: No dysuria, discharge or flank pain  MSK: No arthralgias or back pain   Neurological: No HA, no weakness, no seizures, no AMS       Allergies  Bactrim (Pruritus)    ANTIMICROBIALS:    piperacillin/tazobactam IVPB.. 3.375 every 8 hours      OTHER MEDS: MEDICATIONS  (STANDING):  acetaminophen     Tablet .. 650 every 6 hours PRN  enoxaparin Injectable 40 every 24 hours  famotidine    Tablet 20 daily  influenza  Vaccine (HIGH DOSE) 0.7 once  insulin glargine Injectable (LANTUS) 24 <User Schedule>  insulin lispro (ADMELOG) corrective regimen sliding scale  every 4 hours  insulin lispro Injectable (ADMELOG) 8 three times a day before meals  metoprolol tartrate 25 two times a day  NIFEdipine XL 30 every 24 hours  polyethylene glycol 3350 17 every 24 hours  predniSONE   Tablet 5 daily  senna 2 at bedtime  tacrolimus ER Tablet (ENVARSUS XR) 1.5 <User Schedule>      Vital Signs Last 24 Hrs  T(C): 36.7 (04 Nov 2023 18:08), Max: 36.8 (04 Nov 2023 11:32)  T(F): 98 (04 Nov 2023 18:08), Max: 98.2 (04 Nov 2023 11:32)  HR: 84 (04 Nov 2023 18:08) (68 - 84)  BP: 125/70 (04 Nov 2023 18:08) (125/70 - 135/62)  BP(mean): --  RR: 20 (04 Nov 2023 18:08) (20 - 20)  SpO2: 99% (04 Nov 2023 18:08) (98% - 100%)    Parameters below as of 04 Nov 2023 18:08  Patient On (Oxygen Delivery Method): room air      EXAM:  General: Patient appears comfortable, no acute distress  HEENT: NCAT, PERRL, anicteric sclera, dentures present  Neck: Supple, No lymphadenopathy  CV: +S1/S2, RRR, no M/R/G  Lungs: No respiratory distress, CTA b/l, no wheezing, rales or rhonchi  Abd:  BS4+, Soft, NTND, no guarding  : No suprapubic tenderness  Neuro: AAOx3. No focal deficits noted.       ____________________________________________________  ROS  GENERAL: denies chills, , night sweats, weight loss.   PSYCH: denies depression, anxiety, suicidal ideation, hallucination, and delusions  SKIN: no rash or lesions; no color changes, no abnormal nevi,no  dryness, and nojaundice    EYES: denies visual changes, floaters, pain, inflammation, blurred vision, and discharge  ENT: denies tinnitus, vertigo, epistaxis, oral lesion, and decreased acuity  PULM: denies, hemoptysis, pleurisy  CVS: denies angina, palpitations,+ orthopnea, no syncope, or heart murmur  GI: denies constipation, diarrhea, melena, abdominal pain, nausea.   : denies dysuria, frequency, discharge, incontinence, stones or macroscopic hematuria  MS: no arthralgias, no erythema or swelling, no myalgias, noedema, or lower back pain.   CNS: denies numbness, dizziness, seizure, or tremor  ENDO: denies heat/cold intolerance, polyuria, polydipsia, malaise.    HEME: denies bruising, bleeding, lymphadenopathy, anemia, and calf pain    Allergies  Bactrim (Pruritus)    __________________________________________________  MEDS:  MEDICATIONS  (STANDING):  acetaminophen     Tablet .. 650 every 6 hours PRN  enoxaparin Injectable 40 every 24 hours  famotidine    Tablet 20 daily  hydrOXYzine hydrochloride 25 two times a day PRN  influenza  Vaccine (HIGH DOSE) 0.7 once  insulin glargine Injectable (LANTUS) 24 at bedtime  insulin lispro (ADMELOG) corrective regimen sliding scale  three times a day before meals  insulin lispro (ADMELOG) corrective regimen sliding scale  at bedtime  insulin lispro Injectable (ADMELOG) 9 three times a day before meals  metoprolol tartrate 25 two times a day  mycophenolate mofetil 250 two times a day  NIFEdipine XL 30 every 24 hours  polyethylene glycol 3350 17 every 24 hours  predniSONE   Tablet 5 daily  senna 2 at bedtime  tacrolimus ER Tablet (ENVARSUS XR) 2 <User Schedule>    _________________________________________________  ANTIMICOBIALS  cefuroxime   Tablet 500 every 12 hours  influenza  Vaccine (HIGH DOSE) 0.7 once  mycophenolate mofetil 250 two times a day  tacrolimus ER Tablet (ENVARSUS XR) 2 <User Schedule>      GENERAL LABS              x                    139  | 21   | 11           x     >-----------< x       ------------------------< 186                   x                    4.4  | 105  | 0.81                                         Ca 8.4   Mg 2.2   Ph 2.2        Urinalysis Basic - ( 04 Nov 2023 07:15 )    Color: x / Appearance: x / SG: x / pH: x  Gluc: 186 mg/dL / Ketone: x  / Bili: x / Urobili: x   Blood: x / Protein: x / Nitrite: x   Leuk Esterase: x / RBC: x / WBC x   Sq Epi: x / Non Sq Epi: x / Bacteria: x        _________________________________________________  MICROBIOLOGY  -----------    Culture - Urine (collected 01 Nov 2023 21:15)  Source: Clean Catch Clean Catch (Midstream)  Final Report (04 Nov 2023 17:32):    >100,000 CFU/ml Klebsiella variicola  Organism: Klebsiella variicola (04 Nov 2023 17:32)  Organism: Klebsiella variicola (04 Nov 2023 17:32)      Method Type: HUY      -  Amikacin: S <=16      -  Amoxicillin/Clavulanic Acid: S <=8/4      -  Ampicillin: R 16 These ampicillin results predict results for amoxicillin      -  Ampicillin/Sulbactam: S <=4/2      -  Aztreonam: S <=4      -  Cefazolin: S <=2      -  Cefepime: S <=2      -  Cefoxitin: S <=8      -  Ceftriaxone: S <=1      -  Ciprofloxacin: S <=0.25      -  Ertapenem: S <=0.5      -  Gentamicin: S <=2      -  Imipenem: S <=1      -  Levofloxacin: S <=0.5      -  Meropenem: S <=1      -  Nitrofurantoin: S <=32 Should not be used to treat pyelonephritis      -  Piperacillin/Tazobactam: S <=8      -  Tobramycin: S <=2      -  Trimethoprim/Sulfamethoxazole: R >2/38    Culture - Blood (collected 01 Nov 2023 11:40)  Source: .Blood Blood-Venous  Preliminary Report (04 Nov 2023 03:02):    No growth at 48 Hours    Culture - Blood (collected 01 Nov 2023 11:37)  Source: .Blood Blood-Peripheral  Preliminary Report (04 Nov 2023 03:02):    No growth at 48 Hours

## 2023-11-04 NOTE — PROGRESS NOTE ADULT - SUBJECTIVE AND OBJECTIVE BOX
[facility] DIVISION OF KIDNEY DISEASES AND HYPERTENSION   FOLLOW UP NOTE    --------------------------------------------------------------------------------    SUBJECTIVE / ROS / INTERVAL EVENTS:  -Patient seen and examined at bedside.  -Urine culture + , await sensitivities.  No cough, blood sugars improved.       PAST HISTORY  --------------------------------------------------------------------------------  No significant changes to PMH, PSH, FHx, SHx, unless otherwise noted    ALLERGIES & MEDICATIONS  --------------------------------------------------------------------------------  Allergies    Bactrim (Pruritus)    Intolerances      MEDICATIONS  (STANDING):  chlorhexidine 2% Cloths 1 Application(s) Topical <User Schedule>  enoxaparin Injectable 40 milliGRAM(s) SubCutaneous every 24 hours  famotidine    Tablet 20 milliGRAM(s) Oral daily  influenza  Vaccine (HIGH DOSE) 0.7 milliLiter(s) IntraMuscular once  insulin glargine Injectable (LANTUS) 24 Unit(s) SubCutaneous at bedtime  insulin lispro (ADMELOG) corrective regimen sliding scale   SubCutaneous three times a day before meals  insulin lispro (ADMELOG) corrective regimen sliding scale   SubCutaneous at bedtime  insulin lispro Injectable (ADMELOG) 9 Unit(s) SubCutaneous three times a day before meals  metoprolol tartrate 25 milliGRAM(s) Oral two times a day  mycophenolate mofetil 250 milliGRAM(s) Oral two times a day  NIFEdipine XL 30 milliGRAM(s) Oral every 24 hours  piperacillin/tazobactam IVPB.. 3.375 Gram(s) IV Intermittent every 8 hours  polyethylene glycol 3350 17 Gram(s) Oral every 24 hours  predniSONE   Tablet 5 milliGRAM(s) Oral daily  senna 2 Tablet(s) Oral at bedtime  tacrolimus ER Tablet (ENVARSUS XR) 1.5 milliGRAM(s) Oral <User Schedule>    MEDICATIONS  (PRN):  acetaminophen     Tablet .. 650 milliGRAM(s) Oral every 6 hours PRN Mild Pain (1 - 3)  hydrOXYzine hydrochloride 25 milliGRAM(s) Oral two times a day PRN Itching      Vital Signs Last 24 Hrs  T(C): 36.8 (04 Nov 2023 11:32), Max: 36.8 (04 Nov 2023 11:32)  T(F): 98.2 (04 Nov 2023 11:32), Max: 98.2 (04 Nov 2023 11:32)  HR: 72 (04 Nov 2023 11:32) (67 - 72)  BP: 130/63 (04 Nov 2023 11:32) (128/60 - 140/66)  BP(mean): 86 (03 Nov 2023 20:00) (86 - 95)  RR: 20 (04 Nov 2023 11:32) (20 - 21)  SpO2: 98% (04 Nov 2023 11:32) (98% - 100%)    Parameters below as of 04 Nov 2023 11:32  Patient On (Oxygen Delivery Method): room air        I&O's Summary    03 Nov 2023 07:01  -  04 Nov 2023 07:00  --------------------------------------------------------  IN: 1410 mL / OUT: 1300 mL / NET: 110 mL    04 Nov 2023 08:01  -  04 Nov 2023 13:07  --------------------------------------------------------  IN: 260 mL / OUT: 0 mL / NET: 260 mL        PHYSICAL EXAM:  General: no acute distress  Neuro: no focal deficits  HEENT: anicteric, no JVD  Pulmonary: lungs CTA B/L  Cardiovascular/Chest: +S1S2  GI/Abdomen: soft, +bowel sounds. Transplant site non-tender  Extremities: no LE pitting edema  Skin: Warm and dry    LABS/STUDIES  --------------------------------------------------------------------------------                                 9.3    6.70  )-----------( 182      ( 04 Nov 2023 07:12 )             30.5     11-04    139  |  105  |  11  ----------------------------<  186<H>  4.4   |  21<L>  |  0.81    Ca    8.4      04 Nov 2023 07:15  Phos  2.2     11-04  Mg     2.2     11-04    TPro  5.9<L>  /  Alb  3.3  /  TBili  0.4  /  DBili  x   /  AST  13  /  ALT  19  /  AlkPhos  66  11-04    Tacrolimus (), Serum: 4.0 ng/mL (11-04 @ 07:13)        Culture - Urine (collected 11-01-23 @ 21:15)  Source: Clean Catch Clean Catch (Midstream)  Preliminary Report (11-03-23 @ 15:13):    >100,000 CFU/ml Klebsiella variicola    Culture - Blood (collected 11-01-23 @ 11:40)  Source: .Blood Blood-Venous  Preliminary Report (11-04-23 @ 03:02):    No growth at 48 Hours    Culture - Blood (collected 11-01-23 @ 11:37)  Source: .Blood Blood-Peripheral  Preliminary Report (11-04-23 @ 03:02):    No growth at 48 Hours

## 2023-11-04 NOTE — PROGRESS NOTE ADULT - SUBJECTIVE AND OBJECTIVE BOX
Transplant Surgery - Multidisciplinary Progress Note  --------------------------------------------------------------  DDRT 3/28/23    Present:   Patient seen and examined with multidisciplinary team including Transplant Surgeon: Dr. Bourgeois. Transplant Nephrologist: Dr. Brock. ACPmaeve Christianson, and unit RN during am rounds.  Disciplines not in attendance will be notified of the plan    68F with Hx of Alport Syndrome and CKD 5  (x 15 years), LUE AVF placed in 2017, recently started HD on 3/17/2023 ([facility]ja, Nephrologist Dr. Jet Horn ) now s/p R  DDRT under Simulect induction on 3/28/23 presents from outpatient clinic for new onset hyperglycemia    Interval Events:   Afebrile, VSS  found to be COVID+, comfortable on RA   asymptomatic hyperglycemia, off insulin GTT  good graft function  Transferred to [address] [place] [address]    Immunosuppression:  Envarsus per level , MMF start 250 mg bid, Pred 5  Ongoing monitoring for signs of rejection      Potential Discharge date: TBD, begin dc planning when gets to floor  Education:  Medications  Plan of care:  See Below    MEDICATIONS  (STANDING):  chlorhexidine 2% Cloths 1 Application(s) Topical <User Schedule>  enoxaparin Injectable 40 milliGRAM(s) SubCutaneous every 24 hours  famotidine    Tablet 20 milliGRAM(s) Oral daily  influenza  Vaccine (HIGH DOSE) 0.7 milliLiter(s) IntraMuscular once  insulin glargine Injectable (LANTUS) 24 Unit(s) SubCutaneous at bedtime  insulin lispro (ADMELOG) corrective regimen sliding scale   SubCutaneous three times a day before meals  insulin lispro (ADMELOG) corrective regimen sliding scale   SubCutaneous at bedtime  insulin lispro Injectable (ADMELOG) 9 Unit(s) SubCutaneous three times a day before meals  metoprolol tartrate 25 milliGRAM(s) Oral two times a day  mycophenolate mofetil 250 milliGRAM(s) Oral two times a day  NIFEdipine XL 30 milliGRAM(s) Oral every 24 hours  piperacillin/tazobactam IVPB.. 3.375 Gram(s) IV Intermittent every 8 hours  polyethylene glycol 3350 17 Gram(s) Oral every 24 hours  predniSONE   Tablet 5 milliGRAM(s) Oral daily  senna 2 Tablet(s) Oral at bedtime  tacrolimus ER Tablet (ENVARSUS XR) 1.5 milliGRAM(s) Oral <User Schedule>    MEDICATIONS  (PRN):  acetaminophen     Tablet .. 650 milliGRAM(s) Oral every 6 hours PRN Mild Pain (1 - 3)  hydrOXYzine hydrochloride 25 milliGRAM(s) Oral two times a day PRN Itching      PAST MEDICAL & SURGICAL HISTORY:  Alport's syndrome      HTN (hypertension)      Hard of hearing  uses hearing aids      Anemia      ESRD with anemia  left AV fistula, not in use      Deep vein thrombosis (DVT) of right upper extremity      Deep vein thrombosis (DVT) of right upper extremity      S/P tonsillectomy      A-V fistula  Nov 2017      Renal transplant recipient          Vital Signs Last 24 Hrs  T(C): 36.8 (04 Nov 2023 11:32), Max: 36.8 (04 Nov 2023 11:32)  T(F): 98.2 (04 Nov 2023 11:32), Max: 98.2 (04 Nov 2023 11:32)  HR: 72 (04 Nov 2023 11:32) (67 - 72)  BP: 130/63 (04 Nov 2023 11:32) (128/60 - 140/66)  BP(mean): 86 (03 Nov 2023 20:00) (86 - 95)  RR: 20 (04 Nov 2023 11:32) (20 - 21)  SpO2: 98% (04 Nov 2023 11:32) (98% - 100%)    Parameters below as of 04 Nov 2023 11:32  Patient On (Oxygen Delivery Method): room air        I&O's Summary    03 Nov 2023 07:01  -  04 Nov 2023 07:00  --------------------------------------------------------  IN: 1410 mL / OUT: 1300 mL / NET: 110 mL    04 Nov 2023 08:01  -  04 Nov 2023 13:37  --------------------------------------------------------  IN: 510 mL / OUT: 0 mL / NET: 510 mL                              9.3    6.70  )-----------( 182      ( 04 Nov 2023 07:12 )             30.5     11-04    139  |  105  |  11  ----------------------------<  186<H>  4.4   |  21<L>  |  0.81    Ca    8.4      04 Nov 2023 07:15  Phos  2.2     11-04  Mg     2.2     11-04    TPro  5.9<L>  /  Alb  3.3  /  TBili  0.4  /  DBili  x   /  AST  13  /  ALT  19  /  AlkPhos  66  11-04    Tacrolimus (), Serum: 4.0 ng/mL (11-04 @ 07:13)        Culture - Urine (collected 11-01-23 @ 21:15)  Source: Clean Catch Clean Catch (Midstream)  Preliminary Report (11-03-23 @ 15:13):    >100,000 CFU/ml Klebsiella variicola    Culture - Blood (collected 11-01-23 @ 11:40)  Source: .Blood Blood-Venous  Preliminary Report (11-04-23 @ 03:02):    No growth at 48 Hours    Culture - Blood (collected 11-01-23 @ 11:37)  Source: .Blood Blood-Peripheral  Preliminary Report (11-04-23 @ 03:02):    No growth at 48 Hours                 Review of systems  Gen: No weight changes, fatigue, fevers/chills, weakness  Skin: No rashes  Head/Eyes/Ears/Mouth: No headache; Normal hearing; Normal vision w/o blurriness; No sinus pain/discomfort, sore throat  Respiratory: No dyspnea, cough, wheezing, hemoptysis  CV: No chest pain, PND, orthopnea  GI: Mild abdominal pain at surgical incision site; denies diarrhea, constipation, nausea, vomiting, melena, hematochezia  : No increased frequency, dysuria, hematuria, nocturia  MSK: No joint pain/swelling; no back pain; no edema  Neuro: No dizziness/lightheadedness, weakness, seizures, numbness, tingling  Heme: No easy bruising or bleeding  Endo: No heat/cold intolerance  Psych: No significant nervousness, anxiety, stress, depression  All other systems were reviewed and are negative, except as noted.      PHYSICAL EXAM:  Constitutional: Well developed / well nourished  Eyes: Anicteric, PERRLA  ENMT: nc/at  Neck: supple  Respiratory: CTA B/L  Cardiovascular: RRR  Gastrointestinal: Soft, ND/NT, well healed incisional scar  Genitourinary: Voiding spontaneously  Extremities: SCD's in place and working bilaterally  Vascular: Palpable dp pulses bilaterally  Neurological: A&O x3  Skin: no rashes, ulcerations or lesions;  Musculoskeletal: Moving all extremities  Psychiatric: Responsive

## 2023-11-05 LAB
ALBUMIN SERPL ELPH-MCNC: 3.2 G/DL — LOW (ref 3.3–5)
ALBUMIN SERPL ELPH-MCNC: 3.2 G/DL — LOW (ref 3.3–5)
ALP SERPL-CCNC: 65 U/L — SIGNIFICANT CHANGE UP (ref 40–120)
ALP SERPL-CCNC: 65 U/L — SIGNIFICANT CHANGE UP (ref 40–120)
ALT FLD-CCNC: 22 U/L — SIGNIFICANT CHANGE UP (ref 10–45)
ALT FLD-CCNC: 22 U/L — SIGNIFICANT CHANGE UP (ref 10–45)
ANION GAP SERPL CALC-SCNC: 14 MMOL/L — SIGNIFICANT CHANGE UP (ref 5–17)
ANION GAP SERPL CALC-SCNC: 14 MMOL/L — SIGNIFICANT CHANGE UP (ref 5–17)
APTT BLD: 27 SEC — SIGNIFICANT CHANGE UP (ref 24.5–35.6)
APTT BLD: 27 SEC — SIGNIFICANT CHANGE UP (ref 24.5–35.6)
AST SERPL-CCNC: 24 U/L — SIGNIFICANT CHANGE UP (ref 10–40)
AST SERPL-CCNC: 24 U/L — SIGNIFICANT CHANGE UP (ref 10–40)
BILIRUB SERPL-MCNC: 0.4 MG/DL — SIGNIFICANT CHANGE UP (ref 0.2–1.2)
BILIRUB SERPL-MCNC: 0.4 MG/DL — SIGNIFICANT CHANGE UP (ref 0.2–1.2)
BUN SERPL-MCNC: 11 MG/DL — SIGNIFICANT CHANGE UP (ref 7–23)
BUN SERPL-MCNC: 11 MG/DL — SIGNIFICANT CHANGE UP (ref 7–23)
CALCIUM SERPL-MCNC: 8.6 MG/DL — SIGNIFICANT CHANGE UP (ref 8.4–10.5)
CALCIUM SERPL-MCNC: 8.6 MG/DL — SIGNIFICANT CHANGE UP (ref 8.4–10.5)
CHLORIDE SERPL-SCNC: 108 MMOL/L — SIGNIFICANT CHANGE UP (ref 96–108)
CHLORIDE SERPL-SCNC: 108 MMOL/L — SIGNIFICANT CHANGE UP (ref 96–108)
CO2 SERPL-SCNC: 18 MMOL/L — LOW (ref 22–31)
CO2 SERPL-SCNC: 18 MMOL/L — LOW (ref 22–31)
CREAT SERPL-MCNC: 0.67 MG/DL — SIGNIFICANT CHANGE UP (ref 0.5–1.3)
CREAT SERPL-MCNC: 0.67 MG/DL — SIGNIFICANT CHANGE UP (ref 0.5–1.3)
EGFR: 95 ML/MIN/1.73M2 — SIGNIFICANT CHANGE UP
EGFR: 95 ML/MIN/1.73M2 — SIGNIFICANT CHANGE UP
GLUCOSE BLDC GLUCOMTR-MCNC: 109 MG/DL — HIGH (ref 70–99)
GLUCOSE BLDC GLUCOMTR-MCNC: 109 MG/DL — HIGH (ref 70–99)
GLUCOSE BLDC GLUCOMTR-MCNC: 133 MG/DL — HIGH (ref 70–99)
GLUCOSE BLDC GLUCOMTR-MCNC: 133 MG/DL — HIGH (ref 70–99)
GLUCOSE BLDC GLUCOMTR-MCNC: 189 MG/DL — HIGH (ref 70–99)
GLUCOSE BLDC GLUCOMTR-MCNC: 189 MG/DL — HIGH (ref 70–99)
GLUCOSE BLDC GLUCOMTR-MCNC: 292 MG/DL — HIGH (ref 70–99)
GLUCOSE BLDC GLUCOMTR-MCNC: 292 MG/DL — HIGH (ref 70–99)
GLUCOSE BLDC GLUCOMTR-MCNC: 307 MG/DL — HIGH (ref 70–99)
GLUCOSE BLDC GLUCOMTR-MCNC: 307 MG/DL — HIGH (ref 70–99)
GLUCOSE SERPL-MCNC: 185 MG/DL — HIGH (ref 70–99)
GLUCOSE SERPL-MCNC: 185 MG/DL — HIGH (ref 70–99)
HCT VFR BLD CALC: 31.6 % — LOW (ref 34.5–45)
HCT VFR BLD CALC: 31.6 % — LOW (ref 34.5–45)
HGB BLD-MCNC: 9.7 G/DL — LOW (ref 11.5–15.5)
HGB BLD-MCNC: 9.7 G/DL — LOW (ref 11.5–15.5)
INR BLD: 1.15 RATIO — SIGNIFICANT CHANGE UP (ref 0.85–1.18)
INR BLD: 1.15 RATIO — SIGNIFICANT CHANGE UP (ref 0.85–1.18)
MAGNESIUM SERPL-MCNC: 2.1 MG/DL — SIGNIFICANT CHANGE UP (ref 1.6–2.6)
MAGNESIUM SERPL-MCNC: 2.1 MG/DL — SIGNIFICANT CHANGE UP (ref 1.6–2.6)
MCHC RBC-ENTMCNC: 29 PG — SIGNIFICANT CHANGE UP (ref 27–34)
MCHC RBC-ENTMCNC: 29 PG — SIGNIFICANT CHANGE UP (ref 27–34)
MCHC RBC-ENTMCNC: 30.7 GM/DL — LOW (ref 32–36)
MCHC RBC-ENTMCNC: 30.7 GM/DL — LOW (ref 32–36)
MCV RBC AUTO: 94.3 FL — SIGNIFICANT CHANGE UP (ref 80–100)
MCV RBC AUTO: 94.3 FL — SIGNIFICANT CHANGE UP (ref 80–100)
NRBC # BLD: 0 /100 WBCS — SIGNIFICANT CHANGE UP (ref 0–0)
NRBC # BLD: 0 /100 WBCS — SIGNIFICANT CHANGE UP (ref 0–0)
PHOSPHATE SERPL-MCNC: 2.2 MG/DL — LOW (ref 2.5–4.5)
PHOSPHATE SERPL-MCNC: 2.2 MG/DL — LOW (ref 2.5–4.5)
PLATELET # BLD AUTO: 169 K/UL — SIGNIFICANT CHANGE UP (ref 150–400)
PLATELET # BLD AUTO: 169 K/UL — SIGNIFICANT CHANGE UP (ref 150–400)
POTASSIUM SERPL-MCNC: 4.4 MMOL/L — SIGNIFICANT CHANGE UP (ref 3.5–5.3)
POTASSIUM SERPL-MCNC: 4.4 MMOL/L — SIGNIFICANT CHANGE UP (ref 3.5–5.3)
POTASSIUM SERPL-SCNC: 4.4 MMOL/L — SIGNIFICANT CHANGE UP (ref 3.5–5.3)
POTASSIUM SERPL-SCNC: 4.4 MMOL/L — SIGNIFICANT CHANGE UP (ref 3.5–5.3)
PROT SERPL-MCNC: 5.9 G/DL — LOW (ref 6–8.3)
PROT SERPL-MCNC: 5.9 G/DL — LOW (ref 6–8.3)
PROTHROM AB SERPL-ACNC: 12.6 SEC — SIGNIFICANT CHANGE UP (ref 9.5–13)
PROTHROM AB SERPL-ACNC: 12.6 SEC — SIGNIFICANT CHANGE UP (ref 9.5–13)
RBC # BLD: 3.35 M/UL — LOW (ref 3.8–5.2)
RBC # BLD: 3.35 M/UL — LOW (ref 3.8–5.2)
RBC # FLD: 15.6 % — HIGH (ref 10.3–14.5)
RBC # FLD: 15.6 % — HIGH (ref 10.3–14.5)
SODIUM SERPL-SCNC: 140 MMOL/L — SIGNIFICANT CHANGE UP (ref 135–145)
SODIUM SERPL-SCNC: 140 MMOL/L — SIGNIFICANT CHANGE UP (ref 135–145)
TACROLIMUS SERPL-MCNC: 4.9 NG/ML — SIGNIFICANT CHANGE UP
TACROLIMUS SERPL-MCNC: 4.9 NG/ML — SIGNIFICANT CHANGE UP
WBC # BLD: 5.99 K/UL — SIGNIFICANT CHANGE UP (ref 3.8–10.5)
WBC # BLD: 5.99 K/UL — SIGNIFICANT CHANGE UP (ref 3.8–10.5)
WBC # FLD AUTO: 5.99 K/UL — SIGNIFICANT CHANGE UP (ref 3.8–10.5)
WBC # FLD AUTO: 5.99 K/UL — SIGNIFICANT CHANGE UP (ref 3.8–10.5)

## 2023-11-05 PROCEDURE — 99232 SBSQ HOSP IP/OBS MODERATE 35: CPT

## 2023-11-05 RX ORDER — INSULIN LISPRO 100/ML
10 VIAL (ML) SUBCUTANEOUS
Refills: 0 | Status: DISCONTINUED | OUTPATIENT
Start: 2023-11-05 | End: 2023-11-05

## 2023-11-05 RX ORDER — INSULIN LISPRO 100/ML
VIAL (ML) SUBCUTANEOUS
Refills: 0 | Status: DISCONTINUED | OUTPATIENT
Start: 2023-11-06 | End: 2023-11-08

## 2023-11-05 RX ORDER — INSULIN GLARGINE 100 [IU]/ML
26 INJECTION, SOLUTION SUBCUTANEOUS AT BEDTIME
Refills: 0 | Status: DISCONTINUED | OUTPATIENT
Start: 2023-11-05 | End: 2023-11-08

## 2023-11-05 RX ORDER — INSULIN LISPRO 100/ML
11 VIAL (ML) SUBCUTANEOUS
Refills: 0 | Status: DISCONTINUED | OUTPATIENT
Start: 2023-11-05 | End: 2023-11-05

## 2023-11-05 RX ORDER — TACROLIMUS 5 MG/1
1 CAPSULE ORAL ONCE
Refills: 0 | Status: COMPLETED | OUTPATIENT
Start: 2023-11-05 | End: 2023-11-05

## 2023-11-05 RX ORDER — INSULIN LISPRO 100/ML
11 VIAL (ML) SUBCUTANEOUS
Refills: 0 | Status: DISCONTINUED | OUTPATIENT
Start: 2023-11-05 | End: 2023-11-08

## 2023-11-05 RX ORDER — TACROLIMUS 5 MG/1
3 CAPSULE ORAL
Refills: 0 | Status: DISCONTINUED | OUTPATIENT
Start: 2023-11-06 | End: 2023-11-07

## 2023-11-05 RX ORDER — MYCOPHENOLATE MOFETIL 250 MG/1
1 CAPSULE ORAL
Qty: 0 | Refills: 0 | DISCHARGE
Start: 2023-11-05

## 2023-11-05 RX ORDER — FAMOTIDINE 10 MG/ML
1 INJECTION INTRAVENOUS
Qty: 0 | Refills: 0 | DISCHARGE
Start: 2023-11-05

## 2023-11-05 RX ORDER — SENNA PLUS 8.6 MG/1
2 TABLET ORAL
Qty: 0 | Refills: 0 | DISCHARGE
Start: 2023-11-05

## 2023-11-05 RX ORDER — ISOPROPYL ALCOHOL, BENZOCAINE .7; .06 ML/ML; ML/ML
0 SWAB TOPICAL
Qty: 100 | Refills: 1
Start: 2023-11-05

## 2023-11-05 RX ORDER — FOLIC ACID 0.8 MG
1 TABLET ORAL DAILY
Refills: 0 | Status: DISCONTINUED | OUTPATIENT
Start: 2023-11-06 | End: 2023-11-08

## 2023-11-05 RX ORDER — NIFEDIPINE 30 MG
1 TABLET, EXTENDED RELEASE 24 HR ORAL
Qty: 0 | Refills: 0 | DISCHARGE
Start: 2023-11-05

## 2023-11-05 RX ORDER — POLYETHYLENE GLYCOL 3350 17 G/17G
17 POWDER, FOR SOLUTION ORAL
Qty: 0 | Refills: 0 | DISCHARGE
Start: 2023-11-05

## 2023-11-05 RX ADMIN — Medication 500 MILLIGRAM(S): at 23:00

## 2023-11-05 RX ADMIN — Medication 9 UNIT(S): at 09:11

## 2023-11-05 RX ADMIN — Medication 25 MILLIGRAM(S): at 18:11

## 2023-11-05 RX ADMIN — Medication 25 MILLIGRAM(S): at 06:05

## 2023-11-05 RX ADMIN — TACROLIMUS 1 MILLIGRAM(S): 5 CAPSULE ORAL at 18:12

## 2023-11-05 RX ADMIN — FAMOTIDINE 20 MILLIGRAM(S): 10 INJECTION INTRAVENOUS at 14:45

## 2023-11-05 RX ADMIN — Medication 25 MILLIGRAM(S): at 16:01

## 2023-11-05 RX ADMIN — Medication 30 MILLIGRAM(S): at 06:05

## 2023-11-05 RX ADMIN — MYCOPHENOLATE MOFETIL 250 MILLIGRAM(S): 250 CAPSULE ORAL at 06:06

## 2023-11-05 RX ADMIN — Medication 11 UNIT(S): at 18:11

## 2023-11-05 RX ADMIN — CHLORHEXIDINE GLUCONATE 1 APPLICATION(S): 213 SOLUTION TOPICAL at 06:11

## 2023-11-05 RX ADMIN — SENNA PLUS 2 TABLET(S): 8.6 TABLET ORAL at 22:49

## 2023-11-05 RX ADMIN — MYCOPHENOLATE MOFETIL 250 MILLIGRAM(S): 250 CAPSULE ORAL at 18:12

## 2023-11-05 RX ADMIN — INSULIN GLARGINE 26 UNIT(S): 100 INJECTION, SOLUTION SUBCUTANEOUS at 23:00

## 2023-11-05 RX ADMIN — Medication 9 UNIT(S): at 12:13

## 2023-11-05 RX ADMIN — Medication 1: at 09:12

## 2023-11-05 RX ADMIN — Medication 3: at 12:13

## 2023-11-05 RX ADMIN — Medication 5 MILLIGRAM(S): at 06:05

## 2023-11-05 RX ADMIN — TACROLIMUS 2 MILLIGRAM(S): 5 CAPSULE ORAL at 09:11

## 2023-11-05 RX ADMIN — ENOXAPARIN SODIUM 40 MILLIGRAM(S): 100 INJECTION SUBCUTANEOUS at 14:45

## 2023-11-05 NOTE — CHART NOTE - NSCHARTNOTEFT_GEN_A_CORE
Endocrinology following Ms. Soto for new onset DM. Hyperglycemic to 300s    Recommend  - continue lantus 24 units qhs, do not hold if npo  - start admelog 10 units tid premeal, hold if npo or not eating  - start LOW admelog correction scale tid premeal instead of MODERATE  - start LOW admelog correction at bedtime instead of MODERATE  - target bg 100-180  - hypoglycemia protocol prn  -carb consistent diet    Endocrinology will continue to follow.    Joe Martin MD  Endocrinology Fellow Endocrinology following Ms. Soto for new onset DM. Hyperglycemic to 300s    Recommend  - continue lantus 24 units qhs, do not hold if npo  - start admelog 10 units tid premeal, hold if npo or not eating  - continue LOW admelog correction scale tid premeal  - continue LOW admelog correction at bedtime   - do a 2am bedtime equivalent low admelog correction scale  - fingersticks with meals, bedtime, 2am  - target bg 100-180  - hypoglycemia protocol prn  -carb consistent diet    Endocrinology will continue to follow.    Joe Martin MD  Endocrinology Fellow Endocrinology following Ms. Soto for new onset DM. Hyperglycemic to 300s    Recommend  - increase to lantus 26 units qhs, do not hold if npo  - start admelog 11 units tid premeal, hold if npo or not eating  - continue LOW admelog correction scale tid premeal  - continue LOW admelog correction at bedtime   - do a 2am bedtime equivalent low admelog correction scale  - fingersticks with meals, bedtime, 2am  - target bg 100-180  - hypoglycemia protocol prn  -carb consistent diet    Endocrinology will continue to follow.    Joe Martin MD  Endocrinology Fellow

## 2023-11-05 NOTE — PROGRESS NOTE ADULT - SUBJECTIVE AND OBJECTIVE BOX
[facility] DIVISION OF KIDNEY DISEASES AND HYPERTENSION   FOLLOW UP NOTE    --------------------------------------------------------------------------------    SUBJECTIVE / ROS / INTERVAL EVENTS:  -Patient seen and examined at bedside.  -Pt feels better, blood glucose have improved.       PAST HISTORY  --------------------------------------------------------------------------------  No significant changes to PMH, PSH, FHx, SHx, unless otherwise noted    ALLERGIES & MEDICATIONS  --------------------------------------------------------------------------------  Allergies    Bactrim (Pruritus)    Intolerances      MEDICATIONS  (STANDING):  cefuroxime   Tablet 500 milliGRAM(s) Oral every 12 hours  chlorhexidine 2% Cloths 1 Application(s) Topical <User Schedule>  enoxaparin Injectable 40 milliGRAM(s) SubCutaneous every 24 hours  famotidine    Tablet 20 milliGRAM(s) Oral daily  influenza  Vaccine (HIGH DOSE) 0.7 milliLiter(s) IntraMuscular once  insulin glargine Injectable (LANTUS) 24 Unit(s) SubCutaneous at bedtime  insulin lispro (ADMELOG) corrective regimen sliding scale   SubCutaneous three times a day before meals  insulin lispro (ADMELOG) corrective regimen sliding scale   SubCutaneous at bedtime  insulin lispro Injectable (ADMELOG) 9 Unit(s) SubCutaneous three times a day before meals  metoprolol tartrate 25 milliGRAM(s) Oral two times a day  mycophenolate mofetil 250 milliGRAM(s) Oral two times a day  NIFEdipine XL 30 milliGRAM(s) Oral every 24 hours  polyethylene glycol 3350 17 Gram(s) Oral every 24 hours  predniSONE   Tablet 5 milliGRAM(s) Oral daily  senna 2 Tablet(s) Oral at bedtime  tacrolimus ER Tablet (ENVARSUS XR) 2 milliGRAM(s) Oral <User Schedule>    MEDICATIONS  (PRN):  acetaminophen     Tablet .. 650 milliGRAM(s) Oral every 6 hours PRN Mild Pain (1 - 3)  hydrOXYzine hydrochloride 25 milliGRAM(s) Oral two times a day PRN Itching      Vital Signs Last 24 Hrs  T(C): 36.9 (05 Nov 2023 06:04), Max: 36.9 (05 Nov 2023 06:04)  T(F): 98.4 (05 Nov 2023 06:04), Max: 98.4 (05 Nov 2023 06:04)  HR: 68 (05 Nov 2023 06:04) (68 - 88)  BP: 149/75 (05 Nov 2023 06:04) (125/70 - 149/75)  BP(mean): --  RR: 20 (05 Nov 2023 06:04) (20 - 20)  SpO2: 100% (05 Nov 2023 06:04) (98% - 100%)    Parameters below as of 05 Nov 2023 06:04  Patient On (Oxygen Delivery Method): room air        I&O's Summary    04 Nov 2023 08:01  -  05 Nov 2023 07:00  --------------------------------------------------------  IN: 1090 mL / OUT: 0 mL / NET: 1090 mL    05 Nov 2023 07:01  -  05 Nov 2023 11:20  --------------------------------------------------------  IN: 250 mL / OUT: 0 mL / NET: 250 mL        PHYSICAL EXAM:  General: no acute distress  Neuro: no focal deficits  HEENT: anicteric, no JVD  Pulmonary: lungs CTA B/L  Cardiovascular/Chest: +S1S2  GI/Abdomen: soft, +bowel sounds. Transplant site non-tender  Extremities: no LE pitting edema  Skin: Warm and dry    LABS/STUDIES  --------------------------------------------------------------------------------                                                 9.7    5.99  )-----------( 169      ( 05 Nov 2023 09:27 )             31.6     11-05    140  |  108  |  11  ----------------------------<  185<H>  4.4   |  18<L>  |  0.67    Ca    8.6      05 Nov 2023 09:27  Phos  2.2     11-05  Mg     2.1     11-05    TPro  5.9<L>  /  Alb  3.2<L>  /  TBili  0.4  /  DBili  x   /  AST  24  /  ALT  22  /  AlkPhos  65  11-05    Tacrolimus (), Serum: 4.0 ng/mL (11-04 @ 07:13)        Culture - Urine (collected 11-01-23 @ 21:15)  Source: Clean Catch Clean Catch (Midstream)  Final Report (11-04-23 @ 17:32):    >100,000 CFU/ml Klebsiella variicola  Organism: Klebsiella variicola (11-04-23 @ 17:32)  Organism: Klebsiella variicola (11-04-23 @ 17:32)    Culture - Blood (collected 11-01-23 @ 11:40)  Source: .Blood Blood-Venous  Preliminary Report (11-05-23 @ 03:01):    No growth at 72 Hours    Culture - Blood (collected 11-01-23 @ 11:37)  Source: .Blood Blood-Peripheral  Preliminary Report (11-05-23 @ 03:01):    No growth at 72 Hours

## 2023-11-05 NOTE — PROGRESS NOTE ADULT - NS ATTEND AMEND GEN_ALL_CORE FT
67F sp DDRT 3/2023 with excellent renal function, now admitted with hyperglycemia glucose >600 and lactic acidosis.  Responded to insulin drip and transitioned to lantus  no evidence of DKA  +positive for Covid.  also had it a couple of weeks ago    Immunosuppression - Cont envarsus 1.5 mg daily; pred 5mg daily; MMF/imuran held due to covid  No need for treatment for covid at this time as she is asymptomatic. cont isolation  f/u Endocrinology for hyperglycemia. Previously not on insulin, no DM. insulin teaching and plan for d/c home with insulin
67F sp DDRT 3/2023 with excellent renal function, now admitted with hyperglycemia glucose >600 and lactic acidosis.  Responded to insulin drip and transitioned to lantus  no evidence of DKA  +positive for Covid.  also had it a couple of weeks ago    Immunosuppression - Cont envarsus 1.5 mg daily; pred 5mg daily; MMF/imuran held due to covid  f/u ID re treatment for Covid  f/u Endocrinology for hyperglycemia. Previously not on insulin, no DM. insulin teaching and plan for d/c home with insulin
67F sp DDRT 3/2023 with excellent renal function, now admitted with hyperglycemia glucose >600 and lactic acidosis.  Started in insulin drip with improvement  no evidence of DKA  +positive for Covid.  also had it a couple of weeks ago    Immunosuppression - Cont envarsus 1.5 mg daily; pred 5mg daily; MMF/imuran held due to covid  f/u ID re treatment for Covid  Endocrinology consult for hyperglycemia. Previously not on insulin, no DM. check Hgb A1c and cont insulin gtt for now.

## 2023-11-05 NOTE — PROGRESS NOTE ADULT - SUBJECTIVE AND OBJECTIVE BOX
Transplant Surgery - Multidisciplinary Progress Note  --------------------------------------------------------------  DDRT 3/28/23    Present:   Patient seen and examined with multidisciplinary team including Transplant Surgeon: Dr. Bourgeois. Dr. Montenegro, LORENA Fair, Transplant Nephrologist: Dr. Vinod Tobin and unit RN during am rounds.  Disciplines not in attendance will be notified of the plan    68F with Hx of Alport Syndrome and CKD 5  (x 15 years), LUE AVF placed in 2017, recently started HD on 3/17/2023 (Tanja, Nephrologist Dr. Jet Horn ) now s/p R  DDRT under Simulect induction on 3/28/23 presents from outpatient clinic for new onset hyperglycemia    Interval Events:   Afebrile, VSS  No o/n events or complaints    Immunosuppression:  Envarsus per level ,  mg bid, Pred 5  Ongoing monitoring for signs of rejection      Potential Discharge date: tomorrow  Education:  Medications  Plan of care:  See Below        MEDICATIONS  (STANDING):  cefuroxime   Tablet 500 milliGRAM(s) Oral every 12 hours  chlorhexidine 2% Cloths 1 Application(s) Topical <User Schedule>  enoxaparin Injectable 40 milliGRAM(s) SubCutaneous every 24 hours  famotidine    Tablet 20 milliGRAM(s) Oral daily  influenza  Vaccine (HIGH DOSE) 0.7 milliLiter(s) IntraMuscular once  insulin glargine Injectable (LANTUS) 24 Unit(s) SubCutaneous at bedtime  insulin lispro (ADMELOG) corrective regimen sliding scale   SubCutaneous three times a day before meals  insulin lispro (ADMELOG) corrective regimen sliding scale   SubCutaneous at bedtime  insulin lispro Injectable (ADMELOG) 9 Unit(s) SubCutaneous three times a day before meals  metoprolol tartrate 25 milliGRAM(s) Oral two times a day  mycophenolate mofetil 250 milliGRAM(s) Oral two times a day  NIFEdipine XL 30 milliGRAM(s) Oral every 24 hours  polyethylene glycol 3350 17 Gram(s) Oral every 24 hours  predniSONE   Tablet 5 milliGRAM(s) Oral daily  senna 2 Tablet(s) Oral at bedtime  tacrolimus ER Tablet (ENVARSUS XR) 2 milliGRAM(s) Oral <User Schedule>    MEDICATIONS  (PRN):  acetaminophen     Tablet .. 650 milliGRAM(s) Oral every 6 hours PRN Mild Pain (1 - 3)  hydrOXYzine hydrochloride 25 milliGRAM(s) Oral two times a day PRN Itching      PAST MEDICAL & SURGICAL HISTORY:  Alport's syndrome      HTN (hypertension)      Hard of hearing  uses hearing aids      Anemia      ESRD with anemia  left AV fistula, not in use      Deep vein thrombosis (DVT) of right upper extremity      Deep vein thrombosis (DVT) of right upper extremity      S/P tonsillectomy      A-V fistula  Nov 2017      Renal transplant recipient          Vital Signs Last 24 Hrs  T(C): 36.9 (05 Nov 2023 06:04), Max: 36.9 (05 Nov 2023 06:04)  T(F): 98.4 (05 Nov 2023 06:04), Max: 98.4 (05 Nov 2023 06:04)  HR: 68 (05 Nov 2023 06:04) (68 - 88)  BP: 149/75 (05 Nov 2023 06:04) (125/70 - 149/75)  BP(mean): --  RR: 20 (05 Nov 2023 06:04) (20 - 20)  SpO2: 100% (05 Nov 2023 06:04) (98% - 100%)    Parameters below as of 05 Nov 2023 06:04  Patient On (Oxygen Delivery Method): room air        I&O's Summary    04 Nov 2023 08:01  -  05 Nov 2023 07:00  --------------------------------------------------------  IN: 1090 mL / OUT: 0 mL / NET: 1090 mL    05 Nov 2023 07:01  -  05 Nov 2023 12:59  --------------------------------------------------------  IN: 250 mL / OUT: 0 mL / NET: 250 mL                              9.7    5.99  )-----------( 169      ( 05 Nov 2023 09:27 )             31.6     11-05    140  |  108  |  11  ----------------------------<  185<H>  4.4   |  18<L>  |  0.67    Ca    8.6      05 Nov 2023 09:27  Phos  2.2     11-05  Mg     2.1     11-05    TPro  5.9<L>  /  Alb  3.2<L>  /  TBili  0.4  /  DBili  x   /  AST  24  /  ALT  22  /  AlkPhos  65  11-05    Tacrolimus (), Serum: 4.0 ng/mL (11-04 @ 07:13)        Culture - Urine (collected 11-01-23 @ 21:15)  Source: Clean Catch Clean Catch (Midstream)  Final Report (11-04-23 @ 17:32):    >100,000 CFU/ml Klebsiella variicola  Organism: Klebsiella variicola (11-04-23 @ 17:32)  Organism: Klebsiella variicola (11-04-23 @ 17:32)    Culture - Blood (collected 11-01-23 @ 11:40)  Source: .Blood Blood-Venous  Preliminary Report (11-05-23 @ 03:01):    No growth at 72 Hours    Culture - Blood (collected 11-01-23 @ 11:37)  Source: .Blood Blood-Peripheral  Preliminary Report (11-05-23 @ 03:01):    No growth at 72 Hours                   Review of systems  Gen: No weight changes, fatigue, fevers/chills, weakness  Skin: No rashes  Head/Eyes/Ears/Mouth: No headache; Normal hearing; Normal vision w/o blurriness; No sinus pain/discomfort, sore throat  Respiratory: No dyspnea, cough, wheezing, hemoptysis  CV: No chest pain, PND, orthopnea  GI: no abdominal pain, denies diarrhea, constipation, nausea, vomiting, melena, hematochezia  : No increased frequency, dysuria, hematuria, nocturia  MSK: No joint pain/swelling; no back pain; no edema  Neuro: No dizziness/lightheadedness, weakness, seizures, numbness, tingling  Heme: No easy bruising or bleeding  Endo: No heat/cold intolerance  Psych: No significant nervousness, anxiety, stress, depression  All other systems were reviewed and are negative, except as noted.      PHYSICAL EXAM:  Constitutional: Well developed / well nourished  Eyes: Anicteric, PERRLA  ENMT: nc/at  Neck: supple  Respiratory: CTA B/L  Cardiovascular: RRR  Gastrointestinal: Soft, ND/NT, well healed incisional scar  Genitourinary: Voiding spontaneously  Extremities: SCD's in place and working bilaterally  Vascular: Palpable dp pulses bilaterally  Neurological: A&O x3  Skin: no rashes, ulcerations or lesions;  Musculoskeletal: Moving all extremities  Psychiatric: Responsive

## 2023-11-06 ENCOUNTER — TRANSCRIPTION ENCOUNTER (OUTPATIENT)
Age: 68
End: 2023-11-06

## 2023-11-06 DIAGNOSIS — E11.9 TYPE 2 DIABETES MELLITUS WITHOUT COMPLICATIONS: ICD-10-CM

## 2023-11-06 DIAGNOSIS — D84.9 IMMUNODEFICIENCY, UNSPECIFIED: ICD-10-CM

## 2023-11-06 LAB
ALBUMIN SERPL ELPH-MCNC: 3.2 G/DL — LOW (ref 3.3–5)
ALBUMIN SERPL ELPH-MCNC: 3.2 G/DL — LOW (ref 3.3–5)
ALP SERPL-CCNC: 60 U/L — SIGNIFICANT CHANGE UP (ref 40–120)
ALP SERPL-CCNC: 60 U/L — SIGNIFICANT CHANGE UP (ref 40–120)
ALT FLD-CCNC: 37 U/L — SIGNIFICANT CHANGE UP (ref 10–45)
ALT FLD-CCNC: 37 U/L — SIGNIFICANT CHANGE UP (ref 10–45)
ANION GAP SERPL CALC-SCNC: 13 MMOL/L — SIGNIFICANT CHANGE UP (ref 5–17)
ANION GAP SERPL CALC-SCNC: 13 MMOL/L — SIGNIFICANT CHANGE UP (ref 5–17)
AST SERPL-CCNC: 42 U/L — HIGH (ref 10–40)
AST SERPL-CCNC: 42 U/L — HIGH (ref 10–40)
BILIRUB SERPL-MCNC: 0.3 MG/DL — SIGNIFICANT CHANGE UP (ref 0.2–1.2)
BILIRUB SERPL-MCNC: 0.3 MG/DL — SIGNIFICANT CHANGE UP (ref 0.2–1.2)
BKV DNA SPEC QL NAA+PROBE: NOT DETECTED IU/ML
BLD GP AB SCN SERPL QL: NEGATIVE — SIGNIFICANT CHANGE UP
BLD GP AB SCN SERPL QL: NEGATIVE — SIGNIFICANT CHANGE UP
BUN SERPL-MCNC: 14 MG/DL — SIGNIFICANT CHANGE UP (ref 7–23)
BUN SERPL-MCNC: 14 MG/DL — SIGNIFICANT CHANGE UP (ref 7–23)
CALCIUM SERPL-MCNC: 8.5 MG/DL — SIGNIFICANT CHANGE UP (ref 8.4–10.5)
CALCIUM SERPL-MCNC: 8.5 MG/DL — SIGNIFICANT CHANGE UP (ref 8.4–10.5)
CHLORIDE SERPL-SCNC: 107 MMOL/L — SIGNIFICANT CHANGE UP (ref 96–108)
CHLORIDE SERPL-SCNC: 107 MMOL/L — SIGNIFICANT CHANGE UP (ref 96–108)
CO2 SERPL-SCNC: 19 MMOL/L — LOW (ref 22–31)
CO2 SERPL-SCNC: 19 MMOL/L — LOW (ref 22–31)
CREAT SERPL-MCNC: 0.84 MG/DL — SIGNIFICANT CHANGE UP (ref 0.5–1.3)
CREAT SERPL-MCNC: 0.84 MG/DL — SIGNIFICANT CHANGE UP (ref 0.5–1.3)
EGFR: 75 ML/MIN/1.73M2 — SIGNIFICANT CHANGE UP
EGFR: 75 ML/MIN/1.73M2 — SIGNIFICANT CHANGE UP
GLUCOSE BLDC GLUCOMTR-MCNC: 150 MG/DL — HIGH (ref 70–99)
GLUCOSE BLDC GLUCOMTR-MCNC: 150 MG/DL — HIGH (ref 70–99)
GLUCOSE BLDC GLUCOMTR-MCNC: 166 MG/DL — HIGH (ref 70–99)
GLUCOSE BLDC GLUCOMTR-MCNC: 166 MG/DL — HIGH (ref 70–99)
GLUCOSE BLDC GLUCOMTR-MCNC: 169 MG/DL — HIGH (ref 70–99)
GLUCOSE BLDC GLUCOMTR-MCNC: 169 MG/DL — HIGH (ref 70–99)
GLUCOSE BLDC GLUCOMTR-MCNC: 184 MG/DL — HIGH (ref 70–99)
GLUCOSE BLDC GLUCOMTR-MCNC: 184 MG/DL — HIGH (ref 70–99)
GLUCOSE BLDC GLUCOMTR-MCNC: 215 MG/DL — HIGH (ref 70–99)
GLUCOSE BLDC GLUCOMTR-MCNC: 215 MG/DL — HIGH (ref 70–99)
GLUCOSE SERPL-MCNC: 109 MG/DL — HIGH (ref 70–99)
GLUCOSE SERPL-MCNC: 109 MG/DL — HIGH (ref 70–99)
HCT VFR BLD CALC: 31.3 % — LOW (ref 34.5–45)
HCT VFR BLD CALC: 31.3 % — LOW (ref 34.5–45)
HGB BLD-MCNC: 9.6 G/DL — LOW (ref 11.5–15.5)
HGB BLD-MCNC: 9.6 G/DL — LOW (ref 11.5–15.5)
MAGNESIUM SERPL-MCNC: 1.9 MG/DL — SIGNIFICANT CHANGE UP (ref 1.6–2.6)
MAGNESIUM SERPL-MCNC: 1.9 MG/DL — SIGNIFICANT CHANGE UP (ref 1.6–2.6)
MCHC RBC-ENTMCNC: 29.4 PG — SIGNIFICANT CHANGE UP (ref 27–34)
MCHC RBC-ENTMCNC: 29.4 PG — SIGNIFICANT CHANGE UP (ref 27–34)
MCHC RBC-ENTMCNC: 30.7 GM/DL — LOW (ref 32–36)
MCHC RBC-ENTMCNC: 30.7 GM/DL — LOW (ref 32–36)
MCV RBC AUTO: 96 FL — SIGNIFICANT CHANGE UP (ref 80–100)
MCV RBC AUTO: 96 FL — SIGNIFICANT CHANGE UP (ref 80–100)
NRBC # BLD: 0 /100 WBCS — SIGNIFICANT CHANGE UP (ref 0–0)
NRBC # BLD: 0 /100 WBCS — SIGNIFICANT CHANGE UP (ref 0–0)
PHOSPHATE SERPL-MCNC: 2.1 MG/DL — LOW (ref 2.5–4.5)
PHOSPHATE SERPL-MCNC: 2.1 MG/DL — LOW (ref 2.5–4.5)
PLATELET # BLD AUTO: 129 K/UL — LOW (ref 150–400)
PLATELET # BLD AUTO: 129 K/UL — LOW (ref 150–400)
POTASSIUM SERPL-MCNC: 4.1 MMOL/L — SIGNIFICANT CHANGE UP (ref 3.5–5.3)
POTASSIUM SERPL-MCNC: 4.1 MMOL/L — SIGNIFICANT CHANGE UP (ref 3.5–5.3)
POTASSIUM SERPL-SCNC: 4.1 MMOL/L — SIGNIFICANT CHANGE UP (ref 3.5–5.3)
POTASSIUM SERPL-SCNC: 4.1 MMOL/L — SIGNIFICANT CHANGE UP (ref 3.5–5.3)
PROT SERPL-MCNC: 5.8 G/DL — LOW (ref 6–8.3)
PROT SERPL-MCNC: 5.8 G/DL — LOW (ref 6–8.3)
RBC # BLD: 3.26 M/UL — LOW (ref 3.8–5.2)
RBC # BLD: 3.26 M/UL — LOW (ref 3.8–5.2)
RBC # FLD: 15.9 % — HIGH (ref 10.3–14.5)
RBC # FLD: 15.9 % — HIGH (ref 10.3–14.5)
RH IG SCN BLD-IMP: NEGATIVE — SIGNIFICANT CHANGE UP
RH IG SCN BLD-IMP: NEGATIVE — SIGNIFICANT CHANGE UP
SODIUM SERPL-SCNC: 139 MMOL/L — SIGNIFICANT CHANGE UP (ref 135–145)
SODIUM SERPL-SCNC: 139 MMOL/L — SIGNIFICANT CHANGE UP (ref 135–145)
TACROLIMUS SERPL-MCNC: 5.3 NG/ML — SIGNIFICANT CHANGE UP
TACROLIMUS SERPL-MCNC: 5.3 NG/ML — SIGNIFICANT CHANGE UP
WBC # BLD: 5.7 K/UL — SIGNIFICANT CHANGE UP (ref 3.8–10.5)
WBC # BLD: 5.7 K/UL — SIGNIFICANT CHANGE UP (ref 3.8–10.5)
WBC # FLD AUTO: 5.7 K/UL — SIGNIFICANT CHANGE UP (ref 3.8–10.5)
WBC # FLD AUTO: 5.7 K/UL — SIGNIFICANT CHANGE UP (ref 3.8–10.5)

## 2023-11-06 PROCEDURE — 99232 SBSQ HOSP IP/OBS MODERATE 35: CPT | Mod: GC

## 2023-11-06 PROCEDURE — 99232 SBSQ HOSP IP/OBS MODERATE 35: CPT

## 2023-11-06 RX ORDER — METOPROLOL TARTRATE 50 MG
1 TABLET ORAL
Qty: 0 | Refills: 0 | DISCHARGE
Start: 2023-11-06

## 2023-11-06 RX ORDER — FOLIC ACID 0.8 MG
1 TABLET ORAL
Qty: 30 | Refills: 11
Start: 2023-11-06 | End: 2024-10-30

## 2023-11-06 RX ORDER — INSULIN GLARGINE 100 [IU]/ML
26 INJECTION, SOLUTION SUBCUTANEOUS
Qty: 0 | Refills: 0 | DISCHARGE
Start: 2023-11-06

## 2023-11-06 RX ORDER — INSULIN GLARGINE 100 [IU]/ML
26 INJECTION, SOLUTION SUBCUTANEOUS
Qty: 1 | Refills: 11
Start: 2023-11-06 | End: 2024-10-30

## 2023-11-06 RX ORDER — INSULIN LISPRO 100/ML
11 VIAL (ML) SUBCUTANEOUS
Qty: 1 | Refills: 11
Start: 2023-11-06 | End: 2024-10-30

## 2023-11-06 RX ORDER — SODIUM,POTASSIUM PHOSPHATES 278-250MG
2 POWDER IN PACKET (EA) ORAL ONCE
Refills: 0 | Status: COMPLETED | OUTPATIENT
Start: 2023-11-06 | End: 2023-11-06

## 2023-11-06 RX ADMIN — Medication 500 MILLIGRAM(S): at 09:15

## 2023-11-06 RX ADMIN — FAMOTIDINE 20 MILLIGRAM(S): 10 INJECTION INTRAVENOUS at 13:07

## 2023-11-06 RX ADMIN — Medication 1: at 13:08

## 2023-11-06 RX ADMIN — MYCOPHENOLATE MOFETIL 250 MILLIGRAM(S): 250 CAPSULE ORAL at 17:40

## 2023-11-06 RX ADMIN — MYCOPHENOLATE MOFETIL 250 MILLIGRAM(S): 250 CAPSULE ORAL at 05:21

## 2023-11-06 RX ADMIN — ENOXAPARIN SODIUM 40 MILLIGRAM(S): 100 INJECTION SUBCUTANEOUS at 13:09

## 2023-11-06 RX ADMIN — INSULIN GLARGINE 26 UNIT(S): 100 INJECTION, SOLUTION SUBCUTANEOUS at 21:45

## 2023-11-06 RX ADMIN — Medication 500 MILLIGRAM(S): at 21:45

## 2023-11-06 RX ADMIN — CHLORHEXIDINE GLUCONATE 1 APPLICATION(S): 213 SOLUTION TOPICAL at 09:15

## 2023-11-06 RX ADMIN — Medication 2: at 17:40

## 2023-11-06 RX ADMIN — Medication 1: at 02:28

## 2023-11-06 RX ADMIN — Medication 1 MILLIGRAM(S): at 13:07

## 2023-11-06 RX ADMIN — Medication 25 MILLIGRAM(S): at 05:21

## 2023-11-06 RX ADMIN — Medication 11 UNIT(S): at 17:40

## 2023-11-06 RX ADMIN — TACROLIMUS 3 MILLIGRAM(S): 5 CAPSULE ORAL at 09:15

## 2023-11-06 RX ADMIN — Medication 11 UNIT(S): at 13:08

## 2023-11-06 RX ADMIN — Medication 2 PACKET(S): at 17:40

## 2023-11-06 RX ADMIN — Medication 30 MILLIGRAM(S): at 05:21

## 2023-11-06 RX ADMIN — Medication 11 UNIT(S): at 09:15

## 2023-11-06 RX ADMIN — Medication 1: at 09:15

## 2023-11-06 RX ADMIN — Medication 25 MILLIGRAM(S): at 17:39

## 2023-11-06 RX ADMIN — Medication 5 MILLIGRAM(S): at 05:21

## 2023-11-06 NOTE — DISCHARGE NOTE PROVIDER - NSFOLLOWUPCLINICS_GEN_ALL_ED_FT
[facility]  Endocrinology  [address] [place] [address]  Phone: [phone]  Fax:     
Home

## 2023-11-06 NOTE — PROGRESS NOTE ADULT - ATTENDING COMMENTS
67F sp DDRT 3/2023 with excellent renal function, now admitted with hyperglycemia glucose >600 and lactic acidosis.  Responded to insulin drip and transitioned to lantus  no evidence of DKA  +positive for Covid.  also had it a couple of weeks ago    Immunosuppression - Cont envarsus 1.5 mg daily; pred 5mg daily; MMF/imuran held due to covid  f/u ID re treatment for Covid  f/u Endocrinology for hyperglycemia. Previously not on insulin, no DM. check Hgb A1c
Pt seen and examined with SICU team, agree with above. D/w Transplant team.    Gram-negative UTI:  - Continue Zosyn, follow up urine culture for speciation and sensitivities    COVID infection:  - Unclear whether acute or subacute - follow up COVID cycle threshold results    Newly-diagnosed DM:  - HbA1c 10%  - Endocrine consultation appreciated  - Continue lantus 24 units daily and premeal lispro 8 units, SSI  - Pt will need diabetes teaching, insulin administration instruction, etc, before discharge    S/p DDRT March 2023:  - Continue tacro and prednisone per Tx    Essential HTN:  - On home meds
Blood sugars improved and no resp symptoms  Stable for d/c when she has supplies.   Cont current immunosuppression
Blood sugars improving, endocrine input appreciated.  Cont Zosyn for UTI.  Will resume MMF tomorrow.   COVID + but asymptomatic.
68F with Hx of Alport Syndrome and CKD 5 (x 15 years), LUE AVF placed in 2017, RUE DVT in April 2023 recently started HD on 3/17/2023 (Tanja, Nephrologist Dr. Jet Horn ) now s/p R DDRT under Simulect induction on 3/28/23 and at that time donor with polymicrobial positive blood cultures (Klebsiella spp, strep) presents from outpatient clinic after she was found to have elevated blood glucose to 600's and was informed to go to the ER. UPon work up patient found to have BC of 771 with A1c of 10.4, COVID positive with negative CXR and UA showing trace leukocyte esterase, 60 wbc and mod bacteria. Patient admitted to the ICU for insulin gtt manage of HHS.    COVID 19 cycle threshold 36.5 and exposure ~ 3 weeks ago indicating shedding at this time,  thus will not treat    Hyperglycemia, pyuria and bacteruria- will treat shortly for cystitis in context of significant pyuria. ~ 7 days - hopefully will be able to deescalate and change to po antibiotics        Thank you for involving us in the care of this patient  Transplant ID will continue to follow  Please call or page with additional questions  Pager; #[phone]  Teams: from 8 am to 5 pm  Zeenat Burrows MD

## 2023-11-06 NOTE — DISCHARGE NOTE PROVIDER - NSDCFUADDAPPT_GEN_ALL_CORE_FT
please follow up with Dr. Mejias as scheduled  please follow up with Endocrine next week please follow up with Dr. Mejias as scheduled  please follow up with Endocrine next week, call to schedule-- [address], [address]. [place] [address] Tel# [phone].

## 2023-11-06 NOTE — DISCHARGE NOTE NURSING/CASE MANAGEMENT/SOCIAL WORK - PATIENT PORTAL LINK FT
You can access the FollowMyHealth Patient Portal offered by [facility] by registering at the following website: [email or web address]. By joining [facility]’s FollowMyHealth portal, you will also be able to view your health information using other applications (apps) compatible with our system.

## 2023-11-06 NOTE — DISCHARGE NOTE PROVIDER - NSDCACTIVITY_GEN_ALL_CORE
No restrictions Stairs allowed/Walking - Indoors allowed/Walking - Outdoors allowed/Follow Instructions Provided by your Surgical Team

## 2023-11-06 NOTE — PROGRESS NOTE ADULT - SUBJECTIVE AND OBJECTIVE BOX
Transplant Surgery - Multidisciplinary Progress Note  --------------------------------------------------------------  DDRT 3/28/23    Present:   Patient seen and examined with multidisciplinary team including Transplant Surgeon: Dr. Baer. Dr. Montenegro, LORENA Fair, Transplant Nephrologist: Dr. Vinod Tobin, Pharmacist: Karen and unit RN during am rounds.  Disciplines not in attendance will be notified of the plan    68F with Hx of Alport Syndrome and CKD 5  (x 15 years), LUE AVF placed in 2017, recently started HD on 3/17/2023 (Tanja, Nephrologist Dr. Jet Horn ) now s/p R  DDRT under Simulect induction on 3/28/23 presents from outpatient clinic for new onset hyperglycemia    Interval Events:   Afebrile, VSS  No o/n events or complaints    Immunosuppression:  Envarsus per level ,  mg bid, Pred 5    MEDICATIONS  (STANDING):  cefuroxime   Tablet 500 milliGRAM(s) Oral every 12 hours  chlorhexidine 2% Cloths 1 Application(s) Topical <User Schedule>  enoxaparin Injectable 40 milliGRAM(s) SubCutaneous every 24 hours  famotidine    Tablet 20 milliGRAM(s) Oral daily  folic acid 1 milliGRAM(s) Oral daily  influenza  Vaccine (HIGH DOSE) 0.7 milliLiter(s) IntraMuscular once  insulin glargine Injectable (LANTUS) 26 Unit(s) SubCutaneous at bedtime  insulin lispro (ADMELOG) corrective regimen sliding scale   SubCutaneous <User Schedule>  insulin lispro (ADMELOG) corrective regimen sliding scale   SubCutaneous three times a day before meals  insulin lispro (ADMELOG) corrective regimen sliding scale   SubCutaneous at bedtime  insulin lispro Injectable (ADMELOG) 11 Unit(s) SubCutaneous three times a day before meals  metoprolol tartrate 25 milliGRAM(s) Oral two times a day  mycophenolate mofetil 250 milliGRAM(s) Oral two times a day  NIFEdipine XL 30 milliGRAM(s) Oral every 24 hours  polyethylene glycol 3350 17 Gram(s) Oral every 24 hours  predniSONE   Tablet 5 milliGRAM(s) Oral daily  senna 2 Tablet(s) Oral at bedtime  tacrolimus ER Tablet (ENVARSUS XR) 3 milliGRAM(s) Oral <User Schedule>    MEDICATIONS  (PRN):  acetaminophen     Tablet .. 650 milliGRAM(s) Oral every 6 hours PRN Mild Pain (1 - 3)  hydrOXYzine hydrochloride 25 milliGRAM(s) Oral two times a day PRN Itching      PAST MEDICAL & SURGICAL HISTORY:  Alport's syndrome      HTN (hypertension)      Hard of hearing  uses hearing aids      Anemia      ESRD with anemia  left AV fistula, not in use      Deep vein thrombosis (DVT) of right upper extremity      Deep vein thrombosis (DVT) of right upper extremity      S/P tonsillectomy      A-V fistula  Nov 2017      Renal transplant recipient          Vital Signs Last 24 Hrs  T(C): 37.2 (06 Nov 2023 09:28), Max: 37.3 (05 Nov 2023 17:55)  T(F): 98.9 (06 Nov 2023 09:28), Max: 99.1 (05 Nov 2023 17:55)  HR: 64 (06 Nov 2023 09:28) (64 - 76)  BP: 145/75 (06 Nov 2023 09:28) (117/60 - 145/75)  BP(mean): --  RR: 18 (06 Nov 2023 09:28) (18 - 18)  SpO2: 100% (06 Nov 2023 09:28) (95% - 100%)    Parameters below as of 06 Nov 2023 09:28  Patient On (Oxygen Delivery Method): room air        I&O's Summary    05 Nov 2023 07:01  -  06 Nov 2023 07:00  --------------------------------------------------------  IN: 490 mL / OUT: 0 mL / NET: 490 mL                              9.6    5.70  )-----------( 129      ( 06 Nov 2023 07:35 )             31.3     11-06    139  |  107  |  14  ----------------------------<  109<H>  4.1   |  19<L>  |  0.84    Ca    8.5      06 Nov 2023 07:34  Phos  2.1     11-06  Mg     1.9     11-06    TPro  5.8<L>  /  Alb  3.2<L>  /  TBili  0.3  /  DBili  x   /  AST  42<H>  /  ALT  37  /  AlkPhos  60  11-06    Tacrolimus (), Serum: 5.3 ng/mL (11-06 @ 07:38)        Culture - Urine (collected 11-01-23 @ 21:15)  Source: Clean Catch Clean Catch (Midstream)  Final Report (11-04-23 @ 17:32):    >100,000 CFU/ml Klebsiella variicola  Organism: Klebsiella variicola (11-04-23 @ 17:32)  Organism: Klebsiella variicola (11-04-23 @ 17:32)    Culture - Blood (collected 11-01-23 @ 11:40)  Source: .Blood Blood-Venous  Preliminary Report (11-06-23 @ 03:00):    No growth at 4 days    Culture - Blood (collected 11-01-23 @ 11:37)  Source: .Blood Blood-Peripheral  Preliminary Report (11-06-23 @ 03:00):    No growth at 4 days            Ongoing monitoring for signs of rejection      Potential Discharge date: tomorrow  Education:  Medications  Plan of care:  See Below             Review of systems  Gen: No weight changes, fatigue, fevers/chills, weakness  Skin: No rashes  Head/Eyes/Ears/Mouth: No headache; Normal hearing; Normal vision w/o blurriness; No sinus pain/discomfort, sore throat  Respiratory: No dyspnea, cough, wheezing, hemoptysis  CV: No chest pain, PND, orthopnea  GI: no abdominal pain, denies diarrhea, constipation, nausea, vomiting, melena, hematochezia  : No increased frequency, dysuria, hematuria, nocturia  MSK: No joint pain/swelling; no back pain; no edema  Neuro: No dizziness/lightheadedness, weakness, seizures, numbness, tingling  Heme: No easy bruising or bleeding  Endo: No heat/cold intolerance  Psych: No significant nervousness, anxiety, stress, depression  All other systems were reviewed and are negative, except as noted.      PHYSICAL EXAM:  Constitutional: Well developed / well nourished  Eyes: Anicteric, PERRLA  ENMT: nc/at  Neck: supple  Respiratory: CTA B/L  Cardiovascular: RRR  Gastrointestinal: Soft, ND/NT, well healed incisional scar  Genitourinary: Voiding spontaneously  Extremities: SCD's in place and working bilaterally  Vascular: Palpable dp pulses bilaterally  Neurological: A&O x3  Skin: no rashes, ulcerations or lesions;  Musculoskeletal: Moving all extremities  Psychiatric: Responsive

## 2023-11-06 NOTE — DISCHARGE NOTE PROVIDER - CARE PROVIDERS DIRECT ADDRESSES
[email or web address]

## 2023-11-06 NOTE — DIETITIAN INITIAL EVALUATION ADULT - ADD RECOMMEND
-- Monitor PO intake, GI tolerance, skin integrity, labs, weight, and bowel movement regularity.   -- Will continue to honor food and beverage preferences within diet restriction of patient in an effort to maximize level of nutrient intake.  -- Assist with meals PRN and encourage PO intake.  -- F/U with nutrition education as needed.

## 2023-11-06 NOTE — DIETITIAN INITIAL EVALUATION ADULT - REASON INDICATOR FOR ASSESSMENT
Pt seen for consult for new dx DM with elevated HbA1c. Information obtained from pt, RN, electronic medical record. Chart reviewed, events noted.

## 2023-11-06 NOTE — PROGRESS NOTE ADULT - SUBJECTIVE AND OBJECTIVE BOX
[facility] DIVISION OF KIDNEY DISEASES AND HYPERTENSION   FOLLOW UP NOTE    --------------------------------------------------------------------------------    SUBJECTIVE / ROS / INTERVAL EVENTS:  -Patient seen and examined at bedside.  -Cr at baseline  -VSS, afebrile, on room air        PAST HISTORY  --------------------------------------------------------------------------------  No significant changes to PMH, PSH, FHx, SHx, unless otherwise noted    ALLERGIES & MEDICATIONS  --------------------------------------------------------------------------------  Allergies    Bactrim (Pruritus)    Intolerances      Standing Inpatient Medications  cefuroxime   Tablet 500 milliGRAM(s) Oral every 12 hours  chlorhexidine 2% Cloths 1 Application(s) Topical <User Schedule>  enoxaparin Injectable 40 milliGRAM(s) SubCutaneous every 24 hours  famotidine    Tablet 20 milliGRAM(s) Oral daily  folic acid 1 milliGRAM(s) Oral daily  influenza  Vaccine (HIGH DOSE) 0.7 milliLiter(s) IntraMuscular once  insulin glargine Injectable (LANTUS) 26 Unit(s) SubCutaneous at bedtime  insulin lispro (ADMELOG) corrective regimen sliding scale   SubCutaneous <User Schedule>  insulin lispro (ADMELOG) corrective regimen sliding scale   SubCutaneous three times a day before meals  insulin lispro (ADMELOG) corrective regimen sliding scale   SubCutaneous at bedtime  insulin lispro Injectable (ADMELOG) 11 Unit(s) SubCutaneous three times a day before meals  metoprolol tartrate 25 milliGRAM(s) Oral two times a day  mycophenolate mofetil 250 milliGRAM(s) Oral two times a day  NIFEdipine XL 30 milliGRAM(s) Oral every 24 hours  polyethylene glycol 3350 17 Gram(s) Oral every 24 hours  potassium phosphate / sodium phosphate Powder (PHOS-NaK) 2 Packet(s) Oral once  predniSONE   Tablet 5 milliGRAM(s) Oral daily  senna 2 Tablet(s) Oral at bedtime  tacrolimus ER Tablet (ENVARSUS XR) 3 milliGRAM(s) Oral <User Schedule>    PRN Inpatient Medications  acetaminophen     Tablet .. 650 milliGRAM(s) Oral every 6 hours PRN  hydrOXYzine hydrochloride 25 milliGRAM(s) Oral two times a day PRN      VITALS  --------------------------------------------------------------------------------  T(C): 37.2 (11-06-23 @ 13:57), Max: 37.3 (11-05-23 @ 17:55)  HR: 64 (11-06-23 @ 13:57) (64 - 76)  BP: 134/71 (11-06-23 @ 13:57) (129/66 - 145/75)  RR: 18 (11-06-23 @ 13:57) (18 - 18)  SpO2: 100% (11-06-23 @ 13:57) (95% - 100%)  Wt(kg): --      11-05-23 @ 07:01  -  11-06-23 @ 07:00  --------------------------------------------------------  IN: 490 mL / OUT: 0 mL / NET: 490 mL    11-06-23 @ 07:01  -  11-06-23 @ 15:11  --------------------------------------------------------  IN: 240 mL / OUT: 0 mL / NET: 240 mL      PHYSICAL EXAM:  General: no acute distress  Neuro: no focal deficits  HEENT: anicteric, no JVD  Pulmonary: lungs CTA B/L  Cardiovascular/Chest: +S1S2  GI/Abdomen: soft, +bowel sounds. Transplant site non-tender  Extremities: no LE pitting edema  Skin: Warm and dry    LABS/STUDIES  --------------------------------------------------------------------------------              9.6    5.70  >-----------<  129      [11-06-23 @ 07:35]              31.3     139  |  107  |  14  ----------------------------<  109      [11-06-23 @ 07:34]  4.1   |  19  |  0.84        Ca     8.5     [11-06-23 @ 07:34]      Mg     1.9     [11-06-23 @ 07:34]      Phos  2.1     [11-06-23 @ 07:34]    TPro  5.8  /  Alb  3.2  /  TBili  0.3  /  DBili  x   /  AST  42  /  ALT  37  /  AlkPhos  60  [11-06-23 @ 07:34]    PT/INR: PT 12.6 , INR 1.15       [11-05-23 @ 09:27]  PTT: 27.0       [11-05-23 @ 09:27]      Creatinine Trend:  SCr 0.84 [11-06 @ 07:34]  SCr 0.67 [11-05 @ 09:27]  SCr 0.81 [11-04 @ 07:15]  SCr 0.79 [11-03 @ 06:15]  SCr 0.80 [11-02 @ 03:07]    Urinalysis - [11-06-23 @ 07:34]      Color  / Appearance  / SG  / pH       Gluc 109 / Ketone   / Bili  / Urobili        Blood  / Protein  / Leuk Est  / Nitrite       RBC  / WBC  / Hyaline  / Gran  / Sq Epi  / Non Sq Epi  / Bacteria           TacrolimusTacrolimus (), Serum: 5.3 ng/mL (11-06 @ 07:38)  Tacrolimus (), Serum: 4.9 ng/mL (11-05 @ 09:27)  Tacrolimus (), Serum: 4.0 ng/mL (11-04 @ 07:13)  Tacrolimus (), Serum: 5.4 ng/mL (11-03 @ 06:15)    Cyclosporine  Sirolimus  MycophenolateMycophenolic Acid, Serum: 2.2 ug/mL (11-02 @ 00:09)

## 2023-11-06 NOTE — DISCHARGE NOTE PROVIDER - NSDCFUSCHEDAPPT_GEN_ALL_CORE_FT
[facility]  TRANSPLANT [address]   Scheduled Appointment: 11/28/2023    Farhat Mejias  [facility]  NEPHRO [address]  Scheduled Appointment: 11/29/2023    Jet Horn  [facility]  [address]PHRO [address]   Scheduled Appointment: 12/22/2023    Farhat Mejias  [facility]  [address]  Scheduled Appointment: 02/01/2024

## 2023-11-06 NOTE — DISCHARGE NOTE PROVIDER - NSDCCPCAREPLAN_GEN_ALL_CORE_FT
PRINCIPAL DISCHARGE DIAGNOSIS  Diagnosis: Diabetes mellitus following renal transplant  Assessment and Plan of Treatment: follow your insulin regimen and education closely  call your Endocrine team with any questions or issues  watch diet     PRINCIPAL DISCHARGE DIAGNOSIS  Diagnosis: Diabetes mellitus following renal transplant  Assessment and Plan of Treatment: Uncongrolled blood sugar levels can lead to poor wound healing and other complications. Follow a low carb and low sugar diet. Continue to take all anti-diabetic medications/insulin as prescribed. Follow up with your Primary Care Doctor regularly for blood sugar/A1c checks. Follow up with an opthalmologist and a podiatrist on an annual basis      SECONDARY DISCHARGE DIAGNOSES  Diagnosis: Essential hypertension  Assessment and Plan of Treatment: Be sure to follow a low salt diet, avoid caffeine, reduce alcohol intake.  If you have been prescribed antihypertensive medications to control your blood pressure, be sure to take them every day as prescribed and do not miss any doses, the medications do not work if they are not taken consistently. Follow up with your Primary Care Doctor and have your Blood Pressure checked regularly.       Diagnosis: Renal transplant recipient  Assessment and Plan of Treatment: Contact our Transplant clinic at [phone], return to our Emergency Department or NOTIFY YOUR NEPHROLOGIST IF:  You have any bleeding that does not stop, any fever (over 100.4 F) or chills, persistent nausea/vomiting with inability to tolerate food or liquids, persistent diarrhea, and/or if you develop severe abdominal pain, confusion, any signs of bleeding, any urinary changes.

## 2023-11-06 NOTE — DISCHARGE NOTE PROVIDER - HOSPITAL COURSE
68F with Hx of Alport Syndrome and CKD 5  (x 15 years), LUE AVF placed in 2017, recently started HD on 3/17/2023 ([facility], Nephrologist Dr. Jet Horn ) now s/p R  DDRT under Simulect induction on 3/28/23 presents from outpatient clinic for new onset hyperglycemia    New Onset DM  -asymptomatic uncontrolled hyperglycemia without DKA requiring SICU transfer for close observation on insulin gtt  -Managed by Endocrine  -insulin teaching provided by RN      ID:  - asymptomatic COVID+. Transplant ID:  defer treatment for COVID as cycle threshold was high and repeat COVID testing negative  - asymptomatic Klebsiella UTI: Zosyn--> Cefuroxime until 11/7    DDRT  -good graft function  -Immunosuppression: will continue Envarsus per level, MMF 250BID (home dose 2/2 leukopenia in past), continue Pred 5QD      DISPO  -d/c home with family  -[facility] arranged for new diabetic assistance and teaching  -f/u with Endocrine in one week 68F with Hx of Alport Syndrome and CKD 5  (x 15 years), LUE AVF placed in 2017, recently started HD on 3/17/2023 ([facility], Nephrologist Dr. Jet Horn ) now s/p R  DDRT under Simulect induction on 3/28/23 presents from outpatient clinic for new onset hyperglycemia    New Onset DM  -asymptomatic uncontrolled hyperglycemia without DKA requiring SICU transfer for close observation on insulin gtt  -Managed by Endocrine  -insulin teaching provided by RN  - discharged on Lantus 26u nightly, Lispro 11u three times a day with meals    ID:  - asymptomatic COVID+. Transplant ID:  defer treatment for COVID as cycle threshold was high and repeat COVID testing negative  - asymptomatic Klebsiella UTI: Zosyn--> Cefuroxime until 11/7    DDRT  -good graft function  -Immunosuppression: will continue Envarsus per level, MMF 250BID (home dose 2/2 leukopenia in past), continue Pred 5QD    DISPO  -d/c home with family  -[facility] arranged for new diabetic assistance and teaching  -f/u with Endocrine in one week  [address] [facility], [address] [place] [address] Tel# [phone].  -follow up with Dr. Mejias as scheduled   68F with Hx of Alport Syndrome and CKD 5  (x 15 years), LUE AVF placed in 2017, recently started HD on 3/17/2023 ([facility], Nephrologist Dr. Jet Horn ) now s/p R  DDRT under Simulect induction on 3/28/23 presents from outpatient clinic for new onset hyperglycemia    New Onset DM  -asymptomatic uncontrolled hyperglycemia without DKA requiring SICU transfer for close observation on insulin gtt  -Managed by Endocrine  -insulin teaching provided by RN  - discharged on Lantus 26u nightly, Lispro 11u three times a day with meals    ID:  - asymptomatic COVID+. Transplant ID:  defer treatment for COVID as cycle threshold was high and repeat COVID testing negative  - asymptomatic Klebsiella UTI: Zosyn--> Cefuroxime until 11/7    DDRT  -good graft function  -Immunosuppression: will continue Envarsus per level, MMF 250BID (home dose 2/2 leukopenia in past), continue Pred 5QD    DISPO  -d/c home with family  -[facility] arranged for new diabetic assistance and teaching  -f/u with Endocrine in one week. [address] [place] [address], [address]. [place] [address] Tel# [phone].  -follow up with Dr. Mejias as scheduled   68F with Hx of Alport Syndrome and CKD 5  (x 15 years), LUE AVF placed in 2017, recently started HD on 3/17/2023 ([facility]ja, Nephrologist Dr. Jet Horn ) now s/p R  DDRT under Simulect induction on 3/28/23 presents from outpatient clinic for new onset hyperglycemia    New Onset DM  -asymptomatic uncontrolled hyperglycemia without DKA requiring SICU transfer for close observation on insulin gtt  -Managed by Endocrine  -insulin teaching provided by RN  - discharged on Lantus 26u nightly, Lispro 11u three times a day with meals (hold if NPO)    ID:  - asymptomatic COVID+. Transplant ID:  defer treatment for COVID as cycle threshold was high and repeat COVID testing negative  - asymptomatic Klebsiella UTI: Zosyn--> Cefuroxime until 11/7    DDRT  -good graft function  -Immunosuppression: will continue Envarsus per level, MMF 250BID (home dose 2/2 leukopenia in past), continue Pred 5QD    DISPO  -d/c home with family  -[facility] arranged for new diabetic assistance and teaching  -f/u with Endocrine in one week. [address] [facility], [address]. [place] [address] Tel# [phone].  -follow up with Dr. Mejias as scheduled

## 2023-11-06 NOTE — DISCHARGE NOTE PROVIDER - CARE PROVIDER_API CALL
Farhat Mejias  Nephrology  [address] [place] [address]  Phone: [phone]  Fax: [phone]  Follow Up Time:    Farhat Mejias  Nephrology  [address] [place] [address]  Phone: [phone]  Fax: [phone]  Scheduled Appointment: 11/29/2023   Farhat Mejias  Nephrology  [address] [place] [address]  Phone: [phone]  Fax: [phone]  Scheduled Appointment: 11/28/2023   Farhat Mejias  Nephrology  [address] [place] [address]  Phone: [phone]  Fax: [phone]  Scheduled Appointment: 11/28/2023    [facility],   [address] [place] [address], [address]. [place] [address]  Phone: [phone]  Fax: (   )    -  Follow Up Time: 1 week

## 2023-11-06 NOTE — DIETITIAN INITIAL EVALUATION ADULT - PERTINENT LABORATORY DATA
11-06    139  |  107  |  14  ----------------------------<  109<H>  4.1   |  19<L>  |  0.84    Ca    8.5      06 Nov 2023 07:34  Phos  2.1     11-06  Mg     1.9     11-06    TPro  5.8<L>  /  Alb  3.2<L>  /  TBili  0.3  /  DBili  x   /  AST  42<H>  /  ALT  37  /  AlkPhos  60  11-06    CAPILLARY BLOOD GLUCOSE  POCT Blood Glucose.: 166 mg/dL (06 Nov 2023 12:56)  POCT Blood Glucose.: 184 mg/dL (06 Nov 2023 09:07)  POCT Blood Glucose.: 169 mg/dL (06 Nov 2023 01:57)  POCT Blood Glucose.: 133 mg/dL (05 Nov 2023 22:30)  POCT Blood Glucose.: 109 mg/dL (05 Nov 2023 18:10)    A1C with Estimated Average Glucose Result: 10.1 % (11-04-23 @ 07:15)  A1C with Estimated Average Glucose Result: 10.4 % (11-01-23 @ 21:16)  A1C with Estimated Average Glucose Result: 5.5 % (03-30-23 @ 09:21)

## 2023-11-06 NOTE — DIETITIAN INITIAL EVALUATION ADULT - LITERATURE/VIDEOS GIVEN
Carbohydrate Counting for People with Diabetes, Diabetes Label Reading Nutrition Tips, Plate Method for Diabetes

## 2023-11-06 NOTE — DISCHARGE NOTE PROVIDER - PROVIDER TOKENS
PROVIDER:[TOKEN:[[id number]]] PROVIDER:[TOKEN:[[id number]],SCHEDULEDAPPT:[11/29/2023]] PROVIDER:[TOKEN:[[id number]],SCHEDULEDAPPT:[11/28/2023]] PROVIDER:[TOKEN:[[id number]],SCHEDULEDAPPT:[11/28/2023]],FREE:[LAST:[[facility]],PHONE:[phone]],FAX:[(   )    -],ADDRESS:[[address] [place] [address], [facility] [address]. [place] [address]],FOLLOWUP:[1 week]]

## 2023-11-06 NOTE — DIETITIAN INITIAL EVALUATION ADULT - REASON FOR ADMISSION
Hyperglycemia    Per chart: "68F with Hx of Alport Syndrome and CKD 5  (x 15 years), CHALOE AVF placed in 2017, recently started HD on 3/17/2023 ([facility], Nephrologist Dr. Jet Horn ) now s/p R  DDRT under Simulect induction on 3/28/23 presents from outpatient clinic after she was found to have elevated blood glucose to 600's and was informed to go to the ER. Denies any hx diabetes or dietary changes.    Patient also endorses 3 month history of itching around her face that is most prevalent at night. Was given medication for symptoms however felt very drowsy afterwards, later tried benadryl with worsening drowsiness side effects.    She also endorses weakness in her lower extremities that has been ongoing for 2 months that occurs after walking. She endorses she feels she need to stop and rest after taking "a few hundred steps" which is atypical for her. Denies any numbness/tingling of feet, but also endorses feeling more tired than usual the past several months."

## 2023-11-06 NOTE — DISCHARGE NOTE NURSING/CASE MANAGEMENT/SOCIAL WORK - NSSCTYPOFSERV_GEN_ALL_CORE
Skilled RN services. SOC 11/8. RN will call to schedule appointment Skilled RN services. SOC 11/9. RN will call to schedule appointment

## 2023-11-06 NOTE — DIETITIAN INITIAL EVALUATION ADULT - OTHER INFO
-- Ordered for Prednisone, Tacrolimus, Cellcept (history of kidney transplant 3/2023)  -- Pt with new dx DM (HbA1c 10.1 on 11.4), now is on ISS (Lispro, Lantus) to regulate blood glucose while in house.   -- On Folic acid, Pepcid and Senna   -- s/p supplements for hypokalemia and hypophosphatemia (KCl, Phos-NaK). Most recent lab 11/6 P 2.1L, ordered for K3PO4 today.   -- On contact precaution since 11/2 for Covid 19+

## 2023-11-06 NOTE — DIETITIAN INITIAL EVALUATION ADULT - EDUCATION DIETARY MODIFICATIONS
Provided education on Carbohydrate Consistent diet including sources of carbohydrates, portion sizes, pairing protein with carbohydrates, limiting sugar sweetened beverages in diet and the importance of consistent eating pattern to help optimize glycemic control./teach back/(1) partially meets; needs review/practice/verbalization

## 2023-11-06 NOTE — DIETITIAN INITIAL EVALUATION ADULT - ENERGY INTAKE
pt reports good PO intake while in house, observed with 100% breakfast tray completed upon visit. Menu at bedside, pt is aware of menu ordering procedure in house.  Adequate (%)

## 2023-11-06 NOTE — DIETITIAN INITIAL EVALUATION ADULT - PERTINENT MEDS FT
MEDICATIONS  (STANDING):  cefuroxime   Tablet 500 milliGRAM(s) Oral every 12 hours  chlorhexidine 2% Cloths 1 Application(s) Topical <User Schedule>  enoxaparin Injectable 40 milliGRAM(s) SubCutaneous every 24 hours  famotidine    Tablet 20 milliGRAM(s) Oral daily  folic acid 1 milliGRAM(s) Oral daily  influenza  Vaccine (HIGH DOSE) 0.7 milliLiter(s) IntraMuscular once  insulin glargine Injectable (LANTUS) 26 Unit(s) SubCutaneous at bedtime  insulin lispro (ADMELOG) corrective regimen sliding scale   SubCutaneous <User Schedule>  insulin lispro (ADMELOG) corrective regimen sliding scale   SubCutaneous three times a day before meals  insulin lispro (ADMELOG) corrective regimen sliding scale   SubCutaneous at bedtime  insulin lispro Injectable (ADMELOG) 11 Unit(s) SubCutaneous three times a day before meals  metoprolol tartrate 25 milliGRAM(s) Oral two times a day  mycophenolate mofetil 250 milliGRAM(s) Oral two times a day  NIFEdipine XL 30 milliGRAM(s) Oral every 24 hours  polyethylene glycol 3350 17 Gram(s) Oral every 24 hours  potassium phosphate / sodium phosphate Powder (PHOS-NaK) 2 Packet(s) Oral once  predniSONE   Tablet 5 milliGRAM(s) Oral daily  senna 2 Tablet(s) Oral at bedtime  tacrolimus ER Tablet (ENVARSUS XR) 3 milliGRAM(s) Oral <User Schedule>    MEDICATIONS  (PRN):  acetaminophen     Tablet .. 650 milliGRAM(s) Oral every 6 hours PRN Mild Pain (1 - 3)  hydrOXYzine hydrochloride 25 milliGRAM(s) Oral two times a day PRN Itching

## 2023-11-06 NOTE — PROGRESS NOTE ADULT - SUBJECTIVE AND OBJECTIVE BOX
Seen earlier today     Chief Complaint: Diabetes Mellitus follow up    INTERVAL HX: last 24 hour BGs variable ( -251 ) without pattern wiht fasting  in BMP, Tolerating POs, eats full meals. Denies having snacks between meals. As per patient, learning insulin pen use and glucometer use       Review of Systems:  General: As above  GI: No nausea, vomiting  Endocrine: no  S&Sx of hypoglycemia    Allergies    Bactrim (Pruritus)    Intolerances      MEDICATIONS  (STANDING):  cefuroxime   Tablet 500 milliGRAM(s) Oral every 12 hours  insulin glargine Injectable (LANTUS) 26 Unit(s) SubCutaneous at bedtime  insulin lispro (ADMELOG) corrective regimen sliding scale   SubCutaneous <User Schedule>  insulin lispro (ADMELOG) corrective regimen sliding scale   SubCutaneous three times a day before meals  insulin lispro (ADMELOG) corrective regimen sliding scale   SubCutaneous at bedtime  insulin lispro Injectable (ADMELOG) 11 Unit(s) SubCutaneous three times a day before meals  metoprolol tartrate 25 milliGRAM(s) Oral two times a day  NIFEdipine XL 30 milliGRAM(s) Oral every 24 hours  predniSONE   Tablet 5 milliGRAM(s) Oral daily  tacrolimus ER Tablet (ENVARSUS XR) 3 milliGRAM(s) Oral <User Schedule>        insulin glargine Injectable (LANTUS)   26 Unit(s) SubCutaneous (11-05-23 @ 23:00)    insulin lispro (ADMELOG) corrective regimen sliding scale   1 Unit(s) SubCutaneous (11-06-23 @ 02:28)    insulin lispro (ADMELOG) corrective regimen sliding scale   2 Unit(s) SubCutaneous (11-06-23 @ 17:40)   1 Unit(s) SubCutaneous (11-06-23 @ 13:08)   1 Unit(s) SubCutaneous (11-06-23 @ 09:15)    insulin lispro Injectable (ADMELOG)   11 Unit(s) SubCutaneous (11-06-23 @ 17:40)   11 Unit(s) SubCutaneous (11-06-23 @ 13:08)   11 Unit(s) SubCutaneous (11-06-23 @ 09:15)    predniSONE   Tablet   5 milliGRAM(s) Oral (11-06-23 @ 05:21)        PHYSICAL EXAM:  VITALS: T(C): 36.8 (11-06-23 @ 18:13)  T(F): 98.3 (11-06-23 @ 18:13), Max: 98.9 (11-06-23 @ 09:28)  HR: 89 (11-06-23 @ 18:13) (64 - 89)  BP: 143/81 (11-06-23 @ 18:13) (129/66 - 145/75)  RR:  (18 - 18)  SpO2:  (98% - 100%)  Wt(kg): --  GENERAL: female laying in bed, in NAD  Respiratory: Respirations unlabored   Extremities: Warm, no edema  NEURO: Alert , appropriate     LABS:  POCT Blood Glucose.: 215 mg/dL (11-06-23 @ 17:31)  POCT Blood Glucose.: 166 mg/dL (11-06-23 @ 12:56)  POCT Blood Glucose.: 184 mg/dL (11-06-23 @ 09:07)  POCT Blood Glucose.: 169 mg/dL (11-06-23 @ 01:57)  POCT Blood Glucose.: 133 mg/dL (11-05-23 @ 22:30)  POCT Blood Glucose.: 109 mg/dL (11-05-23 @ 18:10)  POCT Blood Glucose.: 307 mg/dL (11-05-23 @ 13:01)  POCT Blood Glucose.: 292 mg/dL (11-05-23 @ 12:11)  POCT Blood Glucose.: 189 mg/dL (11-05-23 @ 08:59)  POCT Blood Glucose.: 180 mg/dL (11-04-23 @ 21:37)  POCT Blood Glucose.: 196 mg/dL (11-04-23 @ 17:42)  POCT Blood Glucose.: 163 mg/dL (11-04-23 @ 12:44)  POCT Blood Glucose.: 221 mg/dL (11-04-23 @ 08:26)  POCT Blood Glucose.: 238 mg/dL (11-04-23 @ 00:13)                          9.6    5.70  )-----------( 129      ( 06 Nov 2023 07:35 )             31.3     11-06    139  |  107  |  14  ----------------------------<  109<H>  4.1   |  19<L>  |  0.84    Ca    8.5      06 Nov 2023 07:34  Phos  2.1     11-06  Mg     1.9     11-06    TPro  5.8<L>  /  Alb  3.2<L>  /  TBili  0.3  /  DBili  x   /  AST  42<H>  /  ALT  37  /  AlkPhos  60  11-06    eGFR: 75 mL/min/1.73m2 (06 Nov 2023 07:34)      Thyroid Function Tests:      A1C with Estimated Average Glucose Result: 10.1 % (11-04-23 @ 07:15)  A1C with Estimated Average Glucose Result: 10.4 % (11-01-23 @ 21:16)    Estimated Average Glucose: 243 mg/dL (11-04-23 @ 07:15)  Estimated Average Glucose: 252 mg/dL (11-01-23 @ 21:16)        Diet, Consistent Carbohydrate w/Evening Snack (11-02-23 @ 10:58) [Active]

## 2023-11-06 NOTE — DISCHARGE NOTE PROVIDER - NSDCMRMEDTOKEN_GEN_ALL_CORE_FT
alcohol swabs E11.9: Apply topically to affected area 4 times a day  Envarsus XR 0.75 mg oral tablet, extended release: 2 tab(s) orally once a day  famotidine 20 mg oral tablet: 1 tab(s) orally once a day  folic acid 1 mg oral tablet: 1 tab(s) orally once a day  glucometer (per patient&#x27;s insurance) E11.9: Test blood sugars four times a day. Dispense #1 glucometer.  HumaLOG KwikPen 100 units/mL injectable solution: 11 unit(s) injectable 3 times a day (before meals)  Insulin Pen Needles, 4mm E11.9: 1 application subcutaneously 4 times a day. ** Use with insulin pen **  lancets E11.9: 1 application subcutaneously 4 times a day  Lantus Solostar Pen 100 units/mL subcutaneous solution: 26 unit(s) subcutaneous once a day (at bedtime)  metoprolol tartrate 25 mg oral tablet: 1 tab(s) orally 2 times a day  mycophenolate mofetil 250 mg oral capsule: 1 cap(s) orally 2 times a day  NIFEdipine 30 mg oral tablet, extended release: 1 tab(s) orally every 24 hours  polyethylene glycol 3350 oral powder for reconstitution: 17 gram(s) orally once a day as needed for  constipation  predniSONE 5 mg oral tablet: 1 tab(s) orally once a day  senna leaf extract oral tablet: 2 tab(s) orally once a day (at bedtime) as needed for  constipation  test strips (per patient&#x27;s insurance) E11.9: 1 application subcutaneously 4 times a day. ** Compatible with patient&#x27;s glucometer **   alcohol swabs E11.9: Apply topically to affected area 4 times a day  famotidine 20 mg oral tablet: 1 tab(s) orally once a day  folic acid 1 mg oral tablet: 1 tab(s) orally once a day  glucometer (per patient&#x27;s insurance) E11.9: Test blood sugars four times a day. Dispense #1 glucometer.  HumaLOG KwikPen 100 units/mL injectable solution: 11 unit(s) injectable 3 times a day (before meals)  Insulin Pen Needles, 4mm E11.9: 1 application subcutaneously 4 times a day. ** Use with insulin pen **  lancets E11.9: 1 application subcutaneously 4 times a day  Lantus Solostar Pen 100 units/mL subcutaneous solution: 26 unit(s) subcutaneous once a day (at bedtime)  metoprolol tartrate 25 mg oral tablet: 1 tab(s) orally 2 times a day  mycophenolate mofetil 250 mg oral capsule: 1 cap(s) orally 2 times a day  NIFEdipine 30 mg oral tablet, extended release: 1 tab(s) orally every 24 hours  polyethylene glycol 3350 oral powder for reconstitution: 17 gram(s) orally once a day as needed for  constipation  predniSONE 5 mg oral tablet: 1 tab(s) orally once a day  senna leaf extract oral tablet: 2 tab(s) orally once a day (at bedtime) as needed for  constipation  tacrolimus 1 mg oral tablet, extended release: 3 tab(s) orally once a day  test strips (per patient&#x27;s insurance) E11.9: 1 application subcutaneously 4 times a day. ** Compatible with patient&#x27;s glucometer **   alcohol swabs E11.9: Apply topically to affected area 4 times a day  Envarsus XR 4 mg oral tablet, extended release: 1 tab(s) orally once a day  famotidine 20 mg oral tablet: 1 tab(s) orally once a day  folic acid 1 mg oral tablet: 1 tab(s) orally once a day  glucometer (per patient&#x27;s insurance) E11.9: Test blood sugars four times a day. Dispense #1 glucometer.  HumaLOG KwikPen 100 units/mL injectable solution: 11 unit(s) injectable 3 times a day (before meals)  Insulin Pen Needles, 4mm E11.9: 1 application subcutaneously 4 times a day. ** Use with insulin pen **  lancets E11.9: 1 application subcutaneously 4 times a day  Lantus Solostar Pen 100 units/mL subcutaneous solution: 26 unit(s) subcutaneous once a day (at bedtime)  metoprolol tartrate 25 mg oral tablet: 1 tab(s) orally 2 times a day  mycophenolate mofetil 250 mg oral capsule: 1 cap(s) orally 2 times a day  NIFEdipine 30 mg oral tablet, extended release: 1 tab(s) orally every 24 hours  polyethylene glycol 3350 oral powder for reconstitution: 17 gram(s) orally once a day as needed for  constipation  predniSONE 5 mg oral tablet: 1 tab(s) orally once a day  senna leaf extract oral tablet: 2 tab(s) orally once a day (at bedtime) as needed for  constipation  test strips (per patient&#x27;s insurance) E11.9: 1 application subcutaneously 4 times a day. ** Compatible with patient&#x27;s glucometer **

## 2023-11-06 NOTE — DIETITIAN INITIAL EVALUATION ADULT - PHYSCIAL ASSESSMENT
Drug Dosing Weight  Height (cm): 167.6 (01 Nov 2023 20:17)  Weight (kg): 62.6 (01 Nov 2023 20:17)  BMI (kg/m2): 22.3 (01 Nov 2023 20:17)    Daily wt (bed scale): 66.4kg (11/3)  -- some wt discrepancy might due to accuracy of bed scale, RD will continue to monitor wt trends as available/able.     UBW: ~138lb around 1 week PTA per pt, denies history of wt loss PTA.  Wt history from previous RD notes: 65.9kg (3/29/23)     IBW: 130lb, 106% IBW/well nourished

## 2023-11-07 ENCOUNTER — APPOINTMENT (OUTPATIENT)
Dept: NEPHROLOGY | Facility: CLINIC | Age: 68
End: 2023-11-07

## 2023-11-07 LAB
ALBUMIN SERPL ELPH-MCNC: 3.3 G/DL — SIGNIFICANT CHANGE UP (ref 3.3–5)
ALBUMIN SERPL ELPH-MCNC: 3.3 G/DL — SIGNIFICANT CHANGE UP (ref 3.3–5)
ALP SERPL-CCNC: 57 U/L — SIGNIFICANT CHANGE UP (ref 40–120)
ALP SERPL-CCNC: 57 U/L — SIGNIFICANT CHANGE UP (ref 40–120)
ALT FLD-CCNC: 49 U/L — HIGH (ref 10–45)
ALT FLD-CCNC: 49 U/L — HIGH (ref 10–45)
ANION GAP SERPL CALC-SCNC: 13 MMOL/L — SIGNIFICANT CHANGE UP (ref 5–17)
ANION GAP SERPL CALC-SCNC: 13 MMOL/L — SIGNIFICANT CHANGE UP (ref 5–17)
APTT BLD: 27.3 SEC — SIGNIFICANT CHANGE UP (ref 24.5–35.6)
APTT BLD: 27.3 SEC — SIGNIFICANT CHANGE UP (ref 24.5–35.6)
AST SERPL-CCNC: 47 U/L — HIGH (ref 10–40)
AST SERPL-CCNC: 47 U/L — HIGH (ref 10–40)
BILIRUB SERPL-MCNC: 0.3 MG/DL — SIGNIFICANT CHANGE UP (ref 0.2–1.2)
BILIRUB SERPL-MCNC: 0.3 MG/DL — SIGNIFICANT CHANGE UP (ref 0.2–1.2)
BUN SERPL-MCNC: 16 MG/DL — SIGNIFICANT CHANGE UP (ref 7–23)
BUN SERPL-MCNC: 16 MG/DL — SIGNIFICANT CHANGE UP (ref 7–23)
CALCIUM SERPL-MCNC: 8.6 MG/DL — SIGNIFICANT CHANGE UP (ref 8.4–10.5)
CALCIUM SERPL-MCNC: 8.6 MG/DL — SIGNIFICANT CHANGE UP (ref 8.4–10.5)
CHLORIDE SERPL-SCNC: 106 MMOL/L — SIGNIFICANT CHANGE UP (ref 96–108)
CHLORIDE SERPL-SCNC: 106 MMOL/L — SIGNIFICANT CHANGE UP (ref 96–108)
CO2 SERPL-SCNC: 20 MMOL/L — LOW (ref 22–31)
CO2 SERPL-SCNC: 20 MMOL/L — LOW (ref 22–31)
CREAT SERPL-MCNC: 0.69 MG/DL — SIGNIFICANT CHANGE UP (ref 0.5–1.3)
CREAT SERPL-MCNC: 0.69 MG/DL — SIGNIFICANT CHANGE UP (ref 0.5–1.3)
CULTURE RESULTS: SIGNIFICANT CHANGE UP
EGFR: 94 ML/MIN/1.73M2 — SIGNIFICANT CHANGE UP
EGFR: 94 ML/MIN/1.73M2 — SIGNIFICANT CHANGE UP
GLUCOSE BLDC GLUCOMTR-MCNC: 113 MG/DL — HIGH (ref 70–99)
GLUCOSE BLDC GLUCOMTR-MCNC: 113 MG/DL — HIGH (ref 70–99)
GLUCOSE BLDC GLUCOMTR-MCNC: 155 MG/DL — HIGH (ref 70–99)
GLUCOSE BLDC GLUCOMTR-MCNC: 155 MG/DL — HIGH (ref 70–99)
GLUCOSE BLDC GLUCOMTR-MCNC: 176 MG/DL — HIGH (ref 70–99)
GLUCOSE BLDC GLUCOMTR-MCNC: 176 MG/DL — HIGH (ref 70–99)
GLUCOSE BLDC GLUCOMTR-MCNC: 189 MG/DL — HIGH (ref 70–99)
GLUCOSE BLDC GLUCOMTR-MCNC: 189 MG/DL — HIGH (ref 70–99)
GLUCOSE BLDC GLUCOMTR-MCNC: 219 MG/DL — HIGH (ref 70–99)
GLUCOSE BLDC GLUCOMTR-MCNC: 219 MG/DL — HIGH (ref 70–99)
GLUCOSE SERPL-MCNC: 146 MG/DL — HIGH (ref 70–99)
GLUCOSE SERPL-MCNC: 146 MG/DL — HIGH (ref 70–99)
HCT VFR BLD CALC: 29.4 % — LOW (ref 34.5–45)
HCT VFR BLD CALC: 29.4 % — LOW (ref 34.5–45)
HGB BLD-MCNC: 9 G/DL — LOW (ref 11.5–15.5)
HGB BLD-MCNC: 9 G/DL — LOW (ref 11.5–15.5)
INR BLD: 1.18 RATIO — SIGNIFICANT CHANGE UP (ref 0.85–1.18)
INR BLD: 1.18 RATIO — SIGNIFICANT CHANGE UP (ref 0.85–1.18)
MAGNESIUM SERPL-MCNC: 1.9 MG/DL — SIGNIFICANT CHANGE UP (ref 1.6–2.6)
MAGNESIUM SERPL-MCNC: 1.9 MG/DL — SIGNIFICANT CHANGE UP (ref 1.6–2.6)
MCHC RBC-ENTMCNC: 29.5 PG — SIGNIFICANT CHANGE UP (ref 27–34)
MCHC RBC-ENTMCNC: 29.5 PG — SIGNIFICANT CHANGE UP (ref 27–34)
MCHC RBC-ENTMCNC: 30.6 GM/DL — LOW (ref 32–36)
MCHC RBC-ENTMCNC: 30.6 GM/DL — LOW (ref 32–36)
MCV RBC AUTO: 96.4 FL — SIGNIFICANT CHANGE UP (ref 80–100)
MCV RBC AUTO: 96.4 FL — SIGNIFICANT CHANGE UP (ref 80–100)
NRBC # BLD: 0 /100 WBCS — SIGNIFICANT CHANGE UP (ref 0–0)
NRBC # BLD: 0 /100 WBCS — SIGNIFICANT CHANGE UP (ref 0–0)
PHOSPHATE SERPL-MCNC: 2.9 MG/DL — SIGNIFICANT CHANGE UP (ref 2.5–4.5)
PHOSPHATE SERPL-MCNC: 2.9 MG/DL — SIGNIFICANT CHANGE UP (ref 2.5–4.5)
PLATELET # BLD AUTO: 146 K/UL — LOW (ref 150–400)
PLATELET # BLD AUTO: 146 K/UL — LOW (ref 150–400)
POTASSIUM SERPL-MCNC: 4.4 MMOL/L — SIGNIFICANT CHANGE UP (ref 3.5–5.3)
POTASSIUM SERPL-MCNC: 4.4 MMOL/L — SIGNIFICANT CHANGE UP (ref 3.5–5.3)
POTASSIUM SERPL-SCNC: 4.4 MMOL/L — SIGNIFICANT CHANGE UP (ref 3.5–5.3)
POTASSIUM SERPL-SCNC: 4.4 MMOL/L — SIGNIFICANT CHANGE UP (ref 3.5–5.3)
PROT SERPL-MCNC: 5.7 G/DL — LOW (ref 6–8.3)
PROT SERPL-MCNC: 5.7 G/DL — LOW (ref 6–8.3)
PROTHROM AB SERPL-ACNC: 12.9 SEC — SIGNIFICANT CHANGE UP (ref 9.5–13)
PROTHROM AB SERPL-ACNC: 12.9 SEC — SIGNIFICANT CHANGE UP (ref 9.5–13)
RBC # BLD: 3.05 M/UL — LOW (ref 3.8–5.2)
RBC # BLD: 3.05 M/UL — LOW (ref 3.8–5.2)
RBC # FLD: 16.3 % — HIGH (ref 10.3–14.5)
RBC # FLD: 16.3 % — HIGH (ref 10.3–14.5)
SODIUM SERPL-SCNC: 139 MMOL/L — SIGNIFICANT CHANGE UP (ref 135–145)
SODIUM SERPL-SCNC: 139 MMOL/L — SIGNIFICANT CHANGE UP (ref 135–145)
SPECIMEN SOURCE: SIGNIFICANT CHANGE UP
TACROLIMUS SERPL-MCNC: 4.1 NG/ML — SIGNIFICANT CHANGE UP
TACROLIMUS SERPL-MCNC: 4.1 NG/ML — SIGNIFICANT CHANGE UP
WBC # BLD: 6.24 K/UL — SIGNIFICANT CHANGE UP (ref 3.8–10.5)
WBC # BLD: 6.24 K/UL — SIGNIFICANT CHANGE UP (ref 3.8–10.5)
WBC # FLD AUTO: 6.24 K/UL — SIGNIFICANT CHANGE UP (ref 3.8–10.5)
WBC # FLD AUTO: 6.24 K/UL — SIGNIFICANT CHANGE UP (ref 3.8–10.5)

## 2023-11-07 PROCEDURE — 99232 SBSQ HOSP IP/OBS MODERATE 35: CPT

## 2023-11-07 RX ORDER — TACROLIMUS 5 MG/1
2 CAPSULE ORAL
Refills: 0 | DISCHARGE

## 2023-11-07 RX ORDER — TACROLIMUS 0.75 MG/1
0.75 TABLET, EXTENDED RELEASE ORAL DAILY
Qty: 60 | Refills: 11 | Status: DISCONTINUED | COMMUNITY
Start: 2023-05-24 | End: 2023-11-07

## 2023-11-07 RX ORDER — CEFUROXIME AXETIL 250 MG
500 TABLET ORAL ONCE
Refills: 0 | Status: DISCONTINUED | OUTPATIENT
Start: 2023-11-07 | End: 2023-11-07

## 2023-11-07 RX ORDER — TACROLIMUS 5 MG/1
1 CAPSULE ORAL
Qty: 30 | Refills: 0
Start: 2023-11-07 | End: 2023-12-06

## 2023-11-07 RX ORDER — TACROLIMUS 5 MG/1
3 CAPSULE ORAL
Qty: 0 | Refills: 0 | DISCHARGE
Start: 2023-11-07

## 2023-11-07 RX ORDER — TACROLIMUS 5 MG/1
4 CAPSULE ORAL
Refills: 0 | Status: DISCONTINUED | OUTPATIENT
Start: 2023-11-08 | End: 2023-11-08

## 2023-11-07 RX ORDER — TACROLIMUS 5 MG/1
1 CAPSULE ORAL ONCE
Refills: 0 | Status: COMPLETED | OUTPATIENT
Start: 2023-11-07 | End: 2023-11-07

## 2023-11-07 RX ORDER — CEFUROXIME AXETIL 250 MG
500 TABLET ORAL ONCE
Refills: 0 | Status: COMPLETED | OUTPATIENT
Start: 2023-11-07 | End: 2023-11-07

## 2023-11-07 RX ORDER — TACROLIMUS 5 MG/1
3 CAPSULE ORAL
Qty: 90 | Refills: 0
Start: 2023-11-07 | End: 2023-12-06

## 2023-11-07 RX ADMIN — TACROLIMUS 1 MILLIGRAM(S): 5 CAPSULE ORAL at 15:52

## 2023-11-07 RX ADMIN — Medication 1: at 09:02

## 2023-11-07 RX ADMIN — Medication 5 MILLIGRAM(S): at 06:48

## 2023-11-07 RX ADMIN — INSULIN GLARGINE 26 UNIT(S): 100 INJECTION, SOLUTION SUBCUTANEOUS at 22:14

## 2023-11-07 RX ADMIN — MYCOPHENOLATE MOFETIL 250 MILLIGRAM(S): 250 CAPSULE ORAL at 17:52

## 2023-11-07 RX ADMIN — TACROLIMUS 3 MILLIGRAM(S): 5 CAPSULE ORAL at 09:08

## 2023-11-07 RX ADMIN — Medication 1: at 17:54

## 2023-11-07 RX ADMIN — Medication 500 MILLIGRAM(S): at 09:08

## 2023-11-07 RX ADMIN — Medication 25 MILLIGRAM(S): at 17:53

## 2023-11-07 RX ADMIN — Medication 1: at 13:06

## 2023-11-07 RX ADMIN — FAMOTIDINE 20 MILLIGRAM(S): 10 INJECTION INTRAVENOUS at 13:02

## 2023-11-07 RX ADMIN — CHLORHEXIDINE GLUCONATE 1 APPLICATION(S): 213 SOLUTION TOPICAL at 06:49

## 2023-11-07 RX ADMIN — MYCOPHENOLATE MOFETIL 250 MILLIGRAM(S): 250 CAPSULE ORAL at 06:48

## 2023-11-07 RX ADMIN — Medication 500 MILLIGRAM(S): at 22:15

## 2023-11-07 RX ADMIN — Medication 30 MILLIGRAM(S): at 06:48

## 2023-11-07 RX ADMIN — Medication 11 UNIT(S): at 09:01

## 2023-11-07 RX ADMIN — Medication 11 UNIT(S): at 17:54

## 2023-11-07 RX ADMIN — Medication 25 MILLIGRAM(S): at 06:48

## 2023-11-07 RX ADMIN — Medication 11 UNIT(S): at 13:06

## 2023-11-07 RX ADMIN — Medication 1 MILLIGRAM(S): at 13:05

## 2023-11-07 NOTE — PROGRESS NOTE ADULT - SUBJECTIVE AND OBJECTIVE BOX
Transplant Surgery - Multidisciplinary Progress Note  --------------------------------------------------------------  DDRT 3/28/23    Present:   Patient rounded on with multidisciplinary team including Transplant Surgeon: Dr. Baer. Dr. Montenegro, LORENA Vital, Transplant Nephrologist: Dr. DAVIN Tobin, Pharmacist: Karen and unit RN during am rounds.  Disciplines not in attendance will be notified of the plan    68F with Hx of Alport Syndrome and CKD 5  (x 15 years), LUE AVF placed in 2017, recently started HD on 3/17/2023 (Tanja, Nephrologist Dr. Jet Horn ) now s/p R  DDRT under Simulect induction on 3/28/23 presents from outpatient clinic for new onset hyperglycemia    Interval Events:   Afebrile, VSS  No o/n events or complaints  ready for dc    Immunosuppression:  Envarsus per level ,  mg bid, Pred 5  Ongoing monitoring for signs of rejection    Potential Discharge date: today  Education:  Medications  Plan of care:  See Below      MEDICATIONS  (STANDING):  chlorhexidine 2% Cloths 1 Application(s) Topical <User Schedule>  enoxaparin Injectable 40 milliGRAM(s) SubCutaneous every 24 hours  famotidine    Tablet 20 milliGRAM(s) Oral daily  folic acid 1 milliGRAM(s) Oral daily  influenza  Vaccine (HIGH DOSE) 0.7 milliLiter(s) IntraMuscular once  insulin glargine Injectable (LANTUS) 26 Unit(s) SubCutaneous at bedtime  insulin lispro (ADMELOG) corrective regimen sliding scale   SubCutaneous <User Schedule>  insulin lispro (ADMELOG) corrective regimen sliding scale   SubCutaneous three times a day before meals  insulin lispro (ADMELOG) corrective regimen sliding scale   SubCutaneous at bedtime  insulin lispro Injectable (ADMELOG) 11 Unit(s) SubCutaneous three times a day before meals  metoprolol tartrate 25 milliGRAM(s) Oral two times a day  mycophenolate mofetil 250 milliGRAM(s) Oral two times a day  NIFEdipine XL 30 milliGRAM(s) Oral every 24 hours  polyethylene glycol 3350 17 Gram(s) Oral every 24 hours  predniSONE   Tablet 5 milliGRAM(s) Oral daily  senna 2 Tablet(s) Oral at bedtime  tacrolimus ER Tablet (ENVARSUS XR) 3 milliGRAM(s) Oral <User Schedule>    MEDICATIONS  (PRN):  acetaminophen     Tablet .. 650 milliGRAM(s) Oral every 6 hours PRN Mild Pain (1 - 3)  hydrOXYzine hydrochloride 25 milliGRAM(s) Oral two times a day PRN Itching      PAST MEDICAL & SURGICAL HISTORY:  Alport's syndrome  HTN (hypertension)  Hard of hearing  uses hearing aids  Anemia  ESRD with anemia  left AV fistula, not in use  Deep vein thrombosis (DVT) of right upper extremity  Deep vein thrombosis (DVT) of right upper extremity  S/P tonsillectomy  A-V fistula  Nov 2017  Renal transplant recipient      Vital Signs Last 24 Hrs  T(C): 36.2 (07 Nov 2023 10:47), Max: 37.2 (06 Nov 2023 13:57)  T(F): 97.2 (07 Nov 2023 10:47), Max: 98.9 (06 Nov 2023 13:57)  HR: 81 (07 Nov 2023 10:47) (64 - 89)  BP: 119/69 (07 Nov 2023 10:47) (117/65 - 143/81)  BP(mean): --  RR: 18 (07 Nov 2023 10:47) (18 - 18)  SpO2: 99% (07 Nov 2023 10:47) (99% - 100%)    Parameters below as of 07 Nov 2023 10:47  Patient On (Oxygen Delivery Method): room air      I&O's Summary  06 Nov 2023 07:01  -  07 Nov 2023 07:00  --------------------------------------------------------  IN: 1080 mL / OUT: 0 mL / NET: 1080 mL    07 Nov 2023 07:01  -  07 Nov 2023 11:15  --------------------------------------------------------  IN: 260 mL / OUT: 0 mL / NET: 260 mL                          9.0    6.24  )-----------( 146      ( 07 Nov 2023 09:05 )             29.4     11-07  139  |  106  |  16  ----------------------------<  146<H>  4.4   |  20<L>  |  0.69    Ca    8.6      07 Nov 2023 09:06  Phos  2.9     11-07  Mg     1.9     11-07    TPro  5.7<L>  /  Alb  3.3  /  TBili  0.3  /  DBili  x   /  AST  47<H>  /  ALT  49<H>  /  AlkPhos  57  11-07    Tacrolimus (), Serum: 5.3 ng/mL (11-06 @ 07:38)    Culture - Urine (collected 11-01-23 @ 21:15)  Source: Clean Catch Clean Catch (Midstream)  Final Report (11-04-23 @ 17:32):    >100,000 CFU/ml Klebsiella variicola  Organism: Klebsiella variicola (11-04-23 @ 17:32)  Organism: Klebsiella variicola (11-04-23 @ 17:32)    Culture - Blood (collected 11-01-23 @ 11:40)  Source: .Blood Blood-Venous  Final Report (11-07-23 @ 03:01):    No growth at 5 days    Culture - Blood (collected 11-01-23 @ 11:37)  Source: .Blood Blood-Peripheral  Final Report (11-07-23 @ 03:01):    No growth at 5 days       Review of systems  All other systems were reviewed and are negative, except as noted.      PHYSICAL EXAM:  Constitutional: Well developed / well nourished  Eyes: Anicteric, PERRLA  ENMT: nc/at  Neck: supple  Respiratory: CTA B/L  Cardiovascular: RRR  Gastrointestinal: Soft, ND/NT, well healed incisional scar  Genitourinary: Voiding spontaneously  Extremities: SCD's in place and working bilaterally  Vascular: Palpable dp pulses bilaterally  Neurological: A&O x3  Skin: no rashes, ulcerations or lesions;  Musculoskeletal: Moving all extremities  Psychiatric: Responsive

## 2023-11-07 NOTE — PROGRESS NOTE ADULT - SUBJECTIVE AND OBJECTIVE BOX
DIABETES FOLLOW UP NOTE: Saw pt earlier today    Chief Complaint: Endocrine consult requested for management of T2DM    INTERVAL HX: Pt stable, reports tolerating POs with BG improved BG while on present insulin doses. No hypoglycemia.  Pt reports doing own finger pricks and insulin injections under RN supervision but doesn't know how to use an insulin pen> received education on it 3 days ago but can't recall steps. Pt anxious about going home with new DM diagnosis and also insulin. Pt is motivate to learn and participate on DM care.       Review of Systems:  General: As above  Cardiovascular: No chest pain, palpitations  Respiratory: No SOB, no cough  GI: No nausea, vomiting, abdominal pain  Endocrine: No polyuria, polydipsia or S&Sx of hypoglycemia    Allergies    Bactrim (Pruritus)    Intolerances      MEDICATIONS:  cefuroxime   Tablet 500 milliGRAM(s) Oral once  insulin glargine Injectable (LANTUS) 26 Unit(s) SubCutaneous at bedtime  insulin lispro (ADMELOG) corrective regimen sliding scale   SubCutaneous <User Schedule>  insulin lispro (ADMELOG) corrective regimen sliding scale   SubCutaneous three times a day before meals  insulin lispro (ADMELOG) corrective regimen sliding scale   SubCutaneous at bedtime  insulin lispro Injectable (ADMELOG) 11 Unit(s) SubCutaneous three times a day before meals  predniSONE   Tablet 5 milliGRAM(s) Oral daily  tacrolimus ER Tablet (ENVARSUS XR) 3 milliGRAM(s) Oral <User Schedule>      PHYSICAL EXAM:  VITALS: T(C): 36.8 (11-07-23 @ 13:17)  T(F): 98.3 (11-07-23 @ 13:17), Max: 98.9 (11-06-23 @ 13:57)  HR: 77 (11-07-23 @ 13:17) (64 - 89)  BP: 156/83 (11-07-23 @ 13:17) (117/65 - 156/83)  RR:  (18 - 18)  SpO2:  (95% - 100%)  Wt(kg): --  GENERAL: Female laying in bed in NAD  Abdomen: Soft, nontender, non distended  Extremities: Warm, no edema in all 4 exts  NEURO: A&O X3    LABS:  POCT Blood Glucose.: 189 mg/dL (11-07-23 @ 12:55)  POCT Blood Glucose.: 155 mg/dL (11-07-23 @ 08:47)  POCT Blood Glucose.: 113 mg/dL (11-07-23 @ 01:13)  POCT Blood Glucose.: 150 mg/dL (11-06-23 @ 21:32)  POCT Blood Glucose.: 215 mg/dL (11-06-23 @ 17:31)  POCT Blood Glucose.: 166 mg/dL (11-06-23 @ 12:56)  POCT Blood Glucose.: 184 mg/dL (11-06-23 @ 09:07)  POCT Blood Glucose.: 169 mg/dL (11-06-23 @ 01:57)  POCT Blood Glucose.: 133 mg/dL (11-05-23 @ 22:30)  POCT Blood Glucose.: 109 mg/dL (11-05-23 @ 18:10)  POCT Blood Glucose.: 307 mg/dL (11-05-23 @ 13:01)  POCT Blood Glucose.: 292 mg/dL (11-05-23 @ 12:11)  POCT Blood Glucose.: 189 mg/dL (11-05-23 @ 08:59)  POCT Blood Glucose.: 180 mg/dL (11-04-23 @ 21:37)  POCT Blood Glucose.: 196 mg/dL (11-04-23 @ 17:42)                            9.0    6.24  )-----------( 146      ( 07 Nov 2023 09:05 )             29.4       11-07    139  |  106  |  16  ----------------------------<  146<H>  4.4   |  20<L>  |  0.69    eGFR: 94    Ca    8.6      11-07  Mg     1.9     11-07  Phos  2.9     11-07    TPro  5.7<L>  /  Alb  3.3  /  TBili  0.3  /  DBili  x   /  AST  47<H>  /  ALT  49<H>  /  AlkPhos  57  11-07      A1C with Estimated Average Glucose Result: 10.1 % (11-04-23 @ 07:15)  A1C with Estimated Average Glucose Result: 10.4 % (11-01-23 @ 21:16)      Estimated Average Glucose: 243 mg/dL (11-04-23 @ 07:15)  Estimated Average Glucose: 252 mg/dL (11-01-23 @ 21:16)

## 2023-11-08 VITALS
SYSTOLIC BLOOD PRESSURE: 156 MMHG | HEART RATE: 84 BPM | OXYGEN SATURATION: 100 % | RESPIRATION RATE: 18 BRPM | TEMPERATURE: 98 F | DIASTOLIC BLOOD PRESSURE: 65 MMHG

## 2023-11-08 LAB
ALBUMIN SERPL ELPH-MCNC: 3.2 G/DL — LOW (ref 3.3–5)
ALBUMIN SERPL ELPH-MCNC: 3.2 G/DL — LOW (ref 3.3–5)
ALP SERPL-CCNC: 60 U/L — SIGNIFICANT CHANGE UP (ref 40–120)
ALP SERPL-CCNC: 60 U/L — SIGNIFICANT CHANGE UP (ref 40–120)
ALT FLD-CCNC: 47 U/L — HIGH (ref 10–45)
ALT FLD-CCNC: 47 U/L — HIGH (ref 10–45)
ANION GAP SERPL CALC-SCNC: 13 MMOL/L — SIGNIFICANT CHANGE UP (ref 5–17)
ANION GAP SERPL CALC-SCNC: 13 MMOL/L — SIGNIFICANT CHANGE UP (ref 5–17)
APTT BLD: 27.4 SEC — SIGNIFICANT CHANGE UP (ref 24.5–35.6)
APTT BLD: 27.4 SEC — SIGNIFICANT CHANGE UP (ref 24.5–35.6)
AST SERPL-CCNC: 34 U/L — SIGNIFICANT CHANGE UP (ref 10–40)
AST SERPL-CCNC: 34 U/L — SIGNIFICANT CHANGE UP (ref 10–40)
BILIRUB SERPL-MCNC: 0.3 MG/DL — SIGNIFICANT CHANGE UP (ref 0.2–1.2)
BILIRUB SERPL-MCNC: 0.3 MG/DL — SIGNIFICANT CHANGE UP (ref 0.2–1.2)
BUN SERPL-MCNC: 16 MG/DL — SIGNIFICANT CHANGE UP (ref 7–23)
BUN SERPL-MCNC: 16 MG/DL — SIGNIFICANT CHANGE UP (ref 7–23)
CALCIUM SERPL-MCNC: 9.2 MG/DL — SIGNIFICANT CHANGE UP (ref 8.4–10.5)
CALCIUM SERPL-MCNC: 9.2 MG/DL — SIGNIFICANT CHANGE UP (ref 8.4–10.5)
CHLORIDE SERPL-SCNC: 108 MMOL/L — SIGNIFICANT CHANGE UP (ref 96–108)
CHLORIDE SERPL-SCNC: 108 MMOL/L — SIGNIFICANT CHANGE UP (ref 96–108)
CO2 SERPL-SCNC: 20 MMOL/L — LOW (ref 22–31)
CO2 SERPL-SCNC: 20 MMOL/L — LOW (ref 22–31)
CREAT SERPL-MCNC: 0.72 MG/DL — SIGNIFICANT CHANGE UP (ref 0.5–1.3)
CREAT SERPL-MCNC: 0.72 MG/DL — SIGNIFICANT CHANGE UP (ref 0.5–1.3)
EGFR: 90 ML/MIN/1.73M2 — SIGNIFICANT CHANGE UP
EGFR: 90 ML/MIN/1.73M2 — SIGNIFICANT CHANGE UP
GLUCOSE BLDC GLUCOMTR-MCNC: 175 MG/DL — HIGH (ref 70–99)
GLUCOSE BLDC GLUCOMTR-MCNC: 175 MG/DL — HIGH (ref 70–99)
GLUCOSE BLDC GLUCOMTR-MCNC: 186 MG/DL — HIGH (ref 70–99)
GLUCOSE BLDC GLUCOMTR-MCNC: 186 MG/DL — HIGH (ref 70–99)
GLUCOSE BLDC GLUCOMTR-MCNC: 196 MG/DL — HIGH (ref 70–99)
GLUCOSE BLDC GLUCOMTR-MCNC: 196 MG/DL — HIGH (ref 70–99)
GLUCOSE SERPL-MCNC: 146 MG/DL — HIGH (ref 70–99)
GLUCOSE SERPL-MCNC: 146 MG/DL — HIGH (ref 70–99)
HCT VFR BLD CALC: 29.1 % — LOW (ref 34.5–45)
HCT VFR BLD CALC: 29.1 % — LOW (ref 34.5–45)
HGB BLD-MCNC: 8.9 G/DL — LOW (ref 11.5–15.5)
HGB BLD-MCNC: 8.9 G/DL — LOW (ref 11.5–15.5)
INR BLD: 1.16 RATIO — SIGNIFICANT CHANGE UP (ref 0.85–1.18)
INR BLD: 1.16 RATIO — SIGNIFICANT CHANGE UP (ref 0.85–1.18)
MAGNESIUM SERPL-MCNC: 1.9 MG/DL — SIGNIFICANT CHANGE UP (ref 1.6–2.6)
MAGNESIUM SERPL-MCNC: 1.9 MG/DL — SIGNIFICANT CHANGE UP (ref 1.6–2.6)
MCHC RBC-ENTMCNC: 29.7 PG — SIGNIFICANT CHANGE UP (ref 27–34)
MCHC RBC-ENTMCNC: 29.7 PG — SIGNIFICANT CHANGE UP (ref 27–34)
MCHC RBC-ENTMCNC: 30.6 GM/DL — LOW (ref 32–36)
MCHC RBC-ENTMCNC: 30.6 GM/DL — LOW (ref 32–36)
MCV RBC AUTO: 97 FL — SIGNIFICANT CHANGE UP (ref 80–100)
MCV RBC AUTO: 97 FL — SIGNIFICANT CHANGE UP (ref 80–100)
NRBC # BLD: 0 /100 WBCS — SIGNIFICANT CHANGE UP (ref 0–0)
NRBC # BLD: 0 /100 WBCS — SIGNIFICANT CHANGE UP (ref 0–0)
PHOSPHATE SERPL-MCNC: 2.9 MG/DL — SIGNIFICANT CHANGE UP (ref 2.5–4.5)
PHOSPHATE SERPL-MCNC: 2.9 MG/DL — SIGNIFICANT CHANGE UP (ref 2.5–4.5)
PLATELET # BLD AUTO: 135 K/UL — LOW (ref 150–400)
PLATELET # BLD AUTO: 135 K/UL — LOW (ref 150–400)
POTASSIUM SERPL-MCNC: 4.2 MMOL/L — SIGNIFICANT CHANGE UP (ref 3.5–5.3)
POTASSIUM SERPL-MCNC: 4.2 MMOL/L — SIGNIFICANT CHANGE UP (ref 3.5–5.3)
POTASSIUM SERPL-SCNC: 4.2 MMOL/L — SIGNIFICANT CHANGE UP (ref 3.5–5.3)
POTASSIUM SERPL-SCNC: 4.2 MMOL/L — SIGNIFICANT CHANGE UP (ref 3.5–5.3)
PROT SERPL-MCNC: 5.7 G/DL — LOW (ref 6–8.3)
PROT SERPL-MCNC: 5.7 G/DL — LOW (ref 6–8.3)
PROTHROM AB SERPL-ACNC: 12.7 SEC — SIGNIFICANT CHANGE UP (ref 9.5–13)
PROTHROM AB SERPL-ACNC: 12.7 SEC — SIGNIFICANT CHANGE UP (ref 9.5–13)
RBC # BLD: 3 M/UL — LOW (ref 3.8–5.2)
RBC # BLD: 3 M/UL — LOW (ref 3.8–5.2)
RBC # FLD: 16.8 % — HIGH (ref 10.3–14.5)
RBC # FLD: 16.8 % — HIGH (ref 10.3–14.5)
SODIUM SERPL-SCNC: 141 MMOL/L — SIGNIFICANT CHANGE UP (ref 135–145)
SODIUM SERPL-SCNC: 141 MMOL/L — SIGNIFICANT CHANGE UP (ref 135–145)
TACROLIMUS SERPL-MCNC: 6.7 NG/ML — SIGNIFICANT CHANGE UP
TACROLIMUS SERPL-MCNC: 6.7 NG/ML — SIGNIFICANT CHANGE UP
WBC # BLD: 5.16 K/UL — SIGNIFICANT CHANGE UP (ref 3.8–10.5)
WBC # BLD: 5.16 K/UL — SIGNIFICANT CHANGE UP (ref 3.8–10.5)
WBC # FLD AUTO: 5.16 K/UL — SIGNIFICANT CHANGE UP (ref 3.8–10.5)
WBC # FLD AUTO: 5.16 K/UL — SIGNIFICANT CHANGE UP (ref 3.8–10.5)

## 2023-11-08 PROCEDURE — 82962 GLUCOSE BLOOD TEST: CPT

## 2023-11-08 PROCEDURE — 82746 ASSAY OF FOLIC ACID SERUM: CPT

## 2023-11-08 PROCEDURE — 83880 ASSAY OF NATRIURETIC PEPTIDE: CPT

## 2023-11-08 PROCEDURE — 86901 BLOOD TYPING SEROLOGIC RH(D): CPT

## 2023-11-08 PROCEDURE — 86850 RBC ANTIBODY SCREEN: CPT

## 2023-11-08 PROCEDURE — 99233 SBSQ HOSP IP/OBS HIGH 50: CPT

## 2023-11-08 PROCEDURE — 84132 ASSAY OF SERUM POTASSIUM: CPT

## 2023-11-08 PROCEDURE — 97161 PT EVAL LOW COMPLEX 20 MIN: CPT

## 2023-11-08 PROCEDURE — 36415 COLL VENOUS BLD VENIPUNCTURE: CPT

## 2023-11-08 PROCEDURE — 99285 EMERGENCY DEPT VISIT HI MDM: CPT

## 2023-11-08 PROCEDURE — 87640 STAPH A DNA AMP PROBE: CPT

## 2023-11-08 PROCEDURE — 82728 ASSAY OF FERRITIN: CPT

## 2023-11-08 PROCEDURE — 85027 COMPLETE CBC AUTOMATED: CPT

## 2023-11-08 PROCEDURE — 85025 COMPLETE CBC W/AUTO DIFF WBC: CPT

## 2023-11-08 PROCEDURE — 86900 BLOOD TYPING SEROLOGIC ABO: CPT

## 2023-11-08 PROCEDURE — 82010 KETONE BODYS QUAN: CPT

## 2023-11-08 PROCEDURE — 83036 HEMOGLOBIN GLYCOSYLATED A1C: CPT

## 2023-11-08 PROCEDURE — 85018 HEMOGLOBIN: CPT

## 2023-11-08 PROCEDURE — 82330 ASSAY OF CALCIUM: CPT

## 2023-11-08 PROCEDURE — 81001 URINALYSIS AUTO W/SCOPE: CPT

## 2023-11-08 PROCEDURE — 71045 X-RAY EXAM CHEST 1 VIEW: CPT

## 2023-11-08 PROCEDURE — 83540 ASSAY OF IRON: CPT

## 2023-11-08 PROCEDURE — 87086 URINE CULTURE/COLONY COUNT: CPT

## 2023-11-08 PROCEDURE — 80180 DRUG SCRN QUAN MYCOPHENOLATE: CPT

## 2023-11-08 PROCEDURE — 97165 OT EVAL LOW COMPLEX 30 MIN: CPT

## 2023-11-08 PROCEDURE — 83605 ASSAY OF LACTIC ACID: CPT

## 2023-11-08 PROCEDURE — 96374 THER/PROPH/DIAG INJ IV PUSH: CPT

## 2023-11-08 PROCEDURE — 85045 AUTOMATED RETICULOCYTE COUNT: CPT

## 2023-11-08 PROCEDURE — 84100 ASSAY OF PHOSPHORUS: CPT

## 2023-11-08 PROCEDURE — 84145 PROCALCITONIN (PCT): CPT

## 2023-11-08 PROCEDURE — 0225U NFCT DS DNA&RNA 21 SARSCOV2: CPT

## 2023-11-08 PROCEDURE — 87186 SC STD MICRODIL/AGAR DIL: CPT

## 2023-11-08 PROCEDURE — 87077 CULTURE AEROBIC IDENTIFY: CPT

## 2023-11-08 PROCEDURE — 85730 THROMBOPLASTIN TIME PARTIAL: CPT

## 2023-11-08 PROCEDURE — 82435 ASSAY OF BLOOD CHLORIDE: CPT

## 2023-11-08 PROCEDURE — 80197 ASSAY OF TACROLIMUS: CPT

## 2023-11-08 PROCEDURE — 82947 ASSAY GLUCOSE BLOOD QUANT: CPT

## 2023-11-08 PROCEDURE — 84484 ASSAY OF TROPONIN QUANT: CPT

## 2023-11-08 PROCEDURE — 80053 COMPREHEN METABOLIC PANEL: CPT

## 2023-11-08 PROCEDURE — 87040 BLOOD CULTURE FOR BACTERIA: CPT

## 2023-11-08 PROCEDURE — 84295 ASSAY OF SERUM SODIUM: CPT

## 2023-11-08 PROCEDURE — 90662 IIV NO PRSV INCREASED AG IM: CPT

## 2023-11-08 PROCEDURE — 87641 MR-STAPH DNA AMP PROBE: CPT

## 2023-11-08 PROCEDURE — 87635 SARS-COV-2 COVID-19 AMP PRB: CPT

## 2023-11-08 PROCEDURE — 82803 BLOOD GASES ANY COMBINATION: CPT

## 2023-11-08 PROCEDURE — 83735 ASSAY OF MAGNESIUM: CPT

## 2023-11-08 PROCEDURE — 82607 VITAMIN B-12: CPT

## 2023-11-08 PROCEDURE — 85610 PROTHROMBIN TIME: CPT

## 2023-11-08 PROCEDURE — 85014 HEMATOCRIT: CPT

## 2023-11-08 RX ORDER — TACROLIMUS 5 MG/1
1 CAPSULE ORAL
Qty: 30 | Refills: 5
Start: 2023-11-08

## 2023-11-08 RX ADMIN — MYCOPHENOLATE MOFETIL 250 MILLIGRAM(S): 250 CAPSULE ORAL at 06:17

## 2023-11-08 RX ADMIN — Medication 11 UNIT(S): at 13:43

## 2023-11-08 RX ADMIN — Medication 1: at 13:42

## 2023-11-08 RX ADMIN — Medication 1: at 09:53

## 2023-11-08 RX ADMIN — Medication 11 UNIT(S): at 09:54

## 2023-11-08 RX ADMIN — Medication 5 MILLIGRAM(S): at 06:18

## 2023-11-08 RX ADMIN — Medication 30 MILLIGRAM(S): at 06:18

## 2023-11-08 RX ADMIN — INFLUENZA VIRUS VACCINE 0.7 MILLILITER(S): 15; 15; 15; 15 SUSPENSION INTRAMUSCULAR at 13:39

## 2023-11-08 RX ADMIN — FAMOTIDINE 20 MILLIGRAM(S): 10 INJECTION INTRAVENOUS at 13:39

## 2023-11-08 RX ADMIN — Medication 1 MILLIGRAM(S): at 13:39

## 2023-11-08 RX ADMIN — TACROLIMUS 4 MILLIGRAM(S): 5 CAPSULE ORAL at 09:53

## 2023-11-08 RX ADMIN — Medication 25 MILLIGRAM(S): at 06:17

## 2023-11-08 RX ADMIN — CHLORHEXIDINE GLUCONATE 1 APPLICATION(S): 213 SOLUTION TOPICAL at 06:18

## 2023-11-08 RX ADMIN — Medication 1: at 01:45

## 2023-11-08 NOTE — PROGRESS NOTE ADULT - SUBJECTIVE AND OBJECTIVE BOX
Transplant Surgery - Multidisciplinary Progress Note  --------------------------------------------------------------  DDRT 3/28/23    Present:   Patient rounded on with multidisciplinary team including Transplant Surgeon: Dr. Baer. Dr. Montenegro, LORENA Maldonado, Transplant Nephrologist: Dr. AMPARO Tobin, Pharmacist: Karen and unit RN during am rounds.  Disciplines not in attendance will be notified of the plan    HPI: 68F with Hx of Alport Syndrome and CKD 5  (x 15 years), LUE AVF placed in 2017, recently started HD on 3/17/2023 (Tanja, Nephrologist Dr. Jet Horn ) now s/p R  DDRT under Simulect induction on 3/28/23 presents from outpatient clinic for new onset hyperglycemia    Interval Events:   - Discharge held yesterday per patient   - Uncomfortable with insulin teaching  - Feels well, more confident with insulin management today     Immunosuppression:  Envarsus per level ,  mg bid, Pred 5  Ongoing monitoring for signs of rejection    Potential Discharge date: today  Education:  Medications  Plan of care:  See Below      MEDICATIONS  (STANDING):  chlorhexidine 2% Cloths 1 Application(s) Topical <User Schedule>  enoxaparin Injectable 40 milliGRAM(s) SubCutaneous every 24 hours  famotidine    Tablet 20 milliGRAM(s) Oral daily  folic acid 1 milliGRAM(s) Oral daily  insulin glargine Injectable (LANTUS) 26 Unit(s) SubCutaneous at bedtime  insulin lispro (ADMELOG) corrective regimen sliding scale   SubCutaneous three times a day before meals  insulin lispro (ADMELOG) corrective regimen sliding scale   SubCutaneous at bedtime  insulin lispro (ADMELOG) corrective regimen sliding scale   SubCutaneous <User Schedule>  insulin lispro Injectable (ADMELOG) 11 Unit(s) SubCutaneous three times a day before meals  metoprolol tartrate 25 milliGRAM(s) Oral two times a day  mycophenolate mofetil 250 milliGRAM(s) Oral two times a day  NIFEdipine XL 30 milliGRAM(s) Oral every 24 hours  polyethylene glycol 3350 17 Gram(s) Oral every 24 hours  predniSONE   Tablet 5 milliGRAM(s) Oral daily  senna 2 Tablet(s) Oral at bedtime  tacrolimus ER Tablet (ENVARSUS XR) 4 milliGRAM(s) Oral <User Schedule>    MEDICATIONS  (PRN):  acetaminophen     Tablet .. 650 milliGRAM(s) Oral every 6 hours PRN Mild Pain (1 - 3)  hydrOXYzine hydrochloride 25 milliGRAM(s) Oral two times a day PRN Itching      PAST MEDICAL & SURGICAL HISTORY:  Alport's syndrome      HTN (hypertension)      Hard of hearing  uses hearing aids      Anemia      ESRD with anemia  left AV fistula, not in use      Deep vein thrombosis (DVT) of right upper extremity      Deep vein thrombosis (DVT) of right upper extremity      S/P tonsillectomy      A-V fistula  Nov 2017      Renal transplant recipient          Vital Signs Last 24 Hrs  T(C): 36.9 (08 Nov 2023 13:20), Max: 37.3 (08 Nov 2023 09:24)  T(F): 98.4 (08 Nov 2023 13:20), Max: 99.1 (08 Nov 2023 09:24)  HR: 84 (08 Nov 2023 13:20) (62 - 94)  BP: 156/65 (08 Nov 2023 13:20) (128/79 - 163/68)  BP(mean): --  RR: 18 (08 Nov 2023 13:20) (18 - 18)  SpO2: 100% (08 Nov 2023 13:20) (95% - 100%)    Parameters below as of 08 Nov 2023 13:20  Patient On (Oxygen Delivery Method): room air        I&O's Summary    07 Nov 2023 07:01  -  08 Nov 2023 07:00  --------------------------------------------------------  IN: 1200 mL / OUT: 0 mL / NET: 1200 mL    08 Nov 2023 07:01  -  08 Nov 2023 15:53  --------------------------------------------------------  IN: 480 mL / OUT: 0 mL / NET: 480 mL                              8.9    5.16  )-----------( 135      ( 08 Nov 2023 07:07 )             29.1     11-08    141  |  108  |  16  ----------------------------<  146<H>  4.2   |  20<L>  |  0.72    Ca    9.2      08 Nov 2023 07:07  Phos  2.9     11-08  Mg     1.9     11-08    TPro  5.7<L>  /  Alb  3.2<L>  /  TBili  0.3  /  DBili  x   /  AST  34  /  ALT  47<H>  /  AlkPhos  60  11-08    Tacrolimus (), Serum: 6.7 ng/mL (11-08 @ 07:07)        Culture - Urine (collected 11-01-23 @ 21:15)  Source: Clean Catch Clean Catch (Midstream)  Final Report (11-04-23 @ 17:32):    >100,000 CFU/ml Klebsiella variicola  Organism: Klebsiella variicola (11-04-23 @ 17:32)  Organism: Klebsiella variicola (11-04-23 @ 17:32)             Review of systems  All other systems were reviewed and are negative, except as noted.      PHYSICAL EXAM:  Constitutional: Well developed / well nourished  Eyes: Anicteric, PERRLA  ENMT: nc/at  Neck: supple  Respiratory: CTA B/L  Cardiovascular: RRR  Gastrointestinal: Soft, ND/NT, well healed incisional scar  Genitourinary: Voiding spontaneously  Extremities: SCD's in place and working bilaterally  Vascular: Palpable dp pulses bilaterally  Neurological: A&O x3  Skin: no rashes, ulcerations or lesions;  Musculoskeletal: Moving all extremities  Psychiatric: Responsive

## 2023-11-08 NOTE — PROGRESS NOTE ADULT - SUBJECTIVE AND OBJECTIVE BOX
DIABETES FOLLOW UP NOTE: Saw pt earlier today    Chief Complaint: Endocrine consult requested for management of T2DM    INTERVAL HX: Pt stable, reports tolerating POs with BG improved BG while on present insulin doses. No hypoglycemia. Pt reports doing own finger pricks and insulin injections under RN supervision. Received further education on use of insulin pen and glucose meter but still not able to give teach back to this provider at time of visit.     Review of Systems:  General: As above  Cardiovascular: No chest pain, palpitations  Respiratory: No SOB, no cough  GI: No nausea, vomiting, abdominal pain  Endocrine: No polyuria, polydipsia or S&Sx of hypoglycemia    Allergies    Bactrim (Pruritus)    Intolerances      MEDICATIONS:  insulin glargine Injectable (LANTUS) 26 Unit(s) SubCutaneous at bedtime  insulin lispro (ADMELOG) corrective regimen sliding scale   SubCutaneous at bedtime  insulin lispro (ADMELOG) corrective regimen sliding scale   SubCutaneous <User Schedule>  insulin lispro (ADMELOG) corrective regimen sliding scale   SubCutaneous three times a day before meals  insulin lispro Injectable (ADMELOG) 11 Unit(s) SubCutaneous three times a day before meals  predniSONE   Tablet 5 milliGRAM(s) Oral daily  tacrolimus ER Tablet (ENVARSUS XR) 4 milliGRAM(s) Oral <User Schedule>      PHYSICAL EXAM:  VITALS: T(C): 36.9 (11-08-23 @ 13:20)  T(F): 98.4 (11-08-23 @ 13:20), Max: 99.1 (11-08-23 @ 09:24)  HR: 84 (11-08-23 @ 13:20) (62 - 84)  BP: 156/65 (11-08-23 @ 13:20) (128/79 - 156/65)  RR:  (18 - 18)  SpO2:  (95% - 100%)  Wt(kg): --  GENERAL: Female laying in bed in NAD  Abdomen: Soft, nontender, non distended  Extremities: Warm, no edema in all 4 exts  NEURO: A&O X3      LABS:  POCT Blood Glucose.: 186 mg/dL (11-08-23 @ 13:18)  POCT Blood Glucose.: 175 mg/dL (11-08-23 @ 09:20)  POCT Blood Glucose.: 196 mg/dL (11-08-23 @ 01:37)  POCT Blood Glucose.: 219 mg/dL (11-07-23 @ 21:58)  POCT Blood Glucose.: 176 mg/dL (11-07-23 @ 17:17)  POCT Blood Glucose.: 189 mg/dL (11-07-23 @ 12:55)  POCT Blood Glucose.: 155 mg/dL (11-07-23 @ 08:47)  POCT Blood Glucose.: 113 mg/dL (11-07-23 @ 01:13)  POCT Blood Glucose.: 150 mg/dL (11-06-23 @ 21:32)  POCT Blood Glucose.: 215 mg/dL (11-06-23 @ 17:31)  POCT Blood Glucose.: 166 mg/dL (11-06-23 @ 12:56)  POCT Blood Glucose.: 184 mg/dL (11-06-23 @ 09:07)  POCT Blood Glucose.: 169 mg/dL (11-06-23 @ 01:57)  POCT Blood Glucose.: 133 mg/dL (11-05-23 @ 22:30)                            8.9    5.16  )-----------( 135      ( 08 Nov 2023 07:07 )             29.1       11-08    141  |  108  |  16  ----------------------------<  146<H>  4.2   |  20<L>  |  0.72    eGFR: 90    Ca    9.2      11-08  Mg     1.9     11-08  Phos  2.9     11-08    TPro  5.7<L>  /  Alb  3.2<L>  /  TBili  0.3  /  DBili  x   /  AST  34  /  ALT  47<H>  /  AlkPhos  60  11-08      A1C with Estimated Average Glucose Result: 10.1 % (11-04-23 @ 07:15)  A1C with Estimated Average Glucose Result: 10.4 % (11-01-23 @ 21:16)      Estimated Average Glucose: 243 mg/dL (11-04-23 @ 07:15)  Estimated Average Glucose: 252 mg/dL (11-01-23 @ 21:16)

## 2023-11-08 NOTE — PROGRESS NOTE ADULT - NSPROGADDITIONALINFOA_GEN_ALL_CORE
-Plan discussed with pt/team.  Contact info: [phone] (24/7). pager [phone]  Amion on [place]  Teams on M-T-W-F. Unavailable Thu/Weekends/Holidays  Provided face to face education which was more than 50% of encounter that also included assessing pt/labs/meds and discussing plan of care with primary team.  Adjusting insulin  Discharge plan  Follow up care
-Plan discussed with pt/team.  Contact info: [phone] (24/7). pager [phone]  Amion on [place]  Teams on M-T-W-F. Unavailable Thu/Weekends/Holidays  Provided face to face education which was more than 50% of encounter that also included assessing pt/labs/meds and discussing plan of care with primary team.  Adjusting insulin  Discharge plan  Follow up care

## 2023-11-08 NOTE — PROGRESS NOTE ADULT - PROBLEM SELECTOR PLAN 3
LDL goal <70 due to new DM  Pt LDL NA  Order fasting lipid if not recently done  Consider moderate intensity statin due to risk for CV events  Manage per primary team   F/u levels as out pt
statin therapy recommended given risk factors    Sofie Jung NP-C   Contact via [employer] Teams during business hours  To reach covering provider access AMION via [employer] tools  For Urgent matters/after-hours/weekends/holidays please page endocrine fellow on call   For nonurgent matters please email [email or web address]    Please note that this patient may be followed by different provider tomorrow.  Notify endocrine 24 hours prior to discharge for final recommendations
LDL goal <70 due to new DM  Pt LDL NA  Order fasting lipid if not recently done  Consider moderate intensity statin due to risk for CV events  Manage per primary team   F/u levels as out pt

## 2023-11-08 NOTE — PROGRESS NOTE ADULT - PROBLEM SELECTOR PLAN 2
Goal BP <130/80   c/w metoprolol.  Managed by primary team

## 2023-11-08 NOTE — PROGRESS NOTE ADULT - ASSESSMENT
68 year old  female with HTN,  Alport syndrome and CKD 5  (x 15 years), LUE AVF placed in 2017, recently started HD on 3/17/2023 (Tanja, Nephrologist Dr. Jet Horn )  s/p R  DDRT under Simulect induction on 3/28/23. Admitted to [facility] for hyperglycemia noted on outpatient labs .    1. s/p  DDRT on 3/28/23- Allograft function is good   2. IS meds- on Envarsus with goal level 6-8 and Prednisone 5 mg po daily.   Resume cellcept 250mgs BID (home dose for leukopenia).  3. Hyperglycemia- insulin gtt transitioned to subQ. Endocrinology on board.  Needs insulin teaching.   4. UTI- UA with pyuria. UCx growing GN rods, follow up speciation/susceptibilities. BCx NTD. on Zosyn.    
68 year old  female with HTN,  Alport syndrome and CKD 5  (x 15 years), LUE AVF placed in 2017, recently started HD on 3/17/2023 ([facility]ja, Nephrologist Dr. Jet Horn )  s/p R  DDRT under Simulect induction on 3/28/23. Admitted to [facility] for hyperglycemia noted on outpatient labs .    1. s/p  DDRT on 3/28/23- Allograft function is good   2. IS meds- on Envarsus with goal level 6-8 and Prednisone 5 mg po daily. hold cellcept  3. Hyperglycemia- insulin gtt transitioned to subQ. Endocrinology on board.   4. UTI- UA with pyuria. UCx growing GN rods, follow up speciation/susceptibilities. BCx NTD. on Zosyn.    
68F with Hx of Alport Syndrome and CKD 5  (x 15 years), LUE AVF placed in 2017, recently started HD on 3/17/2023 (Tanja, Nephrologist Dr. Jet Horn ) now s/p R  DDRT under Simulect induction on 3/28/23 presents from outpatient clinic for new onset hyperglycemia    New Onset DM  -appreciate Endocrine management  -frequent insulin teaching    ID:  - COVID+. Transplant ID:  defer treatment for COVID due to high cycle threshold and repeat COVID testing negative  - Klebsiella UTI: Zosyn--> Cefuroxime to finish today  - DVT PPx    DDRT  -good graft function  -diet as tolerated  -I&Os  -Immunosuppression: will continue Envarsus per level, resume MMF 250BID home dose 2/2 leukopenia in past, continue Pred 5QD    DISPO  -d/c home today with family  -VNS arranged for new diabetic assistance and teaching
68F with Hx of Alport Syndrome and CKD 5  (x 15 years), LUE AVF placed in 2017, recently started HD on 3/17/2023 (Tanja, Nephrologist Dr. Jet Horn ) now s/p R  DDRT under Simulect induction on 3/28/23 presents from outpatient clinic for new onset hyperglycemia    New Onset DM  -off insulin drip may DG to floor  -follow Endo recs for AC HS insulin requirements  -pt needs education on how to administer insulin before dc  Start Lantus 16 units   Start Admelog 8 units qac  Low correction scale qac + bedtime  Goal glucose 100-180  Outpt. endo follow-up  Plan to d/c on basal bolus vs. basal + orals depending on insulin requirements. Needs education and teaching.    COVID+  - Transplant ID consulted, their recs:        - continue piperacillin/tazobactam        - urine cultures gram negative rods pending speciation and sensitivites         - defer treatment for COVID due to high cycle threshold and repeat COVID testing negative  - DVT PPx  -repeat COVID test negative today 11/3    DDRT  -good graft function  -diet as tolerated  -I&Os  -Immunosuppression: will continue Envarsus per level, HOLD MMF (was on 250BID at home 2/2 leukopenia in past), continue Pred 5QD
Impression:  68F with Hx of Alport Syndrome and CKD 5 (x 15 years), LUE AVF placed in 2017, RUE DVT in April 2023 recently started HD on 3/17/2023 (Tanja, Nephrologist Dr. Jet Horn ) now s/p R DDRT under Simulect induction on 3/28/23 and at that time donor with polymicrobial positive blood cultures (Klebsiella spp, strep) presents from outpatient clinic after she was found to have elevated blood glucose to 600's and was informed to go to the ER. UPon work up patient found to have BC of 771 with A1c of 10.4, COVID positive with negative CXR and UA showing trace leukocyte esterase, 60 wbc and mod bacteria. Patient admitted to the ICU for insulin gtt manage of HHS. repeat covid negative.    Antimicrobials:  piperacillin/tazobactam 10/1 - current    Assessment:  *Sepsis as evidenced by leukocytosis and tachypnea secondary to UTI, UA may represent asymptomatic bacteruria but due to patients immunocompromised status and level of pyruria active infection is likely and should be treated (Procal 0.19), UCx growing GNRs pending specation  *COVID 19 infection w/ hx of  having COVID 19 in October 2023 after trip to [place], patient vaccinated x4 against covid, high cycle threshold and repeat covid swab negative  *s/p DDRT under simulect induction on 3/28/23 immunosuppressed on prednisone and tacrolimus with tacrocurrently held  *HHS without acidosis or ketones in urine with admission BMP glucose of 771, poorly controlled DM with A1c of 10.4  *Pseudohyponatremia in the setting of hyperglycemic state  *Hx of RUE DVT in 4/2023  *Hyperkalemia now resolved    Recommendations:   - continue piperacillin/tazobactam   - blood NTD  - urine cultures gram negative rods pending speciation and sensitivites    - will adjust antimicrobial therapy based off of culture sensitivity   - defer treatment for COVID due to high cycle threshold and repeat COVID testing negative  - HHS management and immunosuppression management as per primary team   -  on DM management     Joe Zambrano DO, PGY-4   Infectious Disease Fellow  [employer] Teams Preferred  After 5pm/weekends call [phone] 
68 year old  female with HTN,  Alport syndrome and CKD 5  (x 15 years), LUE AVF placed in 2017, recently started HD on 3/17/2023 ([facility]ja, Nephrologist Dr. Jet Horn )  s/p R  DDRT under Simulect induction on 3/28/23. Admitted to [facility] for hyperglycemia noted on outpatient labs .    1. s/p  DDRT on 3/28/23- Allograft function is good   2. IS meds- on Envarsus with goal level 6-8 and Prednisone 5 mg po daily.   Resumed cellcept 250mgs BID (home dose for leukopenia).  3. Hyperglycemia- insulin gtt transitioned to subQ. Endocrinology on board.  Needs insulin teaching.  Blood sugars much improved.   4. UTI- Kleb, pan sensitive.  On cefuroxime.     d/c home tomorrow.   
68F with Hx of Alport Syndrome and CKD 5  (x 15 years), LUE AVF placed in 2017, recently started HD on 3/17/2023 (Tanja, Nephrologist Dr. Jet Horn ) now s/p R  DDRT under Simulect induction on 3/28/23 presents from outpatient clinic for new onset hyperglycemia    New Onset DM  -appreciate Endocrine management  -frequent insulin teaching    ID:  - COVID+. Transplant ID:  defer treatment for COVID due to high cycle threshold and repeat COVID testing negative  - Klebsiella UTI: Zosyn--> Cefuroxime  - DVT PPx    DDRT  -good graft function  -diet as tolerated  -I&Os  -Immunosuppression: will continue Envarsus per level, resume MMF 250BID home dose 2/2 leukopenia in past, continue Pred 5QD    DISPO  -d/c home tomorrow with family
68F with Hx of Alport Syndrome and CKD 5  (x 15 years), LUE AVF placed in 2017, recently started HD on 3/17/2023 (Tanja, Nephrologist Dr. Jet Horn ) now s/p R  DDRT under Simulect induction on 3/28/23 presents from outpatient clinic for new onset hyperglycemia      New Onset DM  -appreciate Endocrine management  -frequent insulin teaching      ID:  - COVID+. Transplant ID:  defer treatment for COVID due to high cycle threshold and repeat COVID testing negative  - Klebsiella UTI: Zosyn--> Cefuroxime until tomorrow  - DVT PPx      DDRT  -good graft function  -diet as tolerated  -I&Os  -Immunosuppression: will continue Envarsus per level, resume MMF 250BID home dose 2/2 leukopenia in past, continue Pred 5QD      DISPO  -d/c home tomorrow with family  -VNS arranged for new diabetic assistance and teaching  -CM following closely
68F with Hx of Alport Syndrome and CKD 5  (x 15 years), LUE AVF placed in 2017, recently started HD on 3/17/2023 (Tanja, Nephrologist Dr. Jet Horn ) now s/p R  DDRT under Simulect induction on 3/28/23 presents from outpatient clinic for new onset hyperglycemia    New Onset DM  -appreciate Endocrine management  -frequent insulin teaching  -DC home with Lantus 26u qHS, Lispro 11u premeal (hold if NPO)  -Follow up with Endo as outpatient within 2 weeks of discharge     ID:  - COVID+. Transplant ID:  defer treatment for COVID due to high cycle threshold and repeat COVID testing negative  - Klebsiella UTI: Zosyn--> Cefuroxime completed 11/7/23  - DVT PPx    DDRT  -good graft function  -diet as tolerated  -I&Os  -Immunosuppression: will continue Envarsus per level, MMF 250BID home dose 2/2 leukopenia in past, Pred 5QD    DISPO  -d/c home today with family  -VNS arranged for new diabetic assistance and teaching
68F with Hx of Alport Syndrome and CKD 5  (x 15 years), LUE AVF placed in 2017, recently started HD on 3/17/2023 (Tanja, Nephrologist Dr. Jet Horn ) now s/p R  DDRT under Simulect induction on 3/28/23 presents from outpatient clinic for new onset hyperglycemia    New Onset DM  -follow Endo recs for AC HS insulin requirements  -pt needs education on how to administer insulin before dc (spoke with RN who started edu)  Lantus/admolog per endo  Low correction scale qac + bedtime  Goal glucose 100-180  Outpt. endo follow-up  Plan to d/c on basal bolus vs. basal + orals depending on insulin requirements. Needs education and teaching.    COVID+  - Transplant ID consulted, their recs:        - Continue piperacillin        - urine cultures gram negative rods Kleb pending sensitivities         - defer treatment for COVID due to high cycle threshold and repeat COVID testing negative  - DVT PPx  -repeat COVID test negative today 11/3    DDRT  -good graft function  -diet as tolerated  -I&Os  -Immunosuppression: will continue Envarsus per level, resume MMF 250BID home dose 2/2 leukopenia in past, continue Pred 5QD
ASSESSMENT:  68F with Hx of Alport Syndrome and CKD5 s/p R DDRT under Simulect induction on 3/28/23 presents from outpatient clinic after she was found to have elevated blood glucose to 600's and was informed to go to the ER. Denies any hx diabetes or dietary changes. Brought to SICU for insulin gtt and close monitoring.     PLAN:  Neuro:  - Pain control with tylenol    Resp: COVID +  - Out of bed to chair, ambulate as tolerated, and incentive spirometry to prevent atelectasis  - on room air    CVS:  - hemodynamically stable  - home nifedipine, metoprolol    GI:   - regular diet  - Bowel regimen with senna & Miralax  - home pepcid    Renal: s/p DDRT  - Monitor I&Os and electrolytes  - replete electrolytes as needed   - voiding spontaneously     Heme:  - Monitor CBC and coags  - VTE prophylaxis: lovenox  - SCD's    ID:   - Monitor for clinical evidence of active infection  - Monitor WBC, temperature, and procalcitonin  - empiric zosyn  - holding tacro, MMF    Endo:   - A1c: 10.4  -Glargine 24 units + ISS  - prednisone 5mg QD  
68 year old  female with HTN,  Alport syndrome and CKD 5  (x 15 years), LUE AVF placed in 2017, recently started HD on 3/17/2023 (aTnja, Nephrologist Dr. Jet Horn )  s/p R  DDRT under Simulect induction on 3/28/23. Admitted to [facility] for hyperglycemia noted on outpatient labs .    1. s/p  DDRT on 3/28/23- Allograft function is good   2. IS meds- on Envarsus with goal level 6-8 and Prednisone 5 mg po daily.   Resumed cellcept 250mg BID (home dose for leukopenia).  3. Hyperglycemia- insulin gtt transitioned to subQ. Endocrinology on board.  Needs insulin teaching.  Blood sugars much improved.   4. UTI- Kleb, pan sensitive.  On cefuroxime.   5. Covid- no tx per ID as high cycle threshold and repeat COVID testing negative    d/c home today or tomorrow likely with VNS.   
68F with Hx of Alport Syndrome and CKD 5  (x 15 years), LUE AVF placed in 2017, recently started HD on 3/17/2023 (Tanja, Nephrologist Dr. Jet Horn ) now s/p R  DDRT under Simulect induction on 3/28/23 presents from outpatient clinic for new onset hyperglycemia    New Onset DM  -on insulin gtt, Endocrine consulted  -continue SICU care for today. If remains stable, can downgrade to COVID bed on [address] tomorrow    COVID+  - Transplant ID consulted  - DVT PPx  - follow full cultures    DDRT  -good graft function  -diet as tolerated  -I&Os  -Immunosuppression: will continue Envarsus per level, HOLD MMF (was on 250BID at home 2/2 leukopenia in past), continue Pred 5QD    
69 y/o F w/ NODAT s/p renal tx admitted with hyperglycemia found to have newly diagnosed diabetes with A1c of 10.4%, HTN, HLD (high risk patient with severely uncontrolled diabetes with A1c of 10.4% at high risk of CAD and CVA with high level decision-making). s/p insulin gtt.   BGs variable without pattern on current insulin regimen. Fasting BG at goal (  in BMP)  pt is waiting for Glucometer to be picked up from pharmacy to she can practice BG check with her glucometer, RN to review insulin pen use today. Spoke with RN     Met with patient and reviewed the following:  -Blood glucose goals: 100s to 150s as out pt  -Glucose monitoring frequency: ac and hs  -Hypoglycemia prevention,detection and treatment  -Healthy eating and portion control  -Insulin(s) action, time of administration and side effects  -Importance of follow up care  
Impression:  68F with Hx of Alport Syndrome and CKD 5 (x 15 years), LUE AVF placed in 2017, RUE DVT in April 2023 recently started HD on 3/17/2023 (Tanja, Nephrologist Dr. Jet Horn ) now s/p R DDRT under Simulect induction on 3/28/23 and at that time donor with polymicrobial positive blood cultures (Klebsiella spp, strep) presents from outpatient clinic after she was found to have elevated blood glucose to 600's and was informed to go to the ER. UPon work up patient found to have BC of 771 with A1c of 10.4, COVID positive with negative CXR and UA showing trace leukocyte esterase, 60 wbc and mod bacteria. Patient admitted to the ICU for insulin gtt manage of HHS. repeat covid negative.    Antimicrobials:  piperacillin/tazobactam 10/1 - current    Assessment:  *Sepsis as evidenced by leukocytosis and tachypnea secondary to UTI, UA may represent asymptomatic bacteruria but due to patients immunocompromised status and level of pyruria active infection is likely and should be treated (Procal 0.19), UCx growing GNRs pending specation  *COVID 19 infection w/ hx of  having COVID 19 in October 2023 after trip to [place], patient vaccinated x4 against covid, high cycle threshold and repeat covid swab negative  *s/p DDRT under simulect induction on 3/28/23 immunosuppressed on prednisone and tacrolimus with tacrocurrently held  *HHS without acidosis or ketones in urine with admission BMP glucose of 771, poorly controlled DM with A1c of 10.4  *Pseudohyponatremia in the setting of hyperglycemic state  *Hx of RUE DVT in 4/2023  *Hyperkalemia now resolved    Recommendations:   - Stop zosyn- complete on 11/6 course with cefuroxime 300 mg bid.  - blood NTD  - defer treatment for COVID due to high cycle threshold and repeat COVID testing negative  - ID will sign off at this  time    Thank you for involving us in the care of this patient  Please call or page with additional questions  Pager; #[phone]  Teams: from 8 am to 5 pm  Zeenat Burrows MD  
67 y/o F without any h/o pre DM or DM> no family DM hx as well. Pt w/h/o HTN, HLD,  Alport syndrome and CKD 5  (x 15 years), LUE AVF placed in 2017, recently started HD on 3/17/2023 and s/p R  DDRT under Simulect induction on 3/28/23. Here with severe hyperglycemia after renal tx > found to have newly diagnosed post transplant diabetes with A1c of 10.4%. On Prednisone 5mg/day. Tolerating POs with BG mostly at goal while on present insulin regimen. No hypoglycemia. Goal glucose 100-180. Pt participating in self DM care but unable to give teach back to this provider at time of visit      Met with patient and reviewed the following:  Use of home glucose meter  Use of insulin pens  Pt able to verbalize understanding and gave teach back independently after education was reinforced. Pt has written materials  with steps and graphics in case she forgets any steps.     
69 y/o F without any h/o pre DM or DM> no family DM hx as well. Pt w/h/o HTN, HLD,  Alport syndrome and CKD 5  (x 15 years), MISTY AVF placed in 2017, recently started HD on 3/17/2023 and s/p R  DDRT under Simulect induction on 3/28/23. Here with severe hyperglycemia after renal tx > found to have newly diagnosed post transplant diabetes with A1c of 10.4%. On Prednisone 5mg/day. Tolerating POs with BG at goal while on present insulin regimen. No hypoglycemia. Goal glucose 100-180. Pt participating in self DM care but needs to learn use on insulin pen. Spoke to primary team and covering RN to teach insulin pen with teach back.     Met with patient and reviewed the following:  -New PTDM diagnosis and importance of DM control  -A1c LEVEL: Present and goal  -Blood glucose goals: 100s to 160s as out pt  -Glucose monitoring frequency: ac and hs  -Healthy eating and portion control. Reviewed 'My Plate" in depth  -Insulin(s) action, time of administration and side effects. basal/bolus therapy. Pt has Humalog pen in refrigerator but is missing Lantus insulin  -Importance of follow up care. Needs close f/u due to recent transplant  Pt able to verbalize understanding about need for glucose monitoring, insulin therapy, diet and f/u care    Provided pt with written materials about all the above.

## 2023-11-08 NOTE — PROGRESS NOTE ADULT - NUTRITIONAL ASSESSMENT
Diet, Consistent Carbohydrate w/Evening Snack (11-02-23 @ 10:58) [Active]      Please see RD assessment and/or follow up.  Managed by primary team as well
Diet, Consistent Carbohydrate w/Evening Snack (11-02-23 @ 10:58) [Active]      Please see RD assessment and/or follow up.  Managed by primary team as well
Diet, Consistent Carbohydrate w/Evening Snack (11-02-23 @ 10:58) [Active]

## 2023-11-08 NOTE — PROGRESS NOTE ADULT - PROBLEM SELECTOR PLAN 1
- Continue Lantus 26 units at HS  - Continue premeal Admelog 11 units before each meals, Hold if NPO  - continue with Low correction scale qac + bedtime  - Goal glucose 100-180  - Please keep hypoglycemia protocol in palce  Discharge planning   Outpt. endo follow-up  Outpt. optho, podiatry, nephrology  Plan to d/c on basal bolus vs. basal + orals depending on insulin requirements.   Needs education and teaching.  Can follow up with [facility] - [address] [place] [address], [address]. [place] [address]  Tel) [phone]
- Test BG ac and hs  - Continue Lantus 26 units at HS  - Continue Admelog 11 units before each meals, Hold if NPO  - continue with Low correction scale qac + bedtime  -Will adjust as needed  -Please continue to teach and allow pt to do own finger sticks and insulin injections under RN supervision  Please teach use of insulin pen  Please document teach back  Discharge planning:   Plan to d/c on basal bolus insulin  on above doses  Can follow up with [facility] - [address] [place] [address], [address]. [place] [address]  Tel) [phone]. Sent email to practice to set up apt with pt within next 3 weeks. Pt aware she will get a call   Will need home care upon discharge due to new DM diagnosis and new insulin therapy  Please write Rxs for: Solo Star Insulin pen/Humalog Kwik insulin pen/Lyssa insulin pen needles/glucose meter/strips/lancets. Pt missing basal insulin pens  Outpt. optho, podiatry, nephrology
-Test BG ac and hs  - Continue Lantus 26 units at HS  - Continue Admelog 11 units before each meals, Hold if NPO  - continue with Low correction scale qac + bedtime  -Will adjust as needed  -Please continue to teach and allow pt to do own finger sticks and insulin injections under RN supervision  Please teach use of insulin pen  Please document teach back  Discharge planning:   Plan to d/c on basal bolus insulin since pt is requiring over 50 units of insulin/day  Will continue present insulin doses> if BGs remain stable discharge on above regimen except Admelog correction scales  Can follow up with [facility] - [address] [place] [address], [address]. [place] [address]  Tel) [phone]. Sent email to practice to set up apt with pt within next 3 weeks. Pt aware she will get a call   Will need home care upon discharge due to new DM diagnosis and new insulin therapy  Please write Rxs for: Solo Star Insulin pen/Humalog Kwik insulin pen/Lyssa insulin pen needles/glucose meter/strips/lancets. Pt missing basal insulin pens  Outpt. optho, podiatry, nephrology

## 2023-11-10 ENCOUNTER — APPOINTMENT (OUTPATIENT)
Dept: ENDOCRINOLOGY | Facility: CLINIC | Age: 68
End: 2023-11-10
Payer: MEDICARE

## 2023-11-10 VITALS
OXYGEN SATURATION: 98 % | SYSTOLIC BLOOD PRESSURE: 130 MMHG | TEMPERATURE: 97 F | BODY MASS INDEX: 23.63 KG/M2 | DIASTOLIC BLOOD PRESSURE: 70 MMHG | HEIGHT: 66 IN | HEART RATE: 97 BPM | WEIGHT: 147 LBS

## 2023-11-10 DIAGNOSIS — E11.65 TYPE 2 DIABETES MELLITUS WITH HYPERGLYCEMIA: ICD-10-CM

## 2023-11-10 PROCEDURE — 99215 OFFICE O/P EST HI 40 MIN: CPT | Mod: 25

## 2023-11-10 PROCEDURE — 82962 GLUCOSE BLOOD TEST: CPT

## 2023-11-13 LAB — GLUCOSE BLDC GLUCOMTR-MCNC: 123

## 2023-11-14 PROBLEM — E11.65 HYPERGLYCEMIA DUE TO DIABETES MELLITUS: Status: ACTIVE | Noted: 2023-11-05

## 2023-11-15 ENCOUNTER — APPOINTMENT (OUTPATIENT)
Dept: TRANSPLANT | Facility: CLINIC | Age: 68
End: 2023-11-15

## 2023-11-15 LAB
ALBUMIN SERPL ELPH-MCNC: 3.5 G/DL
ALP BLD-CCNC: 64 U/L
ALT SERPL-CCNC: 32 U/L
ANION GAP SERPL CALC-SCNC: 14 MMOL/L
APPEARANCE: ABNORMAL
AST SERPL-CCNC: 19 U/L
BACTERIA: ABNORMAL /HPF
BILIRUB SERPL-MCNC: 0.4 MG/DL
BILIRUBIN URINE: NEGATIVE
BLOOD URINE: NEGATIVE
BUN SERPL-MCNC: 16 MG/DL
CALCIUM SERPL-MCNC: 9.2 MG/DL
CAST: 0 /LPF
CHLORIDE SERPL-SCNC: 107 MMOL/L
CMV DNA SPEC QL NAA+PROBE: NOT DETECTED IU/ML
CMVPCR LOG: NOT DETECTED LOG10IU/ML
CO2 SERPL-SCNC: 21 MMOL/L
COLOR: YELLOW
CREAT SERPL-MCNC: 0.86 MG/DL
CREAT SPEC-SCNC: 164 MG/DL
CREAT/PROT UR: 0.2 RATIO
EGFR: 73 ML/MIN/1.73M2
EPITHELIAL CELLS: 7 /HPF
GLUCOSE QUALITATIVE U: NEGATIVE MG/DL
GLUCOSE SERPL-MCNC: 169 MG/DL
HCT VFR BLD CALC: 32.4 %
HGB BLD-MCNC: 9.8 G/DL
KETONES URINE: ABNORMAL MG/DL
LDH SERPL-CCNC: 198 U/L
LEUKOCYTE ESTERASE URINE: ABNORMAL
MAGNESIUM SERPL-MCNC: 1.4 MG/DL
MCHC RBC-ENTMCNC: 29.6 PG
MCHC RBC-ENTMCNC: 30.2 GM/DL
MCV RBC AUTO: 97.9 FL
MICROSCOPIC-UA: NORMAL
NITRITE URINE: POSITIVE
PH URINE: 5.5
PHOSPHATE SERPL-MCNC: 3.2 MG/DL
PLATELET # BLD AUTO: 171 K/UL
POTASSIUM SERPL-SCNC: 4.4 MMOL/L
PROT SERPL-MCNC: 5.8 G/DL
PROT UR-MCNC: 27 MG/DL
PROTEIN URINE: 30 MG/DL
RBC # BLD: 3.31 M/UL
RBC # FLD: 17.2 %
RED BLOOD CELLS URINE: 1 /HPF
SODIUM SERPL-SCNC: 142 MMOL/L
SPECIFIC GRAVITY URINE: 1.02
TACROLIMUS SERPL-MCNC: 9 NG/ML
URATE SERPL-MCNC: 6.6 MG/DL
UROBILINOGEN URINE: 1 MG/DL
WBC # FLD AUTO: 5.92 K/UL
WHITE BLOOD CELLS URINE: 105 /HPF

## 2023-11-16 LAB — BKV DNA SPEC QL NAA+PROBE: NOT DETECTED IU/ML

## 2023-11-26 ENCOUNTER — NON-APPOINTMENT (OUTPATIENT)
Age: 68
End: 2023-11-26

## 2023-11-28 ENCOUNTER — APPOINTMENT (OUTPATIENT)
Dept: TRANSPLANT | Facility: CLINIC | Age: 68
End: 2023-11-28

## 2023-11-29 ENCOUNTER — APPOINTMENT (OUTPATIENT)
Dept: NEPHROLOGY | Facility: CLINIC | Age: 68
End: 2023-11-29
Payer: MEDICARE

## 2023-11-29 VITALS
RESPIRATION RATE: 15 BRPM | WEIGHT: 152 LBS | OXYGEN SATURATION: 100 % | HEIGHT: 66 IN | BODY MASS INDEX: 24.43 KG/M2 | SYSTOLIC BLOOD PRESSURE: 165 MMHG | DIASTOLIC BLOOD PRESSURE: 82 MMHG | HEART RATE: 75 BPM

## 2023-11-29 LAB
ALBUMIN SERPL ELPH-MCNC: 3.9 G/DL
ALP BLD-CCNC: 71 U/L
ALT SERPL-CCNC: 14 U/L
ANION GAP SERPL CALC-SCNC: 12 MMOL/L
APPEARANCE: ABNORMAL
AST SERPL-CCNC: 13 U/L
BACTERIA: ABNORMAL /HPF
BILIRUB SERPL-MCNC: 0.4 MG/DL
BILIRUBIN URINE: NEGATIVE
BKV DNA SPEC QL NAA+PROBE: NOT DETECTED IU/ML
BLOOD URINE: ABNORMAL
BUN SERPL-MCNC: 17 MG/DL
CALCIUM SERPL-MCNC: 9 MG/DL
CAST: 0 /LPF
CHLORIDE SERPL-SCNC: 106 MMOL/L
CMV DNA SPEC QL NAA+PROBE: NOT DETECTED IU/ML
CMVPCR LOG: NOT DETECTED LOG10IU/ML
CO2 SERPL-SCNC: 25 MMOL/L
COLOR: YELLOW
CREAT SERPL-MCNC: 0.84 MG/DL
CREAT SPEC-SCNC: 83 MG/DL
CREAT/PROT UR: 0.3 RATIO
EGFR: 75 ML/MIN/1.73M2
EPITHELIAL CELLS: 5 /HPF
GLUCOSE QUALITATIVE U: NEGATIVE MG/DL
GLUCOSE SERPL-MCNC: 122 MG/DL
HCT VFR BLD CALC: 36.2 %
HGB BLD-MCNC: 11 G/DL
KETONES URINE: NEGATIVE MG/DL
LDH SERPL-CCNC: 168 U/L
LEUKOCYTE ESTERASE URINE: ABNORMAL
MAGNESIUM SERPL-MCNC: 1.6 MG/DL
MCHC RBC-ENTMCNC: 29.2 PG
MCHC RBC-ENTMCNC: 30.4 GM/DL
MCV RBC AUTO: 96 FL
MICROSCOPIC-UA: NORMAL
NITRITE URINE: POSITIVE
PH URINE: 6
PHOSPHATE SERPL-MCNC: 3.5 MG/DL
PLATELET # BLD AUTO: 222 K/UL
POTASSIUM SERPL-SCNC: 4 MMOL/L
PROT SERPL-MCNC: 6.3 G/DL
PROT UR-MCNC: 23 MG/DL
PROTEIN URINE: 30 MG/DL
RBC # BLD: 3.77 M/UL
RBC # FLD: 16.7 %
RED BLOOD CELLS URINE: 1 /HPF
SODIUM SERPL-SCNC: 142 MMOL/L
SPECIFIC GRAVITY URINE: 1.02
TACROLIMUS SERPL-MCNC: 9.8 NG/ML
URATE SERPL-MCNC: 6.9 MG/DL
UROBILINOGEN URINE: 0.2 MG/DL
WBC # FLD AUTO: 7.62 K/UL
WHITE BLOOD CELLS URINE: 193 /HPF

## 2023-11-29 PROCEDURE — 99215 OFFICE O/P EST HI 40 MIN: CPT

## 2023-11-29 RX ORDER — REPAGLINIDE 0.5 MG/1
0.5 TABLET ORAL 3 TIMES DAILY
Qty: 90 | Refills: 0 | Status: DISCONTINUED | COMMUNITY
Start: 2023-11-01 | End: 2023-11-29

## 2023-12-15 ENCOUNTER — APPOINTMENT (OUTPATIENT)
Dept: ENDOCRINOLOGY | Facility: CLINIC | Age: 68
End: 2023-12-15
Payer: MEDICARE

## 2023-12-15 PROCEDURE — 99214 OFFICE O/P EST MOD 30 MIN: CPT | Mod: 25,95

## 2023-12-17 NOTE — ASSESSMENT
[FreeTextEntry1] : #Type 2 DM #Steroid induced hyperglycemia - Patient presents as a hospital follow up due to being sent to the hospital after her outpatient labs showed BG >600.  - She is currently taking Lantus 24 units daily and admelog 11/11/9 units with meals. - I reviewed and analyzed patient's freestyle beverly data. Patient's TIR is 88%, high 2%, low 10%, avg glucose is 111, BG during the day is stable however having some overnight lows. Patient states she is asymptomatic during this and is only going by the sensor readings.  Plan: - patient is having some overnight lows however is asymptomic at the time. advised to confirm low sugar with a glucometer next time the sensor reads as low to confirm that it's a true low or not  - Continue with Lantus 26 units daily - Continue with Admleog 11/11/9 - If still having low sugars, patient to call our office   Patient counseled extensively about the complications of diabetes including but not limited to nephropathy, neuropathy, and retinopathy. We discussed the importance of annual foot and optho exams. Explained that ideally blood sugars in the morning prior to breakfast should be between 80 and 130. Blood sugars should be checked 2 hours after eating and should be <180. If blood sugar is <70, patient should treat the blood sugar FIRST and then contact provider. Advised patient to let us know if BG persistently <70 or >200

## 2023-12-17 NOTE — HISTORY OF PRESENT ILLNESS
[FreeTextEntry1] : This visit was provided via telehealth using real-time 2-way audio visual technology. The patient, NGUYEN GARCIA was located at home, [address] [place] [address] at the time of the visit.  The provider, BERTA MOON DO, was located at the medical office located in [address] [place] [address] [address] [place] [id number] at the time of the visit.  Verbal consent given on DECEMBER 15, 2023 by Patient.    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 Jet Horn ) now s/p R  DDRT under Simulect induction on 3/28/23 presents from outpatient clinic after she was found to have elevated blood glucose to 600's and was informed to go to the ER. Denies any hx diabetes or dietary changes.   Patient also endorses 3 month history of itching around her face that is most prevalent at night. Was given medication for symptoms however felt very drowsy afterwards, later tried benadryl with worsening drowsiness side effects.   She also endorses weakness in her lower extremities that has been ongoing for 2 months that occurs after walking. She endorses she feels she need to stop and rest after taking "a few hundred steps" which is atypical for her. Denies any numbness/tingling of feet, but also endorses feeling more tired than usual the past several months.   Endocrine consulted for newly diagnosed diabetes with A1c of 10.4% Pt. denies hx of diabetes prior to transplant. No family hx of diabetes. Does not check glucose. No reported hypoglycemia or symptoms of hypoglycemia. Was given a script recently for a CGM but found it to be overwhelming. Has noticed polyuria, polydipsia and weight loss.  #Diabetes Mellitus Type 2  Patient just had a transplant in 3/2023     Diagnosis- just recently in 11/2023- sent to the hospital for BG >600  Current regimen: Lantus 24 units + Admelog 11/11/9 units  Other diabetic medications discontinued in the past: 1. none  PCP/prior endocrinologist: Denies  Signs/symptoms:  Denies polys  Last HgbA1c: 10.6% in the hospital  Home blood sugars:  fasting-  in range 88% high 2% low 10%  avg 111 6am 92 12pm 11 6pm 125 12a 126   Hypoglycemia: Denies   History of CAD: Denies History of CVA: Mena FH of diabetes: Denies   son also with alport disease and has transplant   Diet:  Breakfast: yogurt and fruits and cup of coffee  Lunch: chicken salad/tuna fish and soup and cold cuts and ham  Dinner: chicken  Drinks: avoids juice/soda   Exercise:  days/week  type of exercise  eGFR:     Alb/creat:  Follow with nephrology?   AST: ALT:  Patient is taking insulin 3-4 times a day that requires blood sugar checks about 4 times a day for self titration of his insulin.  Social History:  Alcohol: Denies Tobacco : [employer] Work: Denies

## 2023-12-19 ENCOUNTER — APPOINTMENT (OUTPATIENT)
Dept: TRANSPLANT | Facility: CLINIC | Age: 68
End: 2023-12-19

## 2023-12-20 LAB
ALBUMIN SERPL ELPH-MCNC: 4.1 G/DL
ALP BLD-CCNC: 89 U/L
ALT SERPL-CCNC: 13 U/L
ANION GAP SERPL CALC-SCNC: 10 MMOL/L
APPEARANCE: ABNORMAL
AST SERPL-CCNC: 12 U/L
BACTERIA: ABNORMAL /HPF
BASOPHILS # BLD AUTO: 0.06 K/UL
BASOPHILS NFR BLD AUTO: 0.9 %
BILIRUB SERPL-MCNC: 0.4 MG/DL
BILIRUBIN URINE: NEGATIVE
BKV DNA SPEC QL NAA+PROBE: NOT DETECTED IU/ML
BLOOD URINE: ABNORMAL
BUN SERPL-MCNC: 17 MG/DL
CALCIUM SERPL-MCNC: 9.4 MG/DL
CAST: 2 /LPF
CHLORIDE SERPL-SCNC: 104 MMOL/L
CMV DNA SPEC QL NAA+PROBE: NOT DETECTED IU/ML
CMVPCR LOG: NOT DETECTED LOG10IU/ML
CO2 SERPL-SCNC: 26 MMOL/L
COLOR: YELLOW
CREAT SERPL-MCNC: 0.94 MG/DL
CREAT SPEC-SCNC: 153 MG/DL
CREAT/PROT UR: 0.2 RATIO
EGFR: 66 ML/MIN/1.73M2
EOSINOPHIL # BLD AUTO: 0.1 K/UL
EOSINOPHIL NFR BLD AUTO: 1.5 %
EPITHELIAL CELLS: 2 /HPF
ESTIMATED AVERAGE GLUCOSE: 126 MG/DL
GLUCOSE QUALITATIVE U: NEGATIVE MG/DL
GLUCOSE SERPL-MCNC: 179 MG/DL
HBA1C MFR BLD HPLC: 6 %
HCT VFR BLD CALC: 36.7 %
HGB BLD-MCNC: 11.2 G/DL
IMM GRANULOCYTES NFR BLD AUTO: 1.8 %
KETONES URINE: NEGATIVE MG/DL
LEUKOCYTE ESTERASE URINE: ABNORMAL
LYMPHOCYTES # BLD AUTO: 0.95 K/UL
LYMPHOCYTES NFR BLD AUTO: 14.5 %
MAGNESIUM SERPL-MCNC: 1.6 MG/DL
MAN DIFF?: NORMAL
MCHC RBC-ENTMCNC: 28.4 PG
MCHC RBC-ENTMCNC: 30.5 GM/DL
MCV RBC AUTO: 93.1 FL
MICROSCOPIC-UA: NORMAL
MONOCYTES # BLD AUTO: 0.99 K/UL
MONOCYTES NFR BLD AUTO: 15.1 %
NEUTROPHILS # BLD AUTO: 4.32 K/UL
NEUTROPHILS NFR BLD AUTO: 66.2 %
NITRITE URINE: POSITIVE
PH URINE: 5.5
PHOSPHATE SERPL-MCNC: 2.8 MG/DL
PLATELET # BLD AUTO: 208 K/UL
POTASSIUM SERPL-SCNC: 4.8 MMOL/L
PROT SERPL-MCNC: 6.4 G/DL
PROT UR-MCNC: 27 MG/DL
PROTEIN URINE: 30 MG/DL
RBC # BLD: 3.94 M/UL
RBC # FLD: 16 %
RED BLOOD CELLS URINE: 0 /HPF
SODIUM SERPL-SCNC: 140 MMOL/L
SPECIFIC GRAVITY URINE: 1.02
TACROLIMUS SERPL-MCNC: 8.6 NG/ML
URATE SERPL-MCNC: 6.5 MG/DL
UROBILINOGEN URINE: 0.2 MG/DL
WBC # FLD AUTO: 6.54 K/UL
WHITE BLOOD CELLS URINE: 277 /HPF

## 2023-12-22 ENCOUNTER — APPOINTMENT (OUTPATIENT)
Dept: NEPHROLOGY | Facility: CLINIC | Age: 68
End: 2023-12-22
Payer: MEDICARE

## 2023-12-22 VITALS
RESPIRATION RATE: 15 BRPM | HEIGHT: 66 IN | SYSTOLIC BLOOD PRESSURE: 139 MMHG | HEART RATE: 88 BPM | OXYGEN SATURATION: 99 % | TEMPERATURE: 97 F | BODY MASS INDEX: 24.59 KG/M2 | DIASTOLIC BLOOD PRESSURE: 71 MMHG | WEIGHT: 153 LBS

## 2023-12-22 PROCEDURE — 99214 OFFICE O/P EST MOD 30 MIN: CPT | Mod: GC

## 2023-12-22 RX ORDER — TORSEMIDE 10 MG/1
10 TABLET ORAL DAILY
Qty: 90 | Refills: 3 | Status: ACTIVE | COMMUNITY
Start: 2020-06-19 | End: 1900-01-01

## 2023-12-22 NOTE — ASSESSMENT
[FreeTextEntry1] : 1. Kidney Transplant: Pt. with ESRD, underwent kidney transplantation at [facility] in March 2023. As per transplant team note on 10/2/23, pt. was advised to discontinue MMF for leucopenia. Pt. last saw her transplant nephrologist Dr. Mejias on 11/29/23. Dr. Mejias's office note from 11/29/23 reviewed. Recent Scr WNL at 0.94 and UPCR was WNL at 0.2 on 12/19/23. Tacrolimus level however was elevated at 8.6 on 12/19/23. Pt. currently on Envarsus 2 mg daily. Plan is to decrease dose of Envarsus to 1.5 mg daily as per discussion with Dr. Mejias. Check tacrolimus level at [facility] transplant clinic in 1 week. Monitor kidney function. Avoid NSAIDs, RCA, and nephrotoxins.  2. HTN: BP in acceptable range during office visit today. Low salt diet advised. Monitor BP on current BP meds.  3. LE edema: Pt. found to have significant B/L LE edema on exam today. Add oral Torsemide 10 mg once daily. Low salt and fluid restriction advised. Monitor body weight.   4. Hyperparathyroidism: Intact PTH decreased to 76 on labs done in July 2023. Monitor intact PTH.  Pt. to follow-up with Dr. Mejias in ~6 weeks. Pt. to follow-up in our office in March 2024.

## 2023-12-22 NOTE — PHYSICAL EXAM
[General Appearance - Alert] : alert [General Appearance - In No Acute Distress] : in no acute distress [General Appearance - Well Nourished] : well nourished [Sclera] : the sclera and conjunctiva were normal [Outer Ear] : the ears and nose were normal in appearance [PERRL With Normal Accommodation] : pupils were equal in size, round, and reactive to light [Neck Appearance] : the appearance of the neck was normal [Jugular Venous Distention Increased] : there was no jugular-venous distention [Respiration, Rhythm And Depth] : normal respiratory rhythm and effort [Auscultation Breath Sounds / Voice Sounds] : lungs were clear to auscultation bilaterally [Heart Rate And Rhythm] : heart rate was normal and rhythm regular [Murmurs] : no murmurs [Heart Sounds] : normal S1 and S2 [Heart Sounds Pericardial Friction Rub] : no pericardial rub [Bowel Sounds] : normal bowel sounds [Abdomen Soft] : soft [No CVA Tenderness] : no ~M costovertebral angle tenderness [Abnormal Walk] : normal gait [___ (cm) Fistula] : [unfilled] (cm) fistula [Bruit] : a bruit was present [Thrill] : a thrill was present [] : no rash [Oriented To Time, Place, And Person] : oriented to person, place, and time [Affect] : the affect was normal [Impaired Insight] : insight and judgment were intact [FreeTextEntry1] : B/L LE: pitting edema

## 2023-12-22 NOTE — END OF VISIT
[FreeTextEntry3] : I was physically present for the key portions of the evaluation and management (E/M) service provided. I agree with the above history, ROS, physical exam, assessment and plan which I have reviewed and edited where appropriate. I have also reviewed the assessment and management of kidney transplant, HTN, LE edema and secondary hyperparathyroidism with the patient during clinic visit today.  Pt. with kidney transplant, on immunosuppressive therapy. Scr WNL at 0.94 on 12/19/23. Pt. found to have significant B/L LE pitting edema on exam today, add oral diuretic (Torsemide) therapy. Low salt diet and fluid restriction advised. Monitor BP and labs. Avoid nephrotoxins.

## 2023-12-22 NOTE — REVIEW OF SYSTEMS
[As Noted in HPI] : as noted in HPI [Itching] : itching [Fever] : no fever [Eye Pain] : no eye pain [Lower Ext Edema] : no lower extremity edema [Sore Throat] : no sore throat [Shortness Of Breath] : no shortness of breath [Abdominal Pain] : no abdominal pain [Dysuria] : no dysuria [Limb Swelling] : no limb swelling [Dizziness] : no dizziness [Sleep Disturbances] : no sleep disturbances [Easy Bleeding] : no tendency for easy bleeding [de-identified] : thinning/loss of hair [de-identified] : no DM

## 2023-12-22 NOTE — HISTORY OF PRESENT ILLNESS
[FreeTextEntry1] : 68-year-old female with history of HTN, ESRD (from presumed Alport syndrome), kidney transplantation (DDRT at [facility] on 3/28/23) presents for follow-up visit. Pt. was last seen in office on 10/6/23.   Pt. underwent kidney transplantation at [facility] in March 2023. As per transplant team note on 10/2/23, pt. was advised to discontinue MMF for leucopenia.   Pt. was recently hospitalized at [facility] from 11/1/23-11/8/23 for severe hyperglycemia. Pt. was evaluated by inpatient endocrinology team and initiated on insulin therapy.   Pt. currently feels well, continues to have intermittent pruritus and thinning/loss of hair. No fever, CP, SOB, abdominal pain, HA or urinary complaints during clinic visit today. Pt. admits compliance to her medications. Pt. last saw her transplant nephrologist Dr. Mejias on 11/29/23. Dr. Mejias's office note from 11/29/23 reviewed.

## 2023-12-26 ENCOUNTER — NON-APPOINTMENT (OUTPATIENT)
Age: 68
End: 2023-12-26

## 2023-12-29 ENCOUNTER — APPOINTMENT (OUTPATIENT)
Dept: TRANSPLANT | Facility: CLINIC | Age: 68
End: 2023-12-29

## 2023-12-29 LAB
ALBUMIN SERPL ELPH-MCNC: 4.3 G/DL
ANION GAP SERPL CALC-SCNC: 14 MMOL/L
BUN SERPL-MCNC: 26 MG/DL
CALCIUM SERPL-MCNC: 9.7 MG/DL
CHLORIDE SERPL-SCNC: 106 MMOL/L
CO2 SERPL-SCNC: 24 MMOL/L
CREAT SERPL-MCNC: 1.07 MG/DL
EGFR: 56 ML/MIN/1.73M2
GLUCOSE SERPL-MCNC: 157 MG/DL
PHOSPHATE SERPL-MCNC: 3.8 MG/DL
POTASSIUM SERPL-SCNC: 4.4 MMOL/L
SODIUM SERPL-SCNC: 144 MMOL/L
TACROLIMUS SERPL-MCNC: 7.7 NG/ML

## 2024-01-30 ENCOUNTER — APPOINTMENT (OUTPATIENT)
Dept: TRANSPLANT | Facility: CLINIC | Age: 69
End: 2024-01-30

## 2024-02-01 ENCOUNTER — APPOINTMENT (OUTPATIENT)
Dept: NEPHROLOGY | Facility: CLINIC | Age: 69
End: 2024-02-01
Payer: MEDICARE

## 2024-02-01 VITALS
HEART RATE: 65 BPM | HEIGHT: 66 IN | OXYGEN SATURATION: 100 % | BODY MASS INDEX: 24.75 KG/M2 | WEIGHT: 154 LBS | SYSTOLIC BLOOD PRESSURE: 145 MMHG | DIASTOLIC BLOOD PRESSURE: 84 MMHG | TEMPERATURE: 97.4 F

## 2024-02-01 DIAGNOSIS — D84.9 IMMUNODEFICIENCY, UNSPECIFIED: ICD-10-CM

## 2024-02-01 LAB
ALBUMIN SERPL ELPH-MCNC: 4 G/DL
ALP BLD-CCNC: 81 U/L
ALT SERPL-CCNC: 13 U/L
ANION GAP SERPL CALC-SCNC: 12 MMOL/L
APPEARANCE: ABNORMAL
AST SERPL-CCNC: 16 U/L
BACTERIA: ABNORMAL /HPF
BILIRUB SERPL-MCNC: 0.4 MG/DL
BILIRUBIN URINE: NEGATIVE
BKV DNA SPEC QL NAA+PROBE: NOT DETECTED IU/ML
BLOOD URINE: ABNORMAL
BUN SERPL-MCNC: 20 MG/DL
CALCIUM SERPL-MCNC: 9.7 MG/DL
CAST: 0 /LPF
CHLORIDE SERPL-SCNC: 107 MMOL/L
CMV DNA SPEC QL NAA+PROBE: NOT DETECTED IU/ML
CMVPCR LOG: NOT DETECTED LOG10IU/ML
CO2 SERPL-SCNC: 25 MMOL/L
COLOR: YELLOW
CREAT SERPL-MCNC: 1 MG/DL
CREAT SPEC-SCNC: 64 MG/DL
CREAT/PROT UR: 0.3 RATIO
EGFR: 61 ML/MIN/1.73M2
EPITHELIAL CELLS: 6 /HPF
GLUCOSE QUALITATIVE U: NEGATIVE MG/DL
GLUCOSE SERPL-MCNC: 121 MG/DL
HCT VFR BLD CALC: 39.3 %
HGB BLD-MCNC: 12.3 G/DL
KETONES URINE: NEGATIVE MG/DL
LDH SERPL-CCNC: 189 U/L
LEUKOCYTE ESTERASE URINE: ABNORMAL
MAGNESIUM SERPL-MCNC: 2 MG/DL
MCHC RBC-ENTMCNC: 29.7 PG
MCHC RBC-ENTMCNC: 31.3 GM/DL
MCV RBC AUTO: 94.9 FL
MICROSCOPIC-UA: NORMAL
NITRITE URINE: POSITIVE
PH URINE: 7
PHOSPHATE SERPL-MCNC: 3.1 MG/DL
PLATELET # BLD AUTO: 198 K/UL
POTASSIUM SERPL-SCNC: 4.9 MMOL/L
PROT SERPL-MCNC: 6.5 G/DL
PROT UR-MCNC: 21 MG/DL
PROTEIN URINE: NORMAL MG/DL
RBC # BLD: 4.14 M/UL
RBC # FLD: 14.9 %
RED BLOOD CELLS URINE: 1 /HPF
SODIUM SERPL-SCNC: 144 MMOL/L
SPECIFIC GRAVITY URINE: 1.01
TACROLIMUS SERPL-MCNC: 6 NG/ML
URATE SERPL-MCNC: 8.3 MG/DL
UROBILINOGEN URINE: 0.2 MG/DL
WBC # FLD AUTO: 6.67 K/UL
WHITE BLOOD CELLS URINE: 537 /HPF

## 2024-02-01 PROCEDURE — 99214 OFFICE O/P EST MOD 30 MIN: CPT

## 2024-02-01 NOTE — HISTORY OF PRESENT ILLNESS
[FreeTextEntry1] : Patient is here for post-transplant outpatient follow up.  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 Recent hospitalization and details noted. Now on insulin for DM Now on   2 mg/d Envarsus On Lantus   and Novolog pre meal, adjusted by Dr. Carreon. Has CGM. Improving glycemia.    Feels overall well. Has seen Dr. Horn and Dr. Carreon for follow up.

## 2024-02-01 NOTE — REASON FOR VISIT
[Follow-Up] : a follow-up visit [Spouse] : spouse [FreeTextEntry1] : Here for post transplant follow up, no acute symptoms

## 2024-02-01 NOTE — ASSESSMENT
[FreeTextEntry1] : Renal Transplant recipient: Noted allograft function, creatinine stable. No proteinuria. Tolerating medications. Noted asymptomatic bacteriuria, will do urinalysis after cleaning next time. Will monitor for any symptoms off antibiotics. Lab data from last visit reviewed including allograft function, urinalysis, any viremia and trough level of medication. Immunosuppression: reviewed; Noted current regimen, maintenance regimen and reviewed target trough level. DM:  New onset post transplant, has CGM monitor, followed by Dr. Carreon, now on insulin. Improved control. Will continue to monitor and adjust treatment; reviewed lifestyle modifications for glycemia control. Hypertension: Fairly controlled; Reviewed medications.  Reviewed target for blood pressure control. Prophylaxis: Reviewed precautions to prevent infections. Discussed Renal Preservation strategies including achieving optimal weight through calory restriction and regular exercise, daily exercise, sleep hygiene, optimized control of glucose, blood pressure, lipids, avoidance of NSAIDs, Low Na and Low animal protein diet and medication adherence. Discussed ophthalmology and dermatology checks at least yearly and vaccinations. Flu vaccine yearly and Pneumonia vaccine every 5 years. She has had Flu vaccine while admitted. Copy of office visit and lab reports are being made available to primary physician and referring nephrologist. F/u - Has appointment with Dr. Horn in March. Will do labs in end of April and has f/u on May 2.

## 2024-02-01 NOTE — ADDENDUM
[FreeTextEntry1] : Patient is found to have severe hyperglycemia on lab review, advised hospital evaluation for hydration IV and insulin therapy.

## 2024-03-19 NOTE — ED ADULT NURSE NOTE - CAS DISCH CONDITION
Left Voicemail (1st Attempt) for the patient to call back and schedule the following:    Appointment type: Return  Provider: Darek Wolff or Dr. Lira   Return date: Next available   Specialty phone number: [phone]  Additional appointment(s) needed: N/A  Additonal Notes: hosp follow-up       Stable

## 2024-03-20 ENCOUNTER — APPOINTMENT (OUTPATIENT)
Dept: TRANSPLANT | Facility: CLINIC | Age: 69
End: 2024-03-20

## 2024-03-20 LAB
ALBUMIN SERPL ELPH-MCNC: 4.4 G/DL
ALP BLD-CCNC: 75 U/L
ALT SERPL-CCNC: 12 U/L
ANION GAP SERPL CALC-SCNC: 12 MMOL/L
AST SERPL-CCNC: 13 U/L
BILIRUB SERPL-MCNC: 0.7 MG/DL
BUN SERPL-MCNC: 15 MG/DL
CALCIUM SERPL-MCNC: 9.6 MG/DL
CHLORIDE SERPL-SCNC: 105 MMOL/L
CO2 SERPL-SCNC: 27 MMOL/L
CREAT SERPL-MCNC: 1 MG/DL
CREAT SPEC-SCNC: 140 MG/DL
CREAT/PROT UR: 0.1 RATIO
EGFR: 61 ML/MIN/1.73M2
GLUCOSE SERPL-MCNC: 120 MG/DL
HCT VFR BLD CALC: 39.3 %
HGB BLD-MCNC: 12.9 G/DL
LDH SERPL-CCNC: 170 U/L
MAGNESIUM SERPL-MCNC: 1.8 MG/DL
MCHC RBC-ENTMCNC: 29.9 PG
MCHC RBC-ENTMCNC: 32.8 GM/DL
MCV RBC AUTO: 91.2 FL
PHOSPHATE SERPL-MCNC: 3.4 MG/DL
PLATELET # BLD AUTO: 200 K/UL
POTASSIUM SERPL-SCNC: 4.3 MMOL/L
PROT SERPL-MCNC: 6.8 G/DL
PROT UR-MCNC: 12 MG/DL
RBC # BLD: 4.31 M/UL
RBC # FLD: 14.8 %
SODIUM SERPL-SCNC: 143 MMOL/L
TACROLIMUS SERPL-MCNC: 5.3 NG/ML
URATE SERPL-MCNC: 8.1 MG/DL
WBC # FLD AUTO: 7.96 K/UL

## 2024-03-21 LAB
APPEARANCE: CLEAR
BACTERIA: NEGATIVE /HPF
BILIRUBIN URINE: NEGATIVE
BLOOD URINE: NEGATIVE
CAST: 0 /LPF
CMV DNA SPEC QL NAA+PROBE: NOT DETECTED IU/ML
CMVPCR LOG: NOT DETECTED LOG10IU/ML
COLOR: YELLOW
EPITHELIAL CELLS: 3 /HPF
GLUCOSE QUALITATIVE U: NEGATIVE MG/DL
KETONES URINE: NEGATIVE MG/DL
LEUKOCYTE ESTERASE URINE: ABNORMAL
MICROSCOPIC-UA: NORMAL
NITRITE URINE: NEGATIVE
PH URINE: 6
PROTEIN URINE: NEGATIVE MG/DL
RED BLOOD CELLS URINE: 1 /HPF
SPECIFIC GRAVITY URINE: 1.02
UROBILINOGEN URINE: 0.2 MG/DL
WHITE BLOOD CELLS URINE: 4 /HPF

## 2024-03-22 ENCOUNTER — APPOINTMENT (OUTPATIENT)
Dept: ENDOCRINOLOGY | Facility: CLINIC | Age: 69
End: 2024-03-22
Payer: MEDICARE

## 2024-03-22 ENCOUNTER — APPOINTMENT (OUTPATIENT)
Dept: NEPHROLOGY | Facility: CLINIC | Age: 69
End: 2024-03-22
Payer: MEDICARE

## 2024-03-22 VITALS
BODY MASS INDEX: 25.71 KG/M2 | WEIGHT: 160 LBS | DIASTOLIC BLOOD PRESSURE: 76 MMHG | HEART RATE: 66 BPM | TEMPERATURE: 98 F | OXYGEN SATURATION: 99 % | SYSTOLIC BLOOD PRESSURE: 153 MMHG | HEIGHT: 66 IN

## 2024-03-22 VITALS
DIASTOLIC BLOOD PRESSURE: 71 MMHG | HEART RATE: 69 BPM | OXYGEN SATURATION: 99 % | BODY MASS INDEX: 25.71 KG/M2 | TEMPERATURE: 98.1 F | WEIGHT: 160 LBS | HEIGHT: 66 IN | SYSTOLIC BLOOD PRESSURE: 145 MMHG

## 2024-03-22 VITALS — SYSTOLIC BLOOD PRESSURE: 148 MMHG | DIASTOLIC BLOOD PRESSURE: 72 MMHG

## 2024-03-22 DIAGNOSIS — R60.0 LOCALIZED EDEMA: ICD-10-CM

## 2024-03-22 DIAGNOSIS — E11.8 TYPE 2 DIABETES MELLITUS WITH UNSPECIFIED COMPLICATIONS: ICD-10-CM

## 2024-03-22 DIAGNOSIS — N25.81 SECONDARY HYPERPARATHYROIDISM OF RENAL ORIGIN: ICD-10-CM

## 2024-03-22 PROCEDURE — 99214 OFFICE O/P EST MOD 30 MIN: CPT | Mod: GC

## 2024-03-22 PROCEDURE — 82962 GLUCOSE BLOOD TEST: CPT

## 2024-03-22 PROCEDURE — G2211 COMPLEX E/M VISIT ADD ON: CPT

## 2024-03-22 PROCEDURE — 95251 CONT GLUC MNTR ANALYSIS I&R: CPT

## 2024-03-22 PROCEDURE — 83036 HEMOGLOBIN GLYCOSYLATED A1C: CPT | Mod: QW

## 2024-03-22 PROCEDURE — 99215 OFFICE O/P EST HI 40 MIN: CPT

## 2024-03-22 RX ORDER — MAGNESIUM OXIDE 241.3 MG/1000MG
400 TABLET ORAL TWICE DAILY
Qty: 60 | Refills: 3 | Status: ACTIVE | COMMUNITY
Start: 2023-04-27 | End: 1900-01-01

## 2024-03-22 RX ORDER — HYDROXYZINE HYDROCHLORIDE 10 MG/1
10 TABLET ORAL 3 TIMES DAILY
Qty: 90 | Refills: 1 | Status: DISCONTINUED | COMMUNITY
Start: 2023-07-20 | End: 2024-03-22

## 2024-03-22 NOTE — PHYSICAL EXAM
[General Appearance - Alert] : alert [General Appearance - In No Acute Distress] : in no acute distress [General Appearance - Well Nourished] : well nourished [Sclera] : the sclera and conjunctiva were normal [PERRL With Normal Accommodation] : pupils were equal in size, round, and reactive to light [Outer Ear] : the ears and nose were normal in appearance [Neck Appearance] : the appearance of the neck was normal [Jugular Venous Distention Increased] : there was no jugular-venous distention [Respiration, Rhythm And Depth] : normal respiratory rhythm and effort [Auscultation Breath Sounds / Voice Sounds] : lungs were clear to auscultation bilaterally [Heart Rate And Rhythm] : heart rate was normal and rhythm regular [Heart Sounds] : normal S1 and S2 [Murmurs] : no murmurs [Heart Sounds Pericardial Friction Rub] : no pericardial rub [Bowel Sounds] : normal bowel sounds [Abdomen Soft] : soft [No CVA Tenderness] : no ~M costovertebral angle tenderness [Abnormal Walk] : normal gait [___ (cm) Fistula] : [unfilled] (cm) fistula [Bruit] : a bruit was present [Thrill] : a thrill was present [] : no rash [Oriented To Time, Place, And Person] : oriented to person, place, and time [Impaired Insight] : insight and judgment were intact [Affect] : the affect was normal

## 2024-03-24 LAB — BKV DNA SPEC QL NAA+PROBE: NOT DETECTED IU/ML

## 2024-03-25 LAB
GLUCOSE BLDC GLUCOMTR-MCNC: 140
HBA1C MFR BLD HPLC: 5.4

## 2024-03-25 NOTE — ASSESSMENT
[FreeTextEntry1] :  #Type 2 DM #Steroid induced hyperglycemia - Patient presents as a hospital follow up due to being sent to the hospital after her outpatient labs showed BG >600.  - She is currently taking Lantus 22 units daily and admelog 11/11/9 units with meals. -I reviewed and analyzed patient's freestyle beverly data.  Her time in range is 91%, high 6%, very high 0%, low 3%, very low 0%.  Her average blood glucose is 117 with a GMI of 6.1%.  Her glucose availability is 29.7% -Labs suggest that patient is having drops in blood sugar fasting and postprandially. -Hemoglobin A1c is running low suggestive of her low sugars with 5.4% Plan: -Decrease Lantus to 16 units daily -Decrease Admelog to 8 units with meals -Advised patient to adjust her mealtime insulin based on the amount of carbs that she is eating.  For example if she is having a low-carb meal, she can reduce her Admelog down to 45 units -Will start patient on Januvia with hopes to get her off of mealtime insulin  Patient counseled extensively about the complications of diabetes including but not limited to nephropathy, neuropathy, and retinopathy. We discussed the importance of annual foot and optho exams. Explained that ideally blood sugars in the morning prior to breakfast should be between 80 and 130. Blood sugars should be checked 2 hours after eating and should be <180. If blood sugar is <70, patient should treat the blood sugar FIRST and then contact provider. Advised patient to let us know if BG persistently <70 or >200

## 2024-03-25 NOTE — HISTORY OF PRESENT ILLNESS
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 Jet Horn ) now s/p R  DDRT under Simulect induction on 3/28/23 presents from outpatient clinic after she was found to have elevated blood glucose to 600's and was informed to go to the ER. Denies any hx diabetes or dietary changes.   Patient also endorses 3 month history of itching around her face that is most prevalent at night. Was given medication for symptoms however felt very drowsy afterwards, later tried benadryl with worsening drowsiness side effects.   She also endorses weakness in her lower extremities that has been ongoing for 2 months that occurs after walking. She endorses she feels she need to stop and rest after taking "a few hundred steps" which is atypical for her. Denies any numbness/tingling of feet, but also endorses feeling more tired than usual the past several months.   Endocrine consulted for newly diagnosed diabetes with A1c of 10.4% Pt. denies hx of diabetes prior to transplant. No family hx of diabetes. Does not check glucose. No reported hypoglycemia or symptoms of hypoglycemia. Was given a script recently for a CGM but found it to be overwhelming. Has noticed polyuria, polydipsia and weight loss.  #Diabetes Mellitus Type 2  Patient just had a transplant in 3/2023     Diagnosis- 11/2023- sent to the hospital for BG >600  Current regimen: Lantus 22 units + Admelog 11/11/9 units  Other diabetic medications discontinued in the past: 1. none  PCP/prior endocrinologist: Denies  Signs/symptoms:  Denies polys  Last HgbA1c: 10.6% in the hospital-->5.4% Home blood sugars:  tir 91% low 3 high 6  lows ovenight  lunch 5  avg 117 12a 112 6a 103 12p 121 6 p 135  Hypoglycemia: Denies   History of CAD: Denies History of CVA: Deneis FH of diabetes: Denies   son also with alport disease and has transplant   Diet:  Breakfast: cheerios and half banana, Dominican toast, eggs  Lunch: chicken salad/tuna fish and soup and cold cuts and ham  Dinner: chicken, rice, vegetables  Drinks: avoids juice/soda   Exercise:  walking   eGFR: 61    Alb/creat:  Follow with nephrology?   AST: 13 ALT: 12  Patient is taking insulin 3-4 times a day that requires blood sugar checks about 4 times a day for self titration of his insulin.  Social History:  Alcohol: Denies Tobacco : [employer] Work: Denies

## 2024-04-29 DIAGNOSIS — Z94.0 KIDNEY TRANSPLANT STATUS: ICD-10-CM

## 2024-04-30 ENCOUNTER — APPOINTMENT (OUTPATIENT)
Dept: TRANSPLANT | Facility: CLINIC | Age: 69
End: 2024-04-30

## 2024-05-02 ENCOUNTER — APPOINTMENT (OUTPATIENT)
Dept: NEPHROLOGY | Facility: CLINIC | Age: 69
End: 2024-05-02
Payer: MEDICARE

## 2024-05-02 VITALS
HEIGHT: 66 IN | TEMPERATURE: 98 F | HEART RATE: 65 BPM | OXYGEN SATURATION: 98 % | BODY MASS INDEX: 25.71 KG/M2 | WEIGHT: 160 LBS | SYSTOLIC BLOOD PRESSURE: 151 MMHG | RESPIRATION RATE: 15 BRPM | DIASTOLIC BLOOD PRESSURE: 75 MMHG

## 2024-05-02 VITALS — DIASTOLIC BLOOD PRESSURE: 60 MMHG | SYSTOLIC BLOOD PRESSURE: 130 MMHG

## 2024-05-02 DIAGNOSIS — I10 ESSENTIAL (PRIMARY) HYPERTENSION: ICD-10-CM

## 2024-05-02 DIAGNOSIS — Z94.0 KIDNEY TRANSPLANT STATUS: ICD-10-CM

## 2024-05-02 DIAGNOSIS — Z79.899 OTHER LONG TERM (CURRENT) DRUG THERAPY: ICD-10-CM

## 2024-05-02 DIAGNOSIS — E11.8 TYPE 2 DIABETES MELLITUS WITH UNSPECIFIED COMPLICATIONS: ICD-10-CM

## 2024-05-02 LAB
25(OH)D3 SERPL-MCNC: 16.7 NG/ML
ALBUMIN SERPL ELPH-MCNC: 4.3 G/DL
ALP BLD-CCNC: 76 U/L
ALT SERPL-CCNC: 12 U/L
ANION GAP SERPL CALC-SCNC: 11 MMOL/L
APPEARANCE: CLEAR
AST SERPL-CCNC: 13 U/L
BACTERIA: NEGATIVE /HPF
BILIRUB SERPL-MCNC: 0.4 MG/DL
BILIRUBIN URINE: NEGATIVE
BKV DNA SPEC QL NAA+PROBE: NOT DETECTED IU/ML
BLOOD URINE: NEGATIVE
BUN SERPL-MCNC: 17 MG/DL
CALCIUM SERPL-MCNC: 10.1 MG/DL
CALCIUM SERPL-MCNC: 10.1 MG/DL
CAST: 0 /LPF
CHLORIDE SERPL-SCNC: 106 MMOL/L
CHOLEST SERPL-MCNC: 220 MG/DL
CMV DNA SPEC QL NAA+PROBE: NOT DETECTED IU/ML
CMVPCR LOG: NOT DETECTED LOG10IU/ML
CO2 SERPL-SCNC: 27 MMOL/L
COLOR: YELLOW
CREAT SERPL-MCNC: 1.04 MG/DL
CREAT SPEC-SCNC: 104 MG/DL
CREAT/PROT UR: 0.1 RATIO
EGFR: 58 ML/MIN/1.73M2
EPITHELIAL CELLS: 9 /HPF
ESTIMATED AVERAGE GLUCOSE: 105 MG/DL
GLUCOSE QUALITATIVE U: NEGATIVE MG/DL
GLUCOSE SERPL-MCNC: 135 MG/DL
HBA1C MFR BLD HPLC: 5.3 %
HCT VFR BLD CALC: 41.1 %
HDLC SERPL-MCNC: 57 MG/DL
HGB BLD-MCNC: 13.1 G/DL
KETONES URINE: NEGATIVE MG/DL
LDH SERPL-CCNC: 172 U/L
LDLC SERPL CALC-MCNC: 135 MG/DL
LEUKOCYTE ESTERASE URINE: ABNORMAL
MAGNESIUM SERPL-MCNC: 2.1 MG/DL
MCHC RBC-ENTMCNC: 30.2 PG
MCHC RBC-ENTMCNC: 31.9 GM/DL
MCV RBC AUTO: 94.7 FL
MICROSCOPIC-UA: NORMAL
NITRITE URINE: NEGATIVE
NONHDLC SERPL-MCNC: 162 MG/DL
PARATHYROID HORMONE INTACT: 59 PG/ML
PH URINE: 7.5
PHOSPHATE SERPL-MCNC: 3.5 MG/DL
PLATELET # BLD AUTO: 168 K/UL
POTASSIUM SERPL-SCNC: 4.9 MMOL/L
PROT SERPL-MCNC: 6.6 G/DL
PROT UR-MCNC: 11 MG/DL
PROTEIN URINE: NORMAL MG/DL
RBC # BLD: 4.34 M/UL
RBC # FLD: 14.2 %
RED BLOOD CELLS URINE: 0 /HPF
SODIUM SERPL-SCNC: 144 MMOL/L
SPECIFIC GRAVITY URINE: 1.02
TACROLIMUS SERPL-MCNC: 8.5 NG/ML
TRIGL SERPL-MCNC: 153 MG/DL
URATE SERPL-MCNC: 7.2 MG/DL
UROBILINOGEN URINE: 1 MG/DL
WBC # FLD AUTO: 7.09 K/UL
WHITE BLOOD CELLS URINE: 6 /HPF

## 2024-05-02 PROCEDURE — 99214 OFFICE O/P EST MOD 30 MIN: CPT

## 2024-05-02 RX ORDER — CHOLECALCIFEROL (VITAMIN D3) 1250 MCG
1.25 MG CAPSULE ORAL
Qty: 14 | Refills: 3 | Status: ACTIVE | COMMUNITY
Start: 2024-05-02 | End: 1900-01-01

## 2024-05-02 NOTE — ASSESSMENT
[FreeTextEntry1] : Renal Transplant recipient: Noted allograft function, creatinine stable. No proteinuria. Tolerating medications. Noted asymptomatic bacteriuria, will do urinalysis after cleaning next time. Will monitor for any symptoms off antibiotics. Lab data from recent lab visit reviewed including allograft function, urinalysis, any viremia and trough level of medication. Continue current regimen Immunosuppression: reviewed; Noted current regimen, maintenance regimen and reviewed target trough level. Meds reviewed and noted above. Dry skin- discussed emollients DM:   has CGM monitor, followed by Dr. Carreon, on insulin. Improved control. Will continue to monitor and adjust treatment; reviewed lifestyle modifications for glycemia control. Hypertension: Fairly controlled; Reviewed medications.  Reviewed target for blood pressure control. Prophylaxis: Reviewed precautions to prevent infections. Discussed Renal Preservation strategies including achieving optimal weight through calory restriction and regular exercise, daily exercise, sleep hygiene, optimized control of glucose, blood pressure, lipids, avoidance of NSAIDs, Low Na and Low animal protein diet and medication adherence. Discussed ophthalmology and dermatology checks at least yearly and vaccinations. Flu vaccine yearly and Pneumonia vaccine every 5 years. She has had Flu vaccine while admitted. Copy of office visit and lab reports are being made available to primary physician and referring nephrologist. Discussed left forearm AVF closure. F/u - Has appointment with Dr. Horn  ; Labs every 3 months.

## 2024-05-02 NOTE — HISTORY OF PRESENT ILLNESS
[FreeTextEntry1] : Patient is here for post-transplant outpatient follow up.  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 Recent hospitalization and details noted. Now on insulin for DM Now on   1.5 mg/d Envarsus On Lantus   and Novolog pre meal, adjusted by endocrinologist.    Feels overall well. Has seen Dr. Horn for follow up.  Has good glycemic control and blood pressure control  Has dry skin  Meds: Envarsus 1.5 mg/d Cellcept 250 BID Prednisone 5/d Pepcid 20 mg/d Metoprolol 25 BID Procardia XL 30 mg/d Folic acid 1 mg/d Lantus 16 u/d Novolog 8 u TID Torsemide 10/d  Advised to start Vitamin D

## 2024-05-08 RX ORDER — PEN NEEDLE, DIABETIC 29 G X1/2"
32G X 4 MM NEEDLE, DISPOSABLE MISCELLANEOUS
Qty: 360 | Refills: 2 | Status: ACTIVE | COMMUNITY
Start: 2023-11-20 | End: 1900-01-01

## 2024-05-08 RX ORDER — INSULIN LISPRO 100 [IU]/ML
100 INJECTION, SOLUTION INTRAVENOUS; SUBCUTANEOUS
Qty: 6 | Refills: 2 | Status: ACTIVE | COMMUNITY
Start: 2023-11-14 | End: 1900-01-01

## 2024-05-08 RX ORDER — INSULIN GLARGINE 100 [IU]/ML
100 INJECTION, SOLUTION SUBCUTANEOUS
Qty: 5 | Refills: 3 | Status: ACTIVE | COMMUNITY
Start: 2023-11-14 | End: 1900-01-01

## 2024-06-18 RX ORDER — SITAGLIPTIN 100 MG/1
100 TABLET, FILM COATED ORAL DAILY
Qty: 90 | Refills: 2 | Status: ACTIVE | COMMUNITY
Start: 2024-03-22 | End: 1900-01-01

## 2024-06-24 RX ORDER — PREDNISONE 5 MG/1
5 TABLET ORAL DAILY
Qty: 90 | Refills: 3 | Status: ACTIVE | COMMUNITY
Start: 2023-03-29 | End: 1900-01-01

## 2024-06-24 RX ORDER — MYCOPHENOLATE MOFETIL 250 MG/1
250 CAPSULE ORAL
Qty: 60 | Refills: 11 | Status: ACTIVE | COMMUNITY
Start: 2023-10-11 | End: 1900-01-01

## 2024-06-24 RX ORDER — NIFEDIPINE 30 MG/1
30 TABLET, FILM COATED, EXTENDED RELEASE ORAL
Qty: 180 | Refills: 3 | Status: ACTIVE | COMMUNITY
Start: 2023-04-10 | End: 1900-01-01

## 2024-06-24 RX ORDER — TACROLIMUS 0.75 MG/1
0.75 TABLET, EXTENDED RELEASE ORAL DAILY
Qty: 60 | Refills: 11 | Status: ACTIVE | COMMUNITY
Start: 2023-11-07 | End: 1900-01-01

## 2024-06-24 RX ORDER — FAMOTIDINE 20 MG/1
20 TABLET, FILM COATED ORAL
Qty: 30 | Refills: 5 | Status: ACTIVE | COMMUNITY
Start: 2023-03-29 | End: 1900-01-01

## 2024-06-24 RX ORDER — METOPROLOL TARTRATE 25 MG/1
25 TABLET, FILM COATED ORAL
Qty: 180 | Refills: 3 | Status: ACTIVE | COMMUNITY
Start: 2023-04-06 | End: 1900-01-01

## 2024-07-30 ENCOUNTER — APPOINTMENT (OUTPATIENT)
Dept: TRANSPLANT | Facility: CLINIC | Age: 69
End: 2024-07-30

## 2024-07-31 NOTE — ED ADULT NURSE NOTE - ALCOHOL PRE SCREEN (AUDIT - C)
Eye pain, right eye: Intermittent throbbing pain/right-sided headache gradually improving since traumatic SDH.  Reassured patient of intact vision and undilated structural exam.  I do not see ocular etiology for her symptoms; I explained that this may be ongoing due to headache.  I instructed her to follow-up with any worsening/changes.  
Statement Selected

## 2024-08-01 ENCOUNTER — NON-APPOINTMENT (OUTPATIENT)
Age: 69
End: 2024-08-01

## 2024-08-01 LAB
ALBUMIN SERPL ELPH-MCNC: 4.3 G/DL
ALP BLD-CCNC: 76 U/L
ALT SERPL-CCNC: 9 U/L
ANION GAP SERPL CALC-SCNC: 16 MMOL/L
APPEARANCE: CLEAR
AST SERPL-CCNC: 14 U/L
BACTERIA: NEGATIVE /HPF
BASOPHILS # BLD AUTO: 0.04 K/UL
BASOPHILS NFR BLD AUTO: 0.5 %
BILIRUB SERPL-MCNC: 0.6 MG/DL
BILIRUBIN URINE: NEGATIVE
BKV DNA SPEC QL NAA+PROBE: NOT DETECTED IU/ML
BLOOD URINE: NEGATIVE
BUN SERPL-MCNC: 21 MG/DL
CALCIUM SERPL-MCNC: 10 MG/DL
CAST: 1 /LPF
CHLORIDE SERPL-SCNC: 108 MMOL/L
CMV DNA SPEC QL NAA+PROBE: NOT DETECTED IU/ML
CMVPCR LOG: NOT DETECTED LOG10IU/ML
CO2 SERPL-SCNC: 24 MMOL/L
COLOR: YELLOW
CREAT SERPL-MCNC: 1.04 MG/DL
CREAT SPEC-SCNC: 152 MG/DL
CREAT/PROT UR: 0.1 RATIO
EGFR: 58 ML/MIN/1.73M2
EOSINOPHIL # BLD AUTO: 0.13 K/UL
EOSINOPHIL NFR BLD AUTO: 1.8 %
EPITHELIAL CELLS: 14 /HPF
GLUCOSE QUALITATIVE U: NEGATIVE MG/DL
GLUCOSE SERPL-MCNC: 107 MG/DL
HCT VFR BLD CALC: 39.6 %
HGB BLD-MCNC: 12.8 G/DL
IMM GRANULOCYTES NFR BLD AUTO: 0.5 %
KETONES URINE: NEGATIVE MG/DL
LEUKOCYTE ESTERASE URINE: ABNORMAL
LYMPHOCYTES # BLD AUTO: 1.98 K/UL
LYMPHOCYTES NFR BLD AUTO: 26.8 %
MAGNESIUM SERPL-MCNC: 2 MG/DL
MAN DIFF?: NORMAL
MCHC RBC-ENTMCNC: 30.8 PG
MCHC RBC-ENTMCNC: 32.3 GM/DL
MCV RBC AUTO: 95.2 FL
MICROSCOPIC-UA: NORMAL
MONOCYTES # BLD AUTO: 0.8 K/UL
MONOCYTES NFR BLD AUTO: 10.8 %
NEUTROPHILS # BLD AUTO: 4.39 K/UL
NEUTROPHILS NFR BLD AUTO: 59.6 %
NITRITE URINE: NEGATIVE
PH URINE: 6.5
PHOSPHATE SERPL-MCNC: 3.5 MG/DL
PLATELET # BLD AUTO: 175 K/UL
POTASSIUM SERPL-SCNC: 4.3 MMOL/L
PROT SERPL-MCNC: 6.7 G/DL
PROT UR-MCNC: 15 MG/DL
PROTEIN URINE: NORMAL MG/DL
RBC # BLD: 4.16 M/UL
RBC # FLD: 14.4 %
RED BLOOD CELLS URINE: 0 /HPF
SODIUM SERPL-SCNC: 147 MMOL/L
SPECIFIC GRAVITY URINE: 1.02
TACROLIMUS SERPL-MCNC: 5.5 NG/ML
URATE SERPL-MCNC: 7.8 MG/DL
UROBILINOGEN URINE: 0.2 MG/DL
WBC # FLD AUTO: 7.38 K/UL
WHITE BLOOD CELLS URINE: 6 /HPF

## 2024-08-02 ENCOUNTER — APPOINTMENT (OUTPATIENT)
Dept: NEPHROLOGY | Facility: CLINIC | Age: 69
End: 2024-08-02
Payer: MEDICARE

## 2024-08-02 VITALS
RESPIRATION RATE: 15 BRPM | TEMPERATURE: 98.2 F | DIASTOLIC BLOOD PRESSURE: 72 MMHG | WEIGHT: 166 LBS | BODY MASS INDEX: 26.68 KG/M2 | OXYGEN SATURATION: 98 % | HEART RATE: 74 BPM | SYSTOLIC BLOOD PRESSURE: 166 MMHG | HEIGHT: 66 IN

## 2024-08-02 VITALS — SYSTOLIC BLOOD PRESSURE: 142 MMHG | DIASTOLIC BLOOD PRESSURE: 68 MMHG

## 2024-08-02 DIAGNOSIS — Z94.0 KIDNEY TRANSPLANT STATUS: ICD-10-CM

## 2024-08-02 DIAGNOSIS — N25.81 SECONDARY HYPERPARATHYROIDISM OF RENAL ORIGIN: ICD-10-CM

## 2024-08-02 DIAGNOSIS — R60.0 LOCALIZED EDEMA: ICD-10-CM

## 2024-08-02 DIAGNOSIS — I10 ESSENTIAL (PRIMARY) HYPERTENSION: ICD-10-CM

## 2024-08-02 PROCEDURE — 99214 OFFICE O/P EST MOD 30 MIN: CPT | Mod: GC

## 2024-08-02 NOTE — REVIEW OF SYSTEMS
[Skin Lesions] : skin lesion [As Noted in HPI] : as noted in HPI [Easy Bruising] : a tendency for easy bruising [Negative] : Psychiatric [Fever] : no fever [Eye Pain] : no eye pain [Sore Throat] : no sore throat [Lower Ext Edema] : no lower extremity edema [Shortness Of Breath] : no shortness of breath [Abdominal Pain] : no abdominal pain [Dysuria] : no dysuria [Limb Swelling] : no limb swelling [Dizziness] : no dizziness [Easy Bleeding] : no tendency for easy bleeding [de-identified] : thinning/loss of scalp hair, bruising on R arm [de-identified] : DM

## 2024-08-02 NOTE — PHYSICAL EXAM
[General Appearance - Alert] : alert [General Appearance - In No Acute Distress] : in no acute distress [General Appearance - Well Nourished] : well nourished [Sclera] : the sclera and conjunctiva were normal [PERRL With Normal Accommodation] : pupils were equal in size, round, and reactive to light [Outer Ear] : the ears and nose were normal in appearance [Neck Appearance] : the appearance of the neck was normal [Jugular Venous Distention Increased] : there was no jugular-venous distention [Respiration, Rhythm And Depth] : normal respiratory rhythm and effort [Auscultation Breath Sounds / Voice Sounds] : lungs were clear to auscultation bilaterally [Heart Rate And Rhythm] : heart rate was normal and rhythm regular [Heart Sounds] : normal S1 and S2 [Murmurs] : no murmurs [Heart Sounds Pericardial Friction Rub] : no pericardial rub [Bowel Sounds] : normal bowel sounds [Abdomen Soft] : soft [No CVA Tenderness] : no ~M costovertebral angle tenderness [Abnormal Walk] : normal gait [___ (cm) Fistula] : [unfilled] (cm) fistula [Bruit] : a bruit was present [Thrill] : a thrill was present [] : no rash [Oriented To Time, Place, And Person] : oriented to person, place, and time [Affect] : the affect was normal [Edema] : there was no peripheral edema [Impaired Insight] : insight and judgment were intact [FreeTextEntry1] : small bruise on R arm

## 2024-08-02 NOTE — ASSESSMENT
[FreeTextEntry1] : 1. Kidney Transplant: Pt. with ESRD, underwent kidney transplantation at [facility] in March 2023. Pt. last saw her transplant nephrologist Dr. Mejias on 5/2/24. Dr. Mejias's office note from 5/2/24 reviewed. Scr WNL at 1.04 and UPCR was WNL at 0.1 on recent labs done on 7/30/24. Pt. currently on Envarsus 1.5 mg once daily. Tacrolimus trough level was in acceptable range at 5.5 on 7/30/24. Monitor kidney function. Avoid NSAIDs, RCA, and nephrotoxins.  2. HTN: Repeat BP reading improved during clinic visit today. Pt. reports good BP readings at home. Low salt diet advised. Monitor BP on current BP meds.  3. LE edema: No LE edema on exam today. Continue Torsemide 10 mg once daily. Low salt and fluid restriction advised. Monitor body weight.   4. Secondary hyperparathyroidism: Intact PTH decreased to 59 on labs done on 4/30/24. Monitor intact PTH.  Pt. to follow-up with Dr. Mejias on 11/21/24. Pt. advised to follow-up in our office in February 2025.

## 2024-08-02 NOTE — HISTORY OF PRESENT ILLNESS
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 Pt. was last seen in office on 3/22/24.   Pt. underwent kidney transplantation at [facility] in March 2023. As per transplant team note on 10/2/23, pt. was advised to discontinue MMF for leucopenia. Pt. was hospitalized at [facility] from 11/1/23-11/8/23 for severe hyperglycemia. Pt. was evaluated by inpatient endocrinology team and initiated on insulin therapy.   Pt. currently feels well, reports increased appetite and weight gain. No fever, CP, SOB, abdominal pain, HA or urinary complaints during clinic visit today. Pt. saw her transplant nephrologist Dr. Mejias on 5/2/24. Pt. currently taking MMF.

## 2024-08-02 NOTE — END OF VISIT
[FreeTextEntry3] : I was physically present for the key portions of the evaluation and management (E/M) service provided. I agree with the above history, ROS, physical exam, assessment and plan which I have reviewed and edited where appropriate. I have also reviewed the assessment and management of kidney transplant, HTN, LE edema and secondary hyperparathyroidism with the patient during clinic visit today.   Pt. with kidney transplant, on immunosuppressive therapy. Recent Scr WNL at 1.04 on 7/30/24. BP reading improved during clinic visit today. Low salt diet advised. Monitor BP and labs. Avoid nephrotoxins.

## 2024-08-21 ENCOUNTER — LABORATORY RESULT (OUTPATIENT)
Age: 69
End: 2024-08-21

## 2024-08-21 ENCOUNTER — APPOINTMENT (OUTPATIENT)
Dept: ENDOCRINOLOGY | Facility: CLINIC | Age: 69
End: 2024-08-21
Payer: MEDICARE

## 2024-08-21 VITALS
DIASTOLIC BLOOD PRESSURE: 71 MMHG | OXYGEN SATURATION: 100 % | BODY MASS INDEX: 27.64 KG/M2 | HEIGHT: 66 IN | SYSTOLIC BLOOD PRESSURE: 155 MMHG | HEART RATE: 70 BPM | WEIGHT: 172 LBS

## 2024-08-21 DIAGNOSIS — I10 ESSENTIAL (PRIMARY) HYPERTENSION: ICD-10-CM

## 2024-08-21 DIAGNOSIS — E78.5 HYPERLIPIDEMIA, UNSPECIFIED: ICD-10-CM

## 2024-08-21 DIAGNOSIS — E11.8 TYPE 2 DIABETES MELLITUS WITH UNSPECIFIED COMPLICATIONS: ICD-10-CM

## 2024-08-21 LAB
GLUCOSE BLDC GLUCOMTR-MCNC: 194
HBA1C MFR BLD HPLC: 5.3

## 2024-08-21 PROCEDURE — 82962 GLUCOSE BLOOD TEST: CPT

## 2024-08-21 PROCEDURE — 99214 OFFICE O/P EST MOD 30 MIN: CPT

## 2024-08-21 PROCEDURE — 95251 CONT GLUC MNTR ANALYSIS I&R: CPT

## 2024-08-21 PROCEDURE — 83036 HEMOGLOBIN GLYCOSYLATED A1C: CPT | Mod: QW

## 2024-08-21 NOTE — ASSESSMENT
[FreeTextEntry1] : #Type 2 DM - She is currently taking Lantus 16 units daily and admelog 8 units with meals. I reviewed and analyzed patient's beverly data. Patient's TIR is 95%, high 5%, very high 0%. Low 1%, very low 0%. GMI 6.3%, average glucose 125. Glucose variability 26.2% Plan: - Start Ozempic .25mg weekly -Continue with Lantus 16 units daily  -Decrease Admelog to 4 units with meals - continue with januvia 100mg daily  - next visit, if tolerating ozempic d/c januvia   Patient counseled extensively about the complications of diabetes including but not limited to nephropathy, neuropathy, and retinopathy. We discussed the importance of annual foot and optho exams. Explained that ideally blood sugars in the morning prior to breakfast should be between 80 and 130. Blood sugars should be checked 2 hours after eating and should be <180. If blood sugar is <70, patient should treat the blood sugar FIRST and then contact provider. Advised patient to let us know if BG persistently <70 or >200  #HLD - recheck lipid panel  #HTN - continue with current regimen

## 2024-08-21 NOTE — HISTORY OF PRESENT ILLNESS
[FreeTextEntry1] : 69F with Hx of Alport Syndrome and CKD 5  (x 15 years), LUE AVF placed in 2017, recently started HD on 3/17/2023 (Tanja, Nephrologist Dr. Jet Horn  now s/p R  DDRT under Simulect induction on 3/28/23.   Interval history: Last office visit: 3/2024  with me Since then, patient states that they feel well. She mentions that she has gained weight  Denies any recent illness, hospitalizations, new medications.  Immunosuppressant regimen:  1. Prednisone 5mg daily  2. Tacrolimus 0.75mg Two tabs daily  3. Mycophenolate 250mg BID   #Diabetes Mellitus Type 2  Patient just had renal transplant in 3/2023     Diagnosis- 11/2023- post transplant  Current regimen: Lantus 16 units + Humalog 8 units + Januvia 100mg daily  Other diabetic medications discontinued in the past: 1. none  PCP/prior endocrinologist: Denies  Signs/symptoms:  Denies polys  Last HgbA1c: 10.6% in the hospital-->5.4% Home blood sugars:  I reviewed and analyzed patient's beverly data. Patient's TIR is 95%, high 5%, very high 0%. Low 1%, very low 0%. GMI 6.3%, average glucose 125. Glucose variability 26.2%  Hypoglycemia: Denies   History of CAD: Denies History of CVA: Deneis FH of diabetes: Denies  son also with alport disease and has transplant   Diet:  Breakfast: cheerios with half a banana/strawberries/blueberries + coffee with sweetened creamers  Lunch: chicken salad/tuna fish and soup and cold cuts and ham  Dinner: chicken, broccoli, cauliflower  Drinks: avoids juice/soda   Exercise:  walking   eGFR: 58   Alb/creat:  Follow with nephrology?   ALT 9  Patient is taking insulin 3-4 times a day that requires blood sugar checks about 4 times a day for self titration of his insulin.  Social History:  Alcohol: Denies Tobacco : [employer] Work: Denies   #HLD -

## 2024-08-23 RX ORDER — SEMAGLUTIDE 0.68 MG/ML
2 INJECTION, SOLUTION SUBCUTANEOUS
Qty: 1 | Refills: 3 | Status: ACTIVE | COMMUNITY
Start: 2024-08-21

## 2024-08-28 ENCOUNTER — NON-APPOINTMENT (OUTPATIENT)
Age: 69
End: 2024-08-28

## 2024-08-28 DIAGNOSIS — E11.8 TYPE 2 DIABETES MELLITUS WITH UNSPECIFIED COMPLICATIONS: ICD-10-CM

## 2024-08-28 LAB
ALBUMIN SERPL ELPH-MCNC: 4.5 G/DL
ALP BLD-CCNC: 90 U/L
ALT SERPL-CCNC: 14 U/L
ANION GAP SERPL CALC-SCNC: 16 MMOL/L
AST SERPL-CCNC: 13 U/L
BILIRUB SERPL-MCNC: 0.5 MG/DL
BUN SERPL-MCNC: 24 MG/DL
CALCIUM SERPL-MCNC: 10.6 MG/DL
CHLORIDE SERPL-SCNC: 102 MMOL/L
CHOLEST SERPL-MCNC: 244 MG/DL
CO2 SERPL-SCNC: 22 MMOL/L
CREAT SERPL-MCNC: 1.08 MG/DL
CREAT SPEC-SCNC: 18 MG/DL
EGFR: 55 ML/MIN/1.73M2
GLUCOSE SERPL-MCNC: 176 MG/DL
HDLC SERPL-MCNC: 57 MG/DL
LDLC SERPL CALC-MCNC: 152 MG/DL
MICROALBUMIN 24H UR DL<=1MG/L-MCNC: <1.2 MG/DL
MICROALBUMIN/CREAT 24H UR-RTO: NORMAL MG/G
NONHDLC SERPL-MCNC: 187 MG/DL
POTASSIUM SERPL-SCNC: 4.1 MMOL/L
PROT SERPL-MCNC: 7.5 G/DL
SODIUM SERPL-SCNC: 140 MMOL/L
T4 FREE SERPL-MCNC: 1.4 NG/DL
TRIGL SERPL-MCNC: 195 MG/DL
TSH SERPL-ACNC: 1.42 UIU/ML

## 2024-08-28 RX ORDER — ROSUVASTATIN CALCIUM 5 MG/1
5 TABLET, FILM COATED ORAL
Qty: 90 | Refills: 1 | Status: ACTIVE | COMMUNITY
Start: 2024-08-28 | End: 1900-01-01

## 2024-10-08 ENCOUNTER — APPOINTMENT (OUTPATIENT)
Dept: DERMATOLOGY | Facility: CLINIC | Age: 69
End: 2024-10-08
Payer: MEDICARE

## 2024-10-08 DIAGNOSIS — D22.9 MELANOCYTIC NEVI, UNSPECIFIED: ICD-10-CM

## 2024-10-08 DIAGNOSIS — R23.8 OTHER SKIN CHANGES: ICD-10-CM

## 2024-10-08 DIAGNOSIS — L82.1 OTHER SEBORRHEIC KERATOSIS: ICD-10-CM

## 2024-10-08 DIAGNOSIS — Z12.83 ENCOUNTER FOR SCREENING FOR MALIGNANT NEOPLASM OF SKIN: ICD-10-CM

## 2024-10-08 PROCEDURE — 99213 OFFICE O/P EST LOW 20 MIN: CPT

## 2024-11-18 DIAGNOSIS — Z94.0 KIDNEY TRANSPLANT STATUS: ICD-10-CM

## 2024-11-19 ENCOUNTER — APPOINTMENT (OUTPATIENT)
Dept: ENDOCRINOLOGY | Facility: CLINIC | Age: 69
End: 2024-11-19
Payer: MEDICARE

## 2024-11-19 ENCOUNTER — APPOINTMENT (OUTPATIENT)
Dept: TRANSPLANT | Facility: CLINIC | Age: 69
End: 2024-11-19

## 2024-11-19 VITALS
BODY MASS INDEX: 27.97 KG/M2 | WEIGHT: 174 LBS | HEIGHT: 66 IN | DIASTOLIC BLOOD PRESSURE: 77 MMHG | SYSTOLIC BLOOD PRESSURE: 167 MMHG | HEART RATE: 75 BPM | OXYGEN SATURATION: 99 % | TEMPERATURE: 98 F

## 2024-11-19 DIAGNOSIS — E78.5 HYPERLIPIDEMIA, UNSPECIFIED: ICD-10-CM

## 2024-11-19 DIAGNOSIS — E11.8 TYPE 2 DIABETES MELLITUS WITH UNSPECIFIED COMPLICATIONS: ICD-10-CM

## 2024-11-19 PROCEDURE — 95251 CONT GLUC MNTR ANALYSIS I&R: CPT

## 2024-11-19 PROCEDURE — 83036 HEMOGLOBIN GLYCOSYLATED A1C: CPT | Mod: QW

## 2024-11-19 PROCEDURE — 99214 OFFICE O/P EST MOD 30 MIN: CPT

## 2024-11-21 ENCOUNTER — APPOINTMENT (OUTPATIENT)
Dept: NEPHROLOGY | Facility: CLINIC | Age: 69
End: 2024-11-21
Payer: MEDICARE

## 2024-11-21 VITALS
OXYGEN SATURATION: 99 % | TEMPERATURE: 97.4 F | RESPIRATION RATE: 15 BRPM | HEIGHT: 66 IN | WEIGHT: 164 LBS | SYSTOLIC BLOOD PRESSURE: 145 MMHG | DIASTOLIC BLOOD PRESSURE: 65 MMHG | HEART RATE: 73 BPM | BODY MASS INDEX: 26.36 KG/M2

## 2024-11-21 DIAGNOSIS — I10 ESSENTIAL (PRIMARY) HYPERTENSION: ICD-10-CM

## 2024-11-21 DIAGNOSIS — D84.9 IMMUNODEFICIENCY, UNSPECIFIED: ICD-10-CM

## 2024-11-21 DIAGNOSIS — E11.8 TYPE 2 DIABETES MELLITUS WITH UNSPECIFIED COMPLICATIONS: ICD-10-CM

## 2024-11-21 DIAGNOSIS — Z94.0 KIDNEY TRANSPLANT STATUS: ICD-10-CM

## 2024-11-21 LAB
ALBUMIN SERPL ELPH-MCNC: 4.4 G/DL
ALP BLD-CCNC: 75 U/L
ALT SERPL-CCNC: 15 U/L
ANION GAP SERPL CALC-SCNC: 14 MMOL/L
APPEARANCE: CLEAR
AST SERPL-CCNC: 15 U/L
BACTERIA: NEGATIVE /HPF
BILIRUB SERPL-MCNC: 0.5 MG/DL
BILIRUBIN URINE: NEGATIVE
BKV DNA SPEC QL NAA+PROBE: NOT DETECTED IU/ML
BLOOD URINE: NEGATIVE
BUN SERPL-MCNC: 16 MG/DL
CALCIUM SERPL-MCNC: 10.2 MG/DL
CAST: 1 /LPF
CHLORIDE SERPL-SCNC: 104 MMOL/L
CMV DNA SPEC QL NAA+PROBE: NOT DETECTED IU/ML
CMVPCR LOG: NOT DETECTED LOG10IU/ML
CO2 SERPL-SCNC: 27 MMOL/L
COLOR: YELLOW
CREAT SERPL-MCNC: 0.98 MG/DL
CREAT SPEC-SCNC: 146 MG/DL
CREAT/PROT UR: 0.1 RATIO
EGFR: 62 ML/MIN/1.73M2
EPITHELIAL CELLS: 11 /HPF
GLUCOSE QUALITATIVE U: NEGATIVE MG/DL
GLUCOSE SERPL-MCNC: 126 MG/DL
HCT VFR BLD CALC: 38 %
HGB BLD-MCNC: 12.4 G/DL
KETONES URINE: NEGATIVE MG/DL
LDH SERPL-CCNC: 191 U/L
LEUKOCYTE ESTERASE URINE: ABNORMAL
MAGNESIUM SERPL-MCNC: 1.9 MG/DL
MCHC RBC-ENTMCNC: 31.2 PG
MCHC RBC-ENTMCNC: 32.6 G/DL
MCV RBC AUTO: 95.5 FL
MICROSCOPIC-UA: NORMAL
NITRITE URINE: NEGATIVE
PH URINE: 6.5
PHOSPHATE SERPL-MCNC: 3.9 MG/DL
PLATELET # BLD AUTO: 165 K/UL
POTASSIUM SERPL-SCNC: 3.7 MMOL/L
PROT SERPL-MCNC: 6.7 G/DL
PROT UR-MCNC: 12 MG/DL
PROTEIN URINE: NORMAL MG/DL
RBC # BLD: 3.98 M/UL
RBC # FLD: 15 %
RED BLOOD CELLS URINE: 2 /HPF
REVIEW: NORMAL
SODIUM SERPL-SCNC: 146 MMOL/L
SPECIFIC GRAVITY URINE: 1.02
TACROLIMUS SERPL-MCNC: 4.6 NG/ML
URATE SERPL-MCNC: 7.6 MG/DL
UROBILINOGEN URINE: 0.2 MG/DL
WBC # FLD AUTO: 7.52 K/UL
WHITE BLOOD CELLS URINE: 1 /HPF

## 2024-11-21 PROCEDURE — 99214 OFFICE O/P EST MOD 30 MIN: CPT

## 2024-12-27 DIAGNOSIS — Z94.0 KIDNEY TRANSPLANT STATUS: ICD-10-CM

## 2025-01-30 NOTE — H&P PST ADULT - NEUROLOGICAL DETAILS
"     [facility]  [address] [facility] [address]  Phone: [phone]  Fax: [phone]   January 30, 2025        Bobby Maki  [address] [facility] [address]            Dear Bobby,    You are currently under the care of [facility] for your warfarin (Coumadin , Jantoven ) therapy.  We are contacting you because our records show you were due for an INR on 11/27/24.    There are potentially serious risks when taking warfarin without careful monitoring and we want to make sure you are safely managed.  Routine lab monitoring is required for warfarin refills.     Please schedule a lab appointment as soon as possible either by calling [phone] or scheduling directly in [facility]. To schedule in [facility] open the \"schedule an appointment\" section then select \"lab only\" as the reason you are coming in and \"INR\" as the lab test. If it is difficult for you to get to lab, please call us to discuss options.  If there has been a change in your care or other concerns, please let us know so we can help and/or update our records.         Sincerely,       [facility]   " alert and oriented x 3/no spontaneous movement

## 2025-02-05 ENCOUNTER — APPOINTMENT (OUTPATIENT)
Dept: TRANSPLANT | Facility: CLINIC | Age: 70
End: 2025-02-05

## 2025-02-05 DIAGNOSIS — Z94.0 KIDNEY TRANSPLANT STATUS: ICD-10-CM

## 2025-02-05 LAB
ALBUMIN SERPL ELPH-MCNC: 4.1 G/DL
ALP BLD-CCNC: 86 U/L
ALT SERPL-CCNC: 16 U/L
ANION GAP SERPL CALC-SCNC: 12 MMOL/L
APPEARANCE: CLEAR
AST SERPL-CCNC: 16 U/L
BACTERIA: NEGATIVE /HPF
BILIRUB SERPL-MCNC: 0.8 MG/DL
BILIRUBIN URINE: NEGATIVE
BLOOD URINE: NEGATIVE
BUN SERPL-MCNC: 15 MG/DL
CALCIUM SERPL-MCNC: 10.3 MG/DL
CAST: 2 /LPF
CHLORIDE SERPL-SCNC: 107 MMOL/L
CO2 SERPL-SCNC: 28 MMOL/L
COLOR: YELLOW
CREAT SERPL-MCNC: 1.11 MG/DL
CREAT SPEC-SCNC: 228 MG/DL
CREAT/PROT UR: 0.1 RATIO
EGFR: 53 ML/MIN/1.73M2
EPITHELIAL CELLS: 10 /HPF
GLUCOSE QUALITATIVE U: NEGATIVE MG/DL
GLUCOSE SERPL-MCNC: 137 MG/DL
HCT VFR BLD CALC: 40.9 %
HGB BLD-MCNC: 13.2 G/DL
KETONES URINE: NEGATIVE MG/DL
LDH SERPL-CCNC: 177 U/L
LEUKOCYTE ESTERASE URINE: ABNORMAL
MAGNESIUM SERPL-MCNC: 1.9 MG/DL
MCHC RBC-ENTMCNC: 31 PG
MCHC RBC-ENTMCNC: 32.3 G/DL
MCV RBC AUTO: 96 FL
MICROSCOPIC-UA: NORMAL
NITRITE URINE: NEGATIVE
PH URINE: 7
PHOSPHATE SERPL-MCNC: 3.2 MG/DL
PLATELET # BLD AUTO: 174 K/UL
POTASSIUM SERPL-SCNC: 4.7 MMOL/L
PROT SERPL-MCNC: 6.7 G/DL
PROT UR-MCNC: 16 MG/DL
PROTEIN URINE: NEGATIVE MG/DL
RBC # BLD: 4.26 M/UL
RBC # FLD: 14.5 %
RED BLOOD CELLS URINE: 1 /HPF
SODIUM SERPL-SCNC: 147 MMOL/L
SPECIFIC GRAVITY URINE: 1.02
TACROLIMUS SERPL-MCNC: 6.1 NG/ML
URATE SERPL-MCNC: 8 MG/DL
UROBILINOGEN URINE: 0.2 MG/DL
WBC # FLD AUTO: 7.55 K/UL
WHITE BLOOD CELLS URINE: 3 /HPF

## 2025-02-07 ENCOUNTER — APPOINTMENT (OUTPATIENT)
Dept: NEPHROLOGY | Facility: CLINIC | Age: 70
End: 2025-02-07
Payer: MEDICARE

## 2025-02-07 VITALS
HEART RATE: 75 BPM | HEIGHT: 66 IN | RESPIRATION RATE: 15 BRPM | OXYGEN SATURATION: 97 % | TEMPERATURE: 97 F | SYSTOLIC BLOOD PRESSURE: 161 MMHG | DIASTOLIC BLOOD PRESSURE: 75 MMHG

## 2025-02-07 VITALS — SYSTOLIC BLOOD PRESSURE: 146 MMHG | DIASTOLIC BLOOD PRESSURE: 68 MMHG

## 2025-02-07 VITALS — WEIGHT: 163.4 LBS | BODY MASS INDEX: 26.37 KG/M2

## 2025-02-07 DIAGNOSIS — I10 ESSENTIAL (PRIMARY) HYPERTENSION: ICD-10-CM

## 2025-02-07 DIAGNOSIS — N25.81 SECONDARY HYPERPARATHYROIDISM OF RENAL ORIGIN: ICD-10-CM

## 2025-02-07 DIAGNOSIS — Z94.0 KIDNEY TRANSPLANT STATUS: ICD-10-CM

## 2025-02-07 DIAGNOSIS — R60.0 LOCALIZED EDEMA: ICD-10-CM

## 2025-02-07 DIAGNOSIS — Z79.60 LONG TERM (CURRENT) USE OF UNSPECIFIED IMMUNOMODULATORS AND IMMUNOSUPPRESSANTS: ICD-10-CM

## 2025-02-07 PROCEDURE — G2211 COMPLEX E/M VISIT ADD ON: CPT

## 2025-02-07 PROCEDURE — 99214 OFFICE O/P EST MOD 30 MIN: CPT

## 2025-02-28 ENCOUNTER — APPOINTMENT (OUTPATIENT)
Dept: ENDOCRINOLOGY | Facility: CLINIC | Age: 70
End: 2025-02-28

## 2025-02-28 VITALS
BODY MASS INDEX: 26.2 KG/M2 | HEART RATE: 76 BPM | DIASTOLIC BLOOD PRESSURE: 72 MMHG | OXYGEN SATURATION: 98 % | HEIGHT: 66 IN | SYSTOLIC BLOOD PRESSURE: 149 MMHG | WEIGHT: 163 LBS

## 2025-02-28 DIAGNOSIS — E78.5 HYPERLIPIDEMIA, UNSPECIFIED: ICD-10-CM

## 2025-02-28 DIAGNOSIS — I10 ESSENTIAL (PRIMARY) HYPERTENSION: ICD-10-CM

## 2025-02-28 DIAGNOSIS — E11.8 TYPE 2 DIABETES MELLITUS WITH UNSPECIFIED COMPLICATIONS: ICD-10-CM

## 2025-02-28 PROCEDURE — 95251 CONT GLUC MNTR ANALYSIS I&R: CPT

## 2025-02-28 PROCEDURE — 83036 HEMOGLOBIN GLYCOSYLATED A1C: CPT | Mod: QW

## 2025-02-28 PROCEDURE — 82962 GLUCOSE BLOOD TEST: CPT

## 2025-02-28 PROCEDURE — 99214 OFFICE O/P EST MOD 30 MIN: CPT

## 2025-02-28 PROCEDURE — G2211 COMPLEX E/M VISIT ADD ON: CPT

## 2025-03-02 LAB
GLUCOSE BLDC GLUCOMTR-MCNC: 148
HBA1C MFR BLD HPLC: 5.8

## 2025-03-27 ENCOUNTER — APPOINTMENT (OUTPATIENT)
Dept: OBGYN | Facility: CLINIC | Age: 70
End: 2025-03-27
Payer: MEDICARE

## 2025-03-27 ENCOUNTER — NON-APPOINTMENT (OUTPATIENT)
Age: 70
End: 2025-03-27

## 2025-03-27 VITALS
HEIGHT: 66 IN | BODY MASS INDEX: 26.24 KG/M2 | WEIGHT: 163.25 LBS | SYSTOLIC BLOOD PRESSURE: 154 MMHG | DIASTOLIC BLOOD PRESSURE: 73 MMHG

## 2025-03-27 DIAGNOSIS — Z01.818 ENCOUNTER FOR OTHER PREPROCEDURAL EXAMINATION: ICD-10-CM

## 2025-03-27 DIAGNOSIS — Z01.419 ENCOUNTER FOR GYNECOLOGICAL EXAMINATION (GENERAL) (ROUTINE) W/OUT ABNORMAL FINDINGS: ICD-10-CM

## 2025-03-27 PROCEDURE — G0101: CPT

## 2025-03-27 PROCEDURE — G0444 DEPRESSION SCREEN ANNUAL: CPT

## 2025-03-31 LAB — HPV HIGH+LOW RISK DNA PNL CVX: NOT DETECTED

## 2025-04-02 LAB — CYTOLOGY CVX/VAG DOC THIN PREP: NORMAL

## 2025-04-16 NOTE — ASU DISCHARGE PLAN (ADULT/PEDIATRIC) - FREQUENT HAND WASHING PREVENTS THE SPREAD OF INFECTION.
Physical Therapy Discharge Summary    Reason for therapy discharge:    Discharged to transitional care facility.    Progress towards therapy goal(s). See goals on Care Plan in [facility] electronic health record for goal details.  Goals partially met.  Barriers to achieving goals:   discharge from facility.    Therapy recommendation(s):    Continued therapy is recommended.  Rationale/Recommendations:  to maximize functional gains/IND.                             Statement Selected

## 2025-05-01 NOTE — H&P ADULT - NS ATTEND OPT1 GEN_ALL_CORE
Routing to MA pool for prep.    Willa MENDOZAN, RN Patient Care Coordinator    [facility]     I attest my time as attending is greater than 50% of the total combined time spent on qualifying patient care activities by the PA/NP and attending.

## 2025-06-02 ENCOUNTER — APPOINTMENT (OUTPATIENT)
Dept: TRANSPLANT | Facility: CLINIC | Age: 70
End: 2025-06-02

## 2025-06-02 LAB
25(OH)D3 SERPL-MCNC: 92.7 NG/ML
ALBUMIN SERPL ELPH-MCNC: 4.2 G/DL
ALP BLD-CCNC: 88 U/L
ALT SERPL-CCNC: 20 U/L
ANION GAP SERPL CALC-SCNC: 14 MMOL/L
APPEARANCE: ABNORMAL
AST SERPL-CCNC: 16 U/L
BACTERIA: ABNORMAL /HPF
BILIRUB SERPL-MCNC: 0.6 MG/DL
BILIRUBIN URINE: NEGATIVE
BLOOD URINE: ABNORMAL
BUN SERPL-MCNC: 17 MG/DL
CALCIUM SERPL-MCNC: 9.7 MG/DL
CALCIUM SERPL-MCNC: 9.7 MG/DL
CAST: 1 /LPF
CHLORIDE SERPL-SCNC: 104 MMOL/L
CHOLEST SERPL-MCNC: 153 MG/DL
CO2 SERPL-SCNC: 24 MMOL/L
COLOR: YELLOW
CREAT SERPL-MCNC: 1.03 MG/DL
CREAT SPEC-SCNC: 146 MG/DL
CREAT/PROT UR: 0.2 RATIO
EGFRCR SERPLBLD CKD-EPI 2021: 58 ML/MIN/1.73M2
EPITHELIAL CELLS: 6 /HPF
ESTIMATED AVERAGE GLUCOSE: 148 MG/DL
GLUCOSE QUALITATIVE U: NEGATIVE MG/DL
GLUCOSE SERPL-MCNC: 137 MG/DL
HBA1C MFR BLD HPLC: 6.8 %
HCT VFR BLD CALC: 39.9 %
HDLC SERPL-MCNC: 58 MG/DL
HGB BLD-MCNC: 12.6 G/DL
KETONES URINE: NEGATIVE MG/DL
LDH SERPL-CCNC: 156 U/L
LDLC SERPL-MCNC: 73 MG/DL
LEUKOCYTE ESTERASE URINE: ABNORMAL
MAGNESIUM SERPL-MCNC: 1.8 MG/DL
MCHC RBC-ENTMCNC: 29.5 PG
MCHC RBC-ENTMCNC: 31.6 G/DL
MCV RBC AUTO: 93.4 FL
MICROSCOPIC-UA: NORMAL
NITRITE URINE: POSITIVE
NONHDLC SERPL-MCNC: 95 MG/DL
PARATHYROID HORMONE INTACT: 40 PG/ML
PH URINE: 6.5
PHOSPHATE SERPL-MCNC: 3.2 MG/DL
PLATELET # BLD AUTO: 180 K/UL
POTASSIUM SERPL-SCNC: 3.5 MMOL/L
PROT SERPL-MCNC: 6.8 G/DL
PROT UR-MCNC: 30 MG/DL
PROTEIN URINE: 30 MG/DL
RBC # BLD: 4.27 M/UL
RBC # FLD: 14.8 %
RED BLOOD CELLS URINE: 1 /HPF
SODIUM SERPL-SCNC: 143 MMOL/L
SPECIFIC GRAVITY URINE: 1.02
TACROLIMUS SERPL-MCNC: 8.6 NG/ML
TRIGL SERPL-MCNC: 123 MG/DL
URATE SERPL-MCNC: 7.8 MG/DL
UROBILINOGEN URINE: 0.2 MG/DL
WBC # FLD AUTO: 8.63 K/UL
WHITE BLOOD CELLS URINE: 322 /HPF

## 2025-06-02 RX ORDER — CIPROFLOXACIN HYDROCHLORIDE 500 MG/1
500 TABLET, FILM COATED ORAL
Qty: 7 | Refills: 0 | Status: ACTIVE | COMMUNITY
Start: 2025-06-02 | End: 1900-01-01

## 2025-06-04 ENCOUNTER — APPOINTMENT (OUTPATIENT)
Dept: NEPHROLOGY | Facility: CLINIC | Age: 70
End: 2025-06-04
Payer: MEDICARE

## 2025-06-04 VITALS
RESPIRATION RATE: 16 BRPM | BODY MASS INDEX: 26.03 KG/M2 | SYSTOLIC BLOOD PRESSURE: 112 MMHG | HEART RATE: 72 BPM | DIASTOLIC BLOOD PRESSURE: 55 MMHG | TEMPERATURE: 97.3 F | OXYGEN SATURATION: 98 % | HEIGHT: 66 IN | WEIGHT: 162 LBS

## 2025-06-04 DIAGNOSIS — D84.9 IMMUNODEFICIENCY, UNSPECIFIED: ICD-10-CM

## 2025-06-04 DIAGNOSIS — Z94.0 KIDNEY TRANSPLANT STATUS: ICD-10-CM

## 2025-06-04 DIAGNOSIS — I10 ESSENTIAL (PRIMARY) HYPERTENSION: ICD-10-CM

## 2025-06-04 DIAGNOSIS — E11.8 TYPE 2 DIABETES MELLITUS WITH UNSPECIFIED COMPLICATIONS: ICD-10-CM

## 2025-06-04 PROCEDURE — 99214 OFFICE O/P EST MOD 30 MIN: CPT

## 2025-07-10 RX ORDER — CEPHALEXIN 500 MG/1
500 CAPSULE ORAL 3 TIMES DAILY
Qty: 15 | Refills: 0 | Status: ACTIVE | COMMUNITY
Start: 2025-07-10 | End: 1900-01-01

## 2025-07-22 ENCOUNTER — APPOINTMENT (OUTPATIENT)
Dept: ENDOCRINOLOGY | Facility: CLINIC | Age: 70
End: 2025-07-22
Payer: MEDICARE

## 2025-07-22 VITALS
DIASTOLIC BLOOD PRESSURE: 68 MMHG | HEART RATE: 70 BPM | BODY MASS INDEX: 26.84 KG/M2 | OXYGEN SATURATION: 98 % | SYSTOLIC BLOOD PRESSURE: 159 MMHG | HEIGHT: 66 IN | WEIGHT: 167 LBS

## 2025-07-22 DIAGNOSIS — Z79.4 TYPE 2 DIABETES MELLITUS WITH HYPERGLYCEMIA: ICD-10-CM

## 2025-07-22 DIAGNOSIS — E78.5 HYPERLIPIDEMIA, UNSPECIFIED: ICD-10-CM

## 2025-07-22 DIAGNOSIS — I10 ESSENTIAL (PRIMARY) HYPERTENSION: ICD-10-CM

## 2025-07-22 DIAGNOSIS — E11.65 TYPE 2 DIABETES MELLITUS WITH HYPERGLYCEMIA: ICD-10-CM

## 2025-07-22 PROCEDURE — 95251 CONT GLUC MNTR ANALYSIS I&R: CPT

## 2025-07-22 PROCEDURE — 99214 OFFICE O/P EST MOD 30 MIN: CPT

## 2025-08-07 ENCOUNTER — APPOINTMENT (OUTPATIENT)
Age: 70
End: 2025-08-07

## 2025-08-07 ENCOUNTER — NON-APPOINTMENT (OUTPATIENT)
Age: 70
End: 2025-08-07

## 2025-08-07 PROCEDURE — 92286 ANT SGM IMG I&R SPECLR MIC: CPT | Mod: RT

## 2025-08-07 PROCEDURE — 92136 OPHTHALMIC BIOMETRY: CPT | Mod: RT

## 2025-08-07 PROCEDURE — 92004 COMPRE OPH EXAM NEW PT 1/>: CPT

## 2025-08-07 PROCEDURE — 92134 CPTRZ OPH DX IMG PST SGM RTA: CPT | Mod: 59

## 2025-09-16 ENCOUNTER — APPOINTMENT (OUTPATIENT)
Dept: TRANSPLANT | Facility: CLINIC | Age: 70
End: 2025-09-16

## 2025-09-17 LAB
ALBUMIN SERPL ELPH-MCNC: 4.1 G/DL
ALP BLD-CCNC: 94 U/L
ALT SERPL-CCNC: 24 U/L
ANION GAP SERPL CALC-SCNC: 17 MMOL/L
APPEARANCE: CLEAR
AST SERPL-CCNC: 14 U/L
BACTERIA: ABNORMAL /HPF
BILIRUB SERPL-MCNC: 0.7 MG/DL
BILIRUBIN URINE: NEGATIVE
BLOOD URINE: ABNORMAL
BUN SERPL-MCNC: 15 MG/DL
CALCIUM SERPL-MCNC: 10 MG/DL
CAST: NORMAL /LPF
CHLORIDE SERPL-SCNC: 106 MMOL/L
CMV DNA SPEC QL NAA+PROBE: NOT DETECTED IU/ML
CMVPCR LOG: NOT DETECTED LOG10IU/ML
CO2 SERPL-SCNC: 22 MMOL/L
COLOR: YELLOW
CREAT SERPL-MCNC: 0.93 MG/DL
EGFRCR SERPLBLD CKD-EPI 2021: 66 ML/MIN/1.73M2
EPITHELIAL CELLS: 3 /HPF
GLUCOSE QUALITATIVE U: NEGATIVE
GLUCOSE SERPL-MCNC: 144 MG/DL
HCT VFR BLD CALC: 39.8 %
HGB BLD-MCNC: 12.8 G/DL
KETONES URINE: NEGATIVE
LDH SERPL-CCNC: 176 U/L
LEUKOCYTE ESTERASE URINE: ABNORMAL
MAGNESIUM SERPL-MCNC: 1.8 MG/DL
MCHC RBC-ENTMCNC: 30.6 PG
MCHC RBC-ENTMCNC: 32.2 G/DL
MCV RBC AUTO: 95.2 FL
NITRITE URINE: NEGATIVE
PH URINE: 6
PHOSPHATE SERPL-MCNC: 3.5 MG/DL
PLATELET # BLD AUTO: 169 K/UL
POTASSIUM SERPL-SCNC: 4.1 MMOL/L
PROT SERPL-MCNC: 6.6 G/DL
PROTEIN URINE: ABNORMAL
RBC # BLD: 4.18 M/UL
RBC # FLD: 15.1 %
RED BLOOD CELLS URINE: 3 /HPF
REVIEW: NORMAL
SODIUM SERPL-SCNC: 145 MMOL/L
SPECIFIC GRAVITY URINE: 1.01
TACROLIMUS SERPL-MCNC: 9.4 NG/ML
URATE SERPL-MCNC: 6.8 MG/DL
UROBILINOGEN URINE: NORMAL
WBC # FLD AUTO: 8.21 K/UL
WHITE BLOOD CELLS URINE: 687 /HPF

## 2025-09-18 ENCOUNTER — RX RENEWAL (OUTPATIENT)
Age: 70
End: 2025-09-18

## 2025-09-18 LAB
BKV DNA SPEC QL NAA+PROBE: NOT DETECTED IU/ML
CREAT SPEC-SCNC: 209 MG/DL
CREAT/PROT UR: 0.5 RATIO
PROT UR-MCNC: 96 MG/DL

## 2025-09-18 RX ORDER — PEN NEEDLE, DIABETIC 32GX 5/32"
32G X 4 MM NEEDLE, DISPOSABLE MISCELLANEOUS
Qty: 400 | Refills: 3 | Status: ACTIVE | COMMUNITY
Start: 2025-09-18 | End: 1900-01-01

## 2025-09-19 ENCOUNTER — APPOINTMENT (OUTPATIENT)
Dept: NEPHROLOGY | Facility: CLINIC | Age: 70
End: 2025-09-19
Payer: MEDICARE

## 2025-09-19 VITALS
HEART RATE: 78 BPM | DIASTOLIC BLOOD PRESSURE: 76 MMHG | TEMPERATURE: 98.6 F | OXYGEN SATURATION: 98 % | RESPIRATION RATE: 16 BRPM | HEIGHT: 66 IN | SYSTOLIC BLOOD PRESSURE: 166 MMHG | BODY MASS INDEX: 26.68 KG/M2 | WEIGHT: 166 LBS

## 2025-09-19 VITALS — DIASTOLIC BLOOD PRESSURE: 70 MMHG | SYSTOLIC BLOOD PRESSURE: 155 MMHG

## 2025-09-19 DIAGNOSIS — Z94.0 KIDNEY TRANSPLANT STATUS: ICD-10-CM

## 2025-09-19 DIAGNOSIS — I10 ESSENTIAL (PRIMARY) HYPERTENSION: ICD-10-CM

## 2025-09-19 DIAGNOSIS — Z79.60 LONG TERM (CURRENT) USE OF UNSPECIFIED IMMUNOMODULATORS AND IMMUNOSUPPRESSANTS: ICD-10-CM

## 2025-09-19 DIAGNOSIS — N25.81 SECONDARY HYPERPARATHYROIDISM OF RENAL ORIGIN: ICD-10-CM

## 2025-09-19 DIAGNOSIS — R60.0 LOCALIZED EDEMA: ICD-10-CM

## 2025-09-19 PROCEDURE — G2211 COMPLEX E/M VISIT ADD ON: CPT | Mod: GC

## 2025-09-19 PROCEDURE — 99214 OFFICE O/P EST MOD 30 MIN: CPT | Mod: GC

## (undated) DEVICE — GLV 7 PROTEXIS (WHITE)

## (undated) DEVICE — SUT PROLENE 6-0 4-30" C-1

## (undated) DEVICE — SOL IRR POUR H2O 250ML

## (undated) DEVICE — BAG DECANTER DISP

## (undated) DEVICE — SYR LUER LOK 50CC

## (undated) DEVICE — GLV 7.5 PROTEXIS (WHITE)

## (undated) DEVICE — DRSG TAPE MEDIPORE 3"

## (undated) DEVICE — DRAIN RESERVOIR FOR JACKSON PRATT 100CC CARDINAL

## (undated) DEVICE — SUT PROLENE 7-0 30" C-1

## (undated) DEVICE — DRAPE IOBAN 23" X 23"

## (undated) DEVICE — DRAPE MAYO STAND 30"

## (undated) DEVICE — DRSG CURITY GAUZE SPONGE 4 X 4" 12-PLY

## (undated) DEVICE — VENODYNE/SCD SLEEVE CALF LARGE

## (undated) DEVICE — SUT SOFSILK 4-0 18" TIES

## (undated) DEVICE — WARMING BLANKET LOWER ADULT

## (undated) DEVICE — GOWN TRIMAX LG

## (undated) DEVICE — DRSG TELFA 3 X 8

## (undated) DEVICE — GLV 6.5 PROTEXIS (WHITE)

## (undated) DEVICE — SYR LUER LOK 20CC

## (undated) DEVICE — POSITIONER FOAM HEADREST (PINK)

## (undated) DEVICE — SUT SOFSILK 2-0 18" TIES

## (undated) DEVICE — TUNNELER SHEATH ON-Q 17GA X 8"

## (undated) DEVICE — TUBING TRUWAVE PRESSURE MALE/FEMALE 72"

## (undated) DEVICE — DRAPE ISOLATION BAG 20X20"

## (undated) DEVICE — SYR LUER LOK 10CC

## (undated) DEVICE — FOLEY HOLDER STATLOCK 2 WAY ADULT

## (undated) DEVICE — SUT BOOT STANDARD (ASSORTED) 5 PAIR

## (undated) DEVICE — PACK CYSTO

## (undated) DEVICE — GLV 8 PROTEXIS (WHITE)

## (undated) DEVICE — WARMING BLANKET UPPER ADULT

## (undated) DEVICE — DRAPE INSTRUMENT POUCH 6.75" X 11"

## (undated) DEVICE — DRAPE LIGHT HANDLE COVER (BLUE)

## (undated) DEVICE — TUBING SUCTION 20FT

## (undated) DEVICE — SUT PDS II PLUS 5-0 30" RB-1

## (undated) DEVICE — POSITIONER FOAM EGG CRATE ULNAR 2PCS (PINK)

## (undated) DEVICE — SOL IRR BAG H2O 3000ML

## (undated) DEVICE — TUBING TUR 2 PRONG

## (undated) DEVICE — MEDICATION LABELS W MARKER

## (undated) DEVICE — BAG URINE W METER 2L

## (undated) DEVICE — DRAPE SLUSH / WARMER 44 X 66"

## (undated) DEVICE — CATH IV SAFE INSYTE 16G X 1.16" (GRAY)

## (undated) DEVICE — FOLEY TRAY 16FR 5CC LTX UMETER CLOSED

## (undated) DEVICE — PACK MAJOR ABDOMINAL SUPINE

## (undated) DEVICE — DRAPE EQUIPMENT BANDED BAG 30 X 30" (SHOWER CAP)

## (undated) DEVICE — DRAPE TOWEL BLUE 17" X 24"

## (undated) DEVICE — ACMI SELF-SEALING SEAL UP TO 7FR

## (undated) DEVICE — SOL IRR POUR NS 0.9% 500ML

## (undated) DEVICE — BLADE SCALPEL SAFETYLOCK #15

## (undated) DEVICE — SYR ASEPTO

## (undated) DEVICE — SUT NYLON 2-0 18" FS

## (undated) DEVICE — NDL BIOPSY MONOPTY 18G X 20CM

## (undated) DEVICE — GOWN XL EXTRA LONG

## (undated) DEVICE — SUT SOFSILK 2-0 18" V-20 (POP-OFF)

## (undated) DEVICE — GLV 8.5 PROTEXIS (WHITE)

## (undated) DEVICE — SUT ETHIBOND 5 30" LR

## (undated) DEVICE — SPECIMEN CONTAINER 100ML

## (undated) DEVICE — SUCTION YANKAUER NO CONTROL VENT

## (undated) DEVICE — ELCTR BOVIE TIP BLADE INSULATED 6.5" EDGE

## (undated) DEVICE — DRAIN CHANNEL 19FR ROUND FULL FLUTED

## (undated) DEVICE — PUNCH VASC STD 7.75" HANDLE 4.8MM DISP

## (undated) DEVICE — SUT PDS II 5-0 27" RB-1

## (undated) DEVICE — PREP CHLORAPREP HI-LITE ORANGE 26ML

## (undated) DEVICE — SUT PDS II PLUS 4-0 27" SH

## (undated) DEVICE — NDL COUNTER FOAM AND MAGNET 40-70

## (undated) DEVICE — VESSEL LOOP MAXI-RED  0.120" X 16"

## (undated) DEVICE — SUT SOFSILK 4-0 18" V-20

## (undated) DEVICE — VISITEC 4X4

## (undated) DEVICE — ELCTR BOVIE TIP BLADE INSULATED 2.75" EDGE

## (undated) DEVICE — BLADE SCALPEL SAFETYLOCK #10

## (undated) DEVICE — SOL IRR POUR H2O 1500ML

## (undated) DEVICE — GLV 8 PROTEXIS ORTHO (CREAM)

## (undated) DEVICE — GLV 8 PROTEXIS (CREAM) NEU-THERA

## (undated) DEVICE — LAP PAD 18 X 18"